# Patient Record
Sex: FEMALE | Race: ASIAN | NOT HISPANIC OR LATINO | URBAN - METROPOLITAN AREA
[De-identification: names, ages, dates, MRNs, and addresses within clinical notes are randomized per-mention and may not be internally consistent; named-entity substitution may affect disease eponyms.]

---

## 2017-07-01 ENCOUNTER — OUTPATIENT (OUTPATIENT)
Dept: OUTPATIENT SERVICES | Facility: HOSPITAL | Age: 77
LOS: 1 days | End: 2017-07-01
Payer: MEDICAID

## 2017-07-18 DIAGNOSIS — R69 ILLNESS, UNSPECIFIED: ICD-10-CM

## 2017-09-01 PROCEDURE — G9001: CPT

## 2019-04-01 ENCOUNTER — OUTPATIENT (OUTPATIENT)
Dept: OUTPATIENT SERVICES | Facility: HOSPITAL | Age: 79
LOS: 1 days | End: 2019-04-01
Payer: MEDICARE

## 2019-04-01 PROCEDURE — G9001: CPT

## 2019-04-17 DIAGNOSIS — Z71.89 OTHER SPECIFIED COUNSELING: ICD-10-CM

## 2021-09-27 ENCOUNTER — INPATIENT (INPATIENT)
Facility: HOSPITAL | Age: 81
LOS: 3 days | Discharge: HOME | End: 2021-10-01
Attending: HOSPITALIST | Admitting: HOSPITALIST
Payer: MEDICARE

## 2021-09-27 VITALS
HEART RATE: 44 BPM | DIASTOLIC BLOOD PRESSURE: 53 MMHG | RESPIRATION RATE: 114 BRPM | OXYGEN SATURATION: 100 % | SYSTOLIC BLOOD PRESSURE: 116 MMHG

## 2021-09-27 DIAGNOSIS — R55 SYNCOPE AND COLLAPSE: ICD-10-CM

## 2021-09-27 DIAGNOSIS — Z29.9 ENCOUNTER FOR PROPHYLACTIC MEASURES, UNSPECIFIED: ICD-10-CM

## 2021-09-27 DIAGNOSIS — R00.1 BRADYCARDIA, UNSPECIFIED: ICD-10-CM

## 2021-09-27 DIAGNOSIS — N39.0 URINARY TRACT INFECTION, SITE NOT SPECIFIED: ICD-10-CM

## 2021-09-27 DIAGNOSIS — Z87.81 PERSONAL HISTORY OF (HEALED) TRAUMATIC FRACTURE: Chronic | ICD-10-CM

## 2021-09-27 DIAGNOSIS — R29.6 REPEATED FALLS: ICD-10-CM

## 2021-09-27 DIAGNOSIS — Z98.890 OTHER SPECIFIED POSTPROCEDURAL STATES: Chronic | ICD-10-CM

## 2021-09-27 LAB
ALBUMIN SERPL ELPH-MCNC: 4.1 G/DL — SIGNIFICANT CHANGE UP (ref 3.3–5)
ALP SERPL-CCNC: 98 U/L — SIGNIFICANT CHANGE UP (ref 40–120)
ALT FLD-CCNC: 10 U/L — SIGNIFICANT CHANGE UP (ref 4–33)
ANION GAP SERPL CALC-SCNC: 14 MMOL/L — SIGNIFICANT CHANGE UP (ref 7–14)
APPEARANCE UR: CLEAR — SIGNIFICANT CHANGE UP
AST SERPL-CCNC: 17 U/L — SIGNIFICANT CHANGE UP (ref 4–32)
BACTERIA # UR AUTO: ABNORMAL
BASOPHILS # BLD AUTO: 0.03 K/UL — SIGNIFICANT CHANGE UP (ref 0–0.2)
BASOPHILS NFR BLD AUTO: 0.4 % — SIGNIFICANT CHANGE UP (ref 0–2)
BILIRUB SERPL-MCNC: 0.4 MG/DL — SIGNIFICANT CHANGE UP (ref 0.2–1.2)
BILIRUB UR-MCNC: NEGATIVE — SIGNIFICANT CHANGE UP
BLOOD GAS VENOUS COMPREHENSIVE RESULT: SIGNIFICANT CHANGE UP
BUN SERPL-MCNC: 8 MG/DL — SIGNIFICANT CHANGE UP (ref 7–23)
CALCIUM SERPL-MCNC: 9.2 MG/DL — SIGNIFICANT CHANGE UP (ref 8.4–10.5)
CHLORIDE SERPL-SCNC: 96 MMOL/L — LOW (ref 98–107)
CO2 SERPL-SCNC: 21 MMOL/L — LOW (ref 22–31)
COLOR SPEC: SIGNIFICANT CHANGE UP
CREAT SERPL-MCNC: 0.66 MG/DL — SIGNIFICANT CHANGE UP (ref 0.5–1.3)
DIFF PNL FLD: NEGATIVE — SIGNIFICANT CHANGE UP
EOSINOPHIL # BLD AUTO: 0.31 K/UL — SIGNIFICANT CHANGE UP (ref 0–0.5)
EOSINOPHIL NFR BLD AUTO: 4.3 % — SIGNIFICANT CHANGE UP (ref 0–6)
EPI CELLS # UR: 3 /HPF — SIGNIFICANT CHANGE UP (ref 0–5)
GLUCOSE SERPL-MCNC: 134 MG/DL — HIGH (ref 70–99)
GLUCOSE UR QL: NEGATIVE — SIGNIFICANT CHANGE UP
HCT VFR BLD CALC: 34.1 % — LOW (ref 34.5–45)
HGB BLD-MCNC: 11.6 G/DL — SIGNIFICANT CHANGE UP (ref 11.5–15.5)
HYALINE CASTS # UR AUTO: 7 /LPF — SIGNIFICANT CHANGE UP (ref 0–7)
IANC: 2.76 K/UL — SIGNIFICANT CHANGE UP (ref 1.5–8.5)
IMM GRANULOCYTES NFR BLD AUTO: 0.4 % — SIGNIFICANT CHANGE UP (ref 0–1.5)
KETONES UR-MCNC: NEGATIVE — SIGNIFICANT CHANGE UP
LEUKOCYTE ESTERASE UR-ACNC: ABNORMAL
LIDOCAIN IGE QN: 29 U/L — SIGNIFICANT CHANGE UP (ref 7–60)
LYMPHOCYTES # BLD AUTO: 3.56 K/UL — HIGH (ref 1–3.3)
LYMPHOCYTES # BLD AUTO: 49.3 % — HIGH (ref 13–44)
MAGNESIUM SERPL-MCNC: 2.2 MG/DL — SIGNIFICANT CHANGE UP (ref 1.6–2.6)
MCHC RBC-ENTMCNC: 28.8 PG — SIGNIFICANT CHANGE UP (ref 27–34)
MCHC RBC-ENTMCNC: 34 GM/DL — SIGNIFICANT CHANGE UP (ref 32–36)
MCV RBC AUTO: 84.6 FL — SIGNIFICANT CHANGE UP (ref 80–100)
MONOCYTES # BLD AUTO: 0.53 K/UL — SIGNIFICANT CHANGE UP (ref 0–0.9)
MONOCYTES NFR BLD AUTO: 7.3 % — SIGNIFICANT CHANGE UP (ref 2–14)
NEUTROPHILS # BLD AUTO: 2.76 K/UL — SIGNIFICANT CHANGE UP (ref 1.8–7.4)
NEUTROPHILS NFR BLD AUTO: 38.3 % — LOW (ref 43–77)
NITRITE UR-MCNC: NEGATIVE — SIGNIFICANT CHANGE UP
NRBC # BLD: 0 /100 WBCS — SIGNIFICANT CHANGE UP
NRBC # FLD: 0 K/UL — SIGNIFICANT CHANGE UP
NT-PROBNP SERPL-SCNC: 173 PG/ML — SIGNIFICANT CHANGE UP
PH UR: 7 — SIGNIFICANT CHANGE UP (ref 5–8)
PLATELET # BLD AUTO: 337 K/UL — SIGNIFICANT CHANGE UP (ref 150–400)
POTASSIUM SERPL-MCNC: 3.6 MMOL/L — SIGNIFICANT CHANGE UP (ref 3.5–5.3)
POTASSIUM SERPL-SCNC: 3.6 MMOL/L — SIGNIFICANT CHANGE UP (ref 3.5–5.3)
PROT SERPL-MCNC: 7.3 G/DL — SIGNIFICANT CHANGE UP (ref 6–8.3)
PROT UR-MCNC: ABNORMAL
RBC # BLD: 4.03 M/UL — SIGNIFICANT CHANGE UP (ref 3.8–5.2)
RBC # FLD: 12.2 % — SIGNIFICANT CHANGE UP (ref 10.3–14.5)
RBC CASTS # UR COMP ASSIST: 1 /HPF — SIGNIFICANT CHANGE UP (ref 0–4)
SARS-COV-2 RNA SPEC QL NAA+PROBE: SIGNIFICANT CHANGE UP
SODIUM SERPL-SCNC: 131 MMOL/L — LOW (ref 135–145)
SP GR SPEC: 1.01 — SIGNIFICANT CHANGE UP (ref 1–1.05)
T3FREE SERPL-MCNC: 2.75 PG/ML — SIGNIFICANT CHANGE UP (ref 1.8–4.6)
T4 AB SER-ACNC: 6.7 UG/DL — SIGNIFICANT CHANGE UP (ref 5.1–13)
TROPONIN T, HIGH SENSITIVITY RESULT: 7 NG/L — SIGNIFICANT CHANGE UP
TSH SERPL-MCNC: 5.01 UIU/ML — HIGH (ref 0.27–4.2)
UROBILINOGEN FLD QL: SIGNIFICANT CHANGE UP
WBC # BLD: 7.22 K/UL — SIGNIFICANT CHANGE UP (ref 3.8–10.5)
WBC # FLD AUTO: 7.22 K/UL — SIGNIFICANT CHANGE UP (ref 3.8–10.5)
WBC UR QL: 22 /HPF — HIGH (ref 0–5)

## 2021-09-27 PROCEDURE — 73100 X-RAY EXAM OF WRIST: CPT | Mod: 26,LT

## 2021-09-27 PROCEDURE — 70450 CT HEAD/BRAIN W/O DYE: CPT | Mod: 26

## 2021-09-27 PROCEDURE — 71045 X-RAY EXAM CHEST 1 VIEW: CPT | Mod: 26

## 2021-09-27 PROCEDURE — 73030 X-RAY EXAM OF SHOULDER: CPT | Mod: 26,LT

## 2021-09-27 PROCEDURE — 99233 SBSQ HOSP IP/OBS HIGH 50: CPT

## 2021-09-27 PROCEDURE — 99285 EMERGENCY DEPT VISIT HI MDM: CPT | Mod: 25

## 2021-09-27 PROCEDURE — 93010 ELECTROCARDIOGRAM REPORT: CPT

## 2021-09-27 PROCEDURE — 99223 1ST HOSP IP/OBS HIGH 75: CPT | Mod: GC

## 2021-09-27 RX ORDER — CEFTRIAXONE 500 MG/1
1000 INJECTION, POWDER, FOR SOLUTION INTRAMUSCULAR; INTRAVENOUS ONCE
Refills: 0 | Status: COMPLETED | OUTPATIENT
Start: 2021-09-27 | End: 2021-09-27

## 2021-09-27 RX ORDER — SODIUM CHLORIDE 9 MG/ML
1000 INJECTION INTRAMUSCULAR; INTRAVENOUS; SUBCUTANEOUS ONCE
Refills: 0 | Status: COMPLETED | OUTPATIENT
Start: 2021-09-27 | End: 2021-09-27

## 2021-09-27 RX ORDER — ONDANSETRON 8 MG/1
4 TABLET, FILM COATED ORAL ONCE
Refills: 0 | Status: COMPLETED | OUTPATIENT
Start: 2021-09-27 | End: 2021-09-27

## 2021-09-27 RX ORDER — ENOXAPARIN SODIUM 100 MG/ML
40 INJECTION SUBCUTANEOUS DAILY
Refills: 0 | Status: DISCONTINUED | OUTPATIENT
Start: 2021-09-27 | End: 2021-10-01

## 2021-09-27 RX ORDER — CEFTRIAXONE 500 MG/1
1000 INJECTION, POWDER, FOR SOLUTION INTRAMUSCULAR; INTRAVENOUS EVERY 24 HOURS
Refills: 0 | Status: DISCONTINUED | OUTPATIENT
Start: 2021-09-28 | End: 2021-09-29

## 2021-09-27 RX ADMIN — ENOXAPARIN SODIUM 40 MILLIGRAM(S): 100 INJECTION SUBCUTANEOUS at 23:28

## 2021-09-27 RX ADMIN — CEFTRIAXONE 100 MILLIGRAM(S): 500 INJECTION, POWDER, FOR SOLUTION INTRAMUSCULAR; INTRAVENOUS at 11:07

## 2021-09-27 RX ADMIN — SODIUM CHLORIDE 1000 MILLILITER(S): 9 INJECTION INTRAMUSCULAR; INTRAVENOUS; SUBCUTANEOUS at 08:00

## 2021-09-27 RX ADMIN — ONDANSETRON 4 MILLIGRAM(S): 8 TABLET, FILM COATED ORAL at 08:10

## 2021-09-27 NOTE — ED ADULT NURSE NOTE - OBJECTIVE STATEMENT
Pt received in room 17. Pt A&Ox2 (name and ), BIBEMS for evaluation of a fall and AMS. Per ffamily, pt has no pmhx and does not take any medications. Grandon at bedside states pt may have a history of low BP. States he heard her fall in kitchen and found her on floor, pt unable to provide any history or details. Pt c/o nausea. Pt denies cp, sob, abd pain, ha, dizziness v/d/, fevers/chills. Respirations even and unlabored, no accessory muscle use. Pt in sinus vitaly to 40's, MD aware at bedside. 20G placed to L AC, labs sent. Arrived with 20G to R hand from EMS. Stretcher in lowest position, side rails up, call bell within reach.

## 2021-09-27 NOTE — CONSULT NOTE ADULT - SUBJECTIVE AND OBJECTIVE BOX
Date of Admission: 9/27/2021    CHIEF COMPLAINT: unwitnessed fall    HISTORY OF PRESENT ILLNESS:  This is a 80yoF w/ no known past medical history presents after an unwitnessed fall.  As per daughter, patient was making tea when she syncopized     Allergies    No Known Allergies    Intolerances    	    MEDICATIONS:  enoxaparin Injectable 40 milliGRAM(s) SubCutaneous daily                  PAST MEDICAL & SURGICAL HISTORY:  H/O migraine    Bilateral cataracts    Syncope    H/O elbow surgery    H/O fracture of leg        FAMILY HISTORY:      SOCIAL HISTORY:    [ ] Non-smoker  [ ] Smoker  [ ] Alcohol      REVIEW OF SYSTEMS:  See HPI. Otherwise, 10 point ROS done and otherwise negative.    PHYSICAL EXAM:  T(C): 36.7 (09-27-21 @ 16:20), Max: 36.7 (09-27-21 @ 15:32)  HR: 82 (09-27-21 @ 16:20) (44 - 82)  BP: 173/90 (09-27-21 @ 16:20) (109/67 - 173/90)  RR: 18 (09-27-21 @ 16:20) (17 - 114)  SpO2: 100% (09-27-21 @ 16:20) (99% - 100%)  Wt(kg): --  I&O's Summary      Appearance: Normal	  HEENT:   Normal oral mucosa, PERRL, EOMI	  Lymphatic: No lymphadenopathy  Cardiovascular: Normal S1 S2, No JVD, No murmurs, No edema  Respiratory: Lungs clear to auscultation	  Psychiatry: A & O x 3, Mood & affect appropriate  Gastrointestinal:  Soft, Non-tender, + BS	  Skin: No rashes, No ecchymoses, No cyanosis	  Neurologic: Non-focal  Extremities: Normal range of motion, No clubbing, cyanosis or edema  Vascular: Peripheral pulses palpable 2+ bilaterally        LABS:	 	    CBC Full  -  ( 27 Sep 2021 07:57 )  WBC Count : 7.22 K/uL  Hemoglobin : 11.6 g/dL  Hematocrit : 34.1 %  Platelet Count - Automated : 337 K/uL  Mean Cell Volume : 84.6 fL  Mean Cell Hemoglobin : 28.8 pg  Mean Cell Hemoglobin Concentration : 34.0 gm/dL  Auto Neutrophil # : 2.76 K/uL  Auto Lymphocyte # : 3.56 K/uL  Auto Monocyte # : 0.53 K/uL  Auto Eosinophil # : 0.31 K/uL  Auto Basophil # : 0.03 K/uL  Auto Neutrophil % : 38.3 %  Auto Lymphocyte % : 49.3 %  Auto Monocyte % : 7.3 %  Auto Eosinophil % : 4.3 %  Auto Basophil % : 0.4 %    09-27    131<L>  |  96<L>  |  8   ----------------------------<  134<H>  3.6   |  21<L>  |  0.66    Ca    9.2      27 Sep 2021 07:57  Mg     2.20     09-27    TPro  7.3  /  Alb  4.1  /  TBili  0.4  /  DBili  x   /  AST  17  /  ALT  10  /  AlkPhos  98  09-27      proBNP: Serum Pro-Brain Natriuretic Peptide: 173 pg/mL (09-27 @ 07:57)    Lipid Profile:   HgA1c:   TSH: Thyroid Stimulating Hormone, Serum: 5.01 uIU/mL (09-27 @ 08:37)        CARDIAC MARKERS:            TELEMETRY: 	    ECG:  	  RADIOLOGY:  OTHER: 	    PREVIOUS DIAGNOSTIC TESTING:    [ ] Echocardiogram:  [ ]  Catheterization:  [ ] Stress Test:  	  	  ASSESSMENT/PLAN: 	     Date of Admission: 9/27/2021    CHIEF COMPLAINT: unwitnessed fall    HISTORY OF PRESENT ILLNESS:  This is a 80yoF w/ no known past medical history presents after an unwitnessed fall.  As per daughter, patient was making tea when she lost consciousness and collapsed, was found by her grandson.  She awoke immediately but remain altered and confused until arrival to ED.  Daughter states patient has not been feeling like herself since the night before.  Denies any changes in her medications, diet or lifestyle.  Does endorse a lifetime of intermittent dizziness that was never worked up.  Upon arrival of EMS, HR noted be in the 40's with improvement after a bolus of IVF.  In ED, was bradycardic 40-50's in AM but steadily improved to 70-90's when patient woke up during the day.  States she is feeling better however as per daughter still remains lethargic.  EKG noted to be sinus bradycardia.  Labs notable for TSH 5.01, Na 131, UA with few bacteria and large leukocytes.  CT head negative for acute bleed.  XR wrist and shoulder negative for acute fracture.      Allergies  No Known Allergies    MEDICATIONS:  enoxaparin Injectable 40 milliGRAM(s) SubCutaneous daily    PAST MEDICAL & SURGICAL HISTORY:  H/O migraine  Bilateral cataracts  Syncope  H/O elbow surgery  H/O fracture of leg    REVIEW OF SYSTEMS:  See HPI. Otherwise, 10 point ROS done and otherwise negative.    PHYSICAL EXAM:  T(C): 36.7 (09-27-21 @ 16:20), Max: 36.7 (09-27-21 @ 15:32)  HR: 82 (09-27-21 @ 16:20) (44 - 82)  BP: 173/90 (09-27-21 @ 16:20) (109/67 - 173/90)  RR: 18 (09-27-21 @ 16:20) (17 - 114)  SpO2: 100% (09-27-21 @ 16:20) (99% - 100%)  Wt(kg): --  I&O's Summary    Appearance: Normal	  HEENT:   Normal oral mucosa, PERRL, EOMI	  Lymphatic: No lymphadenopathy  Cardiovascular: Normal S1 S2, No JVD, No murmurs, No edema  Respiratory: Lungs clear to auscultation	  Psychiatry: A & O x 3, Mood & affect appropriate  Gastrointestinal:  Soft, Non-tender, + BS	  Skin: No rashes, No ecchymoses, No cyanosis	  Neurologic: Non-focal  Extremities: Normal range of motion, No clubbing, cyanosis or edema  Vascular: Peripheral pulses palpable 2+ bilaterally    LABS:	 	  CBC Full  -  ( 27 Sep 2021 07:57 )  WBC Count : 7.22 K/uL  Hemoglobin : 11.6 g/dL  Hematocrit : 34.1 %  Platelet Count - Automated : 337 K/uL  Mean Cell Volume : 84.6 fL  Mean Cell Hemoglobin : 28.8 pg  Mean Cell Hemoglobin Concentration : 34.0 gm/dL  Auto Neutrophil # : 2.76 K/uL  Auto Lymphocyte # : 3.56 K/uL  Auto Monocyte # : 0.53 K/uL  Auto Eosinophil # : 0.31 K/uL  Auto Basophil # : 0.03 K/uL  Auto Neutrophil % : 38.3 %  Auto Lymphocyte % : 49.3 %  Auto Monocyte % : 7.3 %  Auto Eosinophil % : 4.3 %  Auto Basophil % : 0.4 %    09-27    131<L>  |  96<L>  |  8   ----------------------------<  134<H>  3.6   |  21<L>  |  0.66    Ca    9.2      27 Sep 2021 07:57  Mg     2.20     09-27    TPro  7.3  /  Alb  4.1  /  TBili  0.4  /  DBili  x   /  AST  17  /  ALT  10  /  AlkPhos  98  09-27      proBNP: Serum Pro-Brain Natriuretic Peptide: 173 pg/mL (09-27 @ 07:57)    TSH: Thyroid Stimulating Hormone, Serum: 5.01 uIU/mL (09-27 @ 08:37)

## 2021-09-27 NOTE — H&P ADULT - ASSESSMENT
81 y/o female w/ h/o dizziness and syncope, migraine HA, and cataracts being admitted d/t unwitnessed fall and possible syncope. On arrival, pt was bradycardic to 44 bpm and found to have UTI. 81 y/o female w/ h/o dizziness and syncope, migraine HA, and cataracts being admitted d/t unwitnessed fall and possible syncope found to be bradycardic on initial presentation and with possible UTI.

## 2021-09-27 NOTE — ED PROVIDER NOTE - CLINICAL SUMMARY MEDICAL DECISION MAKING FREE TEXT BOX
80F presents with AMS, bradycardia. EKG appears to be sinus rhythm at this time. Pt intermittently dropping HR to 40s when vomting. Unclear if intermittent block or vagaling. Will check labs for electrolytes, trops, thyroid studies. Pt on zole and cardiac monitor. CT head for fall. Discuss with cards, TBA.

## 2021-09-27 NOTE — H&P ADULT - NEGATIVE GENERAL SYMPTOMS
Pt reports "feeling bad" day before fall./no fever/no chills Pt reports "feeling bad" day before fall. - wanted to rest more than normal/no fever/no chills

## 2021-09-27 NOTE — H&P ADULT - GASTROINTESTINAL DETAILS
soft/no distention/no masses palpable/bowel sounds normal/no rebound tenderness/no guarding/no rigidity

## 2021-09-27 NOTE — H&P ADULT - PROBLEM SELECTOR PLAN 2
- HR was 44 bpm and now up to 78 bpm  - Atropine if pt becomes symptomatic  - Cardiology consult. - Sinus vitaly to 44 on initial presentation without evidence of high grade AV block.  - heart rate has now improved and is stable in 70's while resting in bed  - Suspect patient had increased vagal tone but will continue to monitor  - Hold AVNB for now  - If becomes symptomatically bradycardic may place pacing pads on patient and keep zoll at bedside with Atropine  - EP consult called

## 2021-09-27 NOTE — ED PROVIDER NOTE - OBJECTIVE STATEMENT
80F unknown PMH presents with AMS. As per family, pt has been in [facility] recently. This AM they heard her fall in the kitchen and thought she appeared altered so called 911. States pt takes no medications, has no known medical history except that her BP is sometimes low. States she is mostly independent with ADLs. AAOx2 at baseline. Pt altered but only c/o epigastric pain, nausea.

## 2021-09-27 NOTE — H&P ADULT - HISTORY OF PRESENT ILLNESS
81 y/o female w/ h/o dizziness and syncope, migraine HA, and cataracts presents s/p unwitnessed fall this morning. Pt states that she was "feeling bad" day before and that she had a HA, which improved w/ Tylenol. This morning pt was making tea with her grandson and went to go from the kitchen to the next room where she fell. Pt does not remember events of the fall but states that she started feeling dizzy and then her legs gave out. States that she may have hit the back of her head while falling. Per daughter who was present in her room, pt's grandson went to her immediately after he heard her fall and called EMS. Pt's daughter also came very quickly and laid the pt on the floor and placed a pillow under her head. Her daughter states that when she found her, her eyes were closed and when they opened they were unable to focus. Pt was also unable to communicate. States that mental status improved rapidly and was close to baseline by the time EMS arrived and was able to answer their questions. Per daughter, pt suffers from dizziness and syncope since she was younger but denies any recent events and states that they have never seen her like that before. Daughter denies any cardiac history but states that her BP fluctuates and sometimes gets very low. After the fall pt was c/o left wrist pain, which is aggravated w/ movement. Also reports 1 episode of vomiting in the ambulance and RUQ/RLQ pain that started after the fall. Pt states that she did not eat anything this morning except a few sips of tea. For the last month, pt describes feeling shaky especially in her arms w/ extension. Also describes numbness in her feet which started yesterday. Denies any chest pain, SOB, cough, congestion, vision changes, hearing changes, nausea, diarrhea, constipation, hematochezia, melena, hematuria, and any new rashes.   In ED, pt A&O x 2 (alert to person and place but not time), which is pt's baseline mental status. HR was as low as 44 bpm. Fully COVID vaccinated w/ Pfizer vaccine.

## 2021-09-27 NOTE — ED ADULT NURSE NOTE - NSIMPLEMENTINTERV_GEN_ALL_ED
Implemented All Fall with Harm Risk Interventions:  [place] to call system. Call bell, personal items and telephone within reach. Instruct patient to call for assistance. Room bathroom lighting operational. Non-slip footwear when patient is off stretcher. Physically safe environment: no spills, clutter or unnecessary equipment. Stretcher in lowest position, wheels locked, appropriate side rails in place. Provide visual cue, wrist band, yellow gown, etc. Monitor gait and stability. Monitor for mental status changes and reorient to person, place, and time. Review medications for side effects contributing to fall risk. Reinforce activity limits and safety measures with patient and family. Provide visual clues: red socks.

## 2021-09-27 NOTE — ED PROVIDER NOTE - ATTENDING CONTRIBUTION TO CARE
Patient is a 81 yo F BIBEMS for evaluation of fall and AMS. Per family, patient has no medical problems and does not take any medications. Her grandson is at bedside, states she has a history of low blood pressure. He states he heard her fall in the kitchen. Patient can state she fell but cannnot provide any details. She is oriented to self and situation but not year. No known sick contacts. She is vaccinated for COVID19.    VS noted  Gen. no acute distress, elderly  HEENT: EOMI, mmm, atraumatic  Lungs: CTAB/L no C/ W /R   CVS: RRR   Abd; Soft epigastric tenderness, non distended   Pelvis: stable, hips non-tender bilaterally, full ROM  Ext: no edema  Skin: no rash  Neuro AAOx2 non focal clear speech  a/p: fall/ syncope - plan for labs, CT Head, CT A/P, u/a and reassess.   - Andrews NERI

## 2021-09-27 NOTE — H&P ADULT - PROBLEM SELECTOR PLAN 3
- Ceftriaxone given in ED  - Continue ceftriaxone - UA weakly positive and patient afebrile without leukocytosis HOWEVER given fall in elderly patient will treat as UTI.  - Ceftriaxone given in ED - will conitnue for now  - Follow up UCx

## 2021-09-27 NOTE — H&P ADULT - PROBLEM SELECTOR PLAN 1
- Admit to medicine on telemetry  - CT head unremarkable  - Xray left wrist: no acute fracture  - Xray left shoulder: no acute fracture  - EKG: Sinus vitaly @53 bpm w/ occasional PVC and RBBB  - +1 pitting edema on physical exam  - UA: 22 WBC, few bacteria, large leukocyte esterase  - TSH: 5.1  - Cardiology consult.   - TTE ordered  - Orthostatic vitals.  - Monitor vitals q4h.  - Ceftriaxone given in ED for UTI  - Repeat CBC for signs of infection  - Tylenol PRN for pain management and odansetron PRN   - Fall risk procedure - Admit to medicine on telemetry  - Unwitnessed fall at home while walking in hallway found to be bradycardic to 44 and with possible UTI; DDx includes symptomatic bradycardia vs vasovagal syncope vs mechanical fall  - CT head reviewed and is  unremarkable  - X-ray left wrist and left shoulder were without  acute fracture  - EKG: Sinus vitaly @53 bpm w/ occasional PVC and RBBB  - TSH: 5.1 and T4 6.7 - doubt thyroid induced bradycardia  - TTE ordered  - Orthostatic  BP ordered  - House EP called to eval   - Fall precautions and PT

## 2021-09-27 NOTE — H&P ADULT - NSHPLABSRESULTS_GEN_ALL_CORE
11.6   7.22  )-----------( 337      ( 27 Sep 2021 07:57 )             34.1       09-27    131<L>  |  96<L>  |  8   ----------------------------<  134<H>  3.6   |  21<L>  |  0.66    Ca    9.2      27 Sep 2021 07:57  Mg     2.20     09-27    TPro  7.3  /  Alb  4.1  /  TBili  0.4  /  DBili  x   /  AST  17  /  ALT  10  /  AlkPhos  98  09-27      EKG: Sinus vitaly @53 bpm w/ occasional PVC and RBBB    Thyroid Stimulating Hormone, Serum: 5.01 uIU/mL (09.27.21 @ 08:37)  T4, Serum (09.27.21 @ 08:37)    T4, Serum: 6.70 ug/dL    Urinalysis + Microscopic Examination (09.27.21 @ 09:34)    Urine Appearance: Clear    Urobilinogen: <2 mg/dL    Specific Gravity: 1.012    Protein, Urine: Trace    pH Urine: 7.0    Leukocyte Esterase Concentration: Large    Nitrite: Negative    Ketone - Urine: Negative    Bilirubin: Negative    Color: Light Yellow    Glucose Qualitative, Urine: Negative    Blood, Urine: Negative    Red Blood Cell - Urine: 1 /HPF    White Blood Cell - Urine: 22 /HPF    Epithelial Cells: 3 /HPF    Hyaline Casts: 7 /LPF    Bacteria: Few    CT head: No hydrocephalus, mass effect, midline shift, acute intracranial hemorrhage, or brain edema.  Mild white matter microvascular ischemic disease. Visualized paranasal sinuses and mastoid air cells are clear. No hydrocephalus, acute intracranial hemorrhage, mass effect, or brain edema.    X-ray Left Wrist: Generalized bone demineralization is noted. There is old fracture deformity of the distal radius. There is positive ulnar variance which is likely acquired. There is widening at the distal radioulnar joint on the AP view which is age indeterminate but also likely chronic. No acute fracture is identified.    X-ray Left Shoulder: There is a high riding humeral head consistent with chronic rotator cuff tearing. There is mild glenohumeral arthrosis. There are ossific bodies in the region of the supraspinatus, infraspinatus and teres minor tendons. There may be an os acromiale. There is moderate AC joint arthrosis. No acute fracture is identified.

## 2021-09-27 NOTE — H&P ADULT - NSHPSOCIALHISTORY_GEN_ALL_CORE
Pt currently lives at home with her daughter and grandson. States that she is able to ambulate w/ cane. Denies smoking/drugs/alcohol use.

## 2021-09-27 NOTE — H&P ADULT - ATTENDING COMMENTS
79 y/o female w/ h/o dizziness and syncope, migraine HA, and cataracts p/w syncope and fall. Found to have sinus bradycardia to 48bpm, RBBB (no prior EKG to compare) and UTI.    PE:  lethargic but AAO x 3, 5/5 b/l UE and LE strength, cn 2-12 intact  CTABL, S1,S2, abd soft NT, ND, +BS  Limited ROM of L. wrist and L. shoulder    Labs/Imaging:  No leukocytosis, hgb stable, hypona 131, HCO3 21, TSH 5.01 FT4 6.70, UA +  CTH was unremarkable  CXR showed calcified nodule on R. lower lung - made daughter aware, can f/u outpt  Xray wrist/Shoulder - chronic arthritis, no e/o fracture    Plan:  Sinus bradycardia - EP c/s, TTE, tele monitoring  check orthostatics  mild hypoNa - if worsening can consider further w/u  IV CTX x 3d for UTI  low suspicion for seizure/CNS etiology  PT c/s - although pt and daughter not interested in K[facility]

## 2021-09-27 NOTE — ED ADULT NURSE NOTE - CHIEF COMPLAINT QUOTE
awake responsive  lethargic  normally Alert oriented x 4 s/p unwitnessed syncopal episode fell on to wood floor  found on floor by son ( was in same room)  HR 44  no med hx

## 2021-09-27 NOTE — ED ADULT NURSE REASSESSMENT NOTE - NS ED NURSE REASSESS COMMENT FT1
Pt states she had fell this morning. R wrist mildly swollen with a hematoma noted. No signs of distress noted at this time. Pt placed on zoll r/t bradycardia occasionally <54bpm.  Labs collected and sent. Will CTM. Pt received to room 17. A&Ox3. Pt states she had fell this morning. R wrist mildly swollen with a hematoma noted. No signs of distress noted at this time. Pt placed on zoll r/t bradycardia occasionally <54bpm.  Labs collected and sent. Will CTM.

## 2021-09-27 NOTE — ED ADULT TRIAGE NOTE - CHIEF COMPLAINT QUOTE
awake responsive  lethargic  normally Alert oriented x 4 s/p unwitnessed syncopal episode fell on to wood floor  found on floor by son ( was in same room)  HR 44  no med hx
Attending Attestation (For Attendings USE Only)...

## 2021-09-27 NOTE — H&P ADULT - CARDIOVASCULAR
PA submitted via CM for Vraylar 1.5MG capsules.   (Petrona Fischer) Rx #: [id number]    STATUS: PENDING details… detailed exam

## 2021-09-28 PROBLEM — R55 SYNCOPE AND COLLAPSE: Chronic | Status: ACTIVE | Noted: 2021-09-27

## 2021-09-28 PROBLEM — H26.9 UNSPECIFIED CATARACT: Chronic | Status: ACTIVE | Noted: 2021-09-27

## 2021-09-28 LAB
ANION GAP SERPL CALC-SCNC: 14 MMOL/L — SIGNIFICANT CHANGE UP (ref 7–14)
BUN SERPL-MCNC: 10 MG/DL — SIGNIFICANT CHANGE UP (ref 7–23)
CALCIUM SERPL-MCNC: 9.6 MG/DL — SIGNIFICANT CHANGE UP (ref 8.4–10.5)
CHLORIDE SERPL-SCNC: 98 MMOL/L — SIGNIFICANT CHANGE UP (ref 98–107)
CO2 SERPL-SCNC: 21 MMOL/L — LOW (ref 22–31)
COVID-19 SPIKE DOMAIN AB INTERP: POSITIVE
COVID-19 SPIKE DOMAIN ANTIBODY RESULT: >250 U/ML — HIGH
CREAT SERPL-MCNC: 0.59 MG/DL — SIGNIFICANT CHANGE UP (ref 0.5–1.3)
CULTURE RESULTS: NO GROWTH — SIGNIFICANT CHANGE UP
GLUCOSE SERPL-MCNC: 114 MG/DL — HIGH (ref 70–99)
HCT VFR BLD CALC: 37.5 % — SIGNIFICANT CHANGE UP (ref 34.5–45)
HGB BLD-MCNC: 13.5 G/DL — SIGNIFICANT CHANGE UP (ref 11.5–15.5)
MAGNESIUM SERPL-MCNC: 2.1 MG/DL — SIGNIFICANT CHANGE UP (ref 1.6–2.6)
MCHC RBC-ENTMCNC: 29.8 PG — SIGNIFICANT CHANGE UP (ref 27–34)
MCHC RBC-ENTMCNC: 36 GM/DL — SIGNIFICANT CHANGE UP (ref 32–36)
MCV RBC AUTO: 82.8 FL — SIGNIFICANT CHANGE UP (ref 80–100)
NRBC # BLD: 0 /100 WBCS — SIGNIFICANT CHANGE UP
NRBC # FLD: 0 K/UL — SIGNIFICANT CHANGE UP
PHOSPHATE SERPL-MCNC: 3.4 MG/DL — SIGNIFICANT CHANGE UP (ref 2.5–4.5)
PLATELET # BLD AUTO: 333 K/UL — SIGNIFICANT CHANGE UP (ref 150–400)
POTASSIUM SERPL-MCNC: 4 MMOL/L — SIGNIFICANT CHANGE UP (ref 3.5–5.3)
POTASSIUM SERPL-SCNC: 4 MMOL/L — SIGNIFICANT CHANGE UP (ref 3.5–5.3)
RBC # BLD: 4.53 M/UL — SIGNIFICANT CHANGE UP (ref 3.8–5.2)
RBC # FLD: 12.3 % — SIGNIFICANT CHANGE UP (ref 10.3–14.5)
SARS-COV-2 IGG+IGM SERPL QL IA: >250 U/ML — HIGH
SARS-COV-2 IGG+IGM SERPL QL IA: POSITIVE
SODIUM SERPL-SCNC: 133 MMOL/L — LOW (ref 135–145)
SPECIMEN SOURCE: SIGNIFICANT CHANGE UP
WBC # BLD: 8.14 K/UL — SIGNIFICANT CHANGE UP (ref 3.8–10.5)
WBC # FLD AUTO: 8.14 K/UL — SIGNIFICANT CHANGE UP (ref 3.8–10.5)

## 2021-09-28 PROCEDURE — 99233 SBSQ HOSP IP/OBS HIGH 50: CPT

## 2021-09-28 PROCEDURE — 93620 COMP EP EVL R AT VEN PAC&REC: CPT | Mod: 26

## 2021-09-28 PROCEDURE — 33285 INSJ SUBQ CAR RHYTHM MNTR: CPT

## 2021-09-28 PROCEDURE — 99232 SBSQ HOSP IP/OBS MODERATE 35: CPT | Mod: 25

## 2021-09-28 PROCEDURE — 93010 ELECTROCARDIOGRAM REPORT: CPT

## 2021-09-28 RX ORDER — INFLUENZA VIRUS VACCINE 15; 15; 15; 15 UG/.5ML; UG/.5ML; UG/.5ML; UG/.5ML
0.7 SUSPENSION INTRAMUSCULAR ONCE
Refills: 0 | Status: DISCONTINUED | OUTPATIENT
Start: 2021-09-28 | End: 2021-10-01

## 2021-09-28 RX ORDER — INFLUENZA VIRUS VACCINE 15; 15; 15; 15 UG/.5ML; UG/.5ML; UG/.5ML; UG/.5ML
0.5 SUSPENSION INTRAMUSCULAR ONCE
Refills: 0 | Status: DISCONTINUED | OUTPATIENT
Start: 2021-09-28 | End: 2021-09-28

## 2021-09-28 RX ORDER — ACETAMINOPHEN 500 MG
650 TABLET ORAL ONCE
Refills: 0 | Status: COMPLETED | OUTPATIENT
Start: 2021-09-28 | End: 2021-09-28

## 2021-09-28 RX ADMIN — CEFTRIAXONE 100 MILLIGRAM(S): 500 INJECTION, POWDER, FOR SOLUTION INTRAMUSCULAR; INTRAVENOUS at 10:59

## 2021-09-28 RX ADMIN — ENOXAPARIN SODIUM 40 MILLIGRAM(S): 100 INJECTION SUBCUTANEOUS at 11:00

## 2021-09-28 RX ADMIN — Medication 650 MILLIGRAM(S): at 23:09

## 2021-09-28 NOTE — PROGRESS NOTE ADULT - ASSESSMENT
79 y/o female w/ h/o dizziness and syncope, migraine HA, and cataracts being admitted d/t unwitnessed fall and possible syncope found to be bradycardic on initial presentation and UTI.

## 2021-09-28 NOTE — PHYSICAL THERAPY INITIAL EVALUATION ADULT - DISCHARGE DISPOSITION, PT EVAL
to ensure safety with ambulation in the home environment with new device being recommended/home w/ home PT

## 2021-09-28 NOTE — PROGRESS NOTE ADULT - ASSESSMENT
80yoF w/ no known past medical history presents after an unwitnessed fall.  As per daughter, patient was making tea when she lost consciousness and collapsed, was found by her grandson.  She awoke immediately but remain altered and confused until arrival to ED.  Daughter states patient has not been feeling like herself since the night before.  Denies any changes in her medications, diet or lifestyle.  Does endorse a lifetime of intermittent dizziness that was never worked up.  Upon arrival of EMS, HR noted be in the 40's with improvement after a bolus of IVF.  In ED, was bradycardic 40-50's in AM but steadily improved to 70-90's when patient woke up during the day.  States she is feeling better however as per daughter still remains lethargic.  EKG noted to be sinus bradycardia.     Syncope and collapse   Monitor on telemetry   F/u TTE   Orthostatic vitals   NPO after midnight for EP study +/- PPM placement - risks and benefits discussed with patient and her daughter consent in chart   Keep K>4 Mg>2   80yoF w/ no known past medical history presents after an unwitnessed fall.  As per daughter, patient was making tea when she lost consciousness and collapsed, was found by her grandson.  She awoke immediately but remain altered and confused until arrival to ED.  Daughter states patient has not been feeling like herself since the night before.  Denies any changes in her medications, diet or lifestyle.  Does endorse a lifetime of intermittent dizziness that was never worked up.  Upon arrival of EMS, HR noted be in the 40's with improvement after a bolus of IVF.  In ED, was bradycardic 40-50's in AM but steadily improved to 70-90's when patient woke up during the day.  States she is feeling better however as per daughter still remains lethargic.  EKG noted to be sinus bradycardia.     Syncope and collapse   Monitor on telemetry   F/u TTE   Orthostatic vitals   NPO after midnight for EP study +/- PPM placement - risks and benefits discussed with patient and her daughter (patient's HCP) consent in chart   Keep K>4 Mg>2   80yoF w/ no known past medical history presents after an unwitnessed fall.  As per daughter, patient was making tea when she lost consciousness and collapsed, was found by her grandson.  She awoke immediately but remain altered and confused until arrival to ED.  Daughter states patient has not been feeling like herself since the night before.  Denies any changes in her medications, diet or lifestyle.  Does endorse a lifetime of intermittent dizziness that was never worked up.  Upon arrival of EMS, HR noted be in the 40's with improvement after a bolus of IVF.  In ED, was bradycardic 40-50's in AM but steadily improved to 70-90's when patient woke up during the day.  States she is feeling better however as per daughter still remains lethargic.  EKG noted to be sinus bradycardia.     Syncope and collapse   Monitor on telemetry   F/u TTE   Orthostatic vitals   NPO after midnight for EP study +/- PPM placement - risks and benefits discussed with patient and her daughter (patient's  [phone]) consent in chart   Keep K>4 Mg>2

## 2021-09-29 LAB
ANION GAP SERPL CALC-SCNC: 16 MMOL/L — HIGH (ref 7–14)
BUN SERPL-MCNC: 15 MG/DL — SIGNIFICANT CHANGE UP (ref 7–23)
CALCIUM SERPL-MCNC: 9.9 MG/DL — SIGNIFICANT CHANGE UP (ref 8.4–10.5)
CHLORIDE SERPL-SCNC: 97 MMOL/L — LOW (ref 98–107)
CO2 SERPL-SCNC: 18 MMOL/L — LOW (ref 22–31)
CREAT SERPL-MCNC: 0.75 MG/DL — SIGNIFICANT CHANGE UP (ref 0.5–1.3)
GLUCOSE SERPL-MCNC: 160 MG/DL — HIGH (ref 70–99)
HCT VFR BLD CALC: 39.5 % — SIGNIFICANT CHANGE UP (ref 34.5–45)
HGB BLD-MCNC: 13.8 G/DL — SIGNIFICANT CHANGE UP (ref 11.5–15.5)
MAGNESIUM SERPL-MCNC: 2.3 MG/DL — SIGNIFICANT CHANGE UP (ref 1.6–2.6)
MCHC RBC-ENTMCNC: 29 PG — SIGNIFICANT CHANGE UP (ref 27–34)
MCHC RBC-ENTMCNC: 34.9 GM/DL — SIGNIFICANT CHANGE UP (ref 32–36)
MCV RBC AUTO: 83 FL — SIGNIFICANT CHANGE UP (ref 80–100)
NRBC # BLD: 0 /100 WBCS — SIGNIFICANT CHANGE UP
NRBC # FLD: 0 K/UL — SIGNIFICANT CHANGE UP
PHOSPHATE SERPL-MCNC: 3.7 MG/DL — SIGNIFICANT CHANGE UP (ref 2.5–4.5)
PLATELET # BLD AUTO: 358 K/UL — SIGNIFICANT CHANGE UP (ref 150–400)
POTASSIUM SERPL-MCNC: 4.2 MMOL/L — SIGNIFICANT CHANGE UP (ref 3.5–5.3)
POTASSIUM SERPL-SCNC: 4.2 MMOL/L — SIGNIFICANT CHANGE UP (ref 3.5–5.3)
RBC # BLD: 4.76 M/UL — SIGNIFICANT CHANGE UP (ref 3.8–5.2)
RBC # FLD: 12.5 % — SIGNIFICANT CHANGE UP (ref 10.3–14.5)
SODIUM SERPL-SCNC: 131 MMOL/L — LOW (ref 135–145)
WBC # BLD: 5.95 K/UL — SIGNIFICANT CHANGE UP (ref 3.8–10.5)
WBC # FLD AUTO: 5.95 K/UL — SIGNIFICANT CHANGE UP (ref 3.8–10.5)

## 2021-09-29 PROCEDURE — 99232 SBSQ HOSP IP/OBS MODERATE 35: CPT

## 2021-09-29 PROCEDURE — 99221 1ST HOSP IP/OBS SF/LOW 40: CPT

## 2021-09-29 PROCEDURE — 73080 X-RAY EXAM OF ELBOW: CPT | Mod: 26,LT

## 2021-09-29 PROCEDURE — 71045 X-RAY EXAM CHEST 1 VIEW: CPT | Mod: 26

## 2021-09-29 RX ORDER — ACETAMINOPHEN 500 MG
650 TABLET ORAL EVERY 6 HOURS
Refills: 0 | Status: DISCONTINUED | OUTPATIENT
Start: 2021-09-29 | End: 2021-10-01

## 2021-09-29 RX ADMIN — ENOXAPARIN SODIUM 40 MILLIGRAM(S): 100 INJECTION SUBCUTANEOUS at 11:19

## 2021-09-29 RX ADMIN — CEFTRIAXONE 100 MILLIGRAM(S): 500 INJECTION, POWDER, FOR SOLUTION INTRAMUSCULAR; INTRAVENOUS at 11:57

## 2021-09-29 RX ADMIN — Medication 650 MILLIGRAM(S): at 00:09

## 2021-09-29 RX ADMIN — Medication 650 MILLIGRAM(S): at 11:15

## 2021-09-29 RX ADMIN — Medication 650 MILLIGRAM(S): at 12:06

## 2021-09-29 NOTE — PROVIDER CONTACT NOTE (OTHER) - SITUATION
Patient came in for a fall, was c/o pain to her left arm but x-rays were completed in ED. RN alerted ACP that patients arm appears more swollen and ecchymotic then on admission yesterday.

## 2021-09-29 NOTE — CONSULT NOTE ADULT - ATTENDING COMMENTS
agree with above.    80F s/p fall, now with nondisplaced L radial neck fracture.     NWB LUE.  Sling for comfort for up to 1 week, then remove.  Work on ROM multiple times throughout the day, limit supination/pronation for now.   FU in 2 weeks for rpt xrays.
80yoF w/ no known past medical history presents after an unwitnessed fall.  EP consulted for bradycardia, which was in the setting of vomiting and likely vagal. However, patient also with unwitnessed syncopal episode proceeded with dizziness. Could be vagal, however difficult to ascertain as she is not a great historian. EKG shows RBBB. Will plan for EPS +/- PPM placement depending on HV interval.

## 2021-09-29 NOTE — CONSULT NOTE ADULT - SUBJECTIVE AND OBJECTIVE BOX
Orthopedic Surgery Consult Note    80y Female RHD who presents s/p mechanical fall onto left arm. Patient was making tea when she went to walk to another room and experienced a syncopal episode. Reports pain and difficulty moving affected extremity afterward. Patient does not fully remember the incident, but denies headstrike. Denies numbness/tingling of the affected extremity. No other bone or joint complaints. She was admitted for a syncopal workup and x-rays of her wrist and shoulder were performed, which were negative. When she continued to have pain, elbow x-rays were ordered. Patient is able to move her left arm but it is painful.    PAST MEDICAL & SURGICAL HISTORY:  H/O migraine    Bilateral cataracts    Syncope    H/O elbow surgery    H/O fracture of leg      MEDICATIONS  (STANDING):  enoxaparin Injectable 40 milliGRAM(s) SubCutaneous daily  influenza  Vaccine (HIGH DOSE) 0.7 milliLiter(s) IntraMuscular once    MEDICATIONS  (PRN):  acetaminophen   Tablet .. 650 milliGRAM(s) Oral every 6 hours PRN Mild Pain (1 - 3), Moderate Pain (4 - 6)    No Known Allergies      Physical Exam  T(C): 36.3 (09-29-21 @ 22:03), Max: 36.6 (09-29-21 @ 11:51)  HR: 88 (09-29-21 @ 22:03) (80 - 88)  BP: 153/85 (09-29-21 @ 22:03) (150/79 - 156/83)  RR: 19 (09-29-21 @ 22:03) (16 - 19)  SpO2: 100% (09-29-21 @ 22:03) (99% - 100%)  Wt(kg): --    Gen: NAD  LUE: skin intact, no ecchymosis  Swelling, TTP about left elbow, no visible deformity  Full painless ROM of wrist/shoulder  Elbow motion full, but limited by pain to 10-90 degrees flexion/extension  Full pronation/supination with no bony block to motion  AIN/PIN/U intact  SILT M/U/R  2+ radial pulses, cap refill < 2s    Imaging  X-ray L elbow demonstrates an impacted mildly displaced radial neck fracture

## 2021-09-29 NOTE — PROGRESS NOTE ADULT - ASSESSMENT
80yoF w/ no known past medical history presents after an unwitnessed fall.  As per daughter, patient was making tea when she lost consciousness and collapsed, was found by her grandson.  She awoke immediately but remain altered and confused until arrival to ED.  Daughter states patient has not been feeling like herself since the night before.  Denies any changes in her medications, diet or lifestyle.  Does endorse a lifetime of intermittent dizziness that was never worked up.  Upon arrival of EMS, HR noted be in the 40's with improvement after a bolus of IVF.  In ED, was bradycardic 40-50's in AM but steadily improved to 70-90's when patient woke up during the day.  States she is feeling better however as per daughter still remains lethargic.  EKG noted to be sinus bradycardia.     Syncope and collapse   Monitor on telemetry   s/p EP study and ILR on 9/28; follow up instructions discussed with daughter (HCP)  Keep K>4 Mg>2

## 2021-09-29 NOTE — PROGRESS NOTE ADULT - ASSESSMENT
79 y/o female w/ h/o dizziness and syncope, migraine HA, and cataracts being admitted d/t unwitnessed fall and possible syncope found to be bradycardic on initial presentation and UTI. Now s/p ILR and nml EPS.

## 2021-09-29 NOTE — OCCUPATIONAL THERAPY INITIAL EVALUATION ADULT - RANGE OF MOTION EXAMINATION, UPPER EXTREMITY
except left shoulder 0-50 flex actively/bilateral UE Active Assistive ROM was WFL  (within functional limits)

## 2021-09-29 NOTE — OCCUPATIONAL THERAPY INITIAL EVALUATION ADULT - PERTINENT HX OF CURRENT PROBLEM, REHAB EVAL
Pt is a 81 y/o female w/ h/o dizziness and syncope, migraine HA, and cataracts being admitted d/t unwitnessed fall and possible syncope found to be bradycardic on initial presentation and with possible UTI. Left shoulder and wrist x-ray negative for fx.

## 2021-09-29 NOTE — PROGRESS NOTE ADULT - ATTENDING COMMENTS
79 y/o female with traumatic syncope. EPS unremarkable. Patient now s/p ILR. Will need outpatient follow up.
80yoF w/ no known past medical history presents after an unwitnessed fall.  EP consulted for bradycardia, which was in the setting of vomiting and likely vagal. However, patient also with unwitnessed syncopal episode proceeded with dizziness. Could be vagal, however difficult to ascertain as she is not a great historian. EKG shows RBBB. EPS study revealed no conduction disease. Decision was made to proceed with ILR placement for monitoring for recurrent syncope given that she had trauma with this episode.

## 2021-09-29 NOTE — CONSULT NOTE ADULT - ASSESSMENT
A/P: 80y Female s/p fall found to have minimally displaced radial neck fracture    - Pain control  - NWB in sling  - OT  - Come out of sling multiple times per day for ROM to prevent stiffness  - Follow up with Dr. Lubna Henry in 2 weeks for repeat x-rays. Please call [phone] to schedule an appointment
80yoF w/ no known past medical history presents after an unwitnessed fall.  As per daughter, patient was making tea when she lost consciousness and collapsed, was found by her grandson.  She awoke immediately but remain altered and confused until arrival to ED.  Daughter states patient has not been feeling like herself since the night before.  Denies any changes in her medications, diet or lifestyle.  Does endorse a lifetime of intermittent dizziness that was never worked up.  Upon arrival of EMS, HR noted be in the 40's with improvement after a bolus of IVF.  In ED, was bradycardic 40-50's in AM but steadily improved to 70-90's when patient woke up during the day.  States she is feeling better however as per daughter still remains lethargic.  EKG noted to be sinus bradycardia.     Syncope and collapse   Monitor on telemetry   F/u TTE   Orthostatic vitals   NPO after midnight for EP study   Keep K>4 Mg>2

## 2021-09-30 ENCOUNTER — TRANSCRIPTION ENCOUNTER (OUTPATIENT)
Age: 81
End: 2021-09-30

## 2021-09-30 DIAGNOSIS — R91.1 SOLITARY PULMONARY NODULE: ICD-10-CM

## 2021-09-30 DIAGNOSIS — S52.133A: ICD-10-CM

## 2021-09-30 LAB
ANION GAP SERPL CALC-SCNC: 12 MMOL/L — SIGNIFICANT CHANGE UP (ref 7–14)
BUN SERPL-MCNC: 17 MG/DL — SIGNIFICANT CHANGE UP (ref 7–23)
CALCIUM SERPL-MCNC: 9.3 MG/DL — SIGNIFICANT CHANGE UP (ref 8.4–10.5)
CHLORIDE SERPL-SCNC: 97 MMOL/L — LOW (ref 98–107)
CO2 SERPL-SCNC: 22 MMOL/L — SIGNIFICANT CHANGE UP (ref 22–31)
CREAT SERPL-MCNC: 0.63 MG/DL — SIGNIFICANT CHANGE UP (ref 0.5–1.3)
GLUCOSE SERPL-MCNC: 101 MG/DL — HIGH (ref 70–99)
HCT VFR BLD CALC: 32.7 % — LOW (ref 34.5–45)
HGB BLD-MCNC: 11.7 G/DL — SIGNIFICANT CHANGE UP (ref 11.5–15.5)
MAGNESIUM SERPL-MCNC: 2.3 MG/DL — SIGNIFICANT CHANGE UP (ref 1.6–2.6)
MCHC RBC-ENTMCNC: 29.6 PG — SIGNIFICANT CHANGE UP (ref 27–34)
MCHC RBC-ENTMCNC: 35.8 GM/DL — SIGNIFICANT CHANGE UP (ref 32–36)
MCV RBC AUTO: 82.8 FL — SIGNIFICANT CHANGE UP (ref 80–100)
NRBC # BLD: 0 /100 WBCS — SIGNIFICANT CHANGE UP
NRBC # FLD: 0 K/UL — SIGNIFICANT CHANGE UP
PHOSPHATE SERPL-MCNC: 2.5 MG/DL — SIGNIFICANT CHANGE UP (ref 2.5–4.5)
PLATELET # BLD AUTO: 345 K/UL — SIGNIFICANT CHANGE UP (ref 150–400)
POTASSIUM SERPL-MCNC: 3.6 MMOL/L — SIGNIFICANT CHANGE UP (ref 3.5–5.3)
POTASSIUM SERPL-SCNC: 3.6 MMOL/L — SIGNIFICANT CHANGE UP (ref 3.5–5.3)
RBC # BLD: 3.95 M/UL — SIGNIFICANT CHANGE UP (ref 3.8–5.2)
RBC # FLD: 12.5 % — SIGNIFICANT CHANGE UP (ref 10.3–14.5)
SODIUM SERPL-SCNC: 131 MMOL/L — LOW (ref 135–145)
WBC # BLD: 9.4 K/UL — SIGNIFICANT CHANGE UP (ref 3.8–10.5)
WBC # FLD AUTO: 9.4 K/UL — SIGNIFICANT CHANGE UP (ref 3.8–10.5)

## 2021-09-30 PROCEDURE — 99239 HOSP IP/OBS DSCHRG MGMT >30: CPT

## 2021-09-30 RX ORDER — ACETAMINOPHEN 500 MG
2 TABLET ORAL
Qty: 0 | Refills: 0 | DISCHARGE
Start: 2021-09-30

## 2021-09-30 RX ADMIN — ENOXAPARIN SODIUM 40 MILLIGRAM(S): 100 INJECTION SUBCUTANEOUS at 11:53

## 2021-09-30 NOTE — DISCHARGE NOTE PROVIDER - NSDCMRMEDTOKEN_GEN_ALL_CORE_FT
acetaminophen 325 mg oral tablet: 2 tab(s) orally every 6 hours, As needed, Mild Pain (1 - 3), Moderate Pain (4 - 6)   acetaminophen 325 mg oral tablet: 2 tab(s) orally every 6 hours, As needed, Mild Pain (1 - 3), Moderate Pain (4 - 6)  Occupational Therapy : 3 times a week, Occupational Therapy   physical therapy: 3 times per week, outpatient physical therapy

## 2021-09-30 NOTE — PROGRESS NOTE ADULT - ASSESSMENT
81 y/o female w/ h/o dizziness and syncope, migraine HA, and cataracts being admitted d/t unwitnessed fall and possible syncope. Found to be bradycardic on initial presentation and UTI. Now s/p ILR and nml EPS.

## 2021-09-30 NOTE — DISCHARGE NOTE PROVIDER - NSDCCPTREATMENT_GEN_ALL_CORE_FT
PRINCIPAL PROCEDURE  Procedure: 2D echo  Findings and Treatment: CONCLUSIONS: EF 63 %  1. Mitral annular calcification, otherwise normal mitral  valve. Mild mitral regurgitation.  2. Normal left ventricular internal dimensions and wall  thicknesses.  3. Endocardium not well visualized; grossly normal left  ventricular systolic function.  Endocardial visualization  enhanced with intravenous injection of echo contrast  (Definity).  4. Mild diastolic dysfunction (Stage I).  5. Normal right ventricular size and function.

## 2021-09-30 NOTE — DISCHARGE NOTE PROVIDER - NSFOLLOWUPCLINICS_GEN_ALL_ED_FT
[facility]  Cardiology  [address] [place] [id number]  Phone: [phone]  Fax:

## 2021-09-30 NOTE — DISCHARGE NOTE PROVIDER - CARE PROVIDER_API CALL
Lubna Henry)  [address], [facility] [address]  [place] [address]  Phone: [phone]  Fax: ()-  Follow Up Time: 2 weeks    PCP,   Phone: (   )    -  Fax: (   )    -  Follow Up Time:

## 2021-09-30 NOTE — DISCHARGE NOTE PROVIDER - NSDCFUSCHEDAPPT_GEN_ALL_CORE_FT
CARRILLO FIGUEROA ; 10/13/2021 ; NPP Cardio [facility] [id number] CAROLINA [facility] ; 10/13/2021 ; BIANKA Cardio [facility] [address] [facility]  [facility]LA[facility]AY[facility] ; 10/15/2021 ; BIANKA Orthosurg [address] [place] [address]

## 2021-09-30 NOTE — DISCHARGE NOTE PROVIDER - HOSPITAL COURSE
80F h/o dizziness and syncope, migraine HA, and cataracts being admitted d/t unwitnessed fall and possible syncope found to be bradycardic and possible UTI      Syncope.   - Admit to medicine on telemetry after unwitnessed fall at home while walking in hallway found to be bradycardic to 44 and with possible UTI  - CT head reviewed and is unremarkable  - X-ray left wrist and left shoulder were without acute fracture, left elbow X-ray with impacted radial head fracture  - EKG: Sinus vitaly @53 bpm w/ occasional PVC and RBBB  - TSH: 5.1 and T4 6.7 doubt thyroid induced bradycardia  - TTE ----Pending   - Orthostatics neg (9/29) but c/o dizziness  - EP consulted s/p ILR implantation 9/28; Outpatient clinic f/u   - Fall precautions and PT.  - 9/28 negative EPS and ILR implantation, RFV accessed    Elbow fracture  - Note w/ Left arm swelling/ecchymosis, elbow xray Impacted radial head fracture  - Ortho c/s recommended NWB in sling  - OT consulted    Sinus bradycardia.   - Sinus vitaly to 44 on initial presentation without evidence of high grade AV block now improved and is stable in 70's while resting in bed suspect patient had increased vagal tone but will continue to monitor  - Hold AVNB for now  - EP consult called    UTI  - UA weakly positive and patient afebrile without leukocytosis HOWEVER given fall in elderly patient will treat as UTI; Ceftriaxone given in ED - will continue for now  - UCx no growth    80F h/o dizziness and syncope, migraine HA, and cataracts being admitted d/t unwitnessed fall and possible syncope found to be bradycardic and possible UTI      Syncope.   - Admit to medicine on telemetry after unwitnessed fall at home while walking in hallway found to be bradycardic to 44 and with possible UTI  - CT head reviewed and is unremarkable  - X-ray left wrist and left shoulder were without acute fracture, left elbow X-ray with impacted radial head fracture  - EKG: Sinus vitaly @53 bpm w/ occasional PVC and RBBB  - TSH: 5.1 and T4 6.7 doubt thyroid induced bradycardia  - TTE ----Pending   - Orthostatics neg (9/29) but c/o dizziness  - EP consulted s/p ILR implantation 9/28; Outpatient clinic f/u   - Fall precautions and PT.  - 9/28 negative EPS and ILR implantation, RFV accessed    Elbow fracture  - Note w/ Left arm swelling/ecchymosis, elbow xray Impacted radial head fracture  - Ortho c/s recommended NWB in sling  - OT consulted    Sinus bradycardia.   - Sinus vitaly to 44 on initial presentation without evidence of high grade AV block now improved and is stable in 70's while resting in bed suspect patient had increased vagal tone but will continue to monitor  - Hold AVNB for now  - EP consult called    UTI  - UA weakly positive and patient afebrile without leukocytosis HOWEVER given fall in elderly patient will treat as UTI; Ceftriaxone given in ED - will continue for now  - UCx no growth       Patient seen and evaluated. Reviewed discharge medications with patient and attending. All new medications requiring new prescriptions were sent to the pharmacy of patient's choice. Reviewed need for prescription for previous home medications and new prescriptions sent if requested. Medically cleared/stable for discharge as per  with appropriate follow up. Patient understands and agrees with plan of care. 80F h/o dizziness and syncope, migraine HA, and cataracts being admitted d/t unwitnessed fall and possible syncope found to be bradycardic and possible UTI      Syncope.   - Admitted to medicine on telemetry after unwitnessed fall at home while walking in hallway found to be bradycardic to 44 and with possible UTI  - CT head reviewed and is unremarkable  - X-ray left wrist and left shoulder were without acute fracture, left elbow X-ray with impacted radial head fracture  - EKG: Sinus vitaly @53 bpm w/ occasional PVC and RBBB  - TSH: 5.1 and T4 6.7 doubt thyroid induced bradycardia  - TTE  Stable, EF 63%, Mild DSF  - Orthostatics neg (9/29) but c/o dizziness  - EP consulted s/p ILR implantation 9/28; Outpatient clinic f/u   - Fall precautions and Pt/OT rec home services , family requesting outpt services so Rx provided   - 9/28 negative EPS and ILR implantation, RFV accessed    Elbow fracture  - Note w/ Left arm swelling/ecchymosis, elbow xray Impacted radial head fracture  - Ortho c/s recommended NWB in sling  - OT consulted    Sinus bradycardia.   - Sinus vitaly to 44 on initial presentation without evidence of high grade AV block now improved and is stable in 70's while resting in bed suspect patient had increased vagal tone but will continue to monitor  - Held AVNB for now  - EP placed ILR , out pt follow up recommended     UTI  - S/p CTX UCx negative        Patient seen and evaluated. Reviewed discharge medications with patient and attending. All new medications requiring new prescriptions were sent to the pharmacy of patient's choice. Reviewed need for prescription for previous home medications and new prescriptions sent if requested. Medically cleared/stable for discharge as per  with appropriate follow up. Patient understands and agrees with plan of care. 80F h/o dizziness and syncope, migraine HA, and cataracts being admitted d/t unwitnessed fall and possible syncope found to be bradycardic to 44 and possible UTI    - CT head reviewed and is unremarkable  - X-ray left wrist and left shoulder were without acute fracture, left elbow X-ray with impacted radial head fracture  - EKG: Sinus vitaly @53 bpm w/ occasional PVC and RBBB  - TSH: 5.1 and T4 6.7 doubt thyroid induced bradycardia  - TTE  Stable, EF 63%, Mild DSF  - Orthostatics neg (9/29) but c/o dizziness  - EP consulted s/p ILR implantation 9/28 with nml EP study; Outpatient clinic f/u   - Fall precautions and Pt/OT rec home services , family requesting outpt services so Rx provided   - 9/28 negative EPS and ILR implantation, RFV accessed    Elbow fracture  - Note w/ Left arm swelling/ecchymosis, elbow xray Impacted radial head fracture  - Ortho c/s recommended NWB in sling  - OT consulted    Sinus bradycardia.   - Sinus vitaly to 44 on initial presentation without evidence of high grade AV block now improved and is stable in 70's while resting in bed suspect patient had increased vagal tone but will continue to monitor  - Held AVNB for now  - EP placed ILR , out pt follow up recommended     UTI  - S/p CTX, UCx negative        Patient seen and evaluated. Reviewed discharge medications with patient and attending. All new medications requiring new prescriptions were sent to the pharmacy of patient's choice. Reviewed need for prescription for previous home medications and new prescriptions sent if requested. Medically cleared/stable for discharge as per  with appropriate follow up. Patient understands and agrees with plan of care.

## 2021-09-30 NOTE — CHART NOTE - NSCHARTNOTEFT_GEN_A_CORE
Patient s/p ILR implant and s/p EP study via femoral access.   Patient denies any complaints, no pain at the implant site   Left subclavicular incision site stable. Dressing clean, dry and intact. No hematoma or active bleeding noted. R femoral site with old scant bleeding which was demarcated, no hematoma, or active bleeding. DP+ PT+. Denies any numbness/tingling.   CXR and ECG pending   Patient hemodynamically stable will continue to monitor      [facility]   P. [id number]
s/p negative EP study and loop recorder implantation.  Post EP study and Loop recorder teaching with instructions provided.  R groin site and loop recorder wound site dry intact no bleeding noted.  Follow up on 10/13 at 10:40 am.  Stable for discharge home from EP standpoint.
Call ortho consult for impacted radial head fracture. pt with c/o elbow pain and swelling.

## 2021-09-30 NOTE — DISCHARGE NOTE PROVIDER - PROVIDER TOKENS
PROVIDER:[TOKEN:[[id number]],FOLLOWUP:[2 weeks]],FREE:[LAST:[PCP],PHONE:[(   )    -],FAX:[(   )    -]]

## 2021-09-30 NOTE — DISCHARGE NOTE PROVIDER - NSDCCPCAREPLAN_GEN_ALL_CORE_FT
PRINCIPAL DISCHARGE DIAGNOSIS  Diagnosis: Syncope  Assessment and Plan of Treatment: Upon discharge from the hospital please be sure to eat and drink adequately to maintain a stable blood pressure. Follow-up with your primary care provider as outpatient within 1-2 weeks to further monitor your blood-work and blood pressure. Maintain a safe environment and avoid standing up too quickly in order to prevent falls. Be aware of presistent dizziness, nausea/vomiting, fainting, changes in heart rate/blood pressure, and similar events, and return to emergency department if such signs/symptoms persist.      SECONDARY DISCHARGE DIAGNOSES  Diagnosis: Radial neck fracture  Assessment and Plan of Treatment: You injuried your elbow when you fell. You will need to continue to used an arm in sling, please allow for periods of time out of sling to help prevent arm stiffness.   You will need to follow up with Dr. Lubna Henry in 2 weeks for repeat x-rays for your left elbow. Please call [phone] to schedule an appointment      Diagnosis: Lung nodule  Assessment and Plan of Treatment: You will need to follow up CXR showed calcified nodule on R. lower lung. You daughter is also aware and you can follow up with your PCP after discharge home .    Diagnosis: Urinary tract infection  Assessment and Plan of Treatment: You were started on antibiotics for a urinary infection. To help prevent future infections maintain healthy hygiene and avoid taking long baths. Wipe from front to back and empty your bladder frequently by keeping yourself properly hydrated.   Monitor for signs/symptoms of infection, such as, fever/chills, burning/pain with urination, urinary frequency/hesitancy, cloudy urine, or blood in urine and follow-up with your primary care provider as outpatient for further care.     PRINCIPAL DISCHARGE DIAGNOSIS  Diagnosis: Syncope  Assessment and Plan of Treatment: Upon discharge from the hospital please be sure to eat and drink adequately to maintain a stable blood pressure.   Maintain a safe environment and avoid standing up too quickly in order to prevent falls. Be aware of presistent dizziness, nausea/vomiting, fainting, changes in heart rate/blood pressure, and similar events, and return to emergency department if such signs/symptoms persist.  A loop recorder was implanted during admission and you have an appointment scheduled with electrophysiology 13-Oct-2021 at 10:40am.      SECONDARY DISCHARGE DIAGNOSES  Diagnosis: Urinary tract infection  Assessment and Plan of Treatment: You were started on antibiotics for a urinary infection. To help prevent future infections maintain healthy hygiene and avoid taking long baths. Wipe from front to back and empty your bladder frequently by keeping yourself properly hydrated.   Monitor for signs/symptoms of infection, such as, fever/chills, burning/pain with urination, urinary frequency/hesitancy, cloudy urine, or blood in urine and follow-up with your primary care provider as outpatient for further care.    Diagnosis: Radial neck fracture  Assessment and Plan of Treatment: You injuried your elbow when you fell. You will need to continue to used an arm in sling, please allow for periods of time out of sling to help prevent arm stiffness.   You will need to follow up with Dr. Lubna Henry in 2 weeks for repeat x-rays for your left elbow. Please call [phone] to schedule an appointment      Diagnosis: Lung nodule  Assessment and Plan of Treatment: You will need to follow up CXR showed calcified nodule on R. lower lung. You daughter is also aware and you can follow up with your PCP after discharge home .     PRINCIPAL DISCHARGE DIAGNOSIS  Diagnosis: Syncope  Assessment and Plan of Treatment: Upon discharge from the hospital please be sure to eat and drink adequately to maintain a stable blood pressure.   Maintain a safe environment and avoid standing up too quickly in order to prevent falls. Be aware of presistent dizziness, nausea/vomiting, fainting, changes in heart rate/blood pressure, and similar events, and return to emergency department if such signs/symptoms persist.  A loop recorder was implanted during admission and you have an appointment scheduled with electrophysiology 13-Oct-2021 at 10:40am.      SECONDARY DISCHARGE DIAGNOSES  Diagnosis: Radial neck fracture  Assessment and Plan of Treatment: You injuried your elbow when you fell. You will need to continue to used an arm in sling, please allow for periods of time out of sling to help prevent arm stiffness.   You will need to follow up with Dr. Lubna Henry in 2 weeks for repeat x-rays for your left elbow. Please call [phone] to schedule an appointment      Diagnosis: Lung nodule  Assessment and Plan of Treatment: You will need to follow up CXR showed calcified nodule on R. lower lung. You daughter is also aware and you can follow up with your PCP after discharge home .    Diagnosis: Urinary tract infection  Assessment and Plan of Treatment: You completed antibiotics for a urinary infection. To help prevent future infections maintain healthy hygiene and avoid taking long baths. Wipe from front to back and empty your bladder frequently by keeping yourself properly hydrated.   Monitor for signs/symptoms of infection, such as, fever/chills, burning/pain with urination, urinary frequency/hesitancy, cloudy urine, or blood in urine and follow-up with your primary care provider as outpatient for further care.     PRINCIPAL DISCHARGE DIAGNOSIS  Diagnosis: Syncope  Assessment and Plan of Treatment: Upon discharge from the hospital please be sure to eat and drink adequately to maintain a stable blood pressure.   Maintain a safe environment and avoid standing up too quickly in order to prevent falls. Be aware of presistent dizziness, nausea/vomiting, fainting, changes in heart rate/blood pressure, and similar events, and return to emergency department if such signs/symptoms persist.  A loop recorder was implanted during admission and you have an appointment scheduled with electrophysiology 13-Oct-2021 at 10:40am.      SECONDARY DISCHARGE DIAGNOSES  Diagnosis: Urinary tract infection  Assessment and Plan of Treatment: You completed antibiotics for a urinary infection. To help prevent future infections maintain healthy hygiene and avoid taking long baths. Wipe from front to back and empty your bladder frequently by keeping yourself properly hydrated.   Monitor for signs/symptoms of infection, such as, fever/chills, burning/pain with urination, urinary frequency/hesitancy, cloudy urine, or blood in urine and follow-up with your primary care provider as outpatient for further care.    Diagnosis: Radial neck fracture  Assessment and Plan of Treatment: You injuried your elbow when you fell. You will need to continue to used an arm in sling, please allow for periods of time out of sling to help prevent arm stiffness.   You will need to follow up with Dr. Lubna Henry in 2 weeks for repeat x-rays for your left elbow. Please call [phone] to schedule an appointment      Diagnosis: Hyponatremia  Assessment and Plan of Treatment: You were found to have low salt levels in the blood. Your salt level were found to be 130 , please follow up with your primary care provider in 1-2 weeks.    Diagnosis: Lung nodule  Assessment and Plan of Treatment: You will need to follow up CXR showed calcified nodule on R. lower lung. You daughter is also aware and you can follow up with your PCP after discharge home .

## 2021-09-30 NOTE — DISCHARGE NOTE PROVIDER - NSDCFUADDAPPT_GEN_ALL_CORE_FT
Chest Xray showed calcified nodule on R. lower lung- daughter aware, can f/u out patient with PCP     Please follow up with elbow injury with Dr. Lubna Henry in 2 weeks for repeat x-rays. Please call [phone] to schedule an appointment

## 2021-09-30 NOTE — DISCHARGE NOTE PROVIDER - CARE PROVIDERS DIRECT ADDRESSES
[email or web address]

## 2021-10-01 ENCOUNTER — TRANSCRIPTION ENCOUNTER (OUTPATIENT)
Age: 81
End: 2021-10-01

## 2021-10-01 VITALS
OXYGEN SATURATION: 100 % | RESPIRATION RATE: 18 BRPM | SYSTOLIC BLOOD PRESSURE: 152 MMHG | HEART RATE: 74 BPM | TEMPERATURE: 98 F | DIASTOLIC BLOOD PRESSURE: 81 MMHG

## 2021-10-01 DIAGNOSIS — E87.1 HYPO-OSMOLALITY AND HYPONATREMIA: ICD-10-CM

## 2021-10-01 LAB
ANION GAP SERPL CALC-SCNC: 14 MMOL/L — SIGNIFICANT CHANGE UP (ref 7–14)
BUN SERPL-MCNC: 13 MG/DL — SIGNIFICANT CHANGE UP (ref 7–23)
CALCIUM SERPL-MCNC: 9.2 MG/DL — SIGNIFICANT CHANGE UP (ref 8.4–10.5)
CHLORIDE SERPL-SCNC: 95 MMOL/L — LOW (ref 98–107)
CO2 SERPL-SCNC: 21 MMOL/L — LOW (ref 22–31)
CREAT SERPL-MCNC: 0.61 MG/DL — SIGNIFICANT CHANGE UP (ref 0.5–1.3)
GLUCOSE SERPL-MCNC: 96 MG/DL — SIGNIFICANT CHANGE UP (ref 70–99)
MAGNESIUM SERPL-MCNC: 2.2 MG/DL — SIGNIFICANT CHANGE UP (ref 1.6–2.6)
PHOSPHATE SERPL-MCNC: 2.7 MG/DL — SIGNIFICANT CHANGE UP (ref 2.5–4.5)
POTASSIUM SERPL-MCNC: 3.7 MMOL/L — SIGNIFICANT CHANGE UP (ref 3.5–5.3)
POTASSIUM SERPL-SCNC: 3.7 MMOL/L — SIGNIFICANT CHANGE UP (ref 3.5–5.3)
SODIUM SERPL-SCNC: 130 MMOL/L — LOW (ref 135–145)

## 2021-10-01 PROCEDURE — 99232 SBSQ HOSP IP/OBS MODERATE 35: CPT

## 2021-10-01 PROCEDURE — 93306 TTE W/DOPPLER COMPLETE: CPT | Mod: 26

## 2021-10-01 RX ADMIN — ENOXAPARIN SODIUM 40 MILLIGRAM(S): 100 INJECTION SUBCUTANEOUS at 12:08

## 2021-10-01 NOTE — DISCHARGE NOTE NURSING/CASE MANAGEMENT/SOCIAL WORK - NSDCFUADDAPPT_GEN_ALL_CORE_FT
Chest Xray showed calcified nodule on R. lower lung- daughter aware, can f/u out patient with PCP     Please follow up with elbow injury with Dr. Lubna Henry in 2 weeks for repeat x-rays. Please call [phone] to schedule an appointment

## 2021-10-01 NOTE — PROGRESS NOTE ADULT - REASON FOR ADMISSION
Unwitnessed Fall

## 2021-10-01 NOTE — PROGRESS NOTE ADULT - PROBLEM SELECTOR PLAN 1
- Unwitnessed fall at home while walking in hallway found to be bradycardic to 44 and UTI; DDx includes symptomatic bradycardia vs vasovagal syncope vs mechanical fall  - CT head unremarkable, X-ray left wrist and left shoulder were without acute fracture but shows old fx in left distal radius. CXR showed calcified nodule on R. lower lung- daughter aware, can f/u outpt  TTE pending  EPS unremarkable but ILR placed on 9/28. Has EP appt on Oct 13 10:40  - Fall precautions and PT recs: home PT  c/w tele monitoring
- Unwitnessed fall at home while walking in hallway found to be bradycardic to 44 and UTI; DDx: symptomatic bradycardia vs vasovagal syncope vs mechanical fall  - CT head unremarkable, X-ray left wrist and left shoulder were without acute fracture but shows old fx in left distal radius. CXR showed calcified nodule on R. lower lung- daughter aware, can f/u outpt  TTE grossly unremarkable, St 1 DD  EPS unremarkable but ILR placed on 9/28. Has EP appt on Oct 13 10:40  - Fall precautions and PT recs: home PT  c/w tele monitoring
- Unwitnessed fall at home while walking in hallway found to be bradycardic to 44 and UTI; DDx includes symptomatic bradycardia vs vasovagal syncope vs mechanical fall  - CT head unremarkable, X-ray left wrist and left shoulder were without acute fracture but shows old fx in left distal radius. CXR showed calcified nodule on R. lower lung- daughter aware, can f/u outpt  - EKG: Sinus vitaly @53 bpm w/ occasional PVC and RBBB  - TSH: 5.1 and T4 6.7 - doubt thyroid induced bradycardia  - TTE ordered  Pending EPS and possible PPM placement  - Fall precautions and PT recs: home PT  c/w tele monitoring
- Unwitnessed fall at home while walking in hallway found to be bradycardic to 44 and UTI; DDx: symptomatic bradycardia vs vasovagal syncope vs mechanical fall  - CT head unremarkable, X-ray left wrist and left shoulder were without acute fracture but shows old fx in left distal radius. CXR showed calcified nodule on R. lower lung- daughter aware, can f/u outpt  TTE pending  EPS unremarkable but ILR placed on 9/28. Has EP appt on Oct 13 10:40  - Fall precautions and PT recs: home PT  c/w tele monitoring

## 2021-10-01 NOTE — DISCHARGE NOTE NURSING/CASE MANAGEMENT/SOCIAL WORK - PATIENT PORTAL LINK FT
You can access the FollowMyHealth Patient Portal offered by [facility] by registering at the following website: [email or web address]. By joining [employer]’s FollowMyHealth portal, you will also be able to view your health information using other applications (apps) compatible with our system.

## 2021-10-01 NOTE — PROGRESS NOTE ADULT - PROBLEM SELECTOR PLAN 2
- Sinus vitaly to 44 on initial presentation without evidence of high grade AV block.  - heart rate has now improved and is stable in 70's while resting in bed  - Suspect patient had increased vagal tone but will continue to monitor  - Hold AVNB for now  - If becomes symptomatically bradycardic may place pacing pads on patient and keep zoll at bedside with Atropine  - EP following
- Sinus vitaly to 44 on initial presentation without evidence of high grade AV block, now with improved rates  S/p ILR on 9/28 and unremarkable EPS  - Hold AVNB for now  Appreciate EP recs
- Sinus vitaly to 44 on initial presentation without evidence of high grade AV block.  improved  S/p ILR on 9/28 and unremarkable EPS  - Hold AVNB for now  - If becomes symptomatically bradycardic may place pacing pads on patient and keep zoll at bedside with Atropine  - EP following
- Sinus vitaly to 44 on initial presentation without evidence of high grade AV block, now with improved rates  S/p ILR on 9/28 and unremarkable EPS  - Hold AVNB for now  Appreciate EP recs

## 2021-10-01 NOTE — PROGRESS NOTE ADULT - PROBLEM SELECTOR PROBLEM 3
UTI (urinary tract infection)
Radial neck fracture
Radial neck fracture
UTI (urinary tract infection)

## 2021-10-01 NOTE — PROGRESS NOTE ADULT - PROBLEM SELECTOR PLAN 4
s/p CTX x 3 days total, Ucx NGTD  No further report of burning with urination
s/p CTX x 3 days total, Ucx NGTD  No further report of burning with urination
- Lovenox 40 mg for DVT prophylaxis  PT: home PT  Dispo: pending TTE and set up of home services
- Lovenox 40 mg for DVT prophylaxis  PT: home PT  Dispo: pending EPS

## 2021-10-01 NOTE — PROGRESS NOTE ADULT - PROBLEM SELECTOR PLAN 5
- Lovenox 40 mg for DVT prophylaxis  PT: home PT but family wants outpatient PT  Dispo: pending TTE and DC plan for home. DC time: 32 min
- Lovenox 40 mg for DVT prophylaxis  PT: home PT but family wants outpatient PT  Dispo: stable for DC home. DC time: 32 min

## 2021-10-01 NOTE — PROGRESS NOTE ADULT - SUBJECTIVE AND OBJECTIVE BOX
Interval History:  No acute events overnight     MEDICATIONS  (STANDING):  cefTRIAXone   IVPB 1000 milliGRAM(s) IV Intermittent every 24 hours  enoxaparin Injectable 40 milliGRAM(s) SubCutaneous daily  influenza   Vaccine 0.5 milliLiter(s) IntraMuscular once    MEDICATIONS  (PRN):    Vital Signs Last 24 Hrs  T(C): 36.8 (28 Sep 2021 10:17), Max: 37.2 (27 Sep 2021 23:53)  T(F): 98.3 (28 Sep 2021 10:17), Max: 98.9 (27 Sep 2021 23:53)  HR: 85 (28 Sep 2021 10:17) (70 - 89)  BP: 139/81 (28 Sep 2021 10:17) (139/81 - 173/90)  BP(mean): --  RR: 18 (28 Sep 2021 10:17) (16 - 19)  SpO2: 100% (28 Sep 2021 10:17) (99% - 100%)    Appearance: Normal	  HEENT:   Normal oral mucosa, PERRL, EOMI	  Lymphatic: No lymphadenopathy  Cardiovascular: Normal S1 S2, No JVD, No murmurs, No edema  Respiratory: Lungs clear to auscultation	  Psychiatry: A & O x 3, Mood & affect appropriate  Gastrointestinal:  Soft, Non-tender, + BS	  Skin: No rashes, No ecchymoses, No cyanosis	  Neurologic: Non-focal  Extremities: Normal range of motion, No clubbing, cyanosis or edema  Vascular: Peripheral pulses palpable 2+ bilaterally    LABS:	 	    CBC Full  -  ( 28 Sep 2021 05:51 )  WBC Count : 8.14 K/uL  Hemoglobin : 13.5 g/dL  Hematocrit : 37.5 %  Platelet Count - Automated : 333 K/uL  Mean Cell Volume : 82.8 fL  Mean Cell Hemoglobin : 29.8 pg  Mean Cell Hemoglobin Concentration : 36.0 gm/dL  Auto Neutrophil # : x  Auto Lymphocyte # : x  Auto Monocyte # : x  Auto Eosinophil # : x  Auto Basophil # : x  Auto Neutrophil % : x  Auto Lymphocyte % : x  Auto Monocyte % : x  Auto Eosinophil % : x  Auto Basophil % : x    09-28    133<L>  |  98  |  10  ----------------------------<  114<H>  4.0   |  21<L>  |  0.59  09-27    131<L>  |  96<L>  |  8   ----------------------------<  134<H>  3.6   |  21<L>  |  0.66    Ca    9.6      28 Sep 2021 05:51  Ca    9.2      27 Sep 2021 07:57  Phos  3.4     09-28  Mg     2.10     09-28  Mg     2.20     09-27    TPro  7.3  /  Alb  4.1  /  TBili  0.4  /  DBili  x   /  AST  17  /  ALT  10  /  AlkPhos  98  09-27    LIVER FUNCTIONS - ( 27 Sep 2021 07:57 )  Alb: 4.1 g/dL / Pro: 7.3 g/dL / ALK PHOS: 98 U/L / ALT: 10 U/L / AST: 17 U/L / GGT: x                     
Interval History:  No acute events overnight   s/p EP study and ILR on 9/28  Telemetry: NSR; no events overnight     MEDICATIONS  (STANDING):  cefTRIAXone   IVPB 1000 milliGRAM(s) IV Intermittent every 24 hours  enoxaparin Injectable 40 milliGRAM(s) SubCutaneous daily  influenza  Vaccine (HIGH DOSE) 0.7 milliLiter(s) IntraMuscular once    MEDICATIONS  (PRN):  acetaminophen   Tablet .. 650 milliGRAM(s) Oral every 6 hours PRN Mild Pain (1 - 3), Moderate Pain (4 - 6)    Vital Signs Last 24 Hrs  T(C): 36.3 (29 Sep 2021 06:35), Max: 36.7 (28 Sep 2021 21:46)  T(F): 97.4 (29 Sep 2021 06:35), Max: 98 (28 Sep 2021 21:46)  HR: 86 (29 Sep 2021 06:35) (86 - 98)  BP: 156/83 (29 Sep 2021 06:35) (156/83 - 159/86)  BP(mean): --  RR: 16 (29 Sep 2021 06:35) (16 - 19)  SpO2: 100% (29 Sep 2021 06:35) (100% - 100%)    Appearance: Normal	  HEENT:   Normal oral mucosa, PERRL, EOMI	  Lymphatic: No lymphadenopathy  Cardiovascular: Normal S1 S2, No JVD, No murmurs, No edema  Respiratory: Lungs clear to auscultation	  Psychiatry: A & O x 3, Mood & affect appropriate  Gastrointestinal:  Soft, Non-tender, + BS	  Skin: No rashes, No ecchymoses, No cyanosis	  Neurologic: Non-focal  Extremities: Normal range of motion, No clubbing, cyanosis or edema  Vascular: Peripheral pulses palpable 2+ bilaterally    LABS:	 	    CBC Full  -  ( 29 Sep 2021 06:14 )  WBC Count : 5.95 K/uL  Hemoglobin : 13.8 g/dL  Hematocrit : 39.5 %  Platelet Count - Automated : 358 K/uL  Mean Cell Volume : 83.0 fL  Mean Cell Hemoglobin : 29.0 pg  Mean Cell Hemoglobin Concentration : 34.9 gm/dL  Auto Neutrophil # : x  Auto Lymphocyte # : x  Auto Monocyte # : x  Auto Eosinophil # : x  Auto Basophil # : x  Auto Neutrophil % : x  Auto Lymphocyte % : x  Auto Monocyte % : x  Auto Eosinophil % : x  Auto Basophil % : x    09-29    131<L>  |  97<L>  |  15  ----------------------------<  160<H>  4.2   |  18<L>  |  0.75  09-28    133<L>  |  98  |  10  ----------------------------<  114<H>  4.0   |  21<L>  |  0.59    Ca    9.9      29 Sep 2021 06:14  Ca    9.6      28 Sep 2021 05:51  Phos  3.7     09-29  Phos  3.4     09-28  Mg     2.30     09-29  Mg     2.10     09-28            
[facility] Division of Hospital Medicine  Marlene Frankel DO  Pager (KOSTAS-F, 8A-5P): [phone]      Patient is a 80y old  Female who presents with a chief complaint of Unwitnessed Fall (29 Sep 2021 23:08)      SUBJECTIVE / OVERNIGHT EVENTS: No events overnight. Denies chest pain but reports some pain at left shoulder and elbow. No N/V/D    MEDICATIONS  (STANDING):  enoxaparin Injectable 40 milliGRAM(s) SubCutaneous daily  influenza  Vaccine (HIGH DOSE) 0.7 milliLiter(s) IntraMuscular once    MEDICATIONS  (PRN):  acetaminophen   Tablet .. 650 milliGRAM(s) Oral every 6 hours PRN Mild Pain (1 - 3), Moderate Pain (4 - 6)      CAPILLARY BLOOD GLUCOSE        I&O's Summary      PHYSICAL EXAM:  Vital Signs Last 24 Hrs  T(C): 36.3 (30 Sep 2021 05:56), Max: 36.6 (29 Sep 2021 11:51)  T(F): 97.3 (30 Sep 2021 05:56), Max: 97.8 (29 Sep 2021 11:51)  HR: 69 (30 Sep 2021 05:56) (69 - 88)  BP: 146/70 (30 Sep 2021 05:56) (146/70 - 153/85)  BP(mean): --  RR: 18 (30 Sep 2021 05:56) (18 - 19)  SpO2: 100% (30 Sep 2021 05:56) (99% - 100%)    CONSTITUTIONAL: NAD, on room air  HEENT: sclera clear, MMM, eyes tracking  RESPIRATORY: Normal respiratory effort; chest ILR with overlying dressing C/D/I  CARDIOVASCULAR: S1/S2, RRR, no murmurs appreciated; No lower extremity edema  ABDOMEN: soft, Nontender, nondistended, normoactive bowel sounds, no rebound/guarding  PSYCH: calm, cooperative  MSK: left arm in sling, left elbow with swelling and mild erythema but no warmth and no significant TTP, has pain with ROM at left shoulder  NEUROLOGY: awake, alert, no gross deficits  SKIN: No rashes; no palpable lesions    LABS:                        11.7   9.40  )-----------( 345      ( 30 Sep 2021 07:15 )             32.7     09-30    131<L>  |  97<L>  |  17  ----------------------------<  101<H>  3.6   |  22  |  0.63    Ca    9.3      30 Sep 2021 07:15  Phos  2.5     09-30  Mg     2.30     09-30                  RADIOLOGY & ADDITIONAL TESTS:  Results Reviewed:   Imaging Personally Reviewed:  Electrocardiogram Personally Reviewed:    COORDINATION OF CARE:  Care Discussed with Consultants/Other Providers [Y/N]:  Prior or Outpatient Records Reviewed [Y/N]:  
[facility] Division of Hospital Medicine  Marlene Frankel DO  Pager (M-F, 8A-5P): [phone]      Patient is a 80y old  Female who presents with a chief complaint of Unwitnessed Fall (30 Sep 2021 11:43)      SUBJECTIVE / OVERNIGHT EVENTS: No events overnight. No complaints of pain, SOB, N/V/D    MEDICATIONS  (STANDING):  enoxaparin Injectable 40 milliGRAM(s) SubCutaneous daily  influenza  Vaccine (HIGH DOSE) 0.7 milliLiter(s) IntraMuscular once    MEDICATIONS  (PRN):  acetaminophen   Tablet .. 650 milliGRAM(s) Oral every 6 hours PRN Mild Pain (1 - 3), Moderate Pain (4 - 6)      CAPILLARY BLOOD GLUCOSE        I&O's Summary      PHYSICAL EXAM:  Vital Signs Last 24 Hrs  T(C): 36.7 (30 Sep 2021 22:39), Max: 36.7 (30 Sep 2021 17:11)  T(F): 98.1 (30 Sep 2021 22:39), Max: 98.1 (30 Sep 2021 17:11)  HR: 81 (01 Oct 2021 05:12) (73 - 91)  BP: 125/74 (01 Oct 2021 05:12) (125/74 - 150/76)  BP(mean): --  RR: 19 (01 Oct 2021 05:12) (16 - 19)  SpO2: 100% (01 Oct 2021 05:12) (98% - 100%)    CONSTITUTIONAL: NAD, on room air  HEENT: sclera clear, MMM, eyes tracking  RESPIRATORY: Normal respiratory effort; chest ILR with overlying dressing C/D/I  CARDIOVASCULAR: S1/S2, RRR, no murmurs appreciated; No lower extremity edema  ABDOMEN: soft, Nontender, nondistended, normoactive bowel sounds, no rebound/guarding  PSYCH: calm, cooperative  MSK: left arm in sling, left elbow with swelling and mild erythema but no warmth and no significant TTP  NEUROLOGY: awake, alert, no gross deficits  SKIN: No rashes; no palpable lesions    LABS:                        11.7   9.40  )-----------( 345      ( 30 Sep 2021 07:15 )             32.7     10-01    130<L>  |  95<L>  |  13  ----------------------------<  96  3.7   |  21<L>  |  0.61    Ca    9.2      01 Oct 2021 06:12  Phos  2.7     10-01  Mg     2.20     10-01                  RADIOLOGY & ADDITIONAL TESTS:  Results Reviewed:   Imaging Personally Reviewed:  Electrocardiogram Personally Reviewed:    COORDINATION OF CARE:  Care Discussed with Consultants/Other Providers [Y/N]:  Prior or Outpatient Records Reviewed [Y/N]:  
[facility] Division of Hospital Medicine  Marlene Frankel DO  Pager (KOSTAS-F, 8A-5P): [phone]      Patient is a 80y old  Female who presents with a chief complaint of Unwitnessed Fall (28 Sep 2021 11:07)      SUBJECTIVE / OVERNIGHT EVENTS: No events overnight. NO complaints of chest pain, SOB, dizziness    MEDICATIONS  (STANDING):  cefTRIAXone   IVPB 1000 milliGRAM(s) IV Intermittent every 24 hours  enoxaparin Injectable 40 milliGRAM(s) SubCutaneous daily  influenza  Vaccine (HIGH DOSE) 0.7 milliLiter(s) IntraMuscular once    MEDICATIONS  (PRN):      CAPILLARY BLOOD GLUCOSE        I&O's Summary      PHYSICAL EXAM:  Vital Signs Last 24 Hrs  T(C): 36.8 (28 Sep 2021 10:17), Max: 37.2 (27 Sep 2021 23:53)  T(F): 98.3 (28 Sep 2021 10:17), Max: 98.9 (27 Sep 2021 23:53)  HR: 85 (28 Sep 2021 10:17) (70 - 89)  BP: 139/81 (28 Sep 2021 10:17) (139/81 - 173/90)  BP(mean): --  RR: 18 (28 Sep 2021 10:17) (16 - 19)  SpO2: 100% (28 Sep 2021 10:17) (100% - 100%)    CONSTITUTIONAL: NAD, on room air  HEENT: sclera clear, MMM, eyes tracking  RESPIRATORY: Normal respiratory effort; lungs are clear to auscultation bilaterally  CARDIOVASCULAR: S1/S2, RRR, no murmurs appreciated; No lower extremity edema  ABDOMEN: soft, Nontender, nondistended, normoactive bowel sounds, no rebound/guarding  PSYCH: calm, cooperative  NEUROLOGY: awake, alert, no gross deficits, moving all extremities  SKIN: No rashes; no palpable lesions    LABS:                        13.5   8.14  )-----------( 333      ( 28 Sep 2021 05:51 )             37.5     09-28    133<L>  |  98  |  10  ----------------------------<  114<H>  4.0   |  21<L>  |  0.59    Ca    9.6      28 Sep 2021 05:51  Phos  3.4     09-  Mg     2.10     09-28    TPro  7.3  /  Alb  4.1  /  TBili  0.4  /  DBili  x   /  AST  17  /  ALT  10  /  AlkPhos  98            Urinalysis Basic - ( 27 Sep 2021 09:34 )    Color: Light Yellow / Appearance: Clear / S.012 / pH: x  Gluc: x / Ketone: Negative  / Bili: Negative / Urobili: <2 mg/dL   Blood: x / Protein: Trace / Nitrite: Negative   Leuk Esterase: Large / RBC: 1 /HPF / WBC 22 /HPF   Sq Epi: x / Non Sq Epi: 3 /HPF / Bacteria: Few        Culture - Urine (collected 27 Sep 2021 09:00)  Source: Clean Catch Clean Catch (Midstream)  Final Report (28 Sep 2021 11:14):    No growth        RADIOLOGY & ADDITIONAL TESTS:  Results Reviewed:   Imaging Personally Reviewed:  Electrocardiogram Personally Reviewed:    COORDINATION OF CARE:  Care Discussed with Consultants/Other Providers [Y/N]:  Prior or Outpatient Records Reviewed [Y/N]:  
[facility] Division of Hospital Medicine  Marlene Frankel DO  Pager (KOSTAS-F, 8A-5P): [phone]      Patient is a 80y old  Female who presents with a chief complaint of Unwitnessed Fall (29 Sep 2021 11:06)      SUBJECTIVE / OVERNIGHT EVENTS: No events overnight. No chest pain, SOB, N/V    MEDICATIONS  (STANDING):  cefTRIAXone   IVPB 1000 milliGRAM(s) IV Intermittent every 24 hours  enoxaparin Injectable 40 milliGRAM(s) SubCutaneous daily  influenza  Vaccine (HIGH DOSE) 0.7 milliLiter(s) IntraMuscular once    MEDICATIONS  (PRN):  acetaminophen   Tablet .. 650 milliGRAM(s) Oral every 6 hours PRN Mild Pain (1 - 3), Moderate Pain (4 - 6)      CAPILLARY BLOOD GLUCOSE        I&O's Summary      PHYSICAL EXAM:  Vital Signs Last 24 Hrs  T(C): 36.6 (29 Sep 2021 11:51), Max: 36.7 (28 Sep 2021 21:46)  T(F): 97.8 (29 Sep 2021 11:51), Max: 98 (28 Sep 2021 21:46)  HR: 80 (29 Sep 2021 11:51) (80 - 98)  BP: 150/79 (29 Sep 2021 11:51) (150/79 - 159/86)  BP(mean): --  RR: 18 (29 Sep 2021 11:51) (16 - 19)  SpO2: 99% (29 Sep 2021 11:51) (99% - 100%)    CONSTITUTIONAL: NAD, on room air  HEENT: sclera clear, MMM, eyes tracking  RESPIRATORY: Normal respiratory effort; lungs are clear to auscultation bilaterally, chest ILR with overlying dressing C/D/I  CARDIOVASCULAR: S1/S2, RRR, no murmurs appreciated; No lower extremity edema  ABDOMEN: soft, Nontender, nondistended, normoactive bowel sounds, no rebound/guarding  PSYCH: calm, cooperative  NEUROLOGY: awake, alert, no gross deficits, moving all extremities  SKIN: No rashes; no palpable lesions    LABS:                        13.8   5.95  )-----------( 358      ( 29 Sep 2021 06:14 )             39.5     09-29    131<L>  |  97<L>  |  15  ----------------------------<  160<H>  4.2   |  18<L>  |  0.75    Ca    9.9      29 Sep 2021 06:14  Phos  3.7     09-29  Mg     2.30     09-29                Culture - Urine (collected 27 Sep 2021 09:00)  Source: Clean Catch Clean Catch (Midstream)  Final Report (28 Sep 2021 11:14):    No growth        RADIOLOGY & ADDITIONAL TESTS:  Results Reviewed:   Imaging Personally Reviewed:  Electrocardiogram Personally Reviewed:    COORDINATION OF CARE:  Care Discussed with Consultants/Other Providers [Y/N]:  Prior or Outpatient Records Reviewed [Y/N]:

## 2021-10-01 NOTE — PROGRESS NOTE ADULT - PROBLEM SELECTOR PLAN 3
c/w CTX x 3 days total, Ucx NGTD  No further report of burning with urination
c/w CTX, f/u Ucx
JORGE ALBERTO ROGERS.  Sling for comfort for up to 1 week, then remove.  C/w PT/OT, ROM multiple times throughout the day, limit supination/pronation for now per ortho recs  Follow up with Dr. Lubna Henry in 2 weeks for repeat x-rays
JORGE ALBERTO ROGERS.  Sling for comfort for up to 1 week, then remove.  C/w PT/OT, ROM multiple times throughout the day, limit supination/pronation for now per ortho recs  Follow up with Dr. Lubna Henry in 2 weeks for repeat x-rays

## 2021-10-04 PROBLEM — Z00.00 ENCOUNTER FOR PREVENTIVE HEALTH EXAMINATION: Status: ACTIVE | Noted: 2021-10-04

## 2021-10-13 ENCOUNTER — APPOINTMENT (OUTPATIENT)
Dept: ELECTROPHYSIOLOGY | Facility: CLINIC | Age: 81
End: 2021-10-13
Payer: MEDICARE

## 2021-10-13 VITALS — HEART RATE: 77 BPM | SYSTOLIC BLOOD PRESSURE: 170 MMHG | RESPIRATION RATE: 14 BRPM | DIASTOLIC BLOOD PRESSURE: 87 MMHG

## 2021-10-13 DIAGNOSIS — S42.409A UNSPECIFIED FRACTURE OF LOWER END OF UNSPECIFIED HUMERUS, INITIAL ENCOUNTER FOR CLOSED FRACTURE: ICD-10-CM

## 2021-10-13 DIAGNOSIS — R55 SYNCOPE AND COLLAPSE: ICD-10-CM

## 2021-10-13 PROCEDURE — 93285 PRGRMG DEV EVAL SCRMS IP: CPT

## 2021-10-15 ENCOUNTER — APPOINTMENT (OUTPATIENT)
Dept: ORTHOPEDIC SURGERY | Facility: CLINIC | Age: 81
End: 2021-10-15
Payer: MEDICARE

## 2021-10-15 VITALS
OXYGEN SATURATION: 96 % | HEIGHT: 60 IN | WEIGHT: 160 LBS | SYSTOLIC BLOOD PRESSURE: 177 MMHG | HEART RATE: 75 BPM | BODY MASS INDEX: 31.41 KG/M2 | DIASTOLIC BLOOD PRESSURE: 94 MMHG

## 2021-10-15 PROCEDURE — 99214 OFFICE O/P EST MOD 30 MIN: CPT

## 2021-10-15 PROCEDURE — 73080 X-RAY EXAM OF ELBOW: CPT | Mod: LT

## 2021-10-15 NOTE — DISCUSSION/SUMMARY
[de-identified] : This is a 80-year-old female with a complete radial neck fracture, minimally displaced.  I have gone over the management options for this including nonoperative and operative.  Operative options include radial head arthroplasty however there are risks of infection, bleeding, nerve damage and others.  Nonoperative risks include possible nonunion/malunion.  After discussing the risks and benefits of both options the family elected to proceed with nonoperative management.  We will follow up in 2 weeks for repeat x-rays.  I have also recommended a course of meloxicam for 10 days and as needed thereafter.  Side effects were reviewed.

## 2021-10-15 NOTE — DATA REVIEWED
[de-identified] : 10/15/2021–left elbow x-rays (AP, lateral, oblique, radial head view): There is a complete fracture through the radial neck with mild interval displacement from the injury film.  Overall the fracture appears in acceptable alignment.

## 2021-10-15 NOTE — PHYSICAL EXAM
[FreeTextEntry1] : General: well nourished, in no acute distress, alert and oriented to person, place and time.\par Psychiatric: normal mood and affect, no abnormal movements or speech patterns.\par Eyes: vision intact without deficits, sclera and conjunctiva were normal, pupils were equal in size. \par ENT: Ears and nose were normal in appearance. No thyromegaly.\par Lymph: no enlarged nodes, no lymphedema in extremity.\par Respiratory: Normal respiratory rhythm and effort. No wheezing detected without auscultation. No shortness of breath or respiratory distress.\par Cardiac: no cardiac related leg swelling.\par Neurology: normal gross sensation in extremities to light touch.\par Abdomen: soft, non-tender, tympanic, no masses.\par \par LUE:\par \par Skin CDI. No swelling or ecchymosis.\par M/U/R/AIN/PIN 5/5. M/U/R/Ax SILT. +2 Rad pulse. Compartments soft. \par No palpable masses or lymphedema.\par +TTP radial head.\par No TTP over lateral epicondyle. No pain with resisted wrist extension.\par No TTP over medial epicondyle. No pain with resisted wrist flexion.\par Elbow ROM: 0-90 w/ pain, no crepitus.

## 2021-10-15 NOTE — HISTORY OF PRESENT ILLNESS
[FreeTextEntry1] : This is a 80-year-old female, LHD, with no prior past medical history had an unwitnessed syncopal fall on 9/27/2021, landing on her left upper extremity.  X-rays performed in the ER revealed a nondisplaced left radial neck fracture.  She was given a sling and was recommended gentle range of motion exercises, nonweightbearing.  She has also had a work-up by electrophysiology for her syncopal fall, thus far negative for any pathology.  Today, she says she continues to have severe left elbow pain.  No pain at rest but severe pain with any attempted range of motion.  Has been taking Tylenol which has helped.

## 2021-10-29 ENCOUNTER — APPOINTMENT (OUTPATIENT)
Dept: ORTHOPEDIC SURGERY | Facility: CLINIC | Age: 81
End: 2021-10-29
Payer: MEDICARE

## 2021-10-29 VITALS
SYSTOLIC BLOOD PRESSURE: 146 MMHG | HEIGHT: 60 IN | HEART RATE: 76 BPM | WEIGHT: 160 LBS | OXYGEN SATURATION: 99 % | DIASTOLIC BLOOD PRESSURE: 69 MMHG | BODY MASS INDEX: 31.41 KG/M2

## 2021-10-29 PROCEDURE — 73080 X-RAY EXAM OF ELBOW: CPT | Mod: LT

## 2021-10-29 PROCEDURE — 99214 OFFICE O/P EST MOD 30 MIN: CPT

## 2021-11-19 ENCOUNTER — APPOINTMENT (OUTPATIENT)
Dept: ELECTROPHYSIOLOGY | Facility: CLINIC | Age: 81
End: 2021-11-19
Payer: MEDICARE

## 2021-11-19 ENCOUNTER — NON-APPOINTMENT (OUTPATIENT)
Age: 81
End: 2021-11-19

## 2021-11-19 PROCEDURE — G2066: CPT

## 2021-11-19 PROCEDURE — 93298 REM INTERROG DEV EVAL SCRMS: CPT | Mod: NC

## 2021-12-03 ENCOUNTER — APPOINTMENT (OUTPATIENT)
Dept: ORTHOPEDIC SURGERY | Facility: CLINIC | Age: 81
End: 2021-12-03
Payer: MEDICARE

## 2021-12-03 PROCEDURE — 99213 OFFICE O/P EST LOW 20 MIN: CPT

## 2021-12-03 PROCEDURE — 73080 X-RAY EXAM OF ELBOW: CPT | Mod: LT

## 2021-12-03 RX ORDER — MELOXICAM 15 MG/1
15 TABLET ORAL
Qty: 30 | Refills: 1 | Status: ACTIVE | COMMUNITY
Start: 2021-10-15 | End: 1900-01-01

## 2021-12-17 ENCOUNTER — NON-APPOINTMENT (OUTPATIENT)
Age: 81
End: 2021-12-17

## 2021-12-20 ENCOUNTER — APPOINTMENT (OUTPATIENT)
Dept: ELECTROPHYSIOLOGY | Facility: CLINIC | Age: 81
End: 2021-12-20
Payer: MEDICARE

## 2021-12-20 PROCEDURE — 93298 REM INTERROG DEV EVAL SCRMS: CPT

## 2021-12-20 PROCEDURE — G2066: CPT

## 2022-01-18 ENCOUNTER — APPOINTMENT (OUTPATIENT)
Dept: ELECTROPHYSIOLOGY | Facility: CLINIC | Age: 82
End: 2022-01-18
Payer: MEDICARE

## 2022-01-18 ENCOUNTER — NON-APPOINTMENT (OUTPATIENT)
Age: 82
End: 2022-01-18

## 2022-01-18 PROCEDURE — 93298 REM INTERROG DEV EVAL SCRMS: CPT

## 2022-01-18 PROCEDURE — G2066: CPT

## 2022-02-18 ENCOUNTER — NON-APPOINTMENT (OUTPATIENT)
Age: 82
End: 2022-02-18

## 2022-02-18 ENCOUNTER — APPOINTMENT (OUTPATIENT)
Dept: ELECTROPHYSIOLOGY | Facility: CLINIC | Age: 82
End: 2022-02-18
Payer: MEDICARE

## 2022-02-18 PROCEDURE — 93298 REM INTERROG DEV EVAL SCRMS: CPT

## 2022-02-18 PROCEDURE — G2066: CPT

## 2022-03-04 ENCOUNTER — APPOINTMENT (OUTPATIENT)
Dept: ORTHOPEDIC SURGERY | Facility: CLINIC | Age: 82
End: 2022-03-04
Payer: MEDICARE

## 2022-03-04 DIAGNOSIS — S52.135A NONDISPLACED FRACTURE OF NECK OF LEFT RADIUS, INITIAL ENCOUNTER FOR CLOSED FRACTURE: ICD-10-CM

## 2022-03-04 PROCEDURE — 99213 OFFICE O/P EST LOW 20 MIN: CPT

## 2022-03-04 PROCEDURE — 73080 X-RAY EXAM OF ELBOW: CPT | Mod: LT

## 2022-03-14 ENCOUNTER — INPATIENT (INPATIENT)
Facility: HOSPITAL | Age: 82
LOS: 8 days | Discharge: HOME | End: 2022-03-23
Attending: HOSPITALIST | Admitting: HOSPITALIST
Payer: MEDICARE

## 2022-03-14 VITALS
HEART RATE: 78 BPM | SYSTOLIC BLOOD PRESSURE: 187 MMHG | RESPIRATION RATE: 18 BRPM | TEMPERATURE: 98 F | DIASTOLIC BLOOD PRESSURE: 83 MMHG | OXYGEN SATURATION: 94 %

## 2022-03-14 DIAGNOSIS — Z98.890 OTHER SPECIFIED POSTPROCEDURAL STATES: Chronic | ICD-10-CM

## 2022-03-14 DIAGNOSIS — R29.898 OTHER SYMPTOMS AND SIGNS INVOLVING THE MUSCULOSKELETAL SYSTEM: ICD-10-CM

## 2022-03-14 DIAGNOSIS — Z87.81 PERSONAL HISTORY OF (HEALED) TRAUMATIC FRACTURE: Chronic | ICD-10-CM

## 2022-03-14 DIAGNOSIS — E87.1 HYPO-OSMOLALITY AND HYPONATREMIA: ICD-10-CM

## 2022-03-14 DIAGNOSIS — I63.9 CEREBRAL INFARCTION, UNSPECIFIED: ICD-10-CM

## 2022-03-14 DIAGNOSIS — R55 SYNCOPE AND COLLAPSE: ICD-10-CM

## 2022-03-14 DIAGNOSIS — S62.609A FRACTURE OF UNSPECIFIED PHALANX OF UNSPECIFIED FINGER, INITIAL ENCOUNTER FOR CLOSED FRACTURE: ICD-10-CM

## 2022-03-14 DIAGNOSIS — S53.106A UNSPECIFIED DISLOCATION OF UNSPECIFIED ULNOHUMERAL JOINT, INITIAL ENCOUNTER: ICD-10-CM

## 2022-03-14 DIAGNOSIS — Z29.9 ENCOUNTER FOR PROPHYLACTIC MEASURES, UNSPECIFIED: ICD-10-CM

## 2022-03-14 LAB
ALBUMIN SERPL ELPH-MCNC: 3.9 G/DL — SIGNIFICANT CHANGE UP (ref 3.3–5)
ALP SERPL-CCNC: 149 U/L — HIGH (ref 40–120)
ALT FLD-CCNC: 14 U/L — SIGNIFICANT CHANGE UP (ref 4–33)
ANION GAP SERPL CALC-SCNC: 10 MMOL/L — SIGNIFICANT CHANGE UP (ref 7–14)
ANION GAP SERPL CALC-SCNC: 14 MMOL/L — SIGNIFICANT CHANGE UP (ref 7–14)
APPEARANCE UR: CLEAR — SIGNIFICANT CHANGE UP
APTT BLD: 26.3 SEC — LOW (ref 27–36.3)
AST SERPL-CCNC: 18 U/L — SIGNIFICANT CHANGE UP (ref 4–32)
BASOPHILS # BLD AUTO: 0.03 K/UL — SIGNIFICANT CHANGE UP (ref 0–0.2)
BASOPHILS NFR BLD AUTO: 0.4 % — SIGNIFICANT CHANGE UP (ref 0–2)
BILIRUB SERPL-MCNC: 0.3 MG/DL — SIGNIFICANT CHANGE UP (ref 0.2–1.2)
BILIRUB UR-MCNC: NEGATIVE — SIGNIFICANT CHANGE UP
BLOOD GAS VENOUS COMPREHENSIVE RESULT: SIGNIFICANT CHANGE UP
BUN SERPL-MCNC: 13 MG/DL — SIGNIFICANT CHANGE UP (ref 7–23)
BUN SERPL-MCNC: 15 MG/DL — SIGNIFICANT CHANGE UP (ref 7–23)
CALCIUM SERPL-MCNC: 9.4 MG/DL — SIGNIFICANT CHANGE UP (ref 8.4–10.5)
CALCIUM SERPL-MCNC: 9.4 MG/DL — SIGNIFICANT CHANGE UP (ref 8.4–10.5)
CHLORIDE SERPL-SCNC: 93 MMOL/L — LOW (ref 98–107)
CHLORIDE SERPL-SCNC: 94 MMOL/L — LOW (ref 98–107)
CK SERPL-CCNC: 85 U/L — SIGNIFICANT CHANGE UP (ref 25–170)
CO2 SERPL-SCNC: 21 MMOL/L — LOW (ref 22–31)
CO2 SERPL-SCNC: 24 MMOL/L — SIGNIFICANT CHANGE UP (ref 22–31)
COLOR SPEC: COLORLESS — SIGNIFICANT CHANGE UP
CREAT SERPL-MCNC: 0.5 MG/DL — SIGNIFICANT CHANGE UP (ref 0.5–1.3)
CREAT SERPL-MCNC: 0.56 MG/DL — SIGNIFICANT CHANGE UP (ref 0.5–1.3)
DIFF PNL FLD: NEGATIVE — SIGNIFICANT CHANGE UP
EGFR: 92 ML/MIN/1.73M2 — SIGNIFICANT CHANGE UP
EGFR: 95 ML/MIN/1.73M2 — SIGNIFICANT CHANGE UP
EOSINOPHIL # BLD AUTO: 0.39 K/UL — SIGNIFICANT CHANGE UP (ref 0–0.5)
EOSINOPHIL NFR BLD AUTO: 4.9 % — SIGNIFICANT CHANGE UP (ref 0–6)
FLUAV AG NPH QL: SIGNIFICANT CHANGE UP
FLUBV AG NPH QL: SIGNIFICANT CHANGE UP
GLUCOSE SERPL-MCNC: 106 MG/DL — HIGH (ref 70–99)
GLUCOSE SERPL-MCNC: 112 MG/DL — HIGH (ref 70–99)
GLUCOSE UR QL: NEGATIVE — SIGNIFICANT CHANGE UP
HCT VFR BLD CALC: 34.9 % — SIGNIFICANT CHANGE UP (ref 34.5–45)
HGB BLD-MCNC: 11.9 G/DL — SIGNIFICANT CHANGE UP (ref 11.5–15.5)
IANC: 4.78 K/UL — SIGNIFICANT CHANGE UP (ref 1.5–8.5)
IMM GRANULOCYTES NFR BLD AUTO: 0.5 % — SIGNIFICANT CHANGE UP (ref 0–1.5)
INR BLD: 1 RATIO — SIGNIFICANT CHANGE UP (ref 0.88–1.16)
KETONES UR-MCNC: NEGATIVE — SIGNIFICANT CHANGE UP
LEUKOCYTE ESTERASE UR-ACNC: NEGATIVE — SIGNIFICANT CHANGE UP
LYMPHOCYTES # BLD AUTO: 1.97 K/UL — SIGNIFICANT CHANGE UP (ref 1–3.3)
LYMPHOCYTES # BLD AUTO: 24.8 % — SIGNIFICANT CHANGE UP (ref 13–44)
MCHC RBC-ENTMCNC: 27.8 PG — SIGNIFICANT CHANGE UP (ref 27–34)
MCHC RBC-ENTMCNC: 34.1 GM/DL — SIGNIFICANT CHANGE UP (ref 32–36)
MCV RBC AUTO: 81.5 FL — SIGNIFICANT CHANGE UP (ref 80–100)
MONOCYTES # BLD AUTO: 0.73 K/UL — SIGNIFICANT CHANGE UP (ref 0–0.9)
MONOCYTES NFR BLD AUTO: 9.2 % — SIGNIFICANT CHANGE UP (ref 2–14)
NEUTROPHILS # BLD AUTO: 4.78 K/UL — SIGNIFICANT CHANGE UP (ref 1.8–7.4)
NEUTROPHILS NFR BLD AUTO: 60.2 % — SIGNIFICANT CHANGE UP (ref 43–77)
NITRITE UR-MCNC: NEGATIVE — SIGNIFICANT CHANGE UP
NRBC # BLD: 0 /100 WBCS — SIGNIFICANT CHANGE UP
NRBC # FLD: 0 K/UL — SIGNIFICANT CHANGE UP
OSMOLALITY SERPL: 277 MOSM/KG — SIGNIFICANT CHANGE UP (ref 275–295)
OSMOLALITY UR: 424 MOSM/KG — SIGNIFICANT CHANGE UP (ref 50–1200)
PH UR: 8 — SIGNIFICANT CHANGE UP (ref 5–8)
PLATELET # BLD AUTO: 412 K/UL — HIGH (ref 150–400)
POTASSIUM SERPL-MCNC: 3.9 MMOL/L — SIGNIFICANT CHANGE UP (ref 3.5–5.3)
POTASSIUM SERPL-MCNC: 4.1 MMOL/L — SIGNIFICANT CHANGE UP (ref 3.5–5.3)
POTASSIUM SERPL-SCNC: 3.9 MMOL/L — SIGNIFICANT CHANGE UP (ref 3.5–5.3)
POTASSIUM SERPL-SCNC: 4.1 MMOL/L — SIGNIFICANT CHANGE UP (ref 3.5–5.3)
PROT SERPL-MCNC: 7.6 G/DL — SIGNIFICANT CHANGE UP (ref 6–8.3)
PROT UR-MCNC: NEGATIVE — SIGNIFICANT CHANGE UP
PROTHROM AB SERPL-ACNC: 11.6 SEC — SIGNIFICANT CHANGE UP (ref 10.5–13.4)
RBC # BLD: 4.28 M/UL — SIGNIFICANT CHANGE UP (ref 3.8–5.2)
RBC # FLD: 13.1 % — SIGNIFICANT CHANGE UP (ref 10.3–14.5)
RSV RNA NPH QL NAA+NON-PROBE: SIGNIFICANT CHANGE UP
SARS-COV-2 RNA SPEC QL NAA+PROBE: SIGNIFICANT CHANGE UP
SODIUM SERPL-SCNC: 127 MMOL/L — LOW (ref 135–145)
SODIUM SERPL-SCNC: 129 MMOL/L — LOW (ref 135–145)
SP GR SPEC: 1.03 — SIGNIFICANT CHANGE UP (ref 1–1.05)
TROPONIN T, HIGH SENSITIVITY RESULT: 14 NG/L — SIGNIFICANT CHANGE UP
TROPONIN T, HIGH SENSITIVITY RESULT: 15 NG/L — SIGNIFICANT CHANGE UP
TSH SERPL-MCNC: 1.67 UIU/ML — SIGNIFICANT CHANGE UP (ref 0.27–4.2)
UROBILINOGEN FLD QL: SIGNIFICANT CHANGE UP
WBC # BLD: 7.94 K/UL — SIGNIFICANT CHANGE UP (ref 3.8–10.5)
WBC # FLD AUTO: 7.94 K/UL — SIGNIFICANT CHANGE UP (ref 3.8–10.5)

## 2022-03-14 PROCEDURE — 99285 EMERGENCY DEPT VISIT HI MDM: CPT | Mod: 25,GC

## 2022-03-14 PROCEDURE — 93010 ELECTROCARDIOGRAM REPORT: CPT | Mod: GC

## 2022-03-14 PROCEDURE — 73502 X-RAY EXAM HIP UNI 2-3 VIEWS: CPT | Mod: 26,LT

## 2022-03-14 PROCEDURE — 99223 1ST HOSP IP/OBS HIGH 75: CPT

## 2022-03-14 PROCEDURE — 93306 TTE W/DOPPLER COMPLETE: CPT | Mod: 26

## 2022-03-14 PROCEDURE — 73560 X-RAY EXAM OF KNEE 1 OR 2: CPT | Mod: 26,LT

## 2022-03-14 PROCEDURE — 72126 CT NECK SPINE W/DYE: CPT | Mod: 26,MA

## 2022-03-14 PROCEDURE — 73030 X-RAY EXAM OF SHOULDER: CPT | Mod: 26,LT

## 2022-03-14 PROCEDURE — 71045 X-RAY EXAM CHEST 1 VIEW: CPT | Mod: 26

## 2022-03-14 PROCEDURE — 73552 X-RAY EXAM OF FEMUR 2/>: CPT | Mod: 26,LT

## 2022-03-14 PROCEDURE — 93291 INTERROG DEV EVAL SCRMS IP: CPT | Mod: 26

## 2022-03-14 PROCEDURE — 73070 X-RAY EXAM OF ELBOW: CPT | Mod: 26,LT

## 2022-03-14 PROCEDURE — 70498 CT ANGIOGRAPHY NECK: CPT | Mod: 26,MA

## 2022-03-14 PROCEDURE — 0042T: CPT

## 2022-03-14 PROCEDURE — 70496 CT ANGIOGRAPHY HEAD: CPT | Mod: 26,MA

## 2022-03-14 PROCEDURE — 73100 X-RAY EXAM OF WRIST: CPT | Mod: 26,LT

## 2022-03-14 PROCEDURE — 73120 X-RAY EXAM OF HAND: CPT | Mod: 26,LT

## 2022-03-14 RX ORDER — ATORVASTATIN CALCIUM 80 MG/1
40 TABLET, FILM COATED ORAL AT BEDTIME
Refills: 0 | Status: DISCONTINUED | OUTPATIENT
Start: 2022-03-14 | End: 2022-03-15

## 2022-03-14 RX ORDER — ASPIRIN/CALCIUM CARB/MAGNESIUM 324 MG
81 TABLET ORAL DAILY
Refills: 0 | Status: DISCONTINUED | OUTPATIENT
Start: 2022-03-14 | End: 2022-03-23

## 2022-03-14 RX ORDER — INFLUENZA VIRUS VACCINE 15; 15; 15; 15 UG/.5ML; UG/.5ML; UG/.5ML; UG/.5ML
0.7 SUSPENSION INTRAMUSCULAR ONCE
Refills: 0 | Status: DISCONTINUED | OUTPATIENT
Start: 2022-03-14 | End: 2022-03-23

## 2022-03-14 RX ORDER — ACETAMINOPHEN 500 MG
650 TABLET ORAL EVERY 6 HOURS
Refills: 0 | Status: DISCONTINUED | OUTPATIENT
Start: 2022-03-14 | End: 2022-03-23

## 2022-03-14 RX ORDER — HEPARIN SODIUM 5000 [USP'U]/ML
5000 INJECTION INTRAVENOUS; SUBCUTANEOUS EVERY 8 HOURS
Refills: 0 | Status: DISCONTINUED | OUTPATIENT
Start: 2022-03-14 | End: 2022-03-15

## 2022-03-14 RX ADMIN — HEPARIN SODIUM 5000 UNIT(S): 5000 INJECTION INTRAVENOUS; SUBCUTANEOUS at 21:45

## 2022-03-14 RX ADMIN — Medication 81 MILLIGRAM(S): at 13:26

## 2022-03-14 RX ADMIN — ATORVASTATIN CALCIUM 40 MILLIGRAM(S): 80 TABLET, FILM COATED ORAL at 21:45

## 2022-03-14 NOTE — ED PROVIDER NOTE - PROGRESS NOTE DETAILS
Song Cochran PGY2: Work up thus far remarkable for hyponatremia. No acute fracture identified. Neuro recs provided. Pt accepted for admission for further management. Song Cochran PGY2: Post void residual ~160 cc.

## 2022-03-14 NOTE — H&P ADULT - NSHPSOCIALHISTORY_GEN_ALL_CORE
Pt , lives with daughter. Denies smoking, drinking or drugs. Ambulates independently with walker at home.

## 2022-03-14 NOTE — H&P ADULT - MUSCULOSKELETAL COMMENTS
c/o pain in left 5th finger since fall 5/10 on ROM Left hand 5th phallanx ecchymosis, mild swelling and tender to palpation and ROM

## 2022-03-14 NOTE — CONSULT NOTE ADULT - SUBJECTIVE AND OBJECTIVE BOX
Orthopedics Consult Note:  80 y/o Female with PMH of vertigo, syncope s/p ILR (10/'21), chronic headache presents to ED s/p unwitnessed fall this morning. Pt was evaluated for stroke, being admitted for syncope. Pt had multiple xrays performed which showed age-indeterminate left elbow dislocation. Pt was seen by Dr. Lubna Henry on 3/4/2022 where xrays obtained showed elbow with same deformity. Pt not complaining of any elbow pain at the moment. Pt denies fevers/chills, radiation of pain, any numbness/tingling of extremities.       PMH:  Vertigo  Syncope s/p ILR  Headaches    Meds: see med reconciliation      Vital Signs:  T(C): 36.8 (03-14-22 @ 14:12)  HR: 86 (03-14-22 @ 14:12)  BP: 155/86 (03-14-22 @ 14:12)  RR: 18 (03-14-22 @ 14:12)  SpO2: 100% (03-14-22 @ 14:12)  Wt(kg): --      Physical Exam:  L UE:  Skin intact, no soft tissue swelling/effusion/ecchymosis/erythema of elbow  -TTP over elbow able to range elbow w/o pain  Good range of motion of shoulder and wrist w/o pain  Sensation intact to light touch  2+ Radial Pulse  Compartments soft    Contralateral upper extremity/bilateral lower extremities:   No bony TTP, Good ROM w/o pain. Able to SLR on R/L. Exam Unremarkable      Imaging:  < from: Xray Elbow AP + Lateral, Left (03.14.22 @ 11:07) >  ACC: [id number] EXAM:  XR ELBOW 2 VIEWS LT                        PROCEDURE DATE:  03/14/2022    INTERPRETATION:  CLINICAL INDICATION: fall; left elbow pain and deformity    EXAM:  Frontal, oblique, and lateral left elbow from 3/14/2022 at 1107. Compared   to prior study from 9/29/2021.    IMPRESSION:  Old radial head fracture deformity with exuberant irregular surrounding   periosteal callus. Chronic appearing surrounding periarticular ossific   debris    Age-indeterminate elbow dislocation however new from prior.  Chronic appearing remodeled appearing distal humeral articular surfaces.  Generalized osteopenia otherwise no discrete suspicious lytic or blastic   lesions.  IV cannula with attached tubing in lateral antecubital region.  --- End of Report ---      EVERETT SAUNDERS MD; Attending Radiologist  This document has been electronically signed. Mar 14 2022 12:11PM  < end of copied text >

## 2022-03-14 NOTE — ED PROVIDER NOTE - ATTENDING CONTRIBUTION TO CARE
80 y/o female w/ h/o dizziness and syncope, migraine HA, and cataracts presents as code stroke.  per daughter last seen normal at 10pm lst night, this morning at about7 am daughter found pt sitting on floor, seemed to be a little weak and confused. pt stated she tried to get out of bed and fell.   seemed to be confused at the time but per daughter acting herself now.   pt was admitted last year for unwitnessed fall/syncope, found to be in sinus bradycardia  on exam pt with no facial droop, slight left sided arm weakness, right wnl, bl le normal strength  alert and oriented  likely fall vs syncope, less likely stroke, will ro infectious/metabolic an central causes for ams and generalized weakness

## 2022-03-14 NOTE — H&P ADULT - NSHPPHYSICALEXAM_GEN_ALL_CORE
Vital Signs Last 24 Hrs  T(C): 36.6 (14 Mar 2022 16:30), Max: 36.8 (14 Mar 2022 14:12)  T(F): 97.9 (14 Mar 2022 16:30), Max: 98.2 (14 Mar 2022 14:12)  HR: 79 (14 Mar 2022 16:30) (78 - 86)  BP: 155/72 (14 Mar 2022 16:30) (155/72 - 187/83)  BP(mean): --  RR: 18 (14 Mar 2022 16:30) (17 - 20)  SpO2: 100% (14 Mar 2022 16:30) (94% - 100%)

## 2022-03-14 NOTE — ED ADULT NURSE NOTE - OBJECTIVE STATEMENT
patient alert ox3 came in as fell last night and was found on the floor by her daughter this morning. as per patient she fell last night unknown LOC. patient was last seen at 10pm. c/o right side  pain and left hip pain. bruising to body noted. ambulates with cane at base line. connected to CM shows NSR. eczema noted to body. daughter by bed side. awaiting lab results and re eval. left sided weakness noted. speech clear ,will continue to monitor.

## 2022-03-14 NOTE — ED PROVIDER NOTE - PHYSICAL EXAMINATION
Gen: A&O to person, place, date  HEENT: Atraumatic. Mucous membranes moist, no scleral icterus.  CV: RRR. 1+ bilat lower extremity edema.   Resp: Respirations unlabored. CTAB, no rales, no wheezes.  GI: Abdomen non tender to palpation, soft and non-distended.   Skin/MSK: Chronic deformity of left elbow, mild ttp. Limited LLE neuro exam 2/2 to pain per pt. Stregth intact w/ plantar and dorsi flexion of left foot. Strength otherwise 5/5 throughout extremities and sensation intact - although exam at left elbow limited 2/2 to prior injury.   Neuro: Following commands. CN2-12 grossly intact. Poor compliance with finger to nose testing, unclear if confusion vs deficit. + pronator drift on left, likely 2/2 to prior injury to left arm. gait exam deferred.   Psych: Appropriate mood, cooperative

## 2022-03-14 NOTE — SWALLOW BEDSIDE ASSESSMENT ADULT - COMMENTS
As per Neurology Consult Note 3/14/22 "HPI: A 81 year old female with no known PMH (not on meds) has presented to the ED as a code stroke at 8:17 AM due to left sided weakness- daughter  found pt on the floor next to bed at 7 AM, pt was not fully alert. Pt states she may have lost consciousness and that she also hit her head when trying to get up from her bed and then fell on the floor on to left side. Unclear if any seizure like symptoms over night including tongue bite/urinary incontinence. LKW 10 pm on 3/13/22 (LKW). Pt has a previous admission several month ago due to dizziness/lightheadedness with LOC, found to be bradycardic at that time with L elbow fracture. 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 Uses a walker at home. Pt currently has a posterior headache and L hip pain with some LLE numbness (unable to clarify onset of numbness) but denies any nausea, tingling, changes in vision/hearing. No hx of strokes or blood thinners or seizures. Pt is not a TPA candidate as she's outside window. Not a thrombectomy candidate as no LVO seen on imaging. /93 by EMS."    CXR 3/14 Bilateral faint airspace opacities with a right upper lung nodular opacity. Recommend CT chest for further evaluation.  CTH 3/14 Stable exam.    Patient seen in [facility] for bedside swallow assessment. Received upright in bed, denied discomfort. Patient alert and oriented to self only. Patient cooperative and able to follow 1-step directives.

## 2022-03-14 NOTE — SWALLOW BEDSIDE ASSESSMENT ADULT - ADDITIONAL RECOMMENDATIONS
1. Monitor for PO intake/tolerance   2. Monitor for change in neurological status that may impact PO

## 2022-03-14 NOTE — ED PROVIDER NOTE - NS ED ROS FT
Gen: Denies fever.   HEENT: Denies headache. Denies congestion.  CV: Denies chest pain. Denies lightheadedness.  Skin: Denies rash.   Resp: Denies SOB. Denies cough.  GI: Denies abd pain. Denies nausea. Denies vomiting. Denies diarrhea. Denies melena. Denies hematochezia.  Msk: +extremity swelling. +extremity pain.  : Denies dysuria. Denies hematuria.  Neuro: Denies dizziness. +new numbness/tingling. +new focal weakness.  Psych: Denies SI

## 2022-03-14 NOTE — ED ADULT TRIAGE NOTE - CHIEF COMPLAINT QUOTE
Pt brought in by EMS from home who was found on the floor this AM, as per Daughter pt is altered and not acting like herself. Pt is A&Ox4 at baseline. Pts last normal was 10pm last night.

## 2022-03-14 NOTE — H&P ADULT - CONSTITUTIONAL DETAILS
Patient Instructions by Chika New MD at 8/21/2019  2:40 PM     Author: Chika New MD Service: -- Author Type: Physician    Filed: 8/21/2019  2:59 PM Encounter Date: 8/21/2019 Status: Signed    : Chika New MD (Physician)         8/21/2019  Wt Readings from Last 1 Encounters:   08/21/19 111 lb (50.3 kg) (59 %, Z= 0.23)*     * Growth percentiles are based on CDC (Girls, 2-20 Years) data.       Acetaminophen Dosing Instructions  (May take every 4-6 hours)      WEIGHT   AGE Infant/Children's  160mg/5ml Children's   Chewable Tabs  80 mg each Vance Strength  Chewable Tabs  160 mg     Milliliter (ml) Soft Chew Tabs Chewable Tabs   6-11 lbs 0-3 months 1.25 ml     12-17 lbs 4-11 months 2.5 ml     18-23 lbs 12-23 months 3.75 ml     24-35 lbs 2-3 years 5 ml 2 tabs    36-47 lbs 4-5 years 7.5 ml 3 tabs    48-59 lbs 6-8 years 10 ml 4 tabs 2 tabs   60-71 lbs 9-10 years 12.5 ml 5 tabs 2.5 tabs   72-95 lbs 11 years 15 ml 6 tabs 3 tabs   96 lbs and over 12 years   4 tabs     Ibuprofen Dosing Instructions- Liquid  (May take every 6-8 hours)      WEIGHT   AGE Concentrated Drops   50 mg/1.25 ml Infant/Children's   100 mg/5ml     Dropperful Milliliter (ml)   12-17 lbs 6- 11 months 1 (1.25 ml)    18-23 lbs 12-23 months 1 1/2 (1.875 ml)    24-35 lbs 2-3 years  5 ml   36-47 lbs 4-5 years  7.5 ml   48-59 lbs 6-8 years  10 ml   60-71 lbs 9-10 years  12.5 ml   72-95 lbs 11 years  15 ml       Ibuprofen Dosing Instructions- Tablets/Caplets  (May take every 6-8 hours)    WEIGHT AGE Children's   Chewable Tabs   50 mg Vance Strength   Chewable Tabs   100 mg Vance Strength   Caplets    100 mg     Tablet Tablet Caplet   24-35 lbs 2-3 years 2 tabs     36-47 lbs 4-5 years 3 tabs     48-59 lbs 6-8 years 4 tabs 2 tabs 2 caps   60-71 lbs 9-10 years 5 tabs 2.5 tabs 2.5 caps   72-95 lbs 11 years 6 tabs 3 tabs 3 caps         Patient Education           Bright Futures Parent Handout   Early Adolescent Visits  Here are some  suggestions from [employer] experts that may be of value to your family.     Your Growing and Changing Child    Talk with your child about how her body is changing with puberty.    Encourage your child to brush his teeth twice a day and floss once a day.    Help your child get to the dentist twice a year.    Serve healthy food and eat together as a family often.    Encourage your child to get 1 hour of vigorous physical activity every day.    Help your child limit screen time (TV, video games, or computer) to 2 hours a day, not including homework time.    Praise your child when she does something well, not just when she looks good.  Healthy Behavior Choices    Help your child find fun, safe things to do.    Make sure your child knows how you feel about alcohol and drug use.    Consider a plan to make sure your child or his friends cannot get alcohol or prescription drugs in your home.    Talk about relationships, sex, and values.    Encourage your child not to have sex.    If you are uncomfortable talking about puberty or sexual pressures with your child, please ask me or others you trust for reliable information that can help you.    Use clear and consistent rules and discipline with your child.    Be a role model for healthy behavior choices. Feeling Happy    Encourage your child to think through problems herself with your support.    Help your child figure out healthy ways to deal with stress.    Spend time with your child.    Know your grzegorz friends and their parents, where your child is, and what he is doing at all times.    Show your child how to use talk to share feelings and handle disputes.    If you are concerned that your child is sad, depressed, nervous, irritable, hopeless, or angry, talk with me.  School and Friends    Check in with your grzegorz teacher about her grades on tests and attend back-to-school events and parent-teacher conferences if possible.    Talk with your child as she takes over  responsibility for schoolwork.    Help your child with organizing time, if he needs it.    Encourage reading.    Help your child find activities she is really interested in, besides schoolwork.    Help your child find and try activities that help others.    Give your child the chance to make more of his own decisions as he grows older. Violence and Injuries    Make sure everyone always wears a seat belt in the car.    Do not allow your child to ride ATVs.    Make sure your child knows how to get help if he is feeling unsafe.    Remove guns from your home. If you must keep a gun in your home, make sure it is unloaded and locked with ammunition locked in a separate place.    Help your child figure out nonviolent ways to handle anger or fear.                well-groomed/no distress

## 2022-03-14 NOTE — CONSULT NOTE ADULT - ASSESSMENT
A/P: 82 y/o Female w/ chronic Left elbow dislocation  -Pain control  -FU XR left forearm  -PT/OT/WBAT LUE as tolerated  -Care per primary team  -No further orthopedic intervention at this time  -Pt may follow up with Dr. Lubna Henry 2 weeks from discharge, call office for appointment, [phone]  -Plan discussed with Dr. Henry

## 2022-03-14 NOTE — H&P ADULT - HISTORY OF PRESENT ILLNESS
82 yo female PMHx Vertigo, Syncope, s/p ILR (10/19307) and chronic headache p/w unwitnessed fall and confusion this morning. Pt poor historian, daughter at bedside provided further collateral.  Pt last known normal was last night where at baseline pt is fully coherent, answering questions briskly and A&Ox4. This morning at 7am pt got up from the bed to go the bathroom and next thing she remembers she was on the floor next to the bed. Pt doesn't remember how she well and c/o recurrent bifrontal headache, 5/10. As per daughter: around 7am she was about to leave to work. She can to check on the pt and found her on the floor lying down alert and confused, not following verbal commands. Daughter called EMS and pt taken to [facility] ED. No reported weakness, chest pain, sob, palpitations, nausea, vomiting, diarrhea, facial palsy, fever, chills, or sick contacts. 80 yo female PMHx Vertigo, Syncope, s/p ILR (10/92905) and chronic headache p/w unwitnessed fall and confusion this morning. Pt poor historian, daughter at bedside provided further collateral.  Pt last known normal was last night where at baseline pt is fully coherent, answering questions briskly and A&Ox4. This morning at 7am pt got up from the bed to go the bathroom and next thing she remembers she was on the floor next to the bed. Pt doesn't remember how she well and c/o recurrent bifrontal headache, 5/10. As per daughter: around 7am she was about to leave to work. She can to check on the pt and found her on the floor lying down alert and confused, not following verbal commands. Daughter called EMS and pt taken to [facility] ED. No reported weakness, chest pain, sob, palpitations, nausea, vomiting, diarrhea, facial palsy, fever, chills, or sick contacts.

## 2022-03-14 NOTE — H&P ADULT - ASSESSMENT
82 yo female PMHx Chronic Headache, Vertigo, s/p ILR (10/2021) p/w unwitnessed fall this morning with altered mental status, admitted to tele for Syncope and Collapse, r/o ACS, Stroke and Hyponatremia.   82 yo female PMHx Chronic Headache, Vertigo, s/p ILR (10/2021) p/w unwitnessed fall day of admission with altered mental status, admitted to tele for Syncope and Collapse, r/o ACS, Stroke and Hyponatremia.

## 2022-03-14 NOTE — H&P ADULT - NSHPLABSRESULTS_GEN_ALL_CORE
EKG: Sinus Rhythm RBBB @ 84bpm QTC 486ms  Trop: 15  Xray L elbow: Old radial head fracture deformity with exuberant irregular surrounding periosteal callus. Chronic appearing surrounding periarticular ossific   debris. Age-indeterminate elbow dislocation however new from prior.  Xray L Hip + Femur: No dislocations, acute appearing gross displaced fractures, or definite suspicious fracture lines appreciated radiographically  CXR: Bilateral faint airspace opacities with a right upper lung nodular opacity.  CTA Head+Neck+Perfusion: There is evidence of a 71% stenosis involving the distal left common carotid artery. Mild narrowing of the proximal left internal carotid   artery is seen. The proximal left external carotid appears normal.  CT Head w/o con: There is no evidence acute hemorrhage mass or mass effect seen  CT C-spine: No CT evidence of acute osseous injury. Moderate to severe multilevel degenerative osteoarthritic changes and degenerative joint disease. Healing left T1 rib fracture.   Xray L hand+L wrist: fracture involving radial basilar   aspect of 5th proximal phalanx EKG: Sinus Rhythm RBBB @ 84bpm QTC 486ms  Trop: 15  Xray L elbow: Old radial head fracture deformity with exuberant irregular surrounding periosteal callus. Chronic appearing surrounding periarticular ossific   debris. Age-indeterminate elbow dislocation however new from prior.  Xray L Hip + Femur: No dislocations, acute appearing gross displaced fractures, or definite suspicious fracture lines appreciated radiographically  CXR: Bilateral faint airspace opacities with a right upper lung nodular opacity.  CTA Head+Neck+Perfusion: There is evidence of a 71% stenosis involving the distal left common carotid artery. Mild narrowing of the proximal left internal carotid   artery is seen. The proximal left external carotid appears normal.  CT Head w/o con: There is no evidence acute hemorrhage mass or mass effect seen  CT C-spine: No CT evidence of acute osseous injury. Moderate to severe multilevel degenerative osteoarthritic changes and degenerative joint disease. Healing left T1 rib fracture.   Xray L hand+L wrist: fracture involving radial basilar   aspect of 5th proximal phalanx                        11.9   7.94  )-----------( 412      ( 14 Mar 2022 08:40 )             34.9     03-14    127<L>  |  93<L>  |  15  ----------------------------<  112<H>  3.9   |  24  |  0.56    Ca    9.4      14 Mar 2022 08:40  Mg     2.10     03-14    TPro  7.6  /  Alb  3.9  /  TBili  0.3  /  DBili  x   /  AST  18  /  ALT  14  /  AlkPhos  149<H>  03-14      Urinalysis Basic - ( 14 Mar 2022 09:26 )    Color: Colorless / Appearance: Clear / S.030 / pH: x  Gluc: x / Ketone: Negative  / Bili: Negative / Urobili: <2 mg/dL   Blood: x / Protein: Negative / Nitrite: Negative   Leuk Esterase: Negative / RBC: x / WBC x   Sq Epi: x / Non Sq Epi: x / Bacteria: x      PT/INR - ( 14 Mar 2022 08:40 )   PT: 11.6 sec;   INR: 1.00 ratio         PTT - ( 14 Mar 2022 08:40 )  PTT:26.3 sec  LIVER FUNCTIONS - ( 14 Mar 2022 08:40 )  Alb: 3.9 g/dL / Pro: 7.6 g/dL / ALK PHOS: 149 U/L / ALT: 14 U/L / AST: 18 U/L / GGT: x           CAPILLARY BLOOD GLUCOSE      POCT Blood Glucose.: 100 mg/dL (14 Mar 2022 08:18) EKG personally reviewed: Sinus Rhythm RBBB @ 84bpm QTC 486ms  Trop: 15  Imaging personally reviewed:  Xray L elbow: Old radial head fracture deformity with exuberant irregular surrounding periosteal callus. Chronic appearing surrounding periarticular ossific   debris. Age-indeterminate elbow dislocation however new from prior.  Xray L Hip + Femur: No dislocations, acute appearing gross displaced fractures, or definite suspicious fracture lines appreciated radiographically  CXR: Bilateral faint airspace opacities with a right upper lung nodular opacity.  CTA Head+Neck+Perfusion: There is evidence of a 71% stenosis involving the distal left common carotid artery. Mild narrowing of the proximal left internal carotid   artery is seen. The proximal left external carotid appears normal.  CT Head w/o con: There is no evidence acute hemorrhage mass or mass effect seen  CT C-spine: No CT evidence of acute osseous injury. Moderate to severe multilevel degenerative osteoarthritic changes and degenerative joint disease. Healing left T1 rib fracture.   Xray L hand+L wrist: fracture involving radial basilar   aspect of 5th proximal phalanx                        11.9   7.94  )-----------( 412      ( 14 Mar 2022 08:40 )             34.9     03-14    127<L>  |  93<L>  |  15  ----------------------------<  112<H>  3.9   |  24  |  0.56    Ca    9.4      14 Mar 2022 08:40  Mg     2.10     03-14    TPro  7.6  /  Alb  3.9  /  TBili  0.3  /  DBili  x   /  AST  18  /  ALT  14  /  AlkPhos  149<H>  03-14      Urinalysis Basic - ( 14 Mar 2022 09:26 )    Color: Colorless / Appearance: Clear / S.030 / pH: x  Gluc: x / Ketone: Negative  / Bili: Negative / Urobili: <2 mg/dL   Blood: x / Protein: Negative / Nitrite: Negative   Leuk Esterase: Negative / RBC: x / WBC x   Sq Epi: x / Non Sq Epi: x / Bacteria: x      PT/INR - ( 14 Mar 2022 08:40 )   PT: 11.6 sec;   INR: 1.00 ratio         PTT - ( 14 Mar 2022 08:40 )  PTT:26.3 sec  LIVER FUNCTIONS - ( 14 Mar 2022 08:40 )  Alb: 3.9 g/dL / Pro: 7.6 g/dL / ALK PHOS: 149 U/L / ALT: 14 U/L / AST: 18 U/L / GGT: x           CAPILLARY BLOOD GLUCOSE      POCT Blood Glucose.: 100 mg/dL (14 Mar 2022 08:18)

## 2022-03-14 NOTE — H&P ADULT - ATTENDING COMMENTS
81 yr old woman PMHx Chronic Headache, Vertigo, s/p ILR (10/2021) presents after an unwitnessed fall and altered mental status. Found to have acute metabolic encephalopathy in setting of hyponatremia, r/o CVA and malignancy.    Hypotonic hyponatremia noted, Na improved to 129 without fluids. Check Shilpa, Uosm, TSH. Fluid restrict for now, Patient appears euvolemic on exam  ? SIADH  RUL nodular opacity noted, patient asymptomatic but saturating well on RA. R/o malignancy vs infection: check Vit D 25 OH, CT chest  Nontoxic appearing, afebrile, and without leukocytosis. Monitor off Abx for now  CTA H/N: 71% stenosis involving the distal left common carotid artery. Check MRI without contrast, orthostatics. Appreciate neuro recs  ILR interrogation with no events  HST flat, EKG nonischemic, Check TTE with bubble study  Left elbow dislocation: appreciate ortho recs  Left 5th phalanx fracture splinted by JULIA Armenta  PT/OT, S/S

## 2022-03-14 NOTE — CONSULT NOTE ADULT - ASSESSMENT
Impression: LOC with head trauma may be 2/2 likely cardiac etiology given previous admission with dizziness/lightheadedness. Lower concern for stroke at this time given no new acute focal findings. Neuro findings likely 2/2 chronic deconditioning 2/2 previous hx of L elbow fracture and L hip displacement.     Recommendations:  -CDU with MRI brain w/o contrast, if no contraindications  -Neuro checks Q4  -Permissive HTN up to 220/110 for 24 hours from symptom onset, gradual normotension over 2-3 days  -PT/OT/Dysphagia screen  -Speech and swallow eval if dysphagia screen fails  -NPO except meds until dysphagia screen passed  -Head of bed > 30 degrees for aspiration prevention and aspiration precautions ordered.  -STAT CT head non-contrast for change in neuro exam.   -Routine EEG  -orthostatics  -may consider cardio consult  -Left elbow xray given hx of L elbow fracture    Secondary prevention of stroke:  -May start baby 81 mg ASA daily (rectal 325 mg ASA if dysphagia fails) if no contraindications, may discontinue if MRI is negative.     -Atorvastatin 80 mg daily (long-term goal LDL < 70)  -Tight glucose control (long-term goal HgbA1c < 6%)    Stroke workup:  -TTE w/ bubble study  -Continuous  telemetry to monitor for arrhythmia  -Stroke labwork: HgbA1C, fasting lipid panel, CBC, CMP, coag pannel, troponin  - Baseline EKG    Prophylaxis: Lovenox 40 mg Sq daily unless contraindicated in which case SCDs   Fall precautions, aspiration precautions    Case discussed with Dr. Froilan Kimble under supervision of neurologist Dr. Cary.    A 81 year old female with no known PMH (not on meds) has presented to the ED due to daughter having have found pt on the floor next to bed at 7 AM, pt was not fully alert. Pt states she may have lost consciousness and that she also hit her head when trying to get up from her bed and then fell on the floor. Unclear if any seizure like symptoms over night. LKW 10 pm on 3/13/22 (LKW). Pt has a previous admission several month ago due to dizziness/lightheadedness with LOC, found to be bradycardic at that time with L elbow fracture. 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 Uses a walker at home. Pt currently has a posterior headache but denies any nausea, numbness, tingling, changes in vision/hearing. No hx of strokes or blood thinners or seizures. Pt is not a TPA candidate as she's outside window. Not a thrombectomy candidate as no LVO seen on imaging.     NIH 4 (LUE drift, LLE drift, RLE drift, no month)  preMRS 3 (walker at home)    Impression: LOC with head trauma may be 2/2 likely cardiac etiology given previous admission with dizziness/lightheadedness. Lower concern for stroke at this time given no new acute focal findings. Neuro findings likely 2/2 chronic deconditioning 2/2 previous hx of L elbow fracture and L hip displacement.     Recommendations:  -CDU with MRI brain w/o contrast, if no contraindications  -Neuro checks Q4  -Permissive HTN up to 220/110 for 24 hours from symptom onset, gradual normotension over 2-3 days  -PT/OT/Dysphagia screen  -Speech and swallow eval if dysphagia screen fails  -NPO except meds until dysphagia screen passed  -Head of bed > 30 degrees for aspiration prevention and aspiration precautions ordered.  -STAT CT head non-contrast for change in neuro exam.   -Routine EEG  -orthostatics  -may consider cardio consult  -Left elbow xray given hx of L elbow fracture    Secondary prevention of stroke:  -May start baby 81 mg ASA daily (rectal 325 mg ASA if dysphagia fails) if no contraindications, may discontinue if MRI is negative.     -Atorvastatin 80 mg daily (long-term goal LDL < 70)  -Tight glucose control (long-term goal HgbA1c < 6%)    Stroke workup:  -TTE w/ bubble study  -Continuous  telemetry to monitor for arrhythmia  -Stroke labwork: HgbA1C, fasting lipid panel, CBC, CMP, coag pannel, troponin  - Baseline EKG    Prophylaxis: Lovenox 40 mg Sq daily unless contraindicated in which case SCDs   Fall precautions, aspiration precautions    Case discussed with Dr. Froilan Kimble under supervision of neurologist Dr. Cary.    HPI: A 81 year old female with no known PMH (not on meds) has presented to the ED due to daughter having have found pt on the floor next to bed at 7 AM, pt was not fully alert. Pt states she may have lost consciousness and that she also hit her head when trying to get up from her bed and then fell on the floor on to left side. Unclear if any seizure like symptoms over night including tongue bite/urinary incontinence. LKW 10 pm on 3/13/22 (LKW). Pt has a previous admission several month ago due to dizziness/lightheadedness with LOC, found to be bradycardic at that time with L elbow fracture. 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 Uses a walker at home. Pt currently has a posterior headache and L hip pain with some LLE numbness (unable to clarify onset of numbness) but denies any nausea, tingling, changes in vision/hearing. No hx of strokes or blood thinners or seizures. Pt is not a TPA candidate as she's outside window. Not a thrombectomy candidate as no LVO seen on imaging.     NIH 4 (LUE drift, LLE drift, RLE drift, no month)  preMRS 3 (walker at home)    Neuro exam revealed AAO x2 to self and location. LUE, LLE (trace) and RLE drift. CT head showed no acute findings. CTA/CTP negative to my eye.     Impression: LOC with head trauma may be 2/2 likely cardiac etiology given previous admission with dizziness/lightheadedness. Lower concern for stroke at this time given no new acute focal findings. Neuro findings likely 2/2 chronic deconditioning 2/2 previous hx of L elbow fracture and L hip displacement.     Recommendations:  -MRI brain w/o contrast, if no contraindications  -Neuro checks Q4  -Permissive HTN up to 220/110 for 24 hours from symptom onset, gradual normotension over 2-3 days  -PT/OT/Dysphagia screen  -Speech and swallow eval if dysphagia screen fails  -NPO except meds until dysphagia screen passed  -Head of bed > 30 degrees for aspiration prevention and aspiration precautions ordered.  -STAT CT head non-contrast for change in neuro exam.   -Routine EEG  -orthostatics  -may consider cardio consult  -Left elbow xray given hx of L elbow fracture    Secondary prevention of stroke:  -May start baby 81 mg ASA daily (rectal 325 mg ASA if dysphagia fails) if no contraindications, may discontinue if MRI is negative.     -Atorvastatin 80 mg daily (long-term goal LDL < 70)  -Tight glucose control (long-term goal HgbA1c < 6%)    Stroke workup:  -TTE w/ bubble study  -Continuous  telemetry to monitor for arrhythmia  -Stroke labwork: HgbA1C, fasting lipid panel, CBC, CMP, coag pannel, troponin  - Baseline EKG    Prophylaxis: Lovenox 40 mg Sq daily unless contraindicated in which case SCDs   Fall precautions, aspiration precautions    Case discussed with Dr. Froilan Kimble under supervision of neurologist Dr. Cary.    HPI: A 81 year old female with no known PMH (not on meds) has presented to the ED as a code stroke at 8:17 AM due to left sided weakness- daughter  found pt on the floor next to bed at 7 AM, pt was not fully alert. Pt states she may have lost consciousness and that she also hit her head when trying to get up from her bed and then fell on the floor on to left side. Unclear if any seizure like symptoms over night including tongue bite/urinary incontinence. LKW 10 pm on 3/13/22 (LKW). Pt has a previous admission several month ago due to dizziness/lightheadedness with LOC, found to be bradycardic at that time with L elbow fracture. 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 Uses a walker at home. Pt currently has a posterior headache and L hip pain with some LLE numbness (unable to clarify onset of numbness) but denies any nausea, tingling, changes in vision/hearing. No hx of strokes or blood thinners or seizures. Pt is not a TPA candidate as she's outside window. Not a thrombectomy candidate as no LVO seen on imaging.     NIH 4 (LUE drift, LLE drift, RLE drift, no month)  preMRS 3 (walker at home)      NIH 4 (LUE drift, LLE drift, RLE drift, no month)  preMRS 3 (walker at home)    Neuro exam revealed AAO x2 to self and location. LUE, LLE (trace) and RLE drift. CT head showed no acute findings. CTA/CTP negative to my eye.     Impression: LOC with head trauma may be 2/2 likely cardiac etiology given previous admission with dizziness/lightheadedness. Lower concern for stroke at this time given no new acute focal findings. Neuro findings likely 2/2 chronic deconditioning 2/2 previous hx of L elbow fracture and L hip displacement.     Recommendations:  -MRI brain w/o contrast, if no contraindications  -Neuro checks Q4  -Permissive HTN up to 220/110 for 24 hours from symptom onset, gradual normotension over 2-3 days  -PT/OT/Dysphagia screen  -Speech and swallow eval if dysphagia screen fails  -NPO except meds until dysphagia screen passed  -Head of bed > 30 degrees for aspiration prevention and aspiration precautions ordered.  -STAT CT head non-contrast for change in neuro exam.   -Routine EEG  -orthostatics  -may consider cardio consult  -Left elbow xray given hx of L elbow fracture    Secondary prevention of stroke:  -May start baby 81 mg ASA daily (rectal 325 mg ASA if dysphagia fails) if no contraindications, may discontinue if MRI is negative.     -Atorvastatin 80 mg daily (long-term goal LDL < 70)  -Tight glucose control (long-term goal HgbA1c < 6%)    Stroke workup:  -TTE w/ bubble study  -Continuous  telemetry to monitor for arrhythmia  -Stroke labwork: HgbA1C, fasting lipid panel, CBC, CMP, coag pannel, troponin  - Baseline EKG    Prophylaxis: Lovenox 40 mg Sq daily unless contraindicated in which case SCDs   Fall precautions, aspiration precautions    Case discussed with Dr. Froilan Kimble under supervision of neurologist Dr. Cary.    HPI: A 81 year old female with no known PMH (not on meds) has presented to the ED as a code stroke at 8:17 AM due to left sided weakness- daughter  found pt on the floor next to bed at 7 AM, pt was not fully alert. Pt states she may have lost consciousness and that she also hit her head when trying to get up from her bed and then fell on the floor on to left side. Unclear if any seizure like symptoms over night including tongue bite/urinary incontinence. LKW 10 pm on 3/13/22 (LKW). Pt has a previous admission several month ago due to dizziness/lightheadedness with LOC, found to be bradycardic at that time with L elbow fracture. 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 Uses a walker at home. Pt currently has a posterior headache and L hip pain with some LLE numbness (unable to clarify onset of numbness) but denies any nausea, tingling, changes in vision/hearing. No hx of strokes or blood thinners or seizures. Pt is not a TPA candidate as she's outside window. Not a thrombectomy candidate as no LVO seen on imaging. /93 by EMS.      NIH 4 (LUE drift, LLE drift, RLE drift, no month)  preMRS 3 (walker at home)    Neuro exam revealed AAO x2 to self and location. LUE, LLE (trace) and RLE drift. Mild tremor in upper extremities. Bradykinesia noted. Cogwheel rigidity in RUE.  CT head showed no acute findings. CTA/CTP negative to my eye.     Impression: LOC with head trauma may be 2/2 likely cardiac etiology given previous admission with dizziness/lightheadedness in the setting of possible movement disorder. Lower concern for stroke at this time given no new acute focal findings. Neuro findings likely 2/2 chronic deconditioning 2/2 previous hx of L elbow fracture and L hip displacement.     Recommendations:  -MRI brain w/o contrast, if no contraindications  -Neuro checks Q4  -Permissive HTN up to 220/110 for 24 hours from symptom onset, gradual normotension over 2-3 days  -PT/OT/Dysphagia screen  -Speech and swallow eval if dysphagia screen fails  -NPO except meds until dysphagia screen passed  -Head of bed > 30 degrees for aspiration prevention and aspiration precautions ordered.  -STAT CT head non-contrast for change in neuro exam.   -Routine EEG  -orthostatics  -may consider cardio consult  -Left elbow xray given hx of L elbow fracture    Secondary prevention of stroke:  -May start baby 81 mg ASA daily (rectal 325 mg ASA if dysphagia fails) if no contraindications, may discontinue if MRI is negative.     -Atorvastatin 80 mg daily (long-term goal LDL < 70)  -Tight glucose control (long-term goal HgbA1c < 6%)    Stroke workup:  -TTE w/ bubble study  -Continuous  telemetry to monitor for arrhythmia  -Stroke labwork: HgbA1C, fasting lipid panel, CBC, CMP, coag pannel, troponin  - Baseline EKG    Prophylaxis: Lovenox 40 mg Sq daily unless contraindicated in which case SCDs   Fall precautions, aspiration precautions    Patient should follow up with movement disorder specialist at [address] [place] [address] after discharge for further work up of possible movement disorder. Please instruct the patient to call [phone] to schedule this appointment.     Case discussed with Dr. Froilan Kimble under supervision of neurologist Dr. Cary.    HPI: A 81 year old female with no known PMH (not on meds) has presented to the ED as a code stroke at 8:17 AM due to left sided weakness- daughter  found pt on the floor next to bed at 7 AM, pt was not fully alert. Pt states she may have lost consciousness and that she also hit her head when trying to get up from her bed and then fell on the floor on to left side. Unclear if any seizure like symptoms over night including tongue bite/urinary incontinence. LKW 10 pm on 3/13/22 (LKW). Pt has a previous admission several month ago due to dizziness/lightheadedness with LOC, found to be bradycardic at that time with L elbow fracture. 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 Uses a walker at home. Pt currently has a posterior headache and L hip pain with some LLE numbness (unable to clarify onset of numbness) but denies any nausea, tingling, changes in vision/hearing. No hx of strokes or blood thinners or seizures. Pt is not a TPA candidate as she's outside window. Not a thrombectomy candidate as no LVO seen on imaging. /93 by EMS.      NIH 4 (LUE drift, LLE drift, RLE drift, no month)  preMRS 3 (walker at home)    Neuro exam revealed AAO x2 to self and location. LUE, LLE (trace) and RLE drift. Mild tremor in upper extremities. Bradykinesia noted. Cogwheel rigidity in RUE.  CT head showed no acute findings. CTA/CTP negative for acute changes     Impression: LOC with head trauma may be 2/2 likely cardiac etiology given previous admission with dizziness/lightheadedness in the setting of possible movement disorder. Lower concern for stroke at this time given no new acute focal findings. Neuro findings likely 2/2 chronic deconditioning 2/2 previous hx of L elbow fracture and L hip displacement.     Recommendations:  -MRI brain w/o contrast, if no contraindications  -Neuro checks Q4  -Permissive HTN up to 220/110 for 24 hours from symptom onset, gradual normotension over 2-3 days  -PT/OT/Dysphagia screen  -Speech and swallow eval if dysphagia screen fails  -NPO except meds until dysphagia screen passed  -Head of bed > 30 degrees for aspiration prevention and aspiration precautions ordered.  -STAT CT head non-contrast for change in neuro exam.   -Routine EEG  -orthostatics  -may consider cardio consult  -Left elbow xray given hx of L elbow fracture    Secondary prevention of stroke:  -May start baby 81 mg ASA daily (rectal 325 mg ASA if dysphagia fails) if no contraindications, may discontinue if MRI is negative.     -Atorvastatin 80 mg daily (long-term goal LDL < 70)  -Tight glucose control (long-term goal HgbA1c < 6%)    Stroke workup:  -TTE w/ bubble study  -Continuous  telemetry to monitor for arrhythmia  -Stroke labwork: HgbA1C, fasting lipid panel, CBC, CMP, coag pannel, troponin  - Baseline EKG    Prophylaxis: Lovenox 40 mg Sq daily unless contraindicated in which case SCDs   Fall precautions, aspiration precautions    Patient should follow up with movement disorder specialist at [address] [place] [address] after discharge for further work up of possible movement disorder. Please instruct the patient to call [phone] to schedule this appointment.     Case discussed with Dr. Froilan Kimble under supervision of neurologist Dr. Cary.    HPI: A 81 year old female with no known PMH (not on meds) has presented to the ED as a code stroke at 8:17 AM due to left sided weakness- daughter  found pt on the floor next to bed at 7 AM, pt was not fully alert. Pt states she may have lost consciousness and that she also hit her head when trying to get up from her bed and then fell on the floor on to left side. Unclear if any seizure like symptoms over night including tongue bite/urinary incontinence. LKW 10 pm on 3/13/22 (LKW). Pt has a previous admission several month ago due to dizziness/lightheadedness with LOC, found to be bradycardic at that time with L elbow fracture. 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 Uses a walker at home. Pt currently has a posterior headache and L hip pain with some LLE numbness (unable to clarify onset of numbness) but denies any nausea, tingling, changes in vision/hearing. No hx of strokes or blood thinners or seizures. Pt is not a TPA candidate as she's outside window. Not a thrombectomy candidate as no LVO seen on imaging. /93 by EMS.      NIH 4 (LUE drift, LLE drift, RLE drift, no month)  preMRS 3 (walker at home)    Neuro exam revealed AAO x2 to self and location. LUE, LLE (trace) and RLE drift. Mild tremor in upper extremities. Bradykinesia noted. Cogwheel rigidity in RUE.  CT head showed no acute findings. CTA/CTP negative for acute changes     Impression: LOC with head trauma may be 2/2 likely cardiac etiology given previous admission with dizziness/lightheadedness/bradycardia in the setting of possible movement disorder. Lower concern for stroke at this time given no new acute focal findings. Neuro findings likely 2/2 chronic deconditioning 2/2 previous hx of L elbow fracture and L hip displacement.     Recommendations:  -MRI brain w/o contrast, if no contraindications  -Neuro checks Q4  -Permissive HTN up to 220/110 for 24 hours from symptom onset, gradual normotension over 2-3 days  -PT/OT/Dysphagia screen  -Speech and swallow eval if dysphagia screen fails  -NPO except meds until dysphagia screen passed  -Head of bed > 30 degrees for aspiration prevention and aspiration precautions ordered.  -STAT CT head non-contrast for change in neuro exam.   -Routine EEG  -orthostatics  -may consider cardio consult  -Left elbow xray given hx of L elbow fracture    Secondary prevention of stroke:  -May start baby 81 mg ASA daily (rectal 325 mg ASA if dysphagia fails) if no contraindications, may discontinue if MRI is negative.     -Atorvastatin 80 mg daily (long-term goal LDL < 70)  -Tight glucose control (long-term goal HgbA1c < 6%)    Stroke workup:  -TTE w/ bubble study  -Continuous  telemetry to monitor for arrhythmia  -Stroke labwork: HgbA1C, fasting lipid panel, CBC, CMP, coag pannel, troponin  - Baseline EKG    Prophylaxis: Lovenox 40 mg Sq daily unless contraindicated in which case SCDs   Fall precautions, aspiration precautions    Patient should follow up with movement disorder specialist at [address] [place] [address] after discharge for further work up of possible movement disorder. Please instruct the patient to call [phone] to schedule this appointment.     Case discussed with Dr. Froilan Kimble under supervision of neurologist Dr. Cary.

## 2022-03-14 NOTE — H&P ADULT - NSICDXPASTSURGICALHX_GEN_ALL_CORE_FT
PAST SURGICAL HISTORY:  H/O elbow surgery     H/O fracture of leg     H/O surgical procedure ILR 10/2021

## 2022-03-14 NOTE — ED PROVIDER NOTE - OBJECTIVE STATEMENT
81 yo F w/ PMHx of dizziness, syncope, migraine HA, cataracts, previously admitted to [facility] in Oct 2021 for unwitnessed fall in setting of bradycardia and UTI, now presenting to ED via EMS as CODE STROKE w/ concern for acute onset of L sided weakness. Last known normal at 10 PM. Pt living at home w/ daughter - this morning pt was found lying next to her bed. Unclear if fall mechanical vs LOC/syncope. Pt states that she hit the back of her head and now has a HA. Daughter reports pt was chronically bed bound x 2 years following a L hip injury, pt now reports chronic LLE decreased sensation. Pt denies new focal extremity weakness. Exam limited to 2/2 mild confusion. Family denies infectious symptoms in pt. Pt denies CP/SOB. 81 yo F w/ PMHx of dizziness, syncope, migraine HA, cataracts, previously admitted to [facility] in Oct 2021 for unwitnessed fall in setting of bradycardia and UTI, now presenting to ED via EMS as CODE STROKE w/ concern for acute onset of L sided weakness. Last known normal at 10 PM. Pt living at home w/ daughter - this morning pt was found lying next to her bed. Unclear if fall mechanical vs LOC/syncope. Pt states that she hit the back of her head and now has a HA. Daughter reports pt was chronically bed bound x 2 years following a L hip injury, pt now reports chronic LLE decreased sensation, but also new left leg weakness and pain in her left thigh. 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 Pt denies new vision changes, urinary symptoms, cough/congestion, fevers, N/V, or abd pain. History slightly limited to 2/2 mild confusion. Pt at baseline per daughter. Family denies infectious symptoms in pt. Pt denies CP/SOB.

## 2022-03-14 NOTE — H&P ADULT - PROBLEM SELECTOR PLAN 2
tele monitor  house neuro in charge  Neuro checks q4h  MRI Head w/o con  PT/OT  check FLP, HgA1c, TSH  start on ASA 81mg and Statin (lipitor 80mg)  Speech and Swallow  TTE w/ bubble study NIH 4   tele monitor  house neuro in charge  Neuro checks q4h  MRI Head w/o con  PT/OT  check FLP, HgA1c, TSH  start on ASA 81mg and Statin (lipitor 80mg)  Speech and Swallow  TTE w/ bubble study

## 2022-03-14 NOTE — ED PROVIDER NOTE - CLINICAL SUMMARY MEDICAL DECISION MAKING FREE TEXT BOX
81 yo F w/ PMHx of dizziness, syncope, migraine HA, cataracts, previously admitted to [facility] in Oct 2021 for unwitnessed fall in setting of bradycardia and UTI, now presenting to ED via EMS as CODE STROKE w/ concern for acute onset of L sided weakness. Found down by daughter. Pt w/ HA and left thigh pain, and LLE weakness/decreased sensation. Exam as above. Neuro at bedside. Consider CVA, ICH, C spine injury, metabolic encephalopathy, syncope - consider electrolyte abnormality, arrhythmia, GENNY, dehydration, vasovagal episode. Previous similar presentation in Fall 2021 - negative cardiac evaluation then. Labs, ecg, stroke code eval, xrays. Neuro at bedside. Will reassess.

## 2022-03-14 NOTE — H&P ADULT - NSHPPOACENTRALVENOUSCATHETER_GEN_ALL_CORE
no

## 2022-03-14 NOTE — ED PROVIDER NOTE - NSFOLLOWUPINSTRUCTIONS_ED_ALL_ED_FT
81 yo F w/ PMHx of dizziness, syncope, migraine HA, cataracts, previously admitted to [facility] in Oct 2021 for unwitnessed fall in setting of bradycardia and UTI, now presenting to ED via EMS as CODE STROKE w/ concern for acute onset of L sided weakness. Found down by daughter. Pt w/ HA and left thigh pain, and LLE weakness/decreased sensation. Exam as above. Neuro at bedside. Consider CVA, ICH, C spine injury, metabolic encephalopathy, syncope - consider electrolyte abnormality, arrhythmia, GENNY, dehydration, vasovagal episode. Previous similar presentation in Fall 2021 - negative cardiac evaluation then. Labs, ecg, stroke code eval, xrays. Neuro at bedside. Will reassess.

## 2022-03-14 NOTE — PATIENT PROFILE ADULT - FALL HARM RISK - HARM RISK INTERVENTIONS
Assistance with ambulation/Assistance OOB with selected safe patient handling equipment/Communicate Risk of Fall with Harm to all staff/Discuss with provider need for PT consult/Monitor gait and stability/Reinforce activity limits and safety measures with patient and family/Tailored Fall Risk Interventions/Visual Cue: Yellow wristband and red socks/Bed in lowest position, wheels locked, appropriate side rails in place/Call bell, personal items and telephone in reach/Instruct patient to call for assistance before getting out of bed or chair/Non-slip footwear when patient is out of bed/[place] to call system/Physically safe environment - no spills, clutter or unnecessary equipment/Purposeful Proactive Rounding/Room/bathroom lighting operational, light cord in reach

## 2022-03-14 NOTE — CONSULT NOTE ADULT - SUBJECTIVE AND OBJECTIVE BOX
HPI:           PAST MEDICAL & SURGICAL HISTORY:  H/O migraine    Bilateral cataracts    Syncope    H/O elbow surgery    H/O fracture of leg      FAMILY HISTORY:      SOCIAL HISTORY: SOCIAL HISTORY:     Marital Status: (  )   (  ) Single  (  )   (  )      Occupation:      Lives: (  ) alone  (  ) with children   (  ) with spouse  (  ) with parents  (  ) other     Illicit Drug Use: (  ) never used  (  ) other _____     Tobacco Use:  (  ) never smoked  (  ) former smoker  (  ) current smoker  (  ) pack year  (  ) last cigarette date     Alcohol Use:      Sexual History:        MEDICATIONS  Home Medications:  acetaminophen 325 mg oral tablet: 2 tab(s) orally every 6 hours, As needed, Mild Pain (1 - 3), Moderate Pain (4 - 6) (30 Sep 2021 11:48)      MEDICATIONS  (STANDING):    MEDICATIONS  (PRN):      ALLERGIES/INTOLERANCES:  Allergies  No Known Allergies    Intolerances      OBJECTIVE:  VITALS   Vital Signs Last 24 Hrs  T(C): 36.6 (14 Mar 2022 08:15), Max: 36.6 (14 Mar 2022 08:15)  T(F): 97.8 (14 Mar 2022 08:15), Max: 97.8 (14 Mar 2022 08:15)  HR: 78 (14 Mar 2022 08:15) (78 - 78)  BP: 187/83 (14 Mar 2022 08:15) (187/83 - 187/83)  BP(mean): --  RR: 18 (14 Mar 2022 08:15) (18 - 18)  SpO2: 94% (14 Mar 2022 08:15) (94% - 94%)    PHYSICAL EXAM:  Neurological Exam:  MS: Awake, alert, oriented to person, place, situation, time. Normal affect. Follows all commands.    Language: Speech is clear, fluent with good repetition & comprehension (able to name objects___)    CNs: PERRLA (R = 3mm, L = 3mm). VFF. EOMI no nystagmus, no diplopia. V1-3 intact to LT/pinprick, well developed masseter muscles b/l. No facial asymmetry b/l, Hearing grossly normal (rubbing fingers) b/l. Symmetric palate elevation in midline. Gag reflex deferred. Head turning & shoulder shrug intact b/l. Tongue midline, normal movements, no atrophy.    Fundoscopic: pale w/ sharp discs margins No vascular changes.      Motor: Normal muscle bulk & tone. No noticeable tremor or seizure. No pronator drift.              Deltoid	Biceps	Triceps 	   R	5	5	5	5	 	  L	5	5	5	5			    	H-Flex	H-Ext	K-Flex	K-Ext	D-Flex	P-Flex  R	5	5	5	5	5	5		   L	5	5	5	5	5	5		     Sensation: Intact to LT/PP/Temp/Vibration/Position b/l throughout.     Cortical: Extinction on DSS (neglect): none    Reflexes:              Biceps(C5)       BR(C6)     Triceps(C7)               Patellar(L4)    Achilles(S1)    Plantar Resp  R	2	          2	             2		        2		    2		Down   L	2	          2	             2		        2		    2		Down     Coordination: intact rapid-alt movements. No dysmetria to FTN/HTS    Gait:  Normal Romberg. No postural instability. Normal stance and tandem gait.     LABORATORY:  CBC                       11.9   7.94  )-----------( 412      ( 14 Mar 2022 08:40 )             34.9     Chem       LFTs   Coagulopathy   Lipid Panel   A1c   Cardiac enzymes     U/A   CSF  Immunological  Other    STUDIES & IMAGING:  Studies (EKG, EEG, EMG, etc):     Radiology (XR, CT, MR, U/S, TTE/ELLEN): HPI: A 81 year old female with no known PMH (not on meds) has presented to the ED due to daughter having have found pt on the floor next to bed at 7 AM, pt was not fully alert. Pt states she may have lost consciousness and that she also hit her head when trying to get up from her bed and then fell on the floor. Unclear if any seizure like symptoms over night. LKW 10 pm on 3/13/22 (LKW). Pt has a previous admission several month ago due to dizziness/lightheadedness with LOC, found to be bradycardic at that time with L elbow fracture. 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 Uses a walker at home. Pt currently has a posterior headache but denies any nausea, numbness, tingling, changes in vision/hearing. No hx of strokes or blood thinners or seizures. Pt is not a TPA candidate as she's outside window. Not a thrombectomy candidate as no LVO seen on imaging.           PAST MEDICAL & SURGICAL HISTORY:  H/O migraine    Bilateral cataracts    Syncope    H/O elbow surgery    H/O fracture of leg      FAMILY HISTORY:      SOCIAL HISTORY: SOCIAL HISTORY:     Marital Status: (  )   (x  ) Single  (  )   (  )      Occupation:      Lives: (  ) alone  (x  ) with children   (  ) with spouse  (  ) with parents  (  ) other        MEDICATIONS  Home Medications:  acetaminophen 325 mg oral tablet: 2 tab(s) orally every 6 hours, As needed, Mild Pain (1 - 3), Moderate Pain (4 - 6) (30 Sep 2021 11:48)      MEDICATIONS  (STANDING):    MEDICATIONS  (PRN):      ALLERGIES/INTOLERANCES:  Allergies  No Known Allergies    Intolerances      OBJECTIVE:  VITALS   Vital Signs Last 24 Hrs  T(C): 36.6 (14 Mar 2022 08:15), Max: 36.6 (14 Mar 2022 08:15)  T(F): 97.8 (14 Mar 2022 08:15), Max: 97.8 (14 Mar 2022 08:15)  HR: 78 (14 Mar 2022 08:15) (78 - 78)  BP: 187/83 (14 Mar 2022 08:15) (187/83 - 187/83)  BP(mean): --  RR: 18 (14 Mar 2022 08:15) (18 - 18)  SpO2: 94% (14 Mar 2022 08:15) (94% - 94%)    PHYSICAL EXAM:  Neurological Exam:  MS: Awake, alert, oriented to person, place, situation, time. Normal affect. Follows all commands.    Language: Speech is clear, fluent with good repetition & comprehension (able to name objects___)    CNs: PERRLA (R = 3mm, L = 3mm). VFF. EOMI no nystagmus, no diplopia. V1-3 intact to LT/pinprick, well developed masseter muscles b/l. No facial asymmetry b/l, Hearing grossly normal (rubbing fingers) b/l. Symmetric palate elevation in midline. Gag reflex deferred. Head turning & shoulder shrug intact b/l. Tongue midline, normal movements, no atrophy.    Fundoscopic: pale w/ sharp discs margins No vascular changes.      Motor: Normal muscle bulk & tone. No noticeable tremor or seizure. No pronator drift.              Deltoid	Biceps	Triceps 	   R	5	5	5	5	 	  L	5	5	5	5			    	H-Flex	H-Ext	K-Flex	K-Ext	D-Flex	P-Flex  R	5	5	5	5	5	5		   L	5	5	5	5	5	5		     Sensation: Intact to LT/PP/Temp/Vibration/Position b/l throughout.     Cortical: Extinction on DSS (neglect): none    Reflexes:              Biceps(C5)       BR(C6)     Triceps(C7)               Patellar(L4)    Achilles(S1)    Plantar Resp  R	2	          2	             2		        2		    2		Down   L	2	          2	             2		        2		    2		Down     Coordination: intact rapid-alt movements. No dysmetria to FTN/HTS    Gait:  Normal Romberg. No postural instability. Normal stance and tandem gait.     LABORATORY:  CBC                       11.9   7.94  )-----------( 412      ( 14 Mar 2022 08:40 )             34.9     Chem       LFTs   Coagulopathy   Lipid Panel   A1c   Cardiac enzymes     U/A   CSF  Immunological  Other    STUDIES & IMAGING:  Studies (EKG, EEG, EMG, etc):     Radiology (XR, CT, MR, U/S, TTE/ELLEN): HPI: A 81 year old female with no known PMH (not on meds) has presented to the ED due to daughter having have found pt on the floor next to bed at 7 AM, pt was not fully alert. Pt states she may have lost consciousness and that she also hit her head when trying to get up from her bed and then fell on the floor on to left side. Unclear if any seizure like symptoms over night including tongue bite/urinary incontinence. LKW 10 pm on 3/13/22 (LKW). Pt has a previous admission several month ago due to dizziness/lightheadedness with LOC, found to be bradycardic at that time with L elbow fracture. 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 Uses a walker at home. Pt currently has a posterior headache and L hip pain with some LLE numbness (unable to clarify onset of numbness) but denies any nausea, tingling, changes in vision/hearing. No hx of strokes or blood thinners or seizures. Pt is not a TPA candidate as she's outside window. Not a thrombectomy candidate as no LVO seen on imaging.     NIH 4 (LUE drift, LLE drift, RLE drift, no month)  preMRS 3 (walker at home)      PAST MEDICAL & SURGICAL HISTORY:  H/O migraine    Bilateral cataracts    Syncope    H/O elbow surgery    H/O fracture of leg      FAMILY HISTORY:      SOCIAL HISTORY: SOCIAL HISTORY:     Marital Status: (  )   (x  ) Single  (  )   (  )      Occupation:      Lives: (  ) alone  (x  ) with children   (  ) with spouse  (  ) with parents  (  ) other        MEDICATIONS  Home Medications:  acetaminophen 325 mg oral tablet: 2 tab(s) orally every 6 hours, As needed, Mild Pain (1 - 3), Moderate Pain (4 - 6) (30 Sep 2021 11:48)      MEDICATIONS  (STANDING):    MEDICATIONS  (PRN):      ALLERGIES/INTOLERANCES:  Allergies  No Known Allergies    Intolerances      OBJECTIVE:  VITALS   Vital Signs Last 24 Hrs  T(C): 36.6 (14 Mar 2022 08:15), Max: 36.6 (14 Mar 2022 08:15)  T(F): 97.8 (14 Mar 2022 08:15), Max: 97.8 (14 Mar 2022 08:15)  HR: 78 (14 Mar 2022 08:15) (78 - 78)  BP: 187/83 (14 Mar 2022 08:15) (187/83 - 187/83)  BP(mean): --  RR: 18 (14 Mar 2022 08:15) (18 - 18)  SpO2: 94% (14 Mar 2022 08:15) (94% - 94%)    PHYSICAL EXAM:  Neurological Exam:  MS: Awake, alert, oriented to person, place, but not month or year. Normal affect. Follows all commands.    Language: Speech is clear, fluent with good repetition & comprehension (able to name objects)    CNs: PERRLA (R = 3mm, L = 3mm). VFF. EOMI no nystagmus, no diplopia. V1-3 intact to LT/pinprick, well developed masseter muscles b/l. No facial asymmetry b/l, Hearing grossly normal (rubbing fingers) b/l. Symmetric palate elevation in midline. Gag reflex deferred. Head turning & shoulder shrug intact b/l. Tongue midline, normal movements, no atrophy.    Motor: Normal muscle bulk & tone. No noticeable tremor or seizure. No pronator drift.  LUE drift, LLE drift (trace), RLE drift present	     Sensation: Intact to LT/PP in all extremities.     Reflexes:              Biceps(C5)       BR(C6)     Triceps(C7)               Patellar(L4)    Achilles(S1)    Plantar Resp  R	2	          2	             2		        2		    2		withdraws   L	2	          2	             2		        1		    1		withdraws    Coordination: intact rapid-alt movements. No dysmetria to FTN/HTS    Gait: deferred    LABORATORY:  CBC                       11.9   7.94  )-----------( 412      ( 14 Mar 2022 08:40 )             34.9     Chem       LFTs   Coagulopathy   Lipid Panel   A1c   Cardiac enzymes     U/A   CSF  Immunological  Other    STUDIES & IMAGING:  Studies (EKG, EEG, EMG, etc):     Radiology (XR, CT, MR, U/S, TTE/ELLEN):  Ct head:   Stable exam.    CTA/CTP negative to my eye.  HPI: A 81 year old female with no known PMH (not on meds) has presented to the ED as a code stroke at 8:17 AM due to left sided weakness- daughter  found pt on the floor next to bed at 7 AM, pt was not fully alert. Pt states she may have lost consciousness and that she also hit her head when trying to get up from her bed and then fell on the floor on to left side. Unclear if any seizure like symptoms over night including tongue bite/urinary incontinence. LKW 10 pm on 3/13/22 (LKW). Pt has a previous admission several month ago due to dizziness/lightheadedness with LOC, found to be bradycardic at that time with L elbow fracture. 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 Uses a walker at home. Pt currently has a posterior headache and L hip pain with some LLE numbness (unable to clarify onset of numbness) but denies any nausea, tingling, changes in vision/hearing. No hx of strokes or blood thinners or seizures. Pt is not a TPA candidate as she's outside window. Not a thrombectomy candidate as no LVO seen on imaging.     NIH 4 (LUE drift, LLE drift, RLE drift, no month)  preMRS 3 (walker at home)      PAST MEDICAL & SURGICAL HISTORY:  H/O migraine    Bilateral cataracts    Syncope    H/O elbow surgery    H/O fracture of leg      FAMILY HISTORY:      SOCIAL HISTORY: SOCIAL HISTORY:     Marital Status: (  )   (x  ) Single  (  )   (  )      Occupation:      Lives: (  ) alone  (x  ) with children   (  ) with spouse  (  ) with parents  (  ) other        MEDICATIONS  Home Medications:  acetaminophen 325 mg oral tablet: 2 tab(s) orally every 6 hours, As needed, Mild Pain (1 - 3), Moderate Pain (4 - 6) (30 Sep 2021 11:48)      MEDICATIONS  (STANDING):    MEDICATIONS  (PRN):      ALLERGIES/INTOLERANCES:  Allergies  No Known Allergies    Intolerances      OBJECTIVE:  VITALS   Vital Signs Last 24 Hrs  T(C): 36.6 (14 Mar 2022 08:15), Max: 36.6 (14 Mar 2022 08:15)  T(F): 97.8 (14 Mar 2022 08:15), Max: 97.8 (14 Mar 2022 08:15)  HR: 78 (14 Mar 2022 08:15) (78 - 78)  BP: 187/83 (14 Mar 2022 08:15) (187/83 - 187/83)  BP(mean): --  RR: 18 (14 Mar 2022 08:15) (18 - 18)  SpO2: 94% (14 Mar 2022 08:15) (94% - 94%)    PHYSICAL EXAM:  Neurological Exam:  MS: Awake, alert, oriented to person, place, but not month or year. Normal affect. Follows all commands.    Language: Speech is clear, fluent with good repetition & comprehension (able to name objects)    CNs: PERRLA (R = 3mm, L = 3mm). VFF. EOMI no nystagmus, no diplopia. V1-3 intact to LT/pinprick, well developed masseter muscles b/l. No facial asymmetry b/l, Hearing grossly normal (rubbing fingers) b/l. Symmetric palate elevation in midline. Gag reflex deferred. Head turning & shoulder shrug intact b/l. Tongue midline, normal movements, no atrophy.    Motor: Normal muscle bulk & tone. No noticeable tremor or seizure. No pronator drift.  LUE drift, LLE drift (trace), RLE drift present	     Sensation: Intact to LT/PP in all extremities.     Reflexes:              Biceps(C5)       BR(C6)     Triceps(C7)               Patellar(L4)    Achilles(S1)    Plantar Resp  R	2	          2	             2		        2		    2		withdraws   L	2	          2	             2		        1		    1		withdraws    Coordination: intact rapid-alt movements. No dysmetria to FTN/HTS    Gait: deferred    LABORATORY:  CBC                       11.9   7.94  )-----------( 412      ( 14 Mar 2022 08:40 )             34.9     Chem       LFTs   Coagulopathy   Lipid Panel   A1c   Cardiac enzymes     U/A   CSF  Immunological  Other    STUDIES & IMAGING:  Studies (EKG, EEG, EMG, etc):     Radiology (XR, CT, MR, U/S, TTE/ELLEN):  Ct head:   Stable exam.    CTA/CTP negative to my eye.  HPI: A 81 year old female with no known PMH (not on meds) has presented to the ED as a code stroke at 8:17 AM due to left sided weakness- daughter  found pt on the floor next to bed at 7 AM, pt was not fully alert. Pt states she may have lost consciousness and that she also hit her head when trying to get up from her bed and then fell on the floor on to left side. Unclear if any seizure like symptoms over night including tongue bite/urinary incontinence. LKW 10 pm on 3/13/22 (LKW). Pt has a previous admission several month ago due to dizziness/lightheadedness with LOC, found to be bradycardic at that time with L elbow fracture. 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 Uses a walker at home. Pt currently has a posterior headache and L hip pain with some LLE numbness (unable to clarify onset of numbness) but denies any nausea, tingling, changes in vision/hearing. No hx of strokes or blood thinners or seizures. Pt is not a TPA candidate as she's outside window. Not a thrombectomy candidate as no LVO seen on imaging. /93 by EMS.    NIH 4 (LUE drift, LLE drift, RLE drift, no month)  preMRS 3 (walker at home)      PAST MEDICAL & SURGICAL HISTORY:  H/O migraine    Bilateral cataracts    Syncope    H/O elbow surgery    H/O fracture of leg      FAMILY HISTORY:      SOCIAL HISTORY: SOCIAL HISTORY:     Marital Status: (  )   (x  ) Single  (  )   (  )      Occupation:      Lives: (  ) alone  (x  ) with children   (  ) with spouse  (  ) with parents  (  ) other        MEDICATIONS  Home Medications:  acetaminophen 325 mg oral tablet: 2 tab(s) orally every 6 hours, As needed, Mild Pain (1 - 3), Moderate Pain (4 - 6) (30 Sep 2021 11:48)      MEDICATIONS  (STANDING):    MEDICATIONS  (PRN):      ALLERGIES/INTOLERANCES:  Allergies  No Known Allergies    Intolerances      OBJECTIVE:  VITALS   Vital Signs Last 24 Hrs  T(C): 36.6 (14 Mar 2022 08:15), Max: 36.6 (14 Mar 2022 08:15)  T(F): 97.8 (14 Mar 2022 08:15), Max: 97.8 (14 Mar 2022 08:15)  HR: 78 (14 Mar 2022 08:15) (78 - 78)  BP: 187/83 (14 Mar 2022 08:15) (187/83 - 187/83)  BP(mean): --  RR: 18 (14 Mar 2022 08:15) (18 - 18)  SpO2: 94% (14 Mar 2022 08:15) (94% - 94%)    PHYSICAL EXAM:  Neurological Exam:  MS: Awake, alert, oriented to person, place, but not month or year. Normal affect. Follows all commands.    Language: Speech is clear, fluent with good repetition & comprehension (able to name objects)    CNs: PERRLA (R = 3mm, L = 3mm). VFF. EOMI no nystagmus, no diplopia. V1-3 intact to LT/pinprick, well developed masseter muscles b/l. No facial asymmetry b/l, Hearing grossly normal (rubbing fingers) b/l. Symmetric palate elevation in midline. Gag reflex deferred. Head turning & shoulder shrug intact b/l. Tongue midline, normal movements, no atrophy.    Motor: Normal muscle bulk. Mild tremor in upper extremities. Bradykinesia noted. Cogwheel rigidity in RUE. No pronator drift.  LUE drift, LLE drift (trace), RLE drift present	     Sensation: Intact to LT/PP in all extremities.     Reflexes:              Biceps(C5)       BR(C6)     Triceps(C7)               Patellar(L4)    Achilles(S1)    Plantar Resp  R	2	          2	             2		        2		    2		down   L	2	          2	             2		        2		    2		down    Coordination: intact rapid-alt movements. No dysmetria to FTN/HTS    Gait: deferred    LABORATORY:  CBC                       11.9   7.94  )-----------( 412      ( 14 Mar 2022 08:40 )             34.9     Chem       LFTs   Coagulopathy   Lipid Panel   A1c   Cardiac enzymes     U/A   CSF  Immunological  Other    STUDIES & IMAGING:  Studies (EKG, EEG, EMG, etc):     Radiology (XR, CT, MR, U/S, TTE/ELLEN):  Ct head:   Stable exam.    CTA/CTP negative to my eye.  HPI: A 81 year old female with no known PMH (not on meds) has presented to the ED as a code stroke at 8:17 AM due to left sided weakness- daughter  found pt on the floor next to bed at 7 AM, pt was not fully alert. Pt states she may have lost consciousness and that she also hit her head when trying to get up from her bed and then fell on the floor on to left side. Unclear if any seizure like symptoms over night including tongue bite/urinary incontinence. LKW 10 pm on 3/13/22 (LKW). Pt has a previous admission several month ago due to dizziness/lightheadedness with LOC, found to be bradycardic at that time with L elbow fracture. 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 Uses a walker at home. Pt currently has a posterior headache and L hip pain with some LLE numbness (unable to clarify onset of numbness) but denies any nausea, tingling, changes in vision/hearing. No hx of strokes or blood thinners or seizures. Pt is not a TPA candidate as she's outside window. Not a thrombectomy candidate as no LVO seen on imaging. /93 by EMS.    NIH 4 (LUE drift, LLE drift, RLE drift, no month)  preMRS 3 (walker at home)      PAST MEDICAL & SURGICAL HISTORY:  H/O migraine    Bilateral cataracts    Syncope    H/O elbow surgery    H/O fracture of leg      FAMILY HISTORY:      SOCIAL HISTORY: SOCIAL HISTORY:     Marital Status: (  )   (x  ) Single  (  )   (  )      Occupation:      Lives: (  ) alone  (x  ) with children   (  ) with spouse  (  ) with parents  (  ) other        MEDICATIONS  Home Medications:  acetaminophen 325 mg oral tablet: 2 tab(s) orally every 6 hours, As needed, Mild Pain (1 - 3), Moderate Pain (4 - 6) (30 Sep 2021 11:48)      MEDICATIONS  (STANDING):    MEDICATIONS  (PRN):      ALLERGIES/INTOLERANCES:  Allergies  No Known Allergies    Intolerances      OBJECTIVE:  VITALS   Vital Signs Last 24 Hrs  T(C): 36.6 (14 Mar 2022 08:15), Max: 36.6 (14 Mar 2022 08:15)  T(F): 97.8 (14 Mar 2022 08:15), Max: 97.8 (14 Mar 2022 08:15)  HR: 78 (14 Mar 2022 08:15) (78 - 78)  BP: 187/83 (14 Mar 2022 08:15) (187/83 - 187/83)  BP(mean): --  RR: 18 (14 Mar 2022 08:15) (18 - 18)  SpO2: 94% (14 Mar 2022 08:15) (94% - 94%)    PHYSICAL EXAM:  Neurological Exam:  MS: Awake, alert, oriented to person, place, but not month or year. Normal affect. Follows all commands.    Language: Speech is clear, fluent with good repetition & comprehension (able to name objects)    CNs: PERRLA (R = 3mm, L = 3mm). VFF. EOMI no nystagmus, no diplopia. V1-3 intact to LT/pinprick, well developed masseter muscles b/l. No facial asymmetry b/l, Hearing grossly normal (rubbing fingers) b/l. Symmetric palate elevation in midline. Gag reflex deferred. Head turning & shoulder shrug intact b/l. Tongue midline, normal movements, no atrophy.    Motor: Normal muscle bulk. Mild tremor in upper extremities. Bradykinesia noted. Cogwheel rigidity in RUE. No pronator drift.  LUE drift, LLE drift (trace), RLE drift present	     Sensation: Intact to LT/PP in all extremities.     Reflexes:              Biceps(C5)       BR(C6)     Triceps(C7)               Patellar(L4)    Achilles(S1)    Plantar Resp  R	2	          2	             2		        2		    2		down   L	2	          2	             2		        2		    2		down    Coordination: intact rapid-alt movements. No dysmetria to FTN/HTS    Gait: deferred    LABORATORY:  CBC                       11.9   7.94  )-----------( 412      ( 14 Mar 2022 08:40 )             34.9     Chem       LFTs   Coagulopathy   Lipid Panel   A1c   Cardiac enzymes     U/A   CSF  Immunological  Other    STUDIES & IMAGING:  Studies (EKG, EEG, EMG, etc):     Radiology (XR, CT, MR, U/S, TTE/ELLEN):  Ct head:   Stable exam.    CT perfusion was performed    CBF<30%: 0.0 mL  T-Max> 6.0s: 0.0 mL  Mismatch volume: 0.0 mL  Mismatch ratio: None    Calcification is seen involving the carotid bifurcation region   bilaterally.    Calcification is seen involving the aortic arch.    Mild narrowing of the distal right common carotid artery. Mild narrowing   of the proximal right internal carotid artery seen. Mild stenosis   involving the proximal right internal carotid artery is seen as well.    There is evidence of a 71% stenosis involving the distal left common   carotid artery. Mild narrowing of the proximal left internal carotid   artery is seen. The proximal left external carotid appears normal.    Both vertebral arteries demonstrate normal enhancement. Calcification is   seeninvolving both distal internal carotid stenosis.    Evaluation of the anterior cerebral middle cerebral basilar and posterior   cerebral arteries appear normal.    The dural venous sinuses demonstrate normal enhancement    Dilation of the soft tissueneck region appears normal    The visualized portions of both lung apices demonstrates hazy densities   which could be compatible with underlying infectious etiology. Please   correlate clinically.    Degenerative changes involving the cervical spineis seen.    IMPRESSION: Carotid stenosis as described above.    Unremarkable CT angiogram of the [place] [facility] [place].    No perfusion mismatches to suggest core infarct or critically   hypoperfused tissue at risk.    --- End of Report ---

## 2022-03-14 NOTE — SWALLOW BEDSIDE ASSESSMENT ADULT - SWALLOW EVAL: DIAGNOSIS
1. Mild oral dysphagia for regular solids marked by adequate oral aperture with decreased mastication and delayed A-P transport. There was mild lingual residue after the primary swallow which cleared with cued secondary swallows. 2. Functional oral phase for thin, mildly and moderately thick liquids, pureed, and soft and bite sized marked by adequate oral aperture with adequate bolus collection and A-P transport. 3. Functional pharyngeal phase for moderately thick liquids, pureed, soft and bite sized and regular marked by initiation of the pharyngeal swallow with hyolaryngeal elevation and excursion upon digital palpation without evidence of laryngeal penetration/aspiration. 4. Suspected pharyngeal dysphagia for mildly thick and thin liquids marked by suspected delay in swallow onset with hyolaryngeal elevation and excursion upon digital palpation. There was an inconsistent cough response  after the swallow which may be indicative of laryngeal penetration/aspiration.

## 2022-03-14 NOTE — ED ADULT NURSE NOTE - NSIMPLEMENTINTERV_GEN_ALL_ED
Implemented All Fall Risk Interventions:  [place] to call system. Call bell, personal items and telephone within reach. Instruct patient to call for assistance. Room bathroom lighting operational. Non-slip footwear when patient is off stretcher. Physically safe environment: no spills, clutter or unnecessary equipment. Stretcher in lowest position, wheels locked, appropriate side rails in place. Provide visual cue, wrist band, yellow gown, etc. Monitor gait and stability. Monitor for mental status changes and reorient to person, place, and time. Review medications for side effects contributing to fall risk. Reinforce activity limits and safety measures with patient and family.

## 2022-03-14 NOTE — H&P ADULT - PROBLEM SELECTOR PLAN 1
tele monitor   cardiac enzymes x 2  Orthostatic blood pressures  -check TTE  -work up for Stroke and Hyponatremia

## 2022-03-14 NOTE — SWALLOW BEDSIDE ASSESSMENT ADULT - ASR SWALLOW RECOMMEND DIAG
Cinesophagram in order to objectively assess swallow mechanism given inconsistent overt signs and symptoms of laryngeal penetration/aspiration at bedside

## 2022-03-15 DIAGNOSIS — R91.1 SOLITARY PULMONARY NODULE: ICD-10-CM

## 2022-03-15 LAB
24R-OH-CALCIDIOL SERPL-MCNC: 30 NG/ML — SIGNIFICANT CHANGE UP (ref 30–80)
A1C WITH ESTIMATED AVERAGE GLUCOSE RESULT: 5.6 % — SIGNIFICANT CHANGE UP (ref 4–5.6)
ANION GAP SERPL CALC-SCNC: 12 MMOL/L — SIGNIFICANT CHANGE UP (ref 7–14)
BUN SERPL-MCNC: 13 MG/DL — SIGNIFICANT CHANGE UP (ref 7–23)
CALCIUM SERPL-MCNC: 9.1 MG/DL — SIGNIFICANT CHANGE UP (ref 8.4–10.5)
CHLORIDE SERPL-SCNC: 93 MMOL/L — LOW (ref 98–107)
CHOLEST SERPL-MCNC: 225 MG/DL — HIGH
CO2 SERPL-SCNC: 19 MMOL/L — LOW (ref 22–31)
CREAT SERPL-MCNC: 0.59 MG/DL — SIGNIFICANT CHANGE UP (ref 0.5–1.3)
CULTURE RESULTS: SIGNIFICANT CHANGE UP
EGFR: 91 ML/MIN/1.73M2 — SIGNIFICANT CHANGE UP
ESTIMATED AVERAGE GLUCOSE: 114 — SIGNIFICANT CHANGE UP
GLUCOSE SERPL-MCNC: 87 MG/DL — SIGNIFICANT CHANGE UP (ref 70–99)
HCT VFR BLD CALC: 34.2 % — LOW (ref 34.5–45)
HDLC SERPL-MCNC: 49 MG/DL — LOW
HGB BLD-MCNC: 11.5 G/DL — SIGNIFICANT CHANGE UP (ref 11.5–15.5)
LIPID PNL WITH DIRECT LDL SERPL: 151 MG/DL — HIGH
MCHC RBC-ENTMCNC: 27.6 PG — SIGNIFICANT CHANGE UP (ref 27–34)
MCHC RBC-ENTMCNC: 33.6 GM/DL — SIGNIFICANT CHANGE UP (ref 32–36)
MCV RBC AUTO: 82.2 FL — SIGNIFICANT CHANGE UP (ref 80–100)
NON HDL CHOLESTEROL: 176 MG/DL — HIGH
NRBC # BLD: 0 /100 WBCS — SIGNIFICANT CHANGE UP
NRBC # FLD: 0 K/UL — SIGNIFICANT CHANGE UP
PLATELET # BLD AUTO: 388 K/UL — SIGNIFICANT CHANGE UP (ref 150–400)
POTASSIUM SERPL-MCNC: 4.1 MMOL/L — SIGNIFICANT CHANGE UP (ref 3.5–5.3)
POTASSIUM SERPL-SCNC: 4.1 MMOL/L — SIGNIFICANT CHANGE UP (ref 3.5–5.3)
RBC # BLD: 4.16 M/UL — SIGNIFICANT CHANGE UP (ref 3.8–5.2)
RBC # FLD: 13.1 % — SIGNIFICANT CHANGE UP (ref 10.3–14.5)
SODIUM SERPL-SCNC: 124 MMOL/L — LOW (ref 135–145)
SPECIMEN SOURCE: SIGNIFICANT CHANGE UP
TRIGL SERPL-MCNC: 125 MG/DL — SIGNIFICANT CHANGE UP
WBC # BLD: 7.04 K/UL — SIGNIFICANT CHANGE UP (ref 3.8–10.5)
WBC # FLD AUTO: 7.04 K/UL — SIGNIFICANT CHANGE UP (ref 3.8–10.5)

## 2022-03-15 PROCEDURE — 74230 X-RAY XM SWLNG FUNCJ C+: CPT | Mod: 26

## 2022-03-15 PROCEDURE — 73090 X-RAY EXAM OF FOREARM: CPT | Mod: 26,LT

## 2022-03-15 PROCEDURE — 99233 SBSQ HOSP IP/OBS HIGH 50: CPT

## 2022-03-15 PROCEDURE — 71250 CT THORAX DX C-: CPT | Mod: 26

## 2022-03-15 RX ORDER — ENOXAPARIN SODIUM 100 MG/ML
40 INJECTION SUBCUTANEOUS EVERY 24 HOURS
Refills: 0 | Status: DISCONTINUED | OUTPATIENT
Start: 2022-03-15 | End: 2022-03-23

## 2022-03-15 RX ORDER — SODIUM CHLORIDE 9 MG/ML
1000 INJECTION INTRAMUSCULAR; INTRAVENOUS; SUBCUTANEOUS
Refills: 0 | Status: DISCONTINUED | OUTPATIENT
Start: 2022-03-15 | End: 2022-03-16

## 2022-03-15 RX ORDER — ATORVASTATIN CALCIUM 80 MG/1
80 TABLET, FILM COATED ORAL AT BEDTIME
Refills: 0 | Status: DISCONTINUED | OUTPATIENT
Start: 2022-03-15 | End: 2022-03-23

## 2022-03-15 RX ADMIN — SODIUM CHLORIDE 100 MILLILITER(S): 9 INJECTION INTRAMUSCULAR; INTRAVENOUS; SUBCUTANEOUS at 16:22

## 2022-03-15 RX ADMIN — HEPARIN SODIUM 5000 UNIT(S): 5000 INJECTION INTRAVENOUS; SUBCUTANEOUS at 05:46

## 2022-03-15 RX ADMIN — Medication 81 MILLIGRAM(S): at 12:28

## 2022-03-15 RX ADMIN — ATORVASTATIN CALCIUM 80 MILLIGRAM(S): 80 TABLET, FILM COATED ORAL at 21:49

## 2022-03-15 RX ADMIN — HEPARIN SODIUM 5000 UNIT(S): 5000 INJECTION INTRAVENOUS; SUBCUTANEOUS at 13:51

## 2022-03-15 RX ADMIN — ENOXAPARIN SODIUM 40 MILLIGRAM(S): 100 INJECTION SUBCUTANEOUS at 18:07

## 2022-03-15 NOTE — PROGRESS NOTE ADULT - ASSESSMENT
80 yo female PMHx Chronic Headache, Vertigo, s/p ILR (10/2021) p/w unwitnessed fall day of admission with altered mental status, admitted to tele for Syncope and Collapse, r/o ACS, Stroke and Hyponatremia.

## 2022-03-15 NOTE — SWALLOW VFSS/MBS ASSESSMENT ADULT - COMMENTS
Per Neurology Progress note 3/14/22, patient is a "81 year old female with no known PMH (not on meds) has presented to the ED as a code stroke at 8:17 AM due to left sided weakness- daughter  found pt on the floor next to bed at 7 AM, pt was not fully alert. Pt states she may have lost consciousness and that she also hit her head when trying to get up from her bed and then fell on the floor on to left side. Unclear if any seizure like symptoms over night including tongue bite/urinary incontinence. LKW 10 pm on 3/13/22 (LKW). Pt has a previous admission several month ago due to dizziness/lightheadedness with LOC, found to be bradycardic at that time with L elbow fracture. 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 Uses a walker at home. Pt currently has a posterior headache and L hip pain with some LLE numbness (unable to clarify onset of numbness) but denies any nausea, tingling, changes in vision/hearing. No hx of strokes or blood thinners or seizures. Pt is not a TPA candidate as she's outside window. Not a thrombectomy candidate as no LVO seen on imaging. /93 by EMS."    WBC is WFL. Most recent CXR revealed "Bilateral faint airspace opacities with a right upper lung nodular opacity."    Patient received awake and alert in Radiology Suite this AM for a cinesophagram. Patient is known to this department as patient was seen for an initial bedside swallow assessment on 3/14/22 (please see for detail), at which time soft and bite sized solids with mildly thick liquids was recommended. Patient able to follow simple directives and was verbally responsive to make wants/needs known.

## 2022-03-15 NOTE — PHYSICAL THERAPY INITIAL EVALUATION ADULT - ACTIVE RANGE OF MOTION EXAMINATION, REHAB EVAL
left shoulder flexion 0-90, elbow WFL, wrist and hand not assess due to left 5th finger fracture/Right UE Active ROM was WFL (within functional limits)/bilateral  lower extremity Active ROM was WFL (within functional limits)

## 2022-03-15 NOTE — PHYSICAL THERAPY INITIAL EVALUATION ADULT - ADDITIONAL COMMENTS
As per patient's daughter in law, patient lives with daughter in house, 1 flight of stairs, ambulated with a walker.

## 2022-03-15 NOTE — SWALLOW VFSS/MBS ASSESSMENT ADULT - DELAYED INITIATION OF THE PHARYNGEAL SWALLOW (IN SECONDS)
Bolus head varies at the level of the vallecular and pyriforms. Bolus head at the level of the vallecular Bolus head at the level of the pyriforms

## 2022-03-15 NOTE — PROGRESS NOTE ADULT - SUBJECTIVE AND OBJECTIVE BOX
CARRILLO FIGUEROA  81y  Female      Patient is a 81y old  Female who presents with a chief complaint of syncope, stroke, hyponatremia (14 Mar 2022 14:59)      INTERVAL HPI/OVERNIGHT EVENTS: Pt awake and alert. She reports some pain from left 5th digit and left arm. Otherwise she denies chest pain, SOB, abdominal pain. No events on tele.         REVIEW OF SYSTEMS:  As per hpi    MEDICATIONS (STANDING):   acetaminophen     Tablet .. 650 milliGRAM(s) Oral every 6 hours PRN  aspirin enteric coated 81 milliGRAM(s) Oral daily  atorvastatin 40 milliGRAM(s) Oral at bedtime  heparin   Injectable 5000 Unit(s) SubCutaneous every 8 hours  influenza  Vaccine (HIGH DOSE) 0.7 milliLiter(s) IntraMuscular once  sodium chloride 0.9%. 1000 milliLiter(s) IV Continuous <Continuous>      T(C): 36.2 (03-15-22 @ 05:40), Max: 36.6 (22 @ 16:30)  HR: 87 (03-15-22 @ 05:40) (79 - 92)  BP: 170/91 (03-15-22 @ 05:40) (144/126 - 170/91)  RR: 18 (03-15-22 @ 05:40) (18 - 18)  SpO2: 100% (03-15-22 @ 05:40) (100% - 100%)  Wt(kg): --Vital Signs Last 24 Hrs  T(C): 36.2 (15 Mar 2022 05:40), Max: 36.6 (14 Mar 2022 16:30)  T(F): 97.2 (15 Mar 2022 05:40), Max: 97.9 (14 Mar 2022 16:30)  HR: 87 (15 Mar 2022 05:40) (79 - 92)  BP: 170/91 (15 Mar 2022 05:40) (144/126 - 170/91)  BP(mean): --  RR: 18 (15 Mar 2022 05:40) (18 - 18)  SpO2: 100% (15 Mar 2022 05:40) (100% - 100%)    PHYSICAL EXAM:  GENERAL: NAD, well-groomed, well-developed  HEAD:  Atraumatic, Normocephalic  EYES: EOMI, PERRLA, conjunctiva and sclera clear  ENMT: Moist mucous membranes, Good dentition, No lesions  NECK: Supple, No JVD, Normal thyroid  NERVOUS SYSTEM:  Alert & Oriented X2, not oriented to time, Good concentration; Motor Strength 4+/5 in all extremities; DTRs 2+ intact and symmetric. Pill rolling tremor noted  CHEST/LUNG: Clear to percussion bilaterally; No rales, rhonchi, wheezing, or rubs  HEART: Regular rate and rhythm; No murmurs, rubs, or gallops  ABDOMEN: Soft, Nontender, Nondistended; Bowel sounds present  EXTREMITIES:  2+ Peripheral Pulses, No clubbing, cyanosis. Ecchymosis on left forearm with associated edema. Left hand 5th phallanx ecchymosis, mild swelling and tender to palpation and ROM  SKIN: ecchymosis    Consultant(s) Notes Reviewed:  [x ] YES  [ ] NO  Care Discussed with Consultants/Other Providers [ x] YES  [ ] NO    LABS:                        11.5   7.04  )-----------( 388      ( 15 Mar 2022 08:17 )             34.2     03-15    124<L>  |  93<L>  |  13  ----------------------------<  87  4.1   |  19<L>  |  0.59    Ca    9.1      15 Mar 2022 08:18  Mg     2.10     03-14    TPro  7.6  /  Alb  3.9  /  TBili  0.3  /  DBili  x   /  AST  18  /  ALT  14  /  AlkPhos  149<H>  03-14    PT/INR - ( 14 Mar 2022 08:40 )   PT: 11.6 sec;   INR: 1.00 ratio         PTT - ( 14 Mar 2022 08:40 )  PTT:26.3 sec  Urinalysis Basic - ( 14 Mar 2022 09:26 )    Color: Colorless / Appearance: Clear / S.030 / pH: x  Gluc: x / Ketone: Negative  / Bili: Negative / Urobili: <2 mg/dL   Blood: x / Protein: Negative / Nitrite: Negative   Leuk Esterase: Negative / RBC: x / WBC x   Sq Epi: x / Non Sq Epi: x / Bacteria: x      CAPILLARY BLOOD GLUCOSE            Urinalysis Basic - ( 14 Mar 2022 09:26 )    Color: Colorless / Appearance: Clear / S.030 / pH: x  Gluc: x / Ketone: Negative  / Bili: Negative / Urobili: <2 mg/dL   Blood: x / Protein: Negative / Nitrite: Negative   Leuk Esterase: Negative / RBC: x / WBC x   Sq Epi: x / Non Sq Epi: x / Bacteria: x        RADIOLOGY & ADDITIONAL TESTS:    Imaging Personally Reviewed:  [ ] YES  [ ] NO

## 2022-03-15 NOTE — PHYSICAL THERAPY INITIAL EVALUATION ADULT - PERTINENT HX OF CURRENT PROBLEM, REHAB EVAL
Patient is 81 year old female admitted with history of Vertigo, Syncope, s/p ILR (10/59930) and chronic headache present with unwitnessed fall and confusion

## 2022-03-15 NOTE — PROGRESS NOTE ADULT - PROBLEM SELECTOR PLAN 1
tele monitor. ILR interrogated, no events. Unclear if LOC  cardiac enzymes flat. TTE with normal LV and RV function, no evidence of patent formen ovale  Broad ddx given unclear history  Concern that fall related to gait instability in the setting of age, white matter ichemic changes vs suspected parkinsons.  MRI brain pending to r/o acute CVA  Routine EEG to r/o seizures   May have also been related to hyponatremia, management as below   Check Orthostatic blood pressures

## 2022-03-15 NOTE — PROGRESS NOTE ADULT - PROBLEM SELECTOR PLAN 3
-high urine Osmolality. Check urine sodium  -SIADH suspected however pt fluid restricted and sodium worsened today  -will trial IVF  -Trend BMP q8h for now

## 2022-03-15 NOTE — PROGRESS NOTE ADULT - PROBLEM SELECTOR PLAN 2
NIH 4   tele monitor  house neuro following   Neuro checks q4h  F/u MRI Head w/o con  PT/OT- rec rehab  c/w ASA 81mg and Statin (lipitor 80mg)  Speech and Swallow eval appreciated  TTE w/ bubble study reviewed

## 2022-03-15 NOTE — SWALLOW VFSS/MBS ASSESSMENT ADULT - DIAGNOSTIC IMPRESSIONS
1. Functional oral stage for puree, mildly thick liquids and thin liquids marked by adequate bolus collection, oral containment and patient exhibiting piecemeal AP transport during large cup sips. Trace/mild lingual residue noted after the swallow that cleared with spontaneous subsequent swallow after each bolus.   2. Mild oral stage given soft and bite sized solids marked by adequate bolus collection, oral containment and mildly prolonged mastication with piecemeal AP transport. Mild lingual residue noted after the swallow that cleared with spontaneous subsequent swallow.  3. Functional pharyngeal stage for puree, soft and bite sized solids, mildly thick liquids and thin liquids marked by a delayed initiation of the pharyngeal swallow trigger (bolus head varies from the vallecular to the pyriforms for mildly thick liquids and thin liquids) with adequate hyolaryngeal elevation, adequate tongue base retraction, adequate epiglottic deflection, and adequate pharyngeal contractility. No aspiration observed before, during or after the swallow. 1. Functional oral stage for puree, mildly thick liquids and thin liquids marked by adequate bolus collection, oral containment and patient exhibiting piecemeal AP transport during large cup sips. Trace/mild lingual residue noted after the swallow that cleared with spontaneous subsequent swallow after each bolus.   2. Mild oral stage given soft and bite sized solids marked by adequate bolus collection, oral containment and mildly prolonged mastication with piecemeal AP transport. Mild lingual residue noted after the swallow that cleared with spontaneous subsequent swallow.  3. Functional pharyngeal stage for puree, soft and bite sized solids, and mildly thick liquids marked by a delayed initiation of the pharyngeal swallow trigger (bolus head varies from the vallecular to the pyriforms for mildly thick liquids and thin liquids) with adequate hyolaryngeal elevation, adequate tongue base retraction, adequate epiglottic deflection, and adequate pharyngeal contractility. No aspiration observed before, during or after the swallow.  4. Mild pharyngeal dysphagia for large cup sips of thin liquids marked by a delayed initiation of the pharyngeal swallow trigger (bolus head at the level of the pyriforms) with adequate hyolaryngeal elevation, reduced base of tongue retraction, reduced epiglottic deflection, and adequate pharyngeal contractility. There was one episode of trace laryngeal penetration during the swallow with full retrieval. 1. Functional oral stage for puree, mildly thick liquids and thin liquids marked by adequate bolus collection, oral containment and patient exhibiting piecemeal AP transport during large cup sips. Trace/mild lingual residue noted after the swallow that cleared with spontaneous subsequent swallow after each bolus.   2. Mild oral stage given soft and bite sized solids marked by adequate bolus collection, oral containment and mildly prolonged mastication with piecemeal AP transport. Mild lingual residue noted after the swallow that cleared with spontaneous subsequent swallow.  3. Functional pharyngeal stage for puree, soft and bite sized solids, and mildly thick liquids marked by a delayed initiation of the pharyngeal swallow trigger (bolus head varies from the vallecular to the pyriforms for mildly thick liquids and thin liquids) with adequate hyolaryngeal elevation, adequate tongue base retraction, adequate epiglottic deflection, and adequate pharyngeal contractility. No aspiration observed before, during or after the swallow.  4. Mild pharyngeal dysphagia for large cup sips of thin liquids marked by a delayed initiation of the pharyngeal swallow trigger (bolus head at the level of the pyriforms) with adequate hyolaryngeal elevation, reduced base of tongue retraction, reduced epiglottic deflection, and adequate pharyngeal contractility. There was one episode of trace laryngeal penetration during the swallow with full retrieval and airway protection maintained. 1. Functional oral stage for puree, mildly thick liquids and thin liquids marked by adequate bolus collection, oral containment and patient exhibiting piecemeal AP transport during large cup sips. Trace/mild lingual residue noted after the swallow that cleared with spontaneous subsequent swallow after each bolus.   2. Mild oral stage given soft and bite sized solids marked by adequate bolus collection, oral containment and mildly prolonged mastication with piecemeal AP transport. Mild lingual residue noted after the swallow that cleared with spontaneous subsequent swallow.  3. Functional pharyngeal stage for puree, soft and bite sized solids, and mildly thick liquids marked by a delayed initiation of the pharyngeal swallow trigger (bolus head varies from the vallecular to the pyriforms for mildly thick liquids) with adequate hyolaryngeal elevation, adequate tongue base retraction, adequate epiglottic deflection, and adequate pharyngeal contractility. No aspiration observed before, during or after the swallow.  4. Mild pharyngeal dysphagia for large cup sips of thin liquids marked by a delayed initiation of the pharyngeal swallow trigger (bolus head at the level of the pyriforms) with adequate hyolaryngeal elevation, adequate base of tongue retraction, adequate epiglottic deflection, and adequate pharyngeal contractility. There was one episode of trace laryngeal penetration during the swallow with full retrieval and airway protection maintained. 1. Functional oral stage for puree, mildly thick liquids and thin liquids marked by adequate bolus collection, oral containment and patient exhibiting piecemeal AP transport during large cup sips. Trace/mild lingual residue noted after the swallow that cleared with spontaneous subsequent swallow after each bolus.   2. Mild oral stage given soft and bite sized solids marked by adequate bolus collection, oral containment and mildly prolonged mastication with piecemeal AP transport. Mild lingual residue noted after the swallow that cleared with spontaneous subsequent swallow.  3. Functional pharyngeal stage for puree, soft and bite sized solids, and mildly thick liquids marked by a delayed initiation of the pharyngeal swallow trigger (bolus head varies from the vallecular to the pyriforms for mildly thick liquids) with adequate hyolaryngeal elevation, adequate tongue base retraction, adequate epiglottic deflection, and adequate pharyngeal contractility. No aspiration observed before, during or after the swallow.  4. Mild pharyngeal dysphagia for large cup sips of thin liquids marked by a delayed initiation of the pharyngeal swallow trigger (bolus head at the level of the pyriforms) with adequate hyolaryngeal elevation, adequate base of tongue retraction, adequate epiglottic deflection, and adequate pharyngeal contractility. There was one episode of trace laryngeal penetration during the swallow with full retrieval and airway protection maintained.    Of note, incidental finding of cricopharyngeal bar, per Radiologist report, however, non-obstructing overall swallow function.

## 2022-03-15 NOTE — OCCUPATIONAL THERAPY INITIAL EVALUATION ADULT - PERTINENT HX OF CURRENT PROBLEM, REHAB EVAL
81 yr old woman PMHx Chronic Headache, Vertigo, s/p ILR (10/2021) presents after an unwitnessed fall and altered mental status. Found to have acute metabolic encephalopathy in setting of hyponatremia, r/o CVA and malignancy.. Pt also found to have left hand 5th digit phalynx fx. Pt is an 81 yr old woman PMH Chronic Headache, Vertigo, s/p ILR (10/2021) presents after an unwitnessed fall and altered mental status. Found to have acute metabolic encephalopathy in setting of hyponatremia, r/o CVA and malignancy.. Pt also found to have left hand 5th digit phalynx fx.

## 2022-03-15 NOTE — PROGRESS NOTE ADULT - PROBLEM SELECTOR PLAN 6
Bilateral faint airspace opacities with a right upper lung nodular opacity. CT chest pending to further eval  no fevers or leukocytosis, monitor off abx

## 2022-03-15 NOTE — SWALLOW VFSS/MBS ASSESSMENT ADULT - RECOMMENDED CONSISTENCY
1.) Soft and bite sized solids with thin liquids  2.) Aspiration precautions  3.) Reflux precautions  4.) Maintain good Oral hygiene care

## 2022-03-15 NOTE — SWALLOW VFSS/MBS ASSESSMENT ADULT - ADDITIONAL RECOMMENDATIONS
1. Monitor for any changes in neurological status that may impact oral intake. 2. Reconsult this department for PO tolerance as needed.

## 2022-03-16 LAB
ALBUMIN SERPL ELPH-MCNC: 3.6 G/DL — SIGNIFICANT CHANGE UP (ref 3.3–5)
ALP SERPL-CCNC: 128 U/L — HIGH (ref 40–120)
ALT FLD-CCNC: 15 U/L — SIGNIFICANT CHANGE UP (ref 4–33)
ANION GAP SERPL CALC-SCNC: 12 MMOL/L — SIGNIFICANT CHANGE UP (ref 7–14)
ANION GAP SERPL CALC-SCNC: 13 MMOL/L — SIGNIFICANT CHANGE UP (ref 7–14)
AST SERPL-CCNC: 31 U/L — SIGNIFICANT CHANGE UP (ref 4–32)
BILIRUB SERPL-MCNC: 0.6 MG/DL — SIGNIFICANT CHANGE UP (ref 0.2–1.2)
BUN SERPL-MCNC: 11 MG/DL — SIGNIFICANT CHANGE UP (ref 7–23)
BUN SERPL-MCNC: 19 MG/DL — SIGNIFICANT CHANGE UP (ref 7–23)
CALCIUM SERPL-MCNC: 8.4 MG/DL — SIGNIFICANT CHANGE UP (ref 8.4–10.5)
CALCIUM SERPL-MCNC: 8.8 MG/DL — SIGNIFICANT CHANGE UP (ref 8.4–10.5)
CHLORIDE SERPL-SCNC: 97 MMOL/L — LOW (ref 98–107)
CHLORIDE SERPL-SCNC: 97 MMOL/L — LOW (ref 98–107)
CO2 SERPL-SCNC: 19 MMOL/L — LOW (ref 22–31)
CO2 SERPL-SCNC: 19 MMOL/L — LOW (ref 22–31)
CREAT SERPL-MCNC: 0.54 MG/DL — SIGNIFICANT CHANGE UP (ref 0.5–1.3)
CREAT SERPL-MCNC: 0.67 MG/DL — SIGNIFICANT CHANGE UP (ref 0.5–1.3)
EGFR: 88 ML/MIN/1.73M2 — SIGNIFICANT CHANGE UP
EGFR: 93 ML/MIN/1.73M2 — SIGNIFICANT CHANGE UP
GLUCOSE SERPL-MCNC: 113 MG/DL — HIGH (ref 70–99)
GLUCOSE SERPL-MCNC: 82 MG/DL — SIGNIFICANT CHANGE UP (ref 70–99)
HCT VFR BLD CALC: 33.6 % — LOW (ref 34.5–45)
HGB BLD-MCNC: 11.4 G/DL — LOW (ref 11.5–15.5)
MAGNESIUM SERPL-MCNC: 1.9 MG/DL — SIGNIFICANT CHANGE UP (ref 1.6–2.6)
MAGNESIUM SERPL-MCNC: 2.2 MG/DL — SIGNIFICANT CHANGE UP (ref 1.6–2.6)
MCHC RBC-ENTMCNC: 28.1 PG — SIGNIFICANT CHANGE UP (ref 27–34)
MCHC RBC-ENTMCNC: 33.9 GM/DL — SIGNIFICANT CHANGE UP (ref 32–36)
MCV RBC AUTO: 82.8 FL — SIGNIFICANT CHANGE UP (ref 80–100)
NRBC # BLD: 0 /100 WBCS — SIGNIFICANT CHANGE UP
NRBC # FLD: 0 K/UL — SIGNIFICANT CHANGE UP
PHOSPHATE SERPL-MCNC: 3.2 MG/DL — SIGNIFICANT CHANGE UP (ref 2.5–4.5)
PHOSPHATE SERPL-MCNC: 3.3 MG/DL — SIGNIFICANT CHANGE UP (ref 2.5–4.5)
PLATELET # BLD AUTO: 358 K/UL — SIGNIFICANT CHANGE UP (ref 150–400)
POTASSIUM SERPL-MCNC: 4 MMOL/L — SIGNIFICANT CHANGE UP (ref 3.5–5.3)
POTASSIUM SERPL-MCNC: 4.2 MMOL/L — SIGNIFICANT CHANGE UP (ref 3.5–5.3)
POTASSIUM SERPL-SCNC: 4 MMOL/L — SIGNIFICANT CHANGE UP (ref 3.5–5.3)
POTASSIUM SERPL-SCNC: 4.2 MMOL/L — SIGNIFICANT CHANGE UP (ref 3.5–5.3)
PROT SERPL-MCNC: 7 G/DL — SIGNIFICANT CHANGE UP (ref 6–8.3)
RBC # BLD: 4.06 M/UL — SIGNIFICANT CHANGE UP (ref 3.8–5.2)
RBC # FLD: 13 % — SIGNIFICANT CHANGE UP (ref 10.3–14.5)
SODIUM SERPL-SCNC: 128 MMOL/L — LOW (ref 135–145)
SODIUM SERPL-SCNC: 129 MMOL/L — LOW (ref 135–145)
WBC # BLD: 7.58 K/UL — SIGNIFICANT CHANGE UP (ref 3.8–10.5)
WBC # FLD AUTO: 7.58 K/UL — SIGNIFICANT CHANGE UP (ref 3.8–10.5)

## 2022-03-16 PROCEDURE — 99233 SBSQ HOSP IP/OBS HIGH 50: CPT

## 2022-03-16 PROCEDURE — 70551 MRI BRAIN STEM W/O DYE: CPT | Mod: 26

## 2022-03-16 RX ORDER — SODIUM CHLORIDE 9 MG/ML
1000 INJECTION INTRAMUSCULAR; INTRAVENOUS; SUBCUTANEOUS
Refills: 0 | Status: DISCONTINUED | OUTPATIENT
Start: 2022-03-16 | End: 2022-03-21

## 2022-03-16 RX ADMIN — Medication 81 MILLIGRAM(S): at 11:38

## 2022-03-16 RX ADMIN — SODIUM CHLORIDE 75 MILLILITER(S): 9 INJECTION INTRAMUSCULAR; INTRAVENOUS; SUBCUTANEOUS at 11:32

## 2022-03-16 RX ADMIN — ENOXAPARIN SODIUM 40 MILLIGRAM(S): 100 INJECTION SUBCUTANEOUS at 18:10

## 2022-03-16 RX ADMIN — ATORVASTATIN CALCIUM 80 MILLIGRAM(S): 80 TABLET, FILM COATED ORAL at 21:47

## 2022-03-16 NOTE — PROGRESS NOTE ADULT - PROBLEM SELECTOR PLAN 6
Bilateral faint airspace opacities with a right upper lung nodular opacity. CT chest: Given peripheral reticular opacities and architectural distortion, likely related to pulmonary fibrosis. Will need to compare to prior CT chest. Pt satting well, denies SOB. Will need repeat low-dose noncontrast CT chest in 3 months and pulm outpt f/u    no fevers or leukocytosis, monitor off abx

## 2022-03-16 NOTE — PROGRESS NOTE ADULT - PROBLEM SELECTOR PLAN 1
tele monitor. ILR interrogated, no events. Unclear if LOC  cardiac enzymes flat. TTE with normal LV and RV function, no evidence of patent foramen ovale  Broad ddx given unclear history  Concern that fall related to gait instability in the setting of age, white matter ischemic changes vs suspected parkinsons.  May have also been related to hyponatremia, management as below   MRI brain neg for acute CVA  Routine EEG to r/o seizures pending  Orthostatic blood pressures pending

## 2022-03-16 NOTE — PROGRESS NOTE ADULT - PROBLEM SELECTOR PLAN 3
-high urine Osmolality. Check urine sodium  -SIADH suspected however pt fluid restricted and sodium worsened. Sodium improved s/p IVF, will continue for now  -Trend BMP daily

## 2022-03-16 NOTE — PROGRESS NOTE ADULT - PROBLEM SELECTOR PLAN 2
NIH 4   tele monitor  house neuro following   Neuro checks q4h  MRI Head w/o con neg for CVA  PT/OT- rec rehab. Family refusing  c/w ASA 81mg and Statin (Lipitor 80mg)  Speech and Swallow eval appreciated  TTE w/ bubble study reviewed [facility] 4   tele monitor  house neuro following   Neuro checks q4h  MRI Head w/o con neg for CVA  PT/OT- rec rehab. Family refusing  c/w ASA 81mg and Statin (Lipitor 80mg)  Speech and Swallow eval appreciated  TTE w/ bubble study reviewed, cannot r/o intracardiac shunt. Possible ELLEN pending improvement in sodium

## 2022-03-16 NOTE — PROGRESS NOTE ADULT - SUBJECTIVE AND OBJECTIVE BOX
CARRILLO FIGUEROA  81y  Female      Patient is a 81y old  Female who presents with a chief complaint of syncope, stroke, hyponatremia (15 Mar 2022 14:12)      INTERVAL HPI/OVERNIGHT EVENTS: Pt awake and alert, appears close to baseline mental status. Reports some pain from left arm. Denies chest pain or SOB. PVCs on tele.        REVIEW OF SYSTEMS:  As per hpi    MEDICATIONS (STANDING):   acetaminophen     Tablet .. 650 milliGRAM(s) Oral every 6 hours PRN  aspirin enteric coated 81 milliGRAM(s) Oral daily  atorvastatin 80 milliGRAM(s) Oral at bedtime  enoxaparin Injectable 40 milliGRAM(s) SubCutaneous every 24 hours  influenza  Vaccine (HIGH DOSE) 0.7 milliLiter(s) IntraMuscular once  sodium chloride 0.9%. 1000 milliLiter(s) IV Continuous <Continuous>      T(C): 36.9 (03-16-22 @ 05:00), Max: 36.9 (03-15-22 @ 14:30)  HR: 87 (03-16-22 @ 05:00) (76 - 87)  BP: 161/90 (03-16-22 @ 05:00) (120/65 - 161/90)  RR: 18 (03-16-22 @ 05:00) (18 - 18)  SpO2: 95% (03-16-22 @ 05:00) (95% - 100%)  Wt(kg): --Vital Signs Last 24 Hrs  T(C): 36.9 (16 Mar 2022 05:00), Max: 36.9 (15 Mar 2022 14:30)  T(F): 98.5 (16 Mar 2022 05:00), Max: 98.5 (16 Mar 2022 05:00)  HR: 87 (16 Mar 2022 05:00) (76 - 87)  BP: 161/90 (16 Mar 2022 05:00) (120/65 - 161/90)  BP(mean): --  RR: 18 (16 Mar 2022 05:00) (18 - 18)  SpO2: 95% (16 Mar 2022 05:00) (95% - 100%)    PHYSICAL EXAM:  GENERAL: NAD, well-groomed, well-developed  HEAD:  Atraumatic, Normocephalic  EYES: EOMI, PERRLA, conjunctiva and sclera clear  ENMT: Moist mucous membranes, Good dentition, No lesions  NECK: Supple, No JVD, Normal thyroid  NERVOUS SYSTEM:  Alert & Oriented X2, not oriented to time, Good concentration; Motor Strength 4+/5 in all extremities; DTRs 2+ intact and symmetric. Pill rolling tremor noted  CHEST/LUNG: Clear to percussion bilaterally; No rales, rhonchi, wheezing, or rubs  HEART: Regular rate and rhythm; No murmurs, rubs, or gallops  ABDOMEN: Soft, Nontender, Nondistended; Bowel sounds present  EXTREMITIES:  2+ Peripheral Pulses, No clubbing, cyanosis. Ecchymosis on left forearm with associated edema. Left hand 5th phalanx ecchymosis, mild swelling and tender to palpation and ROM  SKIN: ecchymosis    Consultant(s) Notes Reviewed:  [x ] YES  [ ] NO  Care Discussed with Consultants/Other Providers [ x] YES  [ ] NO    LABS:                        11.4   7.58  )-----------( 358      ( 16 Mar 2022 07:56 )             33.6     03-16    129<L>  |  97<L>  |  11  ----------------------------<  82  4.2   |  19<L>  |  0.54    Ca    8.8      16 Mar 2022 07:56  Phos  3.2     03-16  Mg     2.20     03-16    TPro  7.0  /  Alb  3.6  /  TBili  0.6  /  DBili  x   /  AST  31  /  ALT  15  /  AlkPhos  128<H>  03-16        CAPILLARY BLOOD GLUCOSE                RADIOLOGY & ADDITIONAL TESTS:    Imaging Personally Reviewed:  [ ] YES  [ ] NO

## 2022-03-17 LAB
ANION GAP SERPL CALC-SCNC: 11 MMOL/L — SIGNIFICANT CHANGE UP (ref 7–14)
BUN SERPL-MCNC: 17 MG/DL — SIGNIFICANT CHANGE UP (ref 7–23)
CALCIUM SERPL-MCNC: 8.9 MG/DL — SIGNIFICANT CHANGE UP (ref 8.4–10.5)
CHLORIDE SERPL-SCNC: 98 MMOL/L — SIGNIFICANT CHANGE UP (ref 98–107)
CO2 SERPL-SCNC: 19 MMOL/L — LOW (ref 22–31)
CREAT SERPL-MCNC: 0.58 MG/DL — SIGNIFICANT CHANGE UP (ref 0.5–1.3)
EGFR: 91 ML/MIN/1.73M2 — SIGNIFICANT CHANGE UP
GLUCOSE SERPL-MCNC: 99 MG/DL — SIGNIFICANT CHANGE UP (ref 70–99)
HCT VFR BLD CALC: 32.1 % — LOW (ref 34.5–45)
HGB BLD-MCNC: 11.2 G/DL — LOW (ref 11.5–15.5)
MAGNESIUM SERPL-MCNC: 1.9 MG/DL — SIGNIFICANT CHANGE UP (ref 1.6–2.6)
MCHC RBC-ENTMCNC: 28.8 PG — SIGNIFICANT CHANGE UP (ref 27–34)
MCHC RBC-ENTMCNC: 34.9 GM/DL — SIGNIFICANT CHANGE UP (ref 32–36)
MCV RBC AUTO: 82.5 FL — SIGNIFICANT CHANGE UP (ref 80–100)
NRBC # BLD: 0 /100 WBCS — SIGNIFICANT CHANGE UP
NRBC # FLD: 0 K/UL — SIGNIFICANT CHANGE UP
PHOSPHATE SERPL-MCNC: 3 MG/DL — SIGNIFICANT CHANGE UP (ref 2.5–4.5)
PLATELET # BLD AUTO: 354 K/UL — SIGNIFICANT CHANGE UP (ref 150–400)
POTASSIUM SERPL-MCNC: 3.8 MMOL/L — SIGNIFICANT CHANGE UP (ref 3.5–5.3)
POTASSIUM SERPL-SCNC: 3.8 MMOL/L — SIGNIFICANT CHANGE UP (ref 3.5–5.3)
RBC # BLD: 3.89 M/UL — SIGNIFICANT CHANGE UP (ref 3.8–5.2)
RBC # FLD: 13.2 % — SIGNIFICANT CHANGE UP (ref 10.3–14.5)
SODIUM SERPL-SCNC: 128 MMOL/L — LOW (ref 135–145)
WBC # BLD: 7.85 K/UL — SIGNIFICANT CHANGE UP (ref 3.8–10.5)
WBC # FLD AUTO: 7.85 K/UL — SIGNIFICANT CHANGE UP (ref 3.8–10.5)

## 2022-03-17 PROCEDURE — 76376 3D RENDER W/INTRP POSTPROCES: CPT | Mod: 26

## 2022-03-17 PROCEDURE — 99233 SBSQ HOSP IP/OBS HIGH 50: CPT

## 2022-03-17 PROCEDURE — 93320 DOPPLER ECHO COMPLETE: CPT | Mod: 26,GC

## 2022-03-17 PROCEDURE — 93325 DOPPLER ECHO COLOR FLOW MAPG: CPT | Mod: 26,GC

## 2022-03-17 PROCEDURE — 93312 ECHO TRANSESOPHAGEAL: CPT | Mod: 26

## 2022-03-17 RX ADMIN — ENOXAPARIN SODIUM 40 MILLIGRAM(S): 100 INJECTION SUBCUTANEOUS at 21:56

## 2022-03-17 RX ADMIN — Medication 81 MILLIGRAM(S): at 21:55

## 2022-03-17 RX ADMIN — ATORVASTATIN CALCIUM 80 MILLIGRAM(S): 80 TABLET, FILM COATED ORAL at 21:55

## 2022-03-17 NOTE — EEG REPORT - NS EEG TEXT BOX
REPORT OF ROUTINE EEG WITH VIDEO  [facility]: [address], [address] [place] [address], Phone: [phone] [facility]: [address] [place] [address], Phone: [phone] Office: [address] [place] [address], [place] [address], [place] [address], Phone: [phone]  Patient Name: CARRILLO FIGUEROA   Age: 81 year : 1940 MRN #: -, Location: [id number] Referring Physician: ERIN SMITH  EEG #: [id number] Study Date: 3/16/2022   Start Time: 11:52:28 AM    Study Duration: 20.3 		  Technical Information:					 On Instrument: - Placement and Labeling of Electrodes: The EEG was performed utilizing 20 channels referential EEG connections (coronal over temporal over parasagittal montage) using all standard 10-20 electrode placements with EKG.  Recording was at a sampling rate of 256 samples per second per channel.  Time synchronized digital video recording was done simultaneously with EEG recording.  A low light infrared camera was used for low light recording.  Matthew and seizure detection algorithms were utilized. CSA Technical Component: Quantitative EEG analysis using a separate Compressed Spectral Array (CSA) software package was conducted in real-time and run at bedside after set up by the technician, digitally displaying the power of electrographic frequencies included in the 1-30Hz band using a graded color map.  This data was reviewed and interpreted independently, and is reported in a separate section below.  History:  ROUTINE EEG AT [id number] 81 YEAR OLD FEMALE COR: AWAKE / DROWSY P/W: SYSPTOMS INVOLUING MUSCULOSKELETAL SYSTEM PMH: SYNCOPE, BILATERAL CATARACTS, VERTIGO, HEADACHE HV: NOT COMPLETED DUE TO PANDEMIC PHOTIC: NOT COMPLETED DUE TO BILATERAL CATARACTS A&OX 3 CONCERN FOR SEIZURES    Medication Tylenol Lipitor Aspirin Lovenox  Study Interpretation:  FINDINGS:  The background was continuous, spontaneously variable and reactive.  During wakefulness, the posteriorly dominant rhythm consisted of symmetric, well modulated 10 Hz activity, with an amplitude to 70 uV, that attenuated to eye opening.  Low amplitude central beta was noted in wakefulness.  Background Slowing: Generalized slowing: theta/delta slowing Focal slowing: none was present.  Sleep Background: Drowsiness was characterized by fragmentation, attenuation, and slowing of the background activity.   Stage II sleep transients were not recorded.  Non-epileptiform activity: None.  Epileptiform Activity:  No epileptiform discharges were present.  Events: No clinical events were recorded. No seizures were recorded.  Activation Procedures:  -Hyperventilation was not performed.   -Photic stimulation was not performed.  Artifacts: Intermittent myogenic and movement artifacts were noted.  ECG: The heart rate on single channel ECG was predominantly between 80-90 BPM.  EEG Classification / Summary:  Abnormal EEG in the awake, drowsy states. -Mild generalized slowing  Clinical Impression:  Mild nonspecific diffuse or multifocal cerebral dysfunction.  No epileptiform pattern or seizure seen.  Preliminary Fellow Report, final report pending attending review  Judd Allison MD Epilepsy Fellow  Reading Room: [phone] On Call Service After Hours: [phone]    REPORT OF ROUTINE EEG WITH VIDEO  [facility]: [address], [address] [place] [address], Phone: [phone] [facility]: [address] [place] [address], Phone: [phone] Office: [address] [place] [address], [place] [address], [place] [address], Phone: [phone]  Patient Name: CARRILLO FIGUEROA   Age: 81 year : 1940 MRN #: -, Location: [id number] Referring Physician: ERIN SMITH  EEG #: [id number] Study Date: 3/16/2022   Start Time: 11:52:28 AM    Study Duration: 20.3 		  Technical Information:					 On Instrument: - Placement and Labeling of Electrodes: The EEG was performed utilizing 20 channels referential EEG connections (coronal over temporal over parasagittal montage) using all standard 10-20 electrode placements with EKG.  Recording was at a sampling rate of 256 samples per second per channel.  Time synchronized digital video recording was done simultaneously with EEG recording.  A low light infrared camera was used for low light recording.  Matthew and seizure detection algorithms were utilized. CSA Technical Component: Quantitative EEG analysis using a separate Compressed Spectral Array (CSA) software package was conducted in real-time and run at bedside after set up by the technician, digitally displaying the power of electrographic frequencies included in the 1-30Hz band using a graded color map.  This data was reviewed and interpreted independently, and is reported in a separate section below.  History:  ROUTINE EEG AT [id number] 81 YEAR OLD FEMALE COR: AWAKE / DROWSY PMH: SYNCOPE, BILATERAL CATARACTS, VERTIGO, HEADACHE HV: NOT COMPLETED DUE TO PANDEMIC PHOTIC: NOT COMPLETED DUE TO BILATERAL CATARACTS A&OX 3 CONCERN FOR SEIZURES   Medication Tylenol Lipitor Aspirin Lovenox  Study Interpretation:  FINDINGS:  The background was continuous, spontaneously variable and reactive.  During wakefulness, the posteriorly dominant rhythm consisted of symmetric, well modulated 10 Hz activity, with an amplitude to 70 uV, that attenuated to eye opening.  Low amplitude central beta was noted in wakefulness.  Background Slowing: Generalized slowing: theta/delta slowing Focal slowing: none was present.  Sleep Background: Drowsiness was characterized by fragmentation, attenuation, and slowing of the background activity with 2hz delta briefly.   Stage II sleep transients were not recorded.  Non-epileptiform activity: None.  Epileptiform Activity:  No epileptiform discharges were present.  Events: No clinical events were recorded. No seizures were recorded.  Activation Procedures:  -Hyperventilation was not performed.   -Photic stimulation was not performed.  Artifacts: Intermittent myogenic and movement artifacts were noted.  ECG: The heart rate on single channel ECG was predominantly between 80-90 BPM.  EEG Classification / Summary:  EEG in the awake and drowsy states, normal for age.  Clinical Impression:  No epileptiform pattern or seizure seen.  Addendum:  Note revised after review - [facility]  Judd Allison MD Epilepsy Fellow  MD YARITZA French Director, Continuous EEG Monitoring Program, [facility]  and Epilepsy Fellowship ,  Department of Neurology, [employer]  [facility] EEG Reading Room Ph#: [phone] Epilepsy Answering Service after 5PM and before 8:30AM: Ph#: [phone]

## 2022-03-17 NOTE — CHART NOTE - NSCHARTNOTEFT_GEN_A_CORE
MR brain w/o contrast:  No acute intracranial hemorrhage or evidence of acute   ischemia.  Similar-appearing moderate severity chronic white matter microvascular   type changes.    EEG:  EEG Classification / Summary:  EEG in the awake and drowsy states, normal for age.  Clinical Impression:  No epileptiform pattern or seizure seen.    Given no acute findings on MRI brain and EEG, low concern of neurological cause for pt's symptoms/presentation.    Signing off. Please reconsult neurology, if needed.     Donald Payne MD  Neurology PGY-2 MR brain w/o contrast:  No acute intracranial hemorrhage or evidence of acute   ischemia.  Similar-appearing moderate severity chronic white matter microvascular   type changes.    EEG:  EEG Classification / Summary:  EEG in the awake and drowsy states, normal for age.  Clinical Impression:  No epileptiform pattern or seizure seen.    Given no acute findings on MRI brain and EEG, low concern of neurological cause for pt's symptoms/presentation.    Signing off. Please reconsult neurology, if needed.     Donald Payne MD  Neurology PGY-2      Thank you

## 2022-03-17 NOTE — PROGRESS NOTE ADULT - PROBLEM SELECTOR PLAN 1
tele monitor. ILR interrogated, no events. Unclear if LOC  cardiac enzymes flat. TTE with normal LV and RV function, no evidence of patent foramen ovale  Broad ddx given unclear history  Concern that fall related to gait instability in the setting of age, white matter ischemic changes vs suspected parkinsons.  May have also been related to hyponatremia, management as below   MRI brain neg for acute CVA  Routine EEG to r/o seizures pending  Orthostatic blood pressures pending  ELLEN pending to r/o intracardiac shunt   Dopplers of the carotids ordered given 71% stenosis noted on CT

## 2022-03-17 NOTE — PROGRESS NOTE ADULT - ASSESSMENT
82 yo female PMHx Chronic Headache, Vertigo, s/p ILR (10/2021) p/w unwitnessed fall day of admission with altered mental status, admitted to tele for Syncope and Collapse, r/o ACS, Stroke and Hyponatremia.

## 2022-03-17 NOTE — PROGRESS NOTE ADULT - SUBJECTIVE AND OBJECTIVE BOX
CARRILLO FIGUEROA  81y  Female      Patient is a 81y old  Female who presents with a chief complaint of syncope, stroke, hyponatremia (16 Mar 2022 14:17)      INTERVAL HPI/OVERNIGHT EVENTS: Pt denies chest pain, shortness of breath, abdominal pain. Tearful on exam, wants to go home.         REVIEW OF SYSTEMS:  As per hpi    MEDICATIONS (STANDING):   acetaminophen     Tablet .. 650 milliGRAM(s) Oral every 6 hours PRN  aspirin enteric coated 81 milliGRAM(s) Oral daily  atorvastatin 80 milliGRAM(s) Oral at bedtime  enoxaparin Injectable 40 milliGRAM(s) SubCutaneous every 24 hours  influenza  Vaccine (HIGH DOSE) 0.7 milliLiter(s) IntraMuscular once  sodium chloride 0.9%. 1000 milliLiter(s) IV Continuous <Continuous>      T(C): 36.4 (03-17-22 @ 05:32), Max: 36.6 (03-16-22 @ 18:00)  HR: 93 (03-17-22 @ 05:32) (85 - 96)  BP: 158/85 (03-17-22 @ 05:32) (148/86 - 158/85)  RR: 18 (03-17-22 @ 05:32) (18 - 20)  SpO2: 100% (03-17-22 @ 05:32) (98% - 100%)  Wt(kg): --Vital Signs Last 24 Hrs  T(C): 36.4 (17 Mar 2022 05:32), Max: 36.6 (16 Mar 2022 18:00)  T(F): 97.6 (17 Mar 2022 05:32), Max: 97.8 (16 Mar 2022 18:00)  HR: 93 (17 Mar 2022 05:32) (85 - 96)  BP: 158/85 (17 Mar 2022 05:32) (148/86 - 158/85)  BP(mean): --  RR: 18 (17 Mar 2022 05:32) (18 - 20)  SpO2: 100% (17 Mar 2022 05:32) (98% - 100%)    PHYSICAL EXAM:  GENERAL: NAD, well-groomed, well-developed  HEAD:  Atraumatic, Normocephalic  EYES: EOMI, PERRLA, conjunctiva and sclera clear  ENMT: Moist mucous membranes, Good dentition, No lesions  NECK: Supple, No JVD, Normal thyroid  NERVOUS SYSTEM:  Alert & Oriented X3 Good concentration; Motor Strength 4+/5 in all extremities; DTRs 2+ intact and symmetric. Pill rolling tremor noted  CHEST/LUNG: Clear to percussion bilaterally; No rales, rhonchi, wheezing, or rubs  HEART: Regular rate and rhythm; No murmurs, rubs, or gallops  ABDOMEN: Soft, Nontender, Nondistended; Bowel sounds present  EXTREMITIES:  2+ Peripheral Pulses, No clubbing, cyanosis. Ecchymosis on left forearm with associated edema. Left hand 5th phalanx ecchymosis, mild swelling and tender to palpation and ROM  SKIN: ecchymosis    Consultant(s) Notes Reviewed:  [x ] YES  [ ] NO  Care Discussed with Consultants/Other Providers [ x] YES  [ ] NO    LABS:                        11.2   7.85  )-----------( 354      ( 17 Mar 2022 07:11 )             32.1     03-17    128<L>  |  98  |  17  ----------------------------<  99  3.8   |  19<L>  |  0.58    Ca    8.9      17 Mar 2022 07:11  Phos  3.0     03-17  Mg     1.90     03-17    TPro  7.0  /  Alb  3.6  /  TBili  0.6  /  DBili  x   /  AST  31  /  ALT  15  /  AlkPhos  128<H>  03-16        CAPILLARY BLOOD GLUCOSE                RADIOLOGY & ADDITIONAL TESTS:    Imaging Personally Reviewed:  [ ] YES  [ ] NO

## 2022-03-17 NOTE — PROGRESS NOTE ADULT - PROBLEM SELECTOR PLAN 3
-high urine Osmolality. Check urine sodium  -sodium initially improved and now slight downtrend. Recheck urine lytes   -Trend BMP daily

## 2022-03-17 NOTE — PROGRESS NOTE ADULT - PROBLEM SELECTOR PLAN 2
NIH 4   tele monitor  house neuro following   Neuro checks q4h  MRI Head w/o con neg for CVA  PT/OT- rec rehab. Family refusing  c/w ASA 81mg and Statin (Lipitor 80mg)  Speech and Swallow eval appreciated  TTE w/ bubble study reviewed, cannot r/o intracardiac shunt. Possible ELLEN pending improvement in sodium  Dopplers of the carotids ordered given 71% stenosis noted on CT, may be a candidate for intervention as per neuro

## 2022-03-18 ENCOUNTER — TRANSCRIPTION ENCOUNTER (OUTPATIENT)
Age: 82
End: 2022-03-18

## 2022-03-18 LAB
ANION GAP SERPL CALC-SCNC: 14 MMOL/L — SIGNIFICANT CHANGE UP (ref 7–14)
BUN SERPL-MCNC: 12 MG/DL — SIGNIFICANT CHANGE UP (ref 7–23)
CALCIUM SERPL-MCNC: 8.8 MG/DL — SIGNIFICANT CHANGE UP (ref 8.4–10.5)
CHLORIDE SERPL-SCNC: 97 MMOL/L — LOW (ref 98–107)
CO2 SERPL-SCNC: 19 MMOL/L — LOW (ref 22–31)
CREAT SERPL-MCNC: 0.53 MG/DL — SIGNIFICANT CHANGE UP (ref 0.5–1.3)
EGFR: 93 ML/MIN/1.73M2 — SIGNIFICANT CHANGE UP
GLUCOSE SERPL-MCNC: 78 MG/DL — SIGNIFICANT CHANGE UP (ref 70–99)
HCT VFR BLD CALC: 32.9 % — LOW (ref 34.5–45)
HGB BLD-MCNC: 11.4 G/DL — LOW (ref 11.5–15.5)
MAGNESIUM SERPL-MCNC: 2 MG/DL — SIGNIFICANT CHANGE UP (ref 1.6–2.6)
MCHC RBC-ENTMCNC: 28.7 PG — SIGNIFICANT CHANGE UP (ref 27–34)
MCHC RBC-ENTMCNC: 34.7 GM/DL — SIGNIFICANT CHANGE UP (ref 32–36)
MCV RBC AUTO: 82.9 FL — SIGNIFICANT CHANGE UP (ref 80–100)
NRBC # BLD: 0 /100 WBCS — SIGNIFICANT CHANGE UP
NRBC # FLD: 0 K/UL — SIGNIFICANT CHANGE UP
OSMOLALITY UR: 555 MOSM/KG — SIGNIFICANT CHANGE UP (ref 50–1200)
PHOSPHATE SERPL-MCNC: 3.5 MG/DL — SIGNIFICANT CHANGE UP (ref 2.5–4.5)
PLATELET # BLD AUTO: 361 K/UL — SIGNIFICANT CHANGE UP (ref 150–400)
POTASSIUM SERPL-MCNC: 3.6 MMOL/L — SIGNIFICANT CHANGE UP (ref 3.5–5.3)
POTASSIUM SERPL-SCNC: 3.6 MMOL/L — SIGNIFICANT CHANGE UP (ref 3.5–5.3)
RBC # BLD: 3.97 M/UL — SIGNIFICANT CHANGE UP (ref 3.8–5.2)
RBC # FLD: 13.2 % — SIGNIFICANT CHANGE UP (ref 10.3–14.5)
SODIUM SERPL-SCNC: 130 MMOL/L — LOW (ref 135–145)
SODIUM UR-SCNC: 103 MMOL/L — SIGNIFICANT CHANGE UP
WBC # BLD: 8.26 K/UL — SIGNIFICANT CHANGE UP (ref 3.8–10.5)
WBC # FLD AUTO: 8.26 K/UL — SIGNIFICANT CHANGE UP (ref 3.8–10.5)

## 2022-03-18 PROCEDURE — 99233 SBSQ HOSP IP/OBS HIGH 50: CPT

## 2022-03-18 PROCEDURE — 93880 EXTRACRANIAL BILAT STUDY: CPT | Mod: 26

## 2022-03-18 RX ADMIN — Medication 81 MILLIGRAM(S): at 11:58

## 2022-03-18 RX ADMIN — ENOXAPARIN SODIUM 40 MILLIGRAM(S): 100 INJECTION SUBCUTANEOUS at 17:33

## 2022-03-18 RX ADMIN — ATORVASTATIN CALCIUM 80 MILLIGRAM(S): 80 TABLET, FILM COATED ORAL at 21:21

## 2022-03-18 NOTE — DISCHARGE NOTE PROVIDER - CARE PROVIDERS DIRECT ADDRESSES
,DirectAddress_Unknown,[email or web address] ,[email or web address] ,[email or web address],DirectAddress_Unknown

## 2022-03-18 NOTE — DISCHARGE NOTE PROVIDER - PROVIDER TOKENS
PROVIDER:[TOKEN:[[id number]]],PROVIDER:[TOKEN:[[id number]]] PROVIDER:[TOKEN:[[id number]]] PROVIDER:[TOKEN:[[id number]]],PROVIDER:[TOKEN:[[id number]]]

## 2022-03-18 NOTE — DISCHARGE NOTE PROVIDER - NSDCCPCAREPLAN_GEN_ALL_CORE_FT
PRINCIPAL DISCHARGE DIAGNOSIS  Diagnosis: Syncope and collapse  Assessment and Plan of Treatment: you had MRI negative for stroke, EEG negative. You syncope likely due to electrolyte abnormality and weakness. You CT neck noted 71% left common carotid stensis. ELLEN: minimal MR, Mild non-mobile atherosclerotic plaque in the aortic arch and descending thoracic aorta. no PFO    Followup with neurology clinic in 2 weeks for further workup      SECONDARY DISCHARGE DIAGNOSES  Diagnosis: Hyponatremia  Assessment and Plan of Treatment: you were give IV fluid with improvement    Diagnosis: Dislocation of elbow  Assessment and Plan of Treatment: Followup with Dr. Lubna Henry 2 weeks from discharge, call office for appointment, [phone]    Diagnosis: Fracture of phalanx of finger  Assessment and Plan of Treatment: Followup with Dr. Lubna Henry 2 weeks from discharge, call office for appointment, [phone]    Diagnosis: Lung nodule  Assessment and Plan of Treatment: Your CT neck noted right upper lobe lung nodule. Ill-defined posterior opacity in the right upper lobe measuring up to 1.4 cm. Given peripheral reticular opacities and architectural distortion, likely   related to pulmonary fibrosis.  Repeat CT in 3 months. Followup with your PMD for pulmonary clinic referral     PRINCIPAL DISCHARGE DIAGNOSIS  Diagnosis: Syncope and collapse  Assessment and Plan of Treatment: you had MRI negative for stroke, EEG negative. You syncope likely due to electrolyte abnormality and weakness. You CT neck noted 71% left common carotid stensis. ELLEN: minimal MR, Mild non-mobile atherosclerotic plaque in the aortic arch and descending thoracic aorta. no PFO    Carotid duplex: Moderate heterogenous plaque noted within the proximal right and left internal carotid arteries, consistent with 50-79% in both proximal vessels.  Followup with General neurology clinic in 1 month for further workup      SECONDARY DISCHARGE DIAGNOSES  Diagnosis: Hyponatremia  Assessment and Plan of Treatment: you were give IV fluid with improvement    Diagnosis: Dislocation of elbow  Assessment and Plan of Treatment: Followup with Dr. Lubna Henry 2 weeks from discharge, call office for appointment, [phone]    Diagnosis: Fracture of phalanx of finger  Assessment and Plan of Treatment: Followup with Dr. Lubna Henry 2 weeks from discharge, call office for appointment, [phone]    Diagnosis: Lung nodule  Assessment and Plan of Treatment: Your CT neck noted right upper lobe lung nodule. Ill-defined posterior opacity in the right upper lobe measuring up to 1.4 cm. Given peripheral reticular opacities and architectural distortion, likely   related to pulmonary fibrosis.  Repeat CT in 3 months. Followup with your PMD for pulmonary clinic referral     PRINCIPAL DISCHARGE DIAGNOSIS  Diagnosis: Syncope and collapse  Assessment and Plan of Treatment: you had MRI negative for stroke, EEG (brain monitor) negative for seizure. Your fall and collapse was likely due to electrolyte abnormality and weakness. Your CT neck noted 71% left common carotid stenosis which is buildup of plaque. Therefore you were started on aspirin and Lipitor. Please follow up with Neurology clinic in 1 month.      SECONDARY DISCHARGE DIAGNOSES  Diagnosis: Hyponatremia  Assessment and Plan of Treatment: Your sodium level was low so you were seen by the kidney team while in the hospital and you were started on IV fluids and salt tabs. Your sodium level upon discharge is 130 (normal level is 135-145) therefore please continue to take salt tabs 1g three times a day at home. Please get your labs rechecked in 1 week to make sure your sodium level improves. Follow up with your PCP.    Diagnosis: Dislocation of elbow  Assessment and Plan of Treatment: Weight bearing as tolerated. No surgical intervention was needed.  Followup with Dr. Lubna Henry 2 weeks from discharge, call office for appointment, [phone]    Diagnosis: Fracture of phalanx of finger  Assessment and Plan of Treatment: Phalanx splint applied. No surgical intervention was needed.  Followup with Dr. Lubna Henry 2 weeks from discharge, call office for appointment, [phone]    Diagnosis: Lung nodule  Assessment and Plan of Treatment: Your CT chest noted right upper lobe lung nodule. Ill-defined posterior opacity in the right upper lobe measuring up to 1.4 cm. Given peripheral reticular opacities and architectural distortion, likely   related to pulmonary fibrosis.  Please get repeat CT in 3 months. Followup with your PMD for pulmonary clinic referral     PRINCIPAL DISCHARGE DIAGNOSIS  Diagnosis: Syncope and collapse  Assessment and Plan of Treatment: you had MRI negative for stroke, EEG (brain monitor) negative for seizure. Your fall and collapse was likely due to electrolyte abnormality and weakness. Your CT neck noted 71% left common carotid stenosis which is buildup of plaque. Therefore you were started on aspirin and Lipitor. Please follow up with Neurology clinic in 1 month.      SECONDARY DISCHARGE DIAGNOSES  Diagnosis: Hyponatremia  Assessment and Plan of Treatment: Your sodium level was low so you were seen by the kidney team while in the hospital and you were started on IV fluids and salt tabs. Your sodium level upon discharge is 130 (normal level is 135-145) therefore please continue to take salt tabs 1g three times a day at home. Please get your labs rechecked in 1 week to make sure your sodium level improves. Follow up with your PCP.  Follow up with your PCP on April 7th at 230pm.    Diagnosis: Dislocation of elbow  Assessment and Plan of Treatment: Weight bearing as tolerated. No surgical intervention was needed.  Followup with Dr. Lubna Henry 2 weeks from discharge, call office for appointment, [phone]    Diagnosis: Fracture of phalanx of finger  Assessment and Plan of Treatment: Phalanx splint applied. No surgical intervention was needed.  Followup with Dr. Lubna Henry 2 weeks from discharge, call office for appointment, [phone]    Diagnosis: Lung nodule  Assessment and Plan of Treatment: Your CT chest noted right upper lobe lung nodule. Ill-defined posterior opacity in the right upper lobe measuring up to 1.4 cm. Given peripheral reticular opacities and architectural distortion, likely   related to pulmonary fibrosis.  Please get repeat CT in 3 months. Followup with your PMD for pulmonary clinic referral

## 2022-03-18 NOTE — PROGRESS NOTE ADULT - SUBJECTIVE AND OBJECTIVE BOX
CARRILLO FIGUEROA  81y  Female      Patient is a 81y old  Female who presents with a chief complaint of syncope, stroke, hyponatremia (18 Mar 2022 08:30)      INTERVAL HPI/OVERNIGHT EVENTS: Pt reports feeling well. No chest pain, shortness of breath, abdominal pain.         REVIEW OF SYSTEMS:  As per hpi    MEDICATIONS (STANDING):   acetaminophen     Tablet .. 650 milliGRAM(s) Oral every 6 hours PRN  aspirin enteric coated 81 milliGRAM(s) Oral daily  atorvastatin 80 milliGRAM(s) Oral at bedtime  enoxaparin Injectable 40 milliGRAM(s) SubCutaneous every 24 hours  influenza  Vaccine (HIGH DOSE) 0.7 milliLiter(s) IntraMuscular once  sodium chloride 0.9%. 1000 milliLiter(s) IV Continuous <Continuous>      T(C): 36.8 (03-18-22 @ 05:01), Max: 36.8 (03-18-22 @ 05:01)  HR: 68 (03-18-22 @ 05:01) (68 - 95)  BP: 138/88 (03-18-22 @ 05:01) (138/88 - 147/77)  RR: 18 (03-18-22 @ 05:01) (18 - 18)  SpO2: 98% (03-18-22 @ 05:01) (98% - 100%)  Wt(kg): --Vital Signs Last 24 Hrs  T(C): 36.8 (18 Mar 2022 05:01), Max: 36.8 (18 Mar 2022 05:01)  T(F): 98.2 (18 Mar 2022 05:01), Max: 98.2 (18 Mar 2022 05:01)  HR: 68 (18 Mar 2022 05:01) (68 - 95)  BP: 138/88 (18 Mar 2022 05:01) (138/88 - 147/77)  BP(mean): --  RR: 18 (18 Mar 2022 05:01) (18 - 18)  SpO2: 98% (18 Mar 2022 05:01) (98% - 100%)    PHYSICAL EXAM:  GENERAL: NAD, well-groomed, well-developed  HEAD:  Atraumatic, Normocephalic  EYES: EOMI, PERRLA, conjunctiva and sclera clear  ENMT: Moist mucous membranes, Good dentition, No lesions  NECK: Supple, No JVD, Normal thyroid  NERVOUS SYSTEM:  Alert & Oriented X3 Good concentration; Motor Strength 4+/5 in all extremities; DTRs 2+ intact and symmetric. Pill rolling tremor noted  CHEST/LUNG: Clear to percussion bilaterally; No rales, rhonchi, wheezing, or rubs  HEART: Regular rate and rhythm; No murmurs, rubs, or gallops  ABDOMEN: Soft, Nontender, Nondistended; Bowel sounds present  EXTREMITIES:  2+ Peripheral Pulses, No clubbing, cyanosis. Ecchymosis on left forearm with associated edema. Left hand 5th phalanx ecchymosis, mild swelling and tender to palpation and ROM  SKIN: ecchymosis    Consultant(s) Notes Reviewed:  [x ] YES  [ ] NO  Care Discussed with Consultants/Other Providers [ x] YES  [ ] NO    LABS:                        11.4   8.26  )-----------( 361      ( 18 Mar 2022 06:45 )             32.9     03-18    130<L>  |  97<L>  |  12  ----------------------------<  78  3.6   |  19<L>  |  0.53    Ca    8.8      18 Mar 2022 06:45  Phos  3.5     03-18  Mg     2.00     03-18          CAPILLARY BLOOD GLUCOSE                RADIOLOGY & ADDITIONAL TESTS:    Imaging Personally Reviewed:  [ ] YES  [ ] NO

## 2022-03-18 NOTE — PROGRESS NOTE ADULT - PROBLEM SELECTOR PLAN 2
NIH 4   tele monitor  house neuro following   Neuro checks q4h  MRI Head w/o con neg for CVA  PT/OT- rec rehab. Family refusing  c/w ASA 81mg and Statin (Lipitor 80mg)  Speech and Swallow eval appreciated  TTE w/ bubble study reviewed, cannot r/o intracardiac shunt. ELLEN wnl  Dopplers of the carotids ordered given 71% stenosis noted on CT, may be a candidate for intervention as per neuro, f/u.

## 2022-03-18 NOTE — DISCHARGE NOTE PROVIDER - NSFOLLOWUPCLINICS_GEN_ALL_ED_FT
[facility]  Neurology  [address] [place] [address] [address]rd [place] [address]  Phone: [phone]  Fax:      [facility]  Neurology  [address] [place] [address] [place] [address]  Phone: [phone]  Fax:     [facility]  Pulmonology  [address] [place] [address]  Phone: [phone]  Fax:

## 2022-03-18 NOTE — PROGRESS NOTE ADULT - PROBLEM SELECTOR PLAN 1
tele monitor. ILR interrogated, no events. Unclear if LOC  cardiac enzymes flat. TTE with normal LV and RV function, no evidence of patent foramen ovale  Broad ddx given unclear history  Concern that fall related to gait instability in the setting of age, white matter ischemic changes vs suspected parkinsons.  May have also been related to hyponatremia, management as below   MRI brain neg for acute CVA  Routine EEG neg  Orthostatic blood pressures neg  ELLEN wnl  Dopplers of the carotids ordered given 71% stenosis noted on CT, reviewed. B/l stenosis noted, neuro f/u

## 2022-03-18 NOTE — PROGRESS NOTE ADULT - PROBLEM SELECTOR PLAN 3
-high urine Osmolality. Check urine sodium  -sodium improved this am. Unclear if also a component of SIADH, pending urine lytes   -Trend BMP daily

## 2022-03-18 NOTE — DISCHARGE NOTE PROVIDER - CARE PROVIDER_API CALL
Marizol Garcia (NP; RN)  NP in Family Health  [address] [facility], [address]  [place] [address]  Phone: [phone]  Fax: [phone]  Follow Up Time:     Lubna Henry)  [address], [address]  [place] [address]  Phone: [phone]  Fax: ()-  Follow Up Time:    Lubna Henry)  [address], [facility] [address]  [place] [address]  Phone: [phone]  Fax: ()-  Follow Up Time:    Lubna Henry)  [address], [address]  [place] [address]  Phone: [phone]  Fax: ()-  Follow Up Time:     Marizol Garcia (NP; RN)  NP in Family Health  [address] [place] [address], [address]  [place] [address]  Phone: [phone]  Fax: [phone]  Follow Up Time:

## 2022-03-18 NOTE — DISCHARGE NOTE PROVIDER - NSDCMRMEDTOKEN_GEN_ALL_CORE_FT
Tylenol Extra Strength 500 mg oral tablet: 2 tab(s) orally once a day, As Needed   aspirin 81 mg oral delayed release tablet: 1 tab(s) orally once a day  atorvastatin 80 mg oral tablet: 1 tab(s) orally once a day (at bedtime)  Occupational Therapy : 3 times a week, Occupational Therapy   polyethylene glycol 3350 oral powder for reconstitution: 17 gram(s) orally once a day  senna oral tablet: 2 tab(s) orally once a day (at bedtime)  sodium chloride 1 g oral tablet: 1 tab(s) orally 3 times a day   Tylenol Extra Strength 500 mg oral tablet: 2 tab(s) orally once a day, As Needed   aspirin 81 mg oral delayed release tablet: 1 tab(s) orally once a day  atorvastatin 80 mg oral tablet: 1 tab(s) orally once a day (at bedtime)  Occupational Therapy : 3 times a week, Occupational Therapy   Physical therapy: 3-5x/week  polyethylene glycol 3350 oral powder for reconstitution: 17 gram(s) orally once a day  senna oral tablet: 2 tab(s) orally once a day (at bedtime)  sodium chloride 1 g oral tablet: 1 tab(s) orally 3 times a day   Tylenol Extra Strength 500 mg oral tablet: 2 tab(s) orally once a day, As Needed

## 2022-03-18 NOTE — DISCHARGE NOTE PROVIDER - HOSPITAL COURSE
81y F PMHx Chronic Headache, Vertigo, s/p ILR (10/2021) p/w unwitnessed fall and AMS. CTH neg however CTA HN with 71% stenosis of distal left common carotid artery. Patient ultimately admitted to telemetry for Fall r/o Syncope vs Stroke and Hyponatremia. Also found with L 5th Phalanx Fx, L Wrist Fx and L Elbow Dilocation (acute vs chronic?)    + syncope - ILR interrogation no events, echo WNL however had positive bubble study.   + 71% stenosis of L common carotid artery. MRI brain no stroke, EEG neg  +Lung nodule RUL  + L elbow dislocation WBAT per ortho no acute intervention     Hospital course:    Syncope and Collapse. Stroke ruled out  orthostatic neg  EKG: Sinus Rhythm RBBB @ 84bpm QTC 486ms  s/p ILR interrogation NSR, no events noted.   Echo WNL however had positive bubble study  CTA HN- 71% stenosis involving the distal L common carotid artery. Mild narrowing proximal L ICA.  CTH neg  CT C-spine neg for acute findings. Mod-severe multilevel degenerative osteoarthritic changes and degenerative joint disease. Healing left T1 rib fracture.  MRI Brain- no stroke. Moderate severity chronic white matter microvascular   type changes.  EEG neg  ELLEN: minimal MR, Mild non-mobile atherosclerotic plaque in the aortic arch and descending thoracic aorta. no PFO   Neuro: Most likely diagnosis is that she has a multifactorial gait abnormality that caused a fall, not consistent with CVA     Hyponatremia  Na+ 127-->129  SIADH suspected however pt fluid restricted and sodium worsened. Sodium improved s/p IVF    L 5th Phalanx Fracture  Xray L hand+L wrist: fracture involving radial basilar aspect of 5th proximal phalanx  applied phalanx splint    L elbow dislocation  foearm XR- Redemonstrated elbow dislocation with displaced radial head fracture component  - WBAT LUE, no acute intervention per ortho  -  follow up with Dr. Lubna Henry 2 weeks from discharge, call office for appointment, [phone]    Lung nodule  Bilateral faint airspace opacities with a RUL nodular opacity. CT chest pending to further eval  CT chest: Ill-defined posterior opacity in the right upper lobe measuring up to 1.4   cm. Given peripheral reticular opacities and architectural distortion, likely   related to pulmonary fibrosis.  - repeat CT in 3 months    81y F PMHx Chronic Headache, Vertigo, s/p ILR (10/2021) p/w unwitnessed fall and AMS. CTH neg however CTA HN with 71% stenosis of distal left common carotid artery. Patient ultimately admitted to telemetry for Fall r/o Syncope vs Stroke and Hyponatremia. Also found with L 5th Phalanx Fx, L Wrist Fx and L Elbow Dilocation (acute vs chronic?)    + syncope - ILR interrogation no events, echo WNL however had positive bubble study.   + 71% stenosis of L common carotid artery. MRI brain no stroke, EEG neg  +Lung nodule RUL  + L elbow dislocation WBAT per ortho no acute intervention     Hospital course:    Syncope and Collapse. Stroke ruled out  orthostatic neg  EKG: Sinus Rhythm RBBB @ 84bpm QTC 486ms  s/p ILR interrogation NSR, no events noted.   Echo WNL however had positive bubble study  CTA HN- 71% stenosis involving the distal L common carotid artery. Mild narrowing proximal L ICA.  CTH neg  CT C-spine neg for acute findings. Mod-severe multilevel degenerative osteoarthritic changes and degenerative joint disease. Healing left T1 rib fracture.  MRI Brain- no stroke. Moderate severity chronic white matter microvascular type changes.  EEG neg  ELLEN: minimal MR, Mild non-mobile atherosclerotic plaque in the aortic arch and descending thoracic aorta. no PFO   Neuro: Most likely diagnosis is that she has a multifactorial gait abnormality that caused a fall, not consistent with CVA  Followup with neuro clinic for the carotid stenosis      Hyponatremia  Na+ 127-->129  SIADH suspected however pt fluid restricted and sodium worsened. Sodium improved s/p IVF    L 5th Phalanx Fracture  Xray L hand+L wrist: fracture involving radial basilar aspect of 5th proximal phalanx  applied phalanx splint  outpatient ortho followup     L elbow dislocation  foearm XR- Redemonstrated elbow dislocation with displaced radial head fracture component  WBAT LUE, no acute intervention per ortho  follow up with Dr. Lubna Henry 2 weeks from discharge, call office for appointment, [phone]    Lung nodule  Bilateral faint airspace opacities with a RUL nodular opacity. CT chest pending to further eval  CT chest: Ill-defined posterior opacity in the right upper lobe measuring up to 1.4   cm. Given peripheral reticular opacities and architectural distortion, likely   related to pulmonary fibrosis.  repeat CT in 3 months     PT rehab but family wants to take pt home with outpatient PT     Case discussed with ****. Reviewed discharge medications with patient; All new medications requiring new prescription sent to pharmacy of patients choice. Reviewed need for prescription for previous home medication and new prescriptions sent if requested. Patient in agreement and understands.     INCOMPELTE 81y F PMHx Chronic Headache, Vertigo, s/p ILR (10/2021) p/w unwitnessed fall and AMS. CTH neg however CTA HN with 71% stenosis of distal left common carotid artery. Patient ultimately admitted to telemetry for Fall r/o Syncope vs Stroke and Hyponatremia. Also found with L 5th Phalanx Fx, L Wrist Fx and L Elbow Dilocation (acute vs chronic?)    + syncope - ILR interrogation no events, echo WNL however had positive bubble study.   + 71% stenosis of L common carotid artery. MRI brain no stroke, EEG neg  +Lung nodule RUL  + L elbow dislocation WBAT per ortho no acute intervention     Hospital course:    Syncope and Collapse. Stroke ruled out  orthostatic neg  EKG: Sinus Rhythm RBBB @ 84bpm QTC 486ms  s/p ILR interrogation NSR, no events noted.   Echo WNL however had positive bubble study  CTA HN- 71% stenosis involving the distal L common carotid artery. Mild narrowing proximal L ICA.  CTH neg  CT C-spine neg for acute findings. Mod-severe multilevel degenerative osteoarthritic changes and degenerative joint disease. Healing left T1 rib fracture.  MRI Brain- no stroke. Moderate severity chronic white matter microvascular type changes.  EEG neg  ELLEN: minimal MR, Mild non-mobile atherosclerotic plaque in the aortic arch and descending thoracic aorta. no PFO   Neuro: Most likely diagnosis is that she has a multifactorial gait abnormality that caused a fall, not consistent with CVA  Followup with neuro clinic for the carotid stenosis      Hyponatremia  Na+ 127-->129>130  Likely SIADH    L 5th Phalanx Fracture  Xray L hand+L wrist: fracture involving radial basilar aspect of 5th proximal phalanx  applied phalanx splint  outpatient ortho followup     L elbow dislocation  foearm XR- Redemonstrated elbow dislocation with displaced radial head fracture component  WBAT LUE, no acute intervention per ortho  follow up with Dr. Lubna Henry 2 weeks from discharge, call office for appointment, [phone]    Lung nodule  Bilateral faint airspace opacities with a RUL nodular opacity. CT chest pending to further eval  CT chest: Ill-defined posterior opacity in the right upper lobe measuring up to 1.4   cm. Given peripheral reticular opacities and architectural distortion, likely   related to pulmonary fibrosis.  repeat CT in 3 months     PT rehab but family wants to take pt home with outpatient PT     Case discussed with ****. Reviewed discharge medications with patient; All new medications requiring new prescription sent to pharmacy of patients choice. Reviewed need for prescription for previous home medication and new prescriptions sent if requested. Patient in agreement and understands.     INCOMPELTE 81y F PMHx Chronic Headache, Vertigo, s/p ILR (10/2021) p/w unwitnessed fall and AMS. CTH neg however CTA HN with 71% stenosis of distal left common carotid artery. Patient ultimately admitted to telemetry for Fall r/o Syncope vs Stroke and Hyponatremia. Also found with L 5th Phalanx Fx, L Wrist Fx and L Elbow Dilocation (acute vs chronic?)    + syncope - ILR interrogation no events, echo WNL however had positive bubble study.   + 71% stenosis of L common carotid artery. MRI brain no stroke, EEG neg  +Lung nodule RUL  + L elbow dislocation WBAT per ortho no acute intervention     Hospital course:    Syncope and Collapse. Stroke ruled out  orthostatic neg  EKG: Sinus Rhythm RBBB @ 84bpm QTC 486ms  s/p ILR interrogation NSR, no events noted.   Echo WNL however had positive bubble study  CTA HN- 71% stenosis involving the distal L common carotid artery. Mild narrowing proximal L ICA.  CTH neg  CT C-spine neg for acute findings. Mod-severe multilevel degenerative osteoarthritic changes and degenerative joint disease. Healing left T1 rib fracture.  MRI Brain- no stroke. Moderate severity chronic white matter microvascular type changes.  EEG neg  ELLEN: minimal MR, Mild non-mobile atherosclerotic plaque in the aortic arch and descending thoracic aorta. no PFO   Carotid duplex: Moderate heterogenous plaque noted within the proximal right and left internal carotid arteries, consistent with 50-79% in both proximal vessels.  Neuro: Most likely diagnosis is that she has a multifactorial gait abnormality that caused a fall, not consistent with CVA  Followup with neuro clinic for the carotid stenosis      Hyponatremia  Na+ 127-->129>130  Likely SIADH    L 5th Phalanx Fracture  Xray L hand+L wrist: fracture involving radial basilar aspect of 5th proximal phalanx  applied phalanx splint  outpatient ortho followup     L elbow dislocation  foearm XR- Redemonstrated elbow dislocation with displaced radial head fracture component  WBAT LUE, no acute intervention per ortho  follow up with Dr. Lubna Henry 2 weeks from discharge, call office for appointment, [phone]    Lung nodule  Bilateral faint airspace opacities with a RUL nodular opacity. CT chest pending to further eval  CT chest: Ill-defined posterior opacity in the right upper lobe measuring up to 1.4   cm. Given peripheral reticular opacities and architectural distortion, likely   related to pulmonary fibrosis.  repeat CT in 3 months     PT rehab but family wants to take pt home with outpatient PT     Case discussed with ****. Reviewed discharge medications with patient; All new medications requiring new prescription sent to pharmacy of patients choice. Reviewed need for prescription for previous home medication and new prescriptions sent if requested. Patient in agreement and understands.     INCOMPELTE 81y F PMHx Chronic Headache, Vertigo, s/p ILR (10/2021) p/w unwitnessed fall and AMS. Stroke was ruled out. CTH neg and MRI brain neg for stroke however CTA HN with 71% stenosis of distal left common carotid artery and MRI w/ Moderate severity chronic white matter microvascular type changes. Carotid Duplex; 50-79% Mod heterogenous plaque within the proximal R and L ICA. R/O Syncope workup was done which was all negative (orthostatic neg, loop recorder interrogation WNL, EEG neg Echo possible positive bubble study but then ELLEN was done which ruled out PFO). Neurology was following and stated most likely diagnosis is that she has a multifactorial gait abnormality that caused a fall, not consistent with CVA. Recommended outpatient neurology clinic f/u for Carotid artery stenosis and started aspirin and Lipitor.   Pt was seen by Ortho for L 5th Phalanx Fx, L Wrist Fx and L Elbow Dislocation (acute vs chronic?) s/p fall who recommended no surgical intervention. Phalanx splint applied for phalanx fracture and for the elbow dislocation pt was recommended for WBAT. Patient will follow up with Dr. Lubna Henry 2 weeks from discharge  Pt was seen by Renal for hyponatremia 2/2 SIADH. Per renal, SIADH likely mediated by pain in addition to decreased solute intake. Pt was treated with  IVF, Salt tabs, IV Lasix, and Ensure was added to increase protein intake. Na improved upon discharge - renal recommended discharging patient home on 1g TID.  Pt incidentally found to have a 1.4cm RUL lung nodule on CT chest. Pt will need repeat CT in 3 months.    PT recommended rehab but family wants to take pt home with outpatient PT. Script given to patient    Case discussed with Dr. Gunter on 3/23, pt medically stable for discharge home. Reviewed discharge medications with patient; All new medications requiring new prescription sent to pharmacy of patients choice. Reviewed need for prescription for previous home medication and new prescriptions sent if requested. Patient in agreement and understands. 81y F PMHx Chronic Headache, Vertigo, s/p ILR (10/2021) p/w unwitnessed fall and AMS. Stroke was ruled out. CTH neg and MRI brain neg for stroke however CTA HN with 71% stenosis of distal left common carotid artery and MRI w/ Moderate severity chronic white matter microvascular type changes. Carotid Duplex; 50-79% Mod heterogenous plaque within the proximal R and L ICA. R/O Syncope workup was done which was all negative (orthostatic neg, loop recorder interrogation WNL, EEG neg Echo possible positive bubble study but then ELLEN was done which ruled out PFO). Neurology was following and stated most likely diagnosis is that she has a multifactorial gait abnormality that caused a fall, not consistent with CVA. Recommended outpatient neurology clinic f/u for Carotid artery stenosis and started aspirin and Lipitor.   Pt was seen by Ortho for L 5th Phalanx Fx, L Wrist Fx and L Elbow Dislocation (acute vs chronic?) s/p fall who recommended no surgical intervention. Phalanx splint applied for phalanx fracture and for the elbow dislocation pt was recommended for WBAT. Patient will follow up with Dr. Lubna Henry 2 weeks from discharge  Pt was seen by Renal for hyponatremia 2/2 SIADH. Per renal, SIADH likely mediated by pain in addition to decreased solute intake. Pt was treated with  IVF, Salt tabs, IV Lasix, and Ensure was added to increase protein intake. Na improved upon discharge - renal recommended discharging patient home on 1g TID.  Pt incidentally found to have a 1.4cm RUL lung nodule on CT chest. Pt will need repeat CT in 3 months.    PT recommended rehab but family wants to take pt home with outpatient PT. Script given to patient    Pt has appt with PCP on April 7th at 230pm.    Case discussed with Dr. Gunter on 3/23, pt medically stable for discharge home. Reviewed discharge medications with patient; All new medications requiring new prescription sent to pharmacy of patients choice. Reviewed need for prescription for previous home medication and new prescriptions sent if requested. Patient in agreement and understands.

## 2022-03-19 LAB
ANION GAP SERPL CALC-SCNC: 11 MMOL/L — SIGNIFICANT CHANGE UP (ref 7–14)
ANION GAP SERPL CALC-SCNC: 16 MMOL/L — HIGH (ref 7–14)
BUN SERPL-MCNC: 16 MG/DL — SIGNIFICANT CHANGE UP (ref 7–23)
BUN SERPL-MCNC: 19 MG/DL — SIGNIFICANT CHANGE UP (ref 7–23)
CALCIUM SERPL-MCNC: 8.6 MG/DL — SIGNIFICANT CHANGE UP (ref 8.4–10.5)
CALCIUM SERPL-MCNC: 8.8 MG/DL — SIGNIFICANT CHANGE UP (ref 8.4–10.5)
CHLORIDE SERPL-SCNC: 94 MMOL/L — LOW (ref 98–107)
CHLORIDE SERPL-SCNC: 95 MMOL/L — LOW (ref 98–107)
CO2 SERPL-SCNC: 17 MMOL/L — LOW (ref 22–31)
CO2 SERPL-SCNC: 19 MMOL/L — LOW (ref 22–31)
CREAT SERPL-MCNC: 0.55 MG/DL — SIGNIFICANT CHANGE UP (ref 0.5–1.3)
CREAT SERPL-MCNC: 0.58 MG/DL — SIGNIFICANT CHANGE UP (ref 0.5–1.3)
EGFR: 91 ML/MIN/1.73M2 — SIGNIFICANT CHANGE UP
EGFR: 93 ML/MIN/1.73M2 — SIGNIFICANT CHANGE UP
GLUCOSE SERPL-MCNC: 133 MG/DL — HIGH (ref 70–99)
GLUCOSE SERPL-MCNC: 149 MG/DL — HIGH (ref 70–99)
HCT VFR BLD CALC: 34.5 % — SIGNIFICANT CHANGE UP (ref 34.5–45)
HGB BLD-MCNC: 11.3 G/DL — LOW (ref 11.5–15.5)
MAGNESIUM SERPL-MCNC: 1.8 MG/DL — SIGNIFICANT CHANGE UP (ref 1.6–2.6)
MAGNESIUM SERPL-MCNC: 1.9 MG/DL — SIGNIFICANT CHANGE UP (ref 1.6–2.6)
MCHC RBC-ENTMCNC: 27.9 PG — SIGNIFICANT CHANGE UP (ref 27–34)
MCHC RBC-ENTMCNC: 32.8 GM/DL — SIGNIFICANT CHANGE UP (ref 32–36)
MCV RBC AUTO: 85.2 FL — SIGNIFICANT CHANGE UP (ref 80–100)
NRBC # BLD: 0 /100 WBCS — SIGNIFICANT CHANGE UP
NRBC # FLD: 0 K/UL — SIGNIFICANT CHANGE UP
PHOSPHATE SERPL-MCNC: 2.4 MG/DL — LOW (ref 2.5–4.5)
PLATELET # BLD AUTO: 325 K/UL — SIGNIFICANT CHANGE UP (ref 150–400)
POTASSIUM SERPL-MCNC: 3.9 MMOL/L — SIGNIFICANT CHANGE UP (ref 3.5–5.3)
POTASSIUM SERPL-MCNC: 4 MMOL/L — SIGNIFICANT CHANGE UP (ref 3.5–5.3)
POTASSIUM SERPL-SCNC: 3.9 MMOL/L — SIGNIFICANT CHANGE UP (ref 3.5–5.3)
POTASSIUM SERPL-SCNC: 4 MMOL/L — SIGNIFICANT CHANGE UP (ref 3.5–5.3)
RBC # BLD: 4.05 M/UL — SIGNIFICANT CHANGE UP (ref 3.8–5.2)
RBC # FLD: 13.2 % — SIGNIFICANT CHANGE UP (ref 10.3–14.5)
SODIUM SERPL-SCNC: 125 MMOL/L — LOW (ref 135–145)
SODIUM SERPL-SCNC: 127 MMOL/L — LOW (ref 135–145)
WBC # BLD: 11.35 K/UL — HIGH (ref 3.8–10.5)
WBC # FLD AUTO: 11.35 K/UL — HIGH (ref 3.8–10.5)

## 2022-03-19 PROCEDURE — 99233 SBSQ HOSP IP/OBS HIGH 50: CPT

## 2022-03-19 RX ORDER — SODIUM CHLORIDE 9 MG/ML
1 INJECTION INTRAMUSCULAR; INTRAVENOUS; SUBCUTANEOUS
Refills: 0 | Status: DISCONTINUED | OUTPATIENT
Start: 2022-03-19 | End: 2022-03-19

## 2022-03-19 RX ORDER — SODIUM CHLORIDE 9 MG/ML
1 INJECTION INTRAMUSCULAR; INTRAVENOUS; SUBCUTANEOUS THREE TIMES A DAY
Refills: 0 | Status: DISCONTINUED | OUTPATIENT
Start: 2022-03-19 | End: 2022-03-20

## 2022-03-19 RX ADMIN — Medication 650 MILLIGRAM(S): at 06:18

## 2022-03-19 RX ADMIN — ATORVASTATIN CALCIUM 80 MILLIGRAM(S): 80 TABLET, FILM COATED ORAL at 21:55

## 2022-03-19 RX ADMIN — Medication 650 MILLIGRAM(S): at 05:18

## 2022-03-19 RX ADMIN — Medication 81 MILLIGRAM(S): at 11:55

## 2022-03-19 RX ADMIN — SODIUM CHLORIDE 1 GRAM(S): 9 INJECTION INTRAMUSCULAR; INTRAVENOUS; SUBCUTANEOUS at 14:36

## 2022-03-19 RX ADMIN — SODIUM CHLORIDE 1 GRAM(S): 9 INJECTION INTRAMUSCULAR; INTRAVENOUS; SUBCUTANEOUS at 21:55

## 2022-03-19 RX ADMIN — ENOXAPARIN SODIUM 40 MILLIGRAM(S): 100 INJECTION SUBCUTANEOUS at 17:29

## 2022-03-19 NOTE — PROGRESS NOTE ADULT - PROBLEM SELECTOR PLAN 6
Bilateral faint airspace opacities with a right upper lung nodular opacity. CT chest: Given peripheral reticular opacities and architectural distortion, likely related to pulmonary fibrosis. Will need to compare to prior CT chest. Pt satting well, denies SOB. Will need repeat low-dose noncontrast CT chest in 3 months and pulm outpt f/u    no fevers or leukocytosis, monitor off abx. Slight uptrend in WBC noted today, will trend

## 2022-03-19 NOTE — PROGRESS NOTE ADULT - PROBLEM SELECTOR PLAN 3
-urine lytes consistent with SIADH. Slight downtrend  -c/w fluid restriction and will add on salt tabs  -Trend BMP BID

## 2022-03-19 NOTE — PROGRESS NOTE ADULT - PROBLEM SELECTOR PLAN 2
NIH 4   tele monitor  house neuro following   Neuro checks q4h  MRI Head w/o con neg for CVA  PT/OT- rec rehab. Family refusing  c/w ASA 81mg and Statin (Lipitor 80mg)  Speech and Swallow eval appreciated  TTE w/ bubble study reviewed, cannot r/o intracardiac shunt. ELLEN wnl  Dopplers of the carotids ordered given 71% stenosis noted on CT, may be a candidate for intervention as per neuro, outpt f/u.

## 2022-03-19 NOTE — PROGRESS NOTE ADULT - PROBLEM SELECTOR PLAN 1
tele monitor. ILR interrogated, no events. Unclear if LOC  cardiac enzymes flat. TTE with normal LV and RV function, no evidence of patent foramen ovale  Broad ddx given unclear history  Concern that fall related to gait instability in the setting of age, white matter ischemic changes vs suspected parkinsons.  May have also been related to hyponatremia, management as below   MRI brain neg for acute CVA  Routine EEG neg  Orthostatic blood pressures neg  ELLEN wnl  Dopplers of the carotids ordered given 71% stenosis noted on CT, reviewed. B/l stenosis noted, outpt neuro f/u

## 2022-03-19 NOTE — PROGRESS NOTE ADULT - SUBJECTIVE AND OBJECTIVE BOX
CARRILLO FIGUEROA  81y  Female      Patient is a 81y old  Female who presents with a chief complaint of syncope, stroke, hyponatremia (18 Mar 2022 14:13)      INTERVAL HPI/OVERNIGHT EVENTS: No events overnight. Pt denies chest pain, shortness of breath, abdominal pain.         REVIEW OF SYSTEMS:  As per hpi    MEDICATIONS (STANDING):   acetaminophen     Tablet .. 650 milliGRAM(s) Oral every 6 hours PRN  aspirin enteric coated 81 milliGRAM(s) Oral daily  atorvastatin 80 milliGRAM(s) Oral at bedtime  enoxaparin Injectable 40 milliGRAM(s) SubCutaneous every 24 hours  influenza  Vaccine (HIGH DOSE) 0.7 milliLiter(s) IntraMuscular once  sodium chloride 1 Gram(s) Oral two times a day  sodium chloride 0.9%. 1000 milliLiter(s) IV Continuous <Continuous>      T(C): 36.7 (03-19-22 @ 12:45), Max: 36.8 (03-18-22 @ 21:25)  HR: 86 (03-19-22 @ 12:45) (86 - 90)  BP: 122/69 (03-19-22 @ 12:45) (110/60 - 122/69)  RR: 18 (03-19-22 @ 12:45) (18 - 18)  SpO2: 99% (03-19-22 @ 12:45) (98% - 99%)  Wt(kg): --Vital Signs Last 24 Hrs  T(C): 36.7 (19 Mar 2022 12:45), Max: 36.8 (18 Mar 2022 21:25)  T(F): 98 (19 Mar 2022 12:45), Max: 98.3 (18 Mar 2022 21:25)  HR: 86 (19 Mar 2022 12:45) (86 - 90)  BP: 122/69 (19 Mar 2022 12:45) (110/60 - 122/69)  BP(mean): --  RR: 18 (19 Mar 2022 12:45) (18 - 18)  SpO2: 99% (19 Mar 2022 12:45) (98% - 99%)    PHYSICAL EXAM:  GENERAL: NAD, well-groomed, well-developed  HEAD:  Atraumatic, Normocephalic  EYES: EOMI, PERRLA, conjunctiva and sclera clear  ENMT: Moist mucous membranes, Good dentition, No lesions  NECK: Supple, No JVD, Normal thyroid  NERVOUS SYSTEM:  Alert & Oriented X3 Good concentration; Motor Strength 4+/5 in all extremities; DTRs 2+ intact and symmetric. Pill rolling tremor noted  CHEST/LUNG: Clear to percussion bilaterally; No rales, rhonchi, wheezing, or rubs  HEART: Regular rate and rhythm; No murmurs, rubs, or gallops  ABDOMEN: Soft, Nontender, Nondistended; Bowel sounds present  EXTREMITIES:  2+ Peripheral Pulses, No clubbing, cyanosis. Ecchymosis on left forearm with associated edema. Left hand 5th phalanx ecchymosis, mild swelling and tender to palpation and ROM  SKIN: ecchymosis      Consultant(s) Notes Reviewed:  [x ] YES  [ ] NO  Care Discussed with Consultants/Other Providers [ x] YES  [ ] NO    LABS:                        11.3   11.35 )-----------( 325      ( 19 Mar 2022 07:35 )             34.5     03-19    127<L>  |  94<L>  |  19  ----------------------------<  133<H>  4.0   |  17<L>  |  0.58    Ca    8.6      19 Mar 2022 07:35  Phos  3.5     03-18  Mg     1.90     03-19          CAPILLARY BLOOD GLUCOSE                RADIOLOGY & ADDITIONAL TESTS:    Imaging Personally Reviewed:  [ ] YES  [ ] NO

## 2022-03-20 LAB
ANION GAP SERPL CALC-SCNC: 10 MMOL/L — SIGNIFICANT CHANGE UP (ref 7–14)
ANION GAP SERPL CALC-SCNC: 8 MMOL/L — SIGNIFICANT CHANGE UP (ref 7–14)
BUN SERPL-MCNC: 10 MG/DL — SIGNIFICANT CHANGE UP (ref 7–23)
BUN SERPL-MCNC: 14 MG/DL — SIGNIFICANT CHANGE UP (ref 7–23)
CALCIUM SERPL-MCNC: 8.6 MG/DL — SIGNIFICANT CHANGE UP (ref 8.4–10.5)
CALCIUM SERPL-MCNC: 8.6 MG/DL — SIGNIFICANT CHANGE UP (ref 8.4–10.5)
CHLORIDE SERPL-SCNC: 96 MMOL/L — LOW (ref 98–107)
CHLORIDE SERPL-SCNC: 98 MMOL/L — SIGNIFICANT CHANGE UP (ref 98–107)
CO2 SERPL-SCNC: 18 MMOL/L — LOW (ref 22–31)
CO2 SERPL-SCNC: 22 MMOL/L — SIGNIFICANT CHANGE UP (ref 22–31)
CREAT SERPL-MCNC: 0.47 MG/DL — LOW (ref 0.5–1.3)
CREAT SERPL-MCNC: 0.52 MG/DL — SIGNIFICANT CHANGE UP (ref 0.5–1.3)
EGFR: 94 ML/MIN/1.73M2 — SIGNIFICANT CHANGE UP
EGFR: 96 ML/MIN/1.73M2 — SIGNIFICANT CHANGE UP
GLUCOSE SERPL-MCNC: 118 MG/DL — HIGH (ref 70–99)
GLUCOSE SERPL-MCNC: 121 MG/DL — HIGH (ref 70–99)
HCT VFR BLD CALC: 31.6 % — LOW (ref 34.5–45)
HGB BLD-MCNC: 10.7 G/DL — LOW (ref 11.5–15.5)
MAGNESIUM SERPL-MCNC: 1.9 MG/DL — SIGNIFICANT CHANGE UP (ref 1.6–2.6)
MAGNESIUM SERPL-MCNC: 1.9 MG/DL — SIGNIFICANT CHANGE UP (ref 1.6–2.6)
MCHC RBC-ENTMCNC: 28.4 PG — SIGNIFICANT CHANGE UP (ref 27–34)
MCHC RBC-ENTMCNC: 33.9 GM/DL — SIGNIFICANT CHANGE UP (ref 32–36)
MCV RBC AUTO: 83.8 FL — SIGNIFICANT CHANGE UP (ref 80–100)
NRBC # BLD: 0 /100 WBCS — SIGNIFICANT CHANGE UP
NRBC # FLD: 0 K/UL — SIGNIFICANT CHANGE UP
OSMOLALITY UR: 668 MOSM/KG — SIGNIFICANT CHANGE UP (ref 50–1200)
PHOSPHATE SERPL-MCNC: 2.5 MG/DL — SIGNIFICANT CHANGE UP (ref 2.5–4.5)
PLATELET # BLD AUTO: 308 K/UL — SIGNIFICANT CHANGE UP (ref 150–400)
POTASSIUM SERPL-MCNC: 3.6 MMOL/L — SIGNIFICANT CHANGE UP (ref 3.5–5.3)
POTASSIUM SERPL-MCNC: 3.8 MMOL/L — SIGNIFICANT CHANGE UP (ref 3.5–5.3)
POTASSIUM SERPL-SCNC: 3.6 MMOL/L — SIGNIFICANT CHANGE UP (ref 3.5–5.3)
POTASSIUM SERPL-SCNC: 3.8 MMOL/L — SIGNIFICANT CHANGE UP (ref 3.5–5.3)
RBC # BLD: 3.77 M/UL — LOW (ref 3.8–5.2)
RBC # FLD: 13.2 % — SIGNIFICANT CHANGE UP (ref 10.3–14.5)
SODIUM SERPL-SCNC: 126 MMOL/L — LOW (ref 135–145)
SODIUM SERPL-SCNC: 126 MMOL/L — LOW (ref 135–145)
SODIUM UR-SCNC: 98 MMOL/L — SIGNIFICANT CHANGE UP
WBC # BLD: 9.91 K/UL — SIGNIFICANT CHANGE UP (ref 3.8–10.5)
WBC # FLD AUTO: 9.91 K/UL — SIGNIFICANT CHANGE UP (ref 3.8–10.5)

## 2022-03-20 PROCEDURE — 99233 SBSQ HOSP IP/OBS HIGH 50: CPT

## 2022-03-20 RX ORDER — SODIUM CHLORIDE 9 MG/ML
2 INJECTION INTRAMUSCULAR; INTRAVENOUS; SUBCUTANEOUS
Refills: 0 | Status: DISCONTINUED | OUTPATIENT
Start: 2022-03-20 | End: 2022-03-21

## 2022-03-20 RX ADMIN — SODIUM CHLORIDE 1 GRAM(S): 9 INJECTION INTRAMUSCULAR; INTRAVENOUS; SUBCUTANEOUS at 05:41

## 2022-03-20 RX ADMIN — ATORVASTATIN CALCIUM 80 MILLIGRAM(S): 80 TABLET, FILM COATED ORAL at 22:22

## 2022-03-20 RX ADMIN — SODIUM CHLORIDE 1 GRAM(S): 9 INJECTION INTRAMUSCULAR; INTRAVENOUS; SUBCUTANEOUS at 13:08

## 2022-03-20 RX ADMIN — ENOXAPARIN SODIUM 40 MILLIGRAM(S): 100 INJECTION SUBCUTANEOUS at 16:44

## 2022-03-20 RX ADMIN — SODIUM CHLORIDE 2 GRAM(S): 9 INJECTION INTRAMUSCULAR; INTRAVENOUS; SUBCUTANEOUS at 18:17

## 2022-03-20 RX ADMIN — Medication 81 MILLIGRAM(S): at 11:28

## 2022-03-20 NOTE — PROGRESS NOTE ADULT - PROBLEM SELECTOR PLAN 3
-urine lytes consistent with SIADH. Slight downtrend today despite starting on salt tabs  -repeat lytes  if sodium continues to downtrend, will consult renal  -c/w fluid restriction and c/w salt tabs for now  -Trend BMP BID

## 2022-03-20 NOTE — PROGRESS NOTE ADULT - SUBJECTIVE AND OBJECTIVE BOX
CARRILLO FIGUEROA  81y  Female      Patient is a 81y old  Female who presents with a chief complaint of syncope, stroke, hyponatremia (19 Mar 2022 14:17)      INTERVAL HPI/OVERNIGHT EVENTS: Pt feeling well. Tearful, requesting to go home. Denies chest pain, shortness of breath.         REVIEW OF SYSTEMS:  As per hpi    MEDICATIONS (STANDING):   acetaminophen     Tablet .. 650 milliGRAM(s) Oral every 6 hours PRN  aspirin enteric coated 81 milliGRAM(s) Oral daily  atorvastatin 80 milliGRAM(s) Oral at bedtime  enoxaparin Injectable 40 milliGRAM(s) SubCutaneous every 24 hours  influenza  Vaccine (HIGH DOSE) 0.7 milliLiter(s) IntraMuscular once  sodium chloride 1 Gram(s) Oral three times a day  sodium chloride 0.9%. 1000 milliLiter(s) IV Continuous <Continuous>      T(C): 36.7 (03-20-22 @ 05:30), Max: 36.7 (03-19-22 @ 12:45)  HR: 86 (03-20-22 @ 05:30) (86 - 86)  BP: 132/71 (03-20-22 @ 05:30) (122/69 - 132/71)  RR: 18 (03-20-22 @ 05:30) (18 - 18)  SpO2: 98% (03-20-22 @ 05:30) (98% - 99%)  Wt(kg): --Vital Signs Last 24 Hrs  T(C): 36.7 (20 Mar 2022 05:30), Max: 36.7 (19 Mar 2022 12:45)  T(F): 98.1 (20 Mar 2022 05:30), Max: 98.1 (20 Mar 2022 05:30)  HR: 86 (20 Mar 2022 05:30) (86 - 86)  BP: 132/71 (20 Mar 2022 05:30) (122/69 - 132/71)  BP(mean): --  RR: 18 (20 Mar 2022 05:30) (18 - 18)  SpO2: 98% (20 Mar 2022 05:30) (98% - 99%)    PHYSICAL EXAM:  GENERAL: NAD, well-groomed, well-developed  HEAD:  Atraumatic, Normocephalic  EYES: EOMI, PERRLA, conjunctiva and sclera clear  ENMT: Moist mucous membranes, Good dentition, No lesions  NECK: Supple, No JVD, Normal thyroid  NERVOUS SYSTEM:  Alert & Oriented X3 Good concentration; Motor Strength 4+/5 in all extremities; DTRs 2+ intact and symmetric. Pill rolling tremor noted  CHEST/LUNG: Clear to percussion bilaterally; No rales, rhonchi, wheezing, or rubs  HEART: Regular rate and rhythm; No murmurs, rubs, or gallops  ABDOMEN: Soft, Nontender, Nondistended; Bowel sounds present  EXTREMITIES:  2+ Peripheral Pulses, No clubbing, cyanosis. Ecchymosis on left forearm with associated edema. Left hand 5th phalanx ecchymosis, mild swelling and tender to palpation and ROM  SKIN: ecchymosis      Consultant(s) Notes Reviewed:  [x ] YES  [ ] NO  Care Discussed with Consultants/Other Providers [ x] YES  [ ] NO    LABS:                        10.7   9.91  )-----------( 308      ( 20 Mar 2022 07:05 )             31.6     03-20    126<L>  |  98  |  14  ----------------------------<  121<H>  3.6   |  18<L>  |  0.47<L>    Ca    8.6      20 Mar 2022 07:05  Phos  2.4     03-19  Mg     1.90     03-20          CAPILLARY BLOOD GLUCOSE                RADIOLOGY & ADDITIONAL TESTS:    Imaging Personally Reviewed:  [ ] YES  [ ] NO

## 2022-03-21 LAB
ANION GAP SERPL CALC-SCNC: 13 MMOL/L — SIGNIFICANT CHANGE UP (ref 7–14)
BUN SERPL-MCNC: 9 MG/DL — SIGNIFICANT CHANGE UP (ref 7–23)
CALCIUM SERPL-MCNC: 8.8 MG/DL — SIGNIFICANT CHANGE UP (ref 8.4–10.5)
CHLORIDE SERPL-SCNC: 95 MMOL/L — LOW (ref 98–107)
CO2 SERPL-SCNC: 19 MMOL/L — LOW (ref 22–31)
CREAT SERPL-MCNC: 0.44 MG/DL — LOW (ref 0.5–1.3)
EGFR: 98 ML/MIN/1.73M2 — SIGNIFICANT CHANGE UP
GLUCOSE SERPL-MCNC: 106 MG/DL — HIGH (ref 70–99)
MAGNESIUM SERPL-MCNC: 1.9 MG/DL — SIGNIFICANT CHANGE UP (ref 1.6–2.6)
POTASSIUM SERPL-MCNC: 3.5 MMOL/L — SIGNIFICANT CHANGE UP (ref 3.5–5.3)
POTASSIUM SERPL-SCNC: 3.5 MMOL/L — SIGNIFICANT CHANGE UP (ref 3.5–5.3)
SARS-COV-2 RNA SPEC QL NAA+PROBE: SIGNIFICANT CHANGE UP
SODIUM SERPL-SCNC: 127 MMOL/L — LOW (ref 135–145)

## 2022-03-21 PROCEDURE — 99223 1ST HOSP IP/OBS HIGH 75: CPT | Mod: GC

## 2022-03-21 PROCEDURE — 99233 SBSQ HOSP IP/OBS HIGH 50: CPT

## 2022-03-21 RX ORDER — SENNA PLUS 8.6 MG/1
2 TABLET ORAL AT BEDTIME
Refills: 0 | Status: DISCONTINUED | OUTPATIENT
Start: 2022-03-21 | End: 2022-03-23

## 2022-03-21 RX ORDER — SODIUM CHLORIDE 5 G/100ML
500 INJECTION, SOLUTION INTRAVENOUS
Refills: 0 | Status: DISCONTINUED | OUTPATIENT
Start: 2022-03-21 | End: 2022-03-21

## 2022-03-21 RX ORDER — POLYETHYLENE GLYCOL 3350 17 G/17G
17 POWDER, FOR SOLUTION ORAL DAILY
Refills: 0 | Status: DISCONTINUED | OUTPATIENT
Start: 2022-03-21 | End: 2022-03-23

## 2022-03-21 RX ORDER — SODIUM CHLORIDE 9 MG/ML
2 INJECTION INTRAMUSCULAR; INTRAVENOUS; SUBCUTANEOUS THREE TIMES A DAY
Refills: 0 | Status: DISCONTINUED | OUTPATIENT
Start: 2022-03-21 | End: 2022-03-23

## 2022-03-21 RX ADMIN — SODIUM CHLORIDE 2 GRAM(S): 9 INJECTION INTRAMUSCULAR; INTRAVENOUS; SUBCUTANEOUS at 22:45

## 2022-03-21 RX ADMIN — SODIUM CHLORIDE 2 GRAM(S): 9 INJECTION INTRAMUSCULAR; INTRAVENOUS; SUBCUTANEOUS at 17:27

## 2022-03-21 RX ADMIN — POLYETHYLENE GLYCOL 3350 17 GRAM(S): 17 POWDER, FOR SOLUTION ORAL at 12:09

## 2022-03-21 RX ADMIN — Medication 81 MILLIGRAM(S): at 12:10

## 2022-03-21 RX ADMIN — SENNA PLUS 2 TABLET(S): 8.6 TABLET ORAL at 22:45

## 2022-03-21 RX ADMIN — Medication 650 MILLIGRAM(S): at 12:19

## 2022-03-21 RX ADMIN — ENOXAPARIN SODIUM 40 MILLIGRAM(S): 100 INJECTION SUBCUTANEOUS at 17:27

## 2022-03-21 RX ADMIN — SODIUM CHLORIDE 2 GRAM(S): 9 INJECTION INTRAMUSCULAR; INTRAVENOUS; SUBCUTANEOUS at 05:42

## 2022-03-21 RX ADMIN — ATORVASTATIN CALCIUM 80 MILLIGRAM(S): 80 TABLET, FILM COATED ORAL at 22:45

## 2022-03-21 NOTE — PROGRESS NOTE ADULT - SUBJECTIVE AND OBJECTIVE BOX
Preston Gunter  Hospitalist  Pager- [phone]    Patient is a 81y old  Female who presents with a chief complaint of syncope, stroke, hyponatremia (19 Mar 2022 14:17)      INTERVAL HPI/OVERNIGHT EVENTS: Pt feeling well. Tearful, requesting to go home. Denies chest pain, shortness of breath.         REVIEW OF SYSTEMS:  As per hpi    MEDICATIONS  (STANDING):  aspirin enteric coated 81 milliGRAM(s) Oral daily  atorvastatin 80 milliGRAM(s) Oral at bedtime  enoxaparin Injectable 40 milliGRAM(s) SubCutaneous every 24 hours  influenza  Vaccine (HIGH DOSE) 0.7 milliLiter(s) IntraMuscular once  sodium chloride 2 Gram(s) Oral two times a day  sodium chloride 0.9%. 1000 milliLiter(s) (75 mL/Hr) IV Continuous <Continuous>    MEDICATIONS  (PRN):  acetaminophen     Tablet .. 650 milliGRAM(s) Oral every 6 hours PRN Mild Pain (1 - 3), Moderate Pain (4 - 6)    Vital Signs Last 24 Hrs  T(C): 36.8 (21 Mar 2022 05:35), Max: 36.8 (21 Mar 2022 05:35)  T(F): 98.3 (21 Mar 2022 05:35), Max: 98.3 (21 Mar 2022 05:35)  HR: 86 (21 Mar 2022 05:35) (74 - 93)  BP: 159/95 (21 Mar 2022 05:35) (144/79 - 162/86)  BP(mean): --  RR: 18 (21 Mar 2022 05:35) (18 - 19)  SpO2: 100% (21 Mar 2022 05:35) (100% - 100%)      PHYSICAL EXAM:  GENERAL: NAD, well-groomed, well-developed  HEAD:  Atraumatic, Normocephalic  EYES: EOMI, PERRLA, conjunctiva and sclera clear  ENMT: Moist mucous membranes, Good dentition, No lesions  NECK: Supple, No JVD, Normal thyroid  NERVOUS SYSTEM:  Alert & Oriented X3 Good concentration; Motor Strength 4+/5 in all extremities; DTRs 2+ intact and symmetric. Pill rolling tremor noted  CHEST/LUNG: Clear to percussion bilaterally; No rales, rhonchi, wheezing, or rubs  HEART: Regular rate and rhythm; No murmurs, rubs, or gallops  ABDOMEN: Soft, Nontender, Nondistended; Bowel sounds present  EXTREMITIES:  2+ Peripheral Pulses, No clubbing, cyanosis. Ecchymosis on left forearm with associated edema. Left hand 5th phalanx ecchymosis, mild swelling and tender to palpation and ROM  SKIN: ecchymosis      Consultant(s) Notes Reviewed:  [x ] YES  [ ] NO  Care Discussed with Consultants/Other Providers [ x] YES  [ ] NO    LABS:                                                10.7   9.91  )-----------( 308      ( 20 Mar 2022 07:05 )             31.6     03-21    127<L>  |  95<L>  |  9   ----------------------------<  106<H>  3.5   |  19<L>  |  0.44<L>    Ca    8.8      21 Mar 2022 06:50  Phos  2.5     03-20  Mg     1.90     03-21            CAPILLARY BLOOD GLUCOSE                RADIOLOGY & ADDITIONAL TESTS:    Imaging Personally Reviewed:  [ ] YES  [ ] NO Preston Gunter  Hospitalist  Pager- [phone]    Patient is a 81y old  Female who presents with a chief complaint of syncope, stroke, hyponatremia (19 Mar 2022 14:17)      INTERVAL HPI/OVERNIGHT EVENTS: Pt seen and examined by me  No new events  Denies chest pain, shortness of breath.   Constipation , no BM for 5 days       REVIEW OF SYSTEMS:  As per hpi    MEDICATIONS  (STANDING):  aspirin enteric coated 81 milliGRAM(s) Oral daily  atorvastatin 80 milliGRAM(s) Oral at bedtime  enoxaparin Injectable 40 milliGRAM(s) SubCutaneous every 24 hours  influenza  Vaccine (HIGH DOSE) 0.7 milliLiter(s) IntraMuscular once  sodium chloride 2 Gram(s) Oral two times a day  sodium chloride 0.9%. 1000 milliLiter(s) (75 mL/Hr) IV Continuous <Continuous>    MEDICATIONS  (PRN):  acetaminophen     Tablet .. 650 milliGRAM(s) Oral every 6 hours PRN Mild Pain (1 - 3), Moderate Pain (4 - 6)    Vital Signs Last 24 Hrs  T(C): 36.8 (21 Mar 2022 05:35), Max: 36.8 (21 Mar 2022 05:35)  T(F): 98.3 (21 Mar 2022 05:35), Max: 98.3 (21 Mar 2022 05:35)  HR: 86 (21 Mar 2022 05:35) (74 - 93)  BP: 159/95 (21 Mar 2022 05:35) (144/79 - 162/86)  BP(mean): --  RR: 18 (21 Mar 2022 05:35) (18 - 19)  SpO2: 100% (21 Mar 2022 05:35) (100% - 100%)      PHYSICAL EXAM:  GENERAL: NAD, well-groomed, well-developed  HEAD:  Atraumatic, Normocephalic  EYES: EOMI, PERRLA, conjunctiva and sclera clear  ENMT: Moist mucous membranes, Good dentition, No lesions  NECK: Supple, No JVD, Normal thyroid  NERVOUS SYSTEM:  Alert & Oriented X3 Good concentration; Motor Strength 4+/5 in all extremities, tremor noted  CHEST/LUNG: Clear to percussion bilaterally; No rales, rhonchi, wheezing, or rubs  HEART: Regular rate and rhythm; No murmurs, rubs, or gallops  ABDOMEN: Soft, Nontender, Nondistended; Bowel sounds present  EXTREMITIES:  2+ Peripheral Pulses, No clubbing, cyanosis. Ecchymosis on left forearm with associated edema. Left hand 5th phalanx ecchymosis, mild swelling and tender to palpation and ROM  SKIN: ecchymosis      Consultant(s) Notes Reviewed:  [x ] YES  [ ] NO  Care Discussed with Consultants/Other Providers [ x] YES  [ ] NO    LABS:                                                10.7   9.91  )-----------( 308      ( 20 Mar 2022 07:05 )             31.6     03-21    127<L>  |  95<L>  |  9   ----------------------------<  106<H>  3.5   |  19<L>  |  0.44<L>    Ca    8.8      21 Mar 2022 06:50  Phos  2.5     03-20  Mg     1.90     03-21            CAPILLARY BLOOD GLUCOSE                RADIOLOGY & ADDITIONAL TESTS:    Imaging Personally Reviewed:  [ ] YES  [ ] NO

## 2022-03-21 NOTE — PROGRESS NOTE ADULT - PROBLEM SELECTOR PLAN 3
Urine lytes consistent with SIADH.   On fluid restriction and salt tabs   Will consult renal  Trend BMP

## 2022-03-21 NOTE — CONSULT NOTE ADULT - SUBJECTIVE AND OBJECTIVE BOX
[facility] DIVISION OF KIDNEY DISEASES AND HYPERTENSION -- FOLLOW UP NOTE  --------------------------------------------------------------------------------  Pt. is an 81 y.o F w/ PMHx. of HLD. presents after syncopal episode at home which patient attributes to her weak right knee. Pt. states that she has some pain in her knee that is worse with ambulation and has been chronic. Denies any history of sodium problems. Pt. has been treated with Salt tabs and fluid restriction. Admitted with Na of 127, peaked at 130 and has now trended and stabilized at 127. UOsm: 668, Javi: 98. Pt. denies any nausea, vomiting, fogginess of mind or diarrhea.       PAST HISTORY  --------------------------------------------------------------------------------  No significant changes to PMH, PSH, FHx, SHx, unless otherwise noted    ALLERGIES & MEDICATIONS  --------------------------------------------------------------------------------  Allergies    No Known Allergies    Intolerances      Standing Inpatient Medications  aspirin enteric coated 81 milliGRAM(s) Oral daily  atorvastatin 80 milliGRAM(s) Oral at bedtime  enoxaparin Injectable 40 milliGRAM(s) SubCutaneous every 24 hours  influenza  Vaccine (HIGH DOSE) 0.7 milliLiter(s) IntraMuscular once  polyethylene glycol 3350 17 Gram(s) Oral daily  senna 2 Tablet(s) Oral at bedtime  sodium chloride 2 Gram(s) Oral three times a day  sodium chloride 0.9%. 1000 milliLiter(s) IV Continuous <Continuous>    PRN Inpatient Medications  acetaminophen     Tablet .. 650 milliGRAM(s) Oral every 6 hours PRN      REVIEW OF SYSTEMS  --------------------------------------------------------------------------------  Gen: No fevers/chills  Head/Eyes/Ears: Normal hearing,   Respiratory: No dyspnea, cough  CV: No chest pain  GI: No abdominal pain, diarrhea  : No dysuria, hematuria  MSK: No edema      All other systems were reviewed and are negative, except as noted.    VITALS/PHYSICAL EXAM  --------------------------------------------------------------------------------  T(C): 36.8 (03-21-22 @ 05:35), Max: 36.8 (03-21-22 @ 05:35)  HR: 86 (03-21-22 @ 05:35) (86 - 93)  BP: 159/95 (03-21-22 @ 05:35) (159/95 - 162/86)  RR: 18 (03-21-22 @ 05:35) (18 - 18)  SpO2: 100% (03-21-22 @ 05:35) (100% - 100%)  Wt(kg): --        03-20-22 @ 07:01  -  03-21-22 @ 07:00  --------------------------------------------------------  IN: 354 mL / OUT: 150 mL / NET: 204 mL    03-21-22 @ 07:01  -  03-21-22 @ 15:24  --------------------------------------------------------  IN: 100 mL / OUT: 0 mL / NET: 100 mL        Physical Exam:  	Gen: NAD  	HEENT: MMM  	Pulm: CTA B/L  	CV: S1S2  	Abd: Soft, +BS   	Ext: No LE edema B/L, R. knee bandaged, tender to motion, LUE bandaged.  	Neuro: Awake  	Skin: Warm and dry  	Vascular access: peripheral       LABS/STUDIES  --------------------------------------------------------------------------------              10.7   9.91  >-----------<  308      [03-20-22 @ 07:05]              31.6     127  |  95  |  9   ----------------------------<  106      [03-21-22 @ 06:50]  3.5   |  19  |  0.44        Ca     8.8     [03-21-22 @ 06:50]      Mg     1.90     [03-21-22 @ 06:50]      Phos  2.5     [03-20-22 @ 16:38]    Creatinine Trend:  SCr 0.44 [03-21 @ 06:50]  SCr 0.52 [03-20 @ 16:38]  SCr 0.47 [03-20 @ 07:05]  SCr 0.55 [03-19 @ 19:05]  SCr 0.58 [03-19 @ 07:35]    Urinalysis - [03-14-22 @ 09:26]      Color Colorless / Appearance Clear / SG 1.030 / pH 8.0      Gluc Negative / Ketone Negative  / Bili Negative / Urobili <2 mg/dL       Blood Negative / Protein Negative / Leuk Est Negative / Nitrite Negative      RBC  / WBC  / Hyaline  / Gran  / Sq Epi  / Non Sq Epi  / Bacteria     Urine Sodium 98      [03-20-22 @ 10:46]  Urine Osmolality 668      [03-20-22 @ 10:46]    Vitamin D (25OH) 30.0      [03-15-22 @ 11:54]  TSH 1.67      [03-14-22 @ 14:11]  Lipid: chol 225, , HDL 49, LDL --      [03-15-22 @ 08:18]

## 2022-03-21 NOTE — CONSULT NOTE ADULT - ATTENDING COMMENTS
Hyponatremia    Workup per fellows note above. Cont to monitor labs and volume status.
Found on the floor next to her bed.  She says she got up and then fell.  She denies any new focal neurological symptoms. She has chronic fluctuating numbness and pain in the left leg. She has been walking with a walker or cane for over 5 months.     Exam:  Significantly limited by pain: right shoulder, left elbow/forearm/wrist, left knee.   Mild bradykinesia.  Low frequency rest tremor in both hands.   Mild cogwheel rigidity in the right arm but difficult to test in left 2/2 pain.  Motor: 4+ --> 5/5 except where she is limited by pain - right deltoid, left arm.  Reflexes 2+ throughout.     A/P  Ms. Farmer is an 82 yo woman found on the floor.  Most likely diagnosis is that she has a multifactorial gait abnormality that caused a fall - age, white matter ischemic changes, no h/o Parkinson disease but has bradykinesia and low frequency rest tremor.   Episode not c/w stroke - no acute focal neurological signs or symptoms at this time - with her risk factors and an uncertain history, I agree with MRI brain.   Unclear if she had any LOC or not - this could have been an unwitnessed seizure but history is uncertain - I agree with EEG.     I agree with the work up and management as above.  I did not discuss the signs of possible Parkinson disease with her or the family at this time.  Would prefer further evaluation when more stable as outpatient.     Thank you

## 2022-03-21 NOTE — PROGRESS NOTE ADULT - PROBLEM SELECTOR PLAN 2
Acute CVA ruled out  NIH 4   MRI Head w/o con neg for CVA  TTE with normal LV and RV function, no evidence of patent foramen ovale.ELLEN wnl  c/w ASA 81mg and Statin (Lipitor 80mg)  TTE w/ bubble study reviewed, cannot r/o intracardiac shunt. ELLEN wnl  Dopplers of the carotids ordered given 71% stenosis noted on CT, may be a candidate for intervention as per neuro, outpt f/u.  PT/OT- rec rehab. Family refusing

## 2022-03-21 NOTE — CONSULT NOTE ADULT - PROBLEM SELECTOR RECOMMENDATION 9
Pt. admitted with Na of 127 which has been stable and trended to 127 today despite Salt tabs and fluid restriction. Javi 98, UOsm 668 consistent with SIADH likely mediated by pain in addition to decreased solute intake. Please provide with Ensure to increase protein intake. Please start Pt. on 2% @ 45 cc/hr for 4 hours. Please check BMP afterwards. Goal Na is 133. Avoid overcorrection  by more than 6-8meq per 24 hour period. Monitor UOP and call Nephrology is exceeds 200cc/ hour.     If you have any questions, please feel free to contact me  Nicholas Mar  Nephrology Fellow  [phone]; Prefer [employer] TEAMS  (After 5pm or on weekends please page the on-call fellow)

## 2022-03-21 NOTE — PROGRESS NOTE ADULT - PROBLEM SELECTOR PLAN 1
Unclear if LOC.  Concern that fall related to gait instability in the setting of age, white matter ischemic changes vs suspected parkinsons.  May have also been related to hyponatremia  MRI brain neg for acute CVA  ILR interrogated, no events.   Troponins 14--15  TTE with normal LV and RV function, no evidence of patent foramen ovale.ELLEN wnl  Routine EEG neg  Orthostatic blood pressures neg  Dopplers of the carotids ordered given 71% stenosis noted on CT, reviewed. B/l stenosis noted, outpt neuro f/u  Apprecitae Neuro recs- Given no acute findings on MRI brain and EEG, low concern of neurological cause for pt's symptoms/presentation.

## 2022-03-22 LAB
ANION GAP SERPL CALC-SCNC: 10 MMOL/L — SIGNIFICANT CHANGE UP (ref 7–14)
ANION GAP SERPL CALC-SCNC: 10 MMOL/L — SIGNIFICANT CHANGE UP (ref 7–14)
ANION GAP SERPL CALC-SCNC: 12 MMOL/L — SIGNIFICANT CHANGE UP (ref 7–14)
BUN SERPL-MCNC: 10 MG/DL — SIGNIFICANT CHANGE UP (ref 7–23)
BUN SERPL-MCNC: 11 MG/DL — SIGNIFICANT CHANGE UP (ref 7–23)
BUN SERPL-MCNC: 12 MG/DL — SIGNIFICANT CHANGE UP (ref 7–23)
CALCIUM SERPL-MCNC: 8.6 MG/DL — SIGNIFICANT CHANGE UP (ref 8.4–10.5)
CALCIUM SERPL-MCNC: 8.6 MG/DL — SIGNIFICANT CHANGE UP (ref 8.4–10.5)
CALCIUM SERPL-MCNC: 8.7 MG/DL — SIGNIFICANT CHANGE UP (ref 8.4–10.5)
CHLORIDE SERPL-SCNC: 102 MMOL/L — SIGNIFICANT CHANGE UP (ref 98–107)
CHLORIDE SERPL-SCNC: 97 MMOL/L — LOW (ref 98–107)
CHLORIDE SERPL-SCNC: 97 MMOL/L — LOW (ref 98–107)
CO2 SERPL-SCNC: 20 MMOL/L — LOW (ref 22–31)
CREAT SERPL-MCNC: 0.47 MG/DL — LOW (ref 0.5–1.3)
CREAT SERPL-MCNC: 0.48 MG/DL — LOW (ref 0.5–1.3)
CREAT SERPL-MCNC: 0.55 MG/DL — SIGNIFICANT CHANGE UP (ref 0.5–1.3)
EGFR: 93 ML/MIN/1.73M2 — SIGNIFICANT CHANGE UP
EGFR: 96 ML/MIN/1.73M2 — SIGNIFICANT CHANGE UP
EGFR: 96 ML/MIN/1.73M2 — SIGNIFICANT CHANGE UP
GLUCOSE SERPL-MCNC: 123 MG/DL — HIGH (ref 70–99)
GLUCOSE SERPL-MCNC: 124 MG/DL — HIGH (ref 70–99)
GLUCOSE SERPL-MCNC: 143 MG/DL — HIGH (ref 70–99)
HCT VFR BLD CALC: 34.8 % — SIGNIFICANT CHANGE UP (ref 34.5–45)
HGB BLD-MCNC: 11.8 G/DL — SIGNIFICANT CHANGE UP (ref 11.5–15.5)
MAGNESIUM SERPL-MCNC: 1.9 MG/DL — SIGNIFICANT CHANGE UP (ref 1.6–2.6)
MAGNESIUM SERPL-MCNC: 1.9 MG/DL — SIGNIFICANT CHANGE UP (ref 1.6–2.6)
MCHC RBC-ENTMCNC: 28.3 PG — SIGNIFICANT CHANGE UP (ref 27–34)
MCHC RBC-ENTMCNC: 33.9 GM/DL — SIGNIFICANT CHANGE UP (ref 32–36)
MCV RBC AUTO: 83.5 FL — SIGNIFICANT CHANGE UP (ref 80–100)
NRBC # BLD: 0 /100 WBCS — SIGNIFICANT CHANGE UP
NRBC # FLD: 0 K/UL — SIGNIFICANT CHANGE UP
PHOSPHATE SERPL-MCNC: 2.4 MG/DL — LOW (ref 2.5–4.5)
PHOSPHATE SERPL-MCNC: 2.4 MG/DL — LOW (ref 2.5–4.5)
PLATELET # BLD AUTO: 350 K/UL — SIGNIFICANT CHANGE UP (ref 150–400)
POTASSIUM SERPL-MCNC: 3.6 MMOL/L — SIGNIFICANT CHANGE UP (ref 3.5–5.3)
POTASSIUM SERPL-MCNC: 3.6 MMOL/L — SIGNIFICANT CHANGE UP (ref 3.5–5.3)
POTASSIUM SERPL-MCNC: 4.1 MMOL/L — SIGNIFICANT CHANGE UP (ref 3.5–5.3)
POTASSIUM SERPL-SCNC: 3.6 MMOL/L — SIGNIFICANT CHANGE UP (ref 3.5–5.3)
POTASSIUM SERPL-SCNC: 3.6 MMOL/L — SIGNIFICANT CHANGE UP (ref 3.5–5.3)
POTASSIUM SERPL-SCNC: 4.1 MMOL/L — SIGNIFICANT CHANGE UP (ref 3.5–5.3)
RBC # BLD: 4.17 M/UL — SIGNIFICANT CHANGE UP (ref 3.8–5.2)
RBC # FLD: 13 % — SIGNIFICANT CHANGE UP (ref 10.3–14.5)
SODIUM SERPL-SCNC: 127 MMOL/L — LOW (ref 135–145)
SODIUM SERPL-SCNC: 127 MMOL/L — LOW (ref 135–145)
SODIUM SERPL-SCNC: 134 MMOL/L — LOW (ref 135–145)
WBC # BLD: 10.24 K/UL — SIGNIFICANT CHANGE UP (ref 3.8–10.5)
WBC # FLD AUTO: 10.24 K/UL — SIGNIFICANT CHANGE UP (ref 3.8–10.5)

## 2022-03-22 PROCEDURE — 99232 SBSQ HOSP IP/OBS MODERATE 35: CPT

## 2022-03-22 PROCEDURE — 99233 SBSQ HOSP IP/OBS HIGH 50: CPT | Mod: GC

## 2022-03-22 RX ORDER — FUROSEMIDE 40 MG
20 TABLET ORAL ONCE
Refills: 0 | Status: COMPLETED | OUTPATIENT
Start: 2022-03-22 | End: 2022-03-22

## 2022-03-22 RX ADMIN — Medication 81 MILLIGRAM(S): at 11:26

## 2022-03-22 RX ADMIN — SODIUM CHLORIDE 2 GRAM(S): 9 INJECTION INTRAMUSCULAR; INTRAVENOUS; SUBCUTANEOUS at 12:48

## 2022-03-22 RX ADMIN — SODIUM CHLORIDE 2 GRAM(S): 9 INJECTION INTRAMUSCULAR; INTRAVENOUS; SUBCUTANEOUS at 06:27

## 2022-03-22 RX ADMIN — ATORVASTATIN CALCIUM 80 MILLIGRAM(S): 80 TABLET, FILM COATED ORAL at 21:19

## 2022-03-22 RX ADMIN — ENOXAPARIN SODIUM 40 MILLIGRAM(S): 100 INJECTION SUBCUTANEOUS at 17:01

## 2022-03-22 RX ADMIN — Medication 20 MILLIGRAM(S): at 12:48

## 2022-03-22 RX ADMIN — SENNA PLUS 2 TABLET(S): 8.6 TABLET ORAL at 21:17

## 2022-03-22 RX ADMIN — POLYETHYLENE GLYCOL 3350 17 GRAM(S): 17 POWDER, FOR SOLUTION ORAL at 11:23

## 2022-03-22 NOTE — PROGRESS NOTE ADULT - SUBJECTIVE AND OBJECTIVE BOX
Preston Gunter  Hospitalist  Pager- [phone]    Patient is a 81y old  Female who presents with a chief complaint of syncope, stroke, hyponatremia (19 Mar 2022 14:17)      INTERVAL HPI/OVERNIGHT EVENTS: Pt seen and examined by me        REVIEW OF SYSTEMS:  As per hpi    MEDICATIONS  (STANDING):  aspirin enteric coated 81 milliGRAM(s) Oral daily  atorvastatin 80 milliGRAM(s) Oral at bedtime  enoxaparin Injectable 40 milliGRAM(s) SubCutaneous every 24 hours  influenza  Vaccine (HIGH DOSE) 0.7 milliLiter(s) IntraMuscular once  polyethylene glycol 3350 17 Gram(s) Oral daily  senna 2 Tablet(s) Oral at bedtime  sodium chloride 2 Gram(s) Oral three times a day  Sodium CHloride 2% 500 milliLiter(s) 500 milliLiter(s) (45 mL/Hr) IV Continuous <Continuous>    MEDICATIONS  (PRN):  acetaminophen     Tablet .. 650 milliGRAM(s) Oral every 6 hours PRN Mild Pain (1 - 3), Moderate Pain (4 - 6)      Vital Signs Last 24 Hrs  T(C): 36.9 (21 Mar 2022 22:41), Max: 36.9 (21 Mar 2022 22:41)  T(F): 98.5 (21 Mar 2022 22:41), Max: 98.5 (21 Mar 2022 22:41)  HR: 79 (21 Mar 2022 22:41) (79 - 80)  BP: 147/74 (21 Mar 2022 22:41) (120/67 - 147/74)  BP(mean): --  RR: 18 (21 Mar 2022 22:41) (18 - 18)  SpO2: 99% (21 Mar 2022 22:41) (99% - 100%)    PHYSICAL EXAM:  GENERAL: NAD, well-groomed, well-developed  HEAD:  Atraumatic, Normocephalic  EYES: EOMI, PERRLA, conjunctiva and sclera clear  ENMT: Moist mucous membranes, Good dentition, No lesions  NECK: Supple, No JVD, Normal thyroid  NERVOUS SYSTEM:  Alert & Oriented X3 Good concentration; Motor Strength 4+/5 in all extremities, tremor noted  CHEST/LUNG: Clear to percussion bilaterally; No rales, rhonchi, wheezing, or rubs  HEART: Regular rate and rhythm; No murmurs, rubs, or gallops  ABDOMEN: Soft, Nontender, Nondistended; Bowel sounds present  EXTREMITIES:  2+ Peripheral Pulses, No clubbing, cyanosis. Ecchymosis on left forearm with associated edema. Left hand 5th phalanx ecchymosis, mild swelling and tender to palpation and ROM  SKIN: ecchymosis      Consultant(s) Notes Reviewed:  [x ] YES  [ ] NO  Care Discussed with Consultants/Other Providers [ x] YES  [ ] NO    LABS:                                            11.8   10.24 )-----------( 350      ( 22 Mar 2022 05:02 )             34.8     03-22    127<L>  |  97<L>  |  11  ----------------------------<  123<H>  3.6   |  20<L>  |  0.47<L>    Ca    8.7      22 Mar 2022 05:02  Phos  2.4     03-22  Mg     1.90     03-22          CAPILLARY BLOOD GLUCOSE                RADIOLOGY & ADDITIONAL TESTS:    Imaging Personally Reviewed:  [ ] YES  [ ] NO Preston Gunter  Hospitalist  Pager- [phone]    Patient is a 81y old  Female who presents with a chief complaint of syncope, stroke, hyponatremia (19 Mar 2022 14:17)      INTERVAL HPI/OVERNIGHT EVENTS: Pt seen and examined by me  Pt frustrated that she is in the hospital   Asking to go home as she is not able to walk or do much in the hospital   Had a BM last night     MEDICATIONS  (STANDING):  aspirin enteric coated 81 milliGRAM(s) Oral daily  atorvastatin 80 milliGRAM(s) Oral at bedtime  enoxaparin Injectable 40 milliGRAM(s) SubCutaneous every 24 hours  influenza  Vaccine (HIGH DOSE) 0.7 milliLiter(s) IntraMuscular once  polyethylene glycol 3350 17 Gram(s) Oral daily  senna 2 Tablet(s) Oral at bedtime  sodium chloride 2 Gram(s) Oral three times a day  Sodium CHloride 2% 500 milliLiter(s) 500 milliLiter(s) (45 mL/Hr) IV Continuous <Continuous>    MEDICATIONS  (PRN):  acetaminophen     Tablet .. 650 milliGRAM(s) Oral every 6 hours PRN Mild Pain (1 - 3), Moderate Pain (4 - 6)      Vital Signs Last 24 Hrs  T(C): 36.9 (21 Mar 2022 22:41), Max: 36.9 (21 Mar 2022 22:41)  T(F): 98.5 (21 Mar 2022 22:41), Max: 98.5 (21 Mar 2022 22:41)  HR: 79 (21 Mar 2022 22:41) (79 - 80)  BP: 147/74 (21 Mar 2022 22:41) (120/67 - 147/74)  BP(mean): --  RR: 18 (21 Mar 2022 22:41) (18 - 18)  SpO2: 99% (21 Mar 2022 22:41) (99% - 100%)    PHYSICAL EXAM:  GENERAL: NAD, well-groomed, well-developed  EYES: EOMI, PERRLA, conjunctiva and sclera clear  ENMT: Moist mucous membranes, Good dentition, No lesions  NECK: Supple, No JVD, Normal thyroid  NERVOUS SYSTEM:  Alert & Oriented X3 Good concentration; Motor Strength 4+/5 in all extremities, tremor noted  CHEST/LUNG: Clear to percussion bilaterally; No rales, rhonchi, wheezing, or rubs  HEART: Regular rate and rhythm; No murmurs, rubs, or gallops  ABDOMEN: Soft, Nontender, Nondistended; Bowel sounds present  EXTREMITIES:  2+ Peripheral Pulses, No clubbing, cyanosis. Ecchymosis on left forearm with associated edema. Left hand 5th phalanx ecchymosis, mild swelling and tender to palpation and ROM  SKIN: ecchymosis. LUE- dressing present   LABS:                                            11.8   10.24 )-----------( 350      ( 22 Mar 2022 05:02 )             34.8     03-22    127<L>  |  97<L>  |  11  ----------------------------<  123<H>  3.6   |  20<L>  |  0.47<L>    Ca    8.7      22 Mar 2022 05:02  Phos  2.4     03-22  Mg     1.90     03-22          CAPILLARY BLOOD GLUCOSE      RADIOLOGY & ADDITIONAL TESTS:    Imaging Personally Reviewed:  [ ] YES  [ ] NO        Consultant(s) Notes Reviewed:  [x ] YES  [ ] NO  Care Discussed with Consultants/Other Providers [ x] YES  [ ] NO -Renal

## 2022-03-22 NOTE — PROGRESS NOTE ADULT - PROBLEM SELECTOR PLAN 1
Unclear if LOC.  Concern that fall related to gait instability in the setting of age, white matter ischemic changes vs metabolic ( hyponatremia) vs suspected parkinsons.  MRI brain neg for acute CVA  ILR interrogated, no events.   Troponins 14--15  TTE with normal LV and RV function, no evidence of patent foramen ovale.ELLEN wnl  Routine EEG neg  Orthostatic blood pressures neg  Dopplers of the carotids ordered given 71% stenosis noted on CT, reviewed. B/l stenosis noted, outpt neuro f/u  Apprecitae Neuro recs- Given no acute findings on MRI brain and EEG, low concern of neurological cause for pt's symptoms/presentation. Javi 98, UOsm 668  consistent with SIADH.   On fluid restriction and salt tabs   Appreciate Renal recs- SIADH likely mediated by pain in addition to decreased solute intake.  Received hypertonic saline 2% @ 45 cc/hr for 4 hours on 3/21  No significant improvement in Na this AM   DW Renal fellow- Plan for repeat hypertonic saline 2% @ 45 cc/hr for 4 hours, lasix 20 mg IV x1  Trend BMP

## 2022-03-22 NOTE — PROGRESS NOTE ADULT - SUBJECTIVE AND OBJECTIVE BOX
[facility] DIVISION OF KIDNEY DISEASES AND HYPERTENSION -- FOLLOW UP NOTE  --------------------------------------------------------------------------------  HPI: Pt. is 81 y.o F w/ PMHx. of HLD. presents after syncopal episode at home which patient attributes to her weak right knee. Nephrology consulted for hyponatremia. Pt. denies any acute complaints today. Does not eat hospital food. Asking to go home. Na unchanged.         PAST HISTORY  --------------------------------------------------------------------------------  No significant changes to PMH, PSH, FHx, SHx, unless otherwise noted    ALLERGIES & MEDICATIONS  --------------------------------------------------------------------------------  Allergies    No Known Allergies    Intolerances      Standing Inpatient Medications  aspirin enteric coated 81 milliGRAM(s) Oral daily  atorvastatin 80 milliGRAM(s) Oral at bedtime  enoxaparin Injectable 40 milliGRAM(s) SubCutaneous every 24 hours  influenza  Vaccine (HIGH DOSE) 0.7 milliLiter(s) IntraMuscular once  polyethylene glycol 3350 17 Gram(s) Oral daily  senna 2 Tablet(s) Oral at bedtime  sodium chloride 2 Gram(s) Oral three times a day  sodium chloride 2% 500 milliLiter(s) 500 milliLiter(s) IV Continuous <Continuous>    PRN Inpatient Medications  acetaminophen     Tablet .. 650 milliGRAM(s) Oral every 6 hours PRN      REVIEW OF SYSTEMS  --------------------------------------------------------------------------------  Gen: No fevers/chills  Head/Eyes/Ears: Normal hearing,   Respiratory: No dyspnea, cough  CV: No chest pain  GI: No abdominal pain, diarrhea  : No dysuria, hematuria  MSK: No edema    All other systems were reviewed and are negative, except as noted.      VITALS/PHYSICAL EXAM  --------------------------------------------------------------------------------  T(C): 36.9 (03-22-22 @ 12:42), Max: 36.9 (03-21-22 @ 22:41)  HR: 73 (03-22-22 @ 12:42) (73 - 80)  BP: 135/66 (03-22-22 @ 12:42) (120/67 - 147/74)  RR: 17 (03-22-22 @ 12:42) (17 - 18)  SpO2: 99% (03-22-22 @ 12:42) (98% - 100%)  Wt(kg): --        03-21-22 @ 07:01  -  03-22-22 @ 07:00  --------------------------------------------------------  IN: 454 mL / OUT: 0 mL / NET: 454 mL    Physical Exam:  	Gen: NAD  	HEENT: MMM  	Pulm: CTA B/L  	CV: S1S2  	Abd: Soft, +BS   	Ext: No LE edema B/L, R. knee bandaged, tender to motion, LUE bandaged.  	Neuro: Awake  	Skin: Warm and dry  	Vascular access: peripheral     LABS/STUDIES  --------------------------------------------------------------------------------              11.8   10.24 >-----------<  350      [03-22-22 @ 05:02]              34.8     127  |  97  |  11  ----------------------------<  123      [03-22-22 @ 05:02]  3.6   |  20  |  0.47        Ca     8.7     [03-22-22 @ 05:02]      Mg     1.90     [03-22-22 @ 05:02]      Phos  2.4     [03-22-22 @ 05:02]    Creatinine Trend:  SCr 0.47 [03-22 @ 05:02]  SCr 0.44 [03-21 @ 06:50]  SCr 0.52 [03-20 @ 16:38]  SCr 0.47 [03-20 @ 07:05]  SCr 0.55 [03-19 @ 19:05]    Urinalysis - [03-14-22 @ 09:26]      Color Colorless / Appearance Clear / SG 1.030 / pH 8.0      Gluc Negative / Ketone Negative  / Bili Negative / Urobili <2 mg/dL       Blood Negative / Protein Negative / Leuk Est Negative / Nitrite Negative      RBC  / WBC  / Hyaline  / Gran  / Sq Epi  / Non Sq Epi  / Bacteria     Urine Sodium 98      [03-20-22 @ 10:46]  Urine Osmolality 668      [03-20-22 @ 10:46]    Vitamin D (25OH) 30.0      [03-15-22 @ 11:54]  TSH 1.67      [03-14-22 @ 14:11]  Lipid: chol 225, , HDL 49, LDL --      [03-15-22 @ 08:18]

## 2022-03-22 NOTE — PROGRESS NOTE ADULT - PROBLEM SELECTOR PLAN 3
Javi 98, UOsm 668  consistent with SIADH.   On fluid restriction and salt tabs   Appreciate Renal recs- SIADH likely mediated by pain in addition to decreased solute intake.  Received hypertonic saline 2% @ 45 cc/hr for 4 hours on 3/21  No significant improvement in Na this AM   FU renal recs  Trend BMP Acute CVA ruled out  NIH 4   MRI Head w/o con neg for CVA  TTE with normal LV and RV function, no evidence of patent foramen ovale.ELLEN wnl  c/w ASA 81mg and Statin (Lipitor 80mg)  TTE w/ bubble study reviewed, cannot r/o intracardiac shunt. ELLEN wnl  Dopplers of the carotids ordered given 71% stenosis noted on CT, may be a candidate for intervention as per neuro, outpt f/u.

## 2022-03-22 NOTE — PROGRESS NOTE ADULT - PROBLEM SELECTOR PLAN 6
Bilateral faint airspace opacities with a right upper lung nodular opacity. CT chest: Given peripheral reticular opacities and architectural distortion, likely related to pulmonary fibrosis. Will need to compare to prior CT chest. Pt satting well, denies SOB. Will need repeat low-dose noncontrast CT chest in 3 months and pulm outpt f/u    no fevers or leukocytosis, monitor off abx. Slight uptrend in WBC noted today, will trend Bilateral faint airspace opacities with a right upper lung nodular opacity. CT chest: Given peripheral reticular opacities and architectural distortion, likely related to pulmonary fibrosis. Will need to compare to prior CT chest. Pt satting well, denies SOB. Will need repeat low-dose noncontrast CT chest in 3 months and pulm outpt f/u  No fevers or leukocytosis, monitor off abx.

## 2022-03-22 NOTE — PROGRESS NOTE ADULT - ASSESSMENT
80 yo female PMHx Chronic Headache, Vertigo, s/p ILR (10/2021) p/w unwitnessed fall day of admission with altered mental status, admitted to tele for Syncope and Collapse, r/o ACS, Stroke and Hyponatremia.  82 yo female PMHx Chronic Headache, Vertigo, s/p ILR (10/2021) p/w unwitnessed fall day of admission with altered mental status, admitted to tele for Syncope and Collapse, r/o ACS, Stroke and Hyponatremia.

## 2022-03-22 NOTE — PROGRESS NOTE ADULT - PROBLEM SELECTOR PLAN 1
Pt. admitted with Na of 127 which has been stable and trended to 127 today despite Salt tabs and fluid restriction. Javi 98, UOsm 668 consistent with SIADH likely mediated by pain in addition to decreased solute intake. Please provide with Ensure to increase protein intake. Na remains 127 despite HTS. Please repeat 2% @ 45 cc/hr for 4 hours and give Lasix 20mg IV. Please check BMP afterwards. Goal Na is 133. Avoid overcorrection by more than 6-8meq per 24 hour period. Monitor UOP and call Nephrology is exceeds 200cc/ hour.     If you have any questions, please feel free to contact me  Nicholas Mar  Nephrology Fellow  [phone]; Prefer [employer] TEAMS  (After 5pm or on weekends please page the on-call fellow).

## 2022-03-22 NOTE — PROGRESS NOTE ADULT - PROBLEM SELECTOR PLAN 7
lovenox  Dispo- FU renal recs  updated pts daughter Farzana lovenox  Dispo- Pending improvement in serum sodium   PT/OT- rec rehab. Family refusing  updated pts daughter Farzana

## 2022-03-22 NOTE — PROGRESS NOTE ADULT - ATTENDING COMMENTS
Hyponatremia    Na still low today, cont on salt tabs and repeat HTS today.  Add lasix as per fellows note above.

## 2022-03-22 NOTE — PROGRESS NOTE ADULT - PROBLEM SELECTOR PLAN 2
Acute CVA ruled out  NIH 4   MRI Head w/o con neg for CVA  TTE with normal LV and RV function, no evidence of patent foramen ovale.ELLEN wnl  c/w ASA 81mg and Statin (Lipitor 80mg)  TTE w/ bubble study reviewed, cannot r/o intracardiac shunt. ELLEN wnl  Dopplers of the carotids ordered given 71% stenosis noted on CT, may be a candidate for intervention as per neuro, outpt f/u.  PT/OT- rec rehab. Family refusing Unclear if LOC.  Concern that fall related to gait instability in the setting of age, white matter ischemic changes vs metabolic   ( hyponatremia) vs suspected parkinson's.  MRI brain neg for acute CVA  ILR interrogated, no events.   Troponins 14--15  TTE with normal LV and RV function, no evidence of patent foramen ovale.ELLEN wnl  Routine EEG neg  Orthostatic blood pressures neg  Dopplers of the carotids ordered given 71% stenosis noted on CT, reviewed. B/l stenosis noted, outpt neuro f/u  Apprecitae Neuro recs- Given no acute findings on MRI brain and EEG, low concern of neurological cause for pt's symptoms/presentation.

## 2022-03-23 ENCOUNTER — TRANSCRIPTION ENCOUNTER (OUTPATIENT)
Age: 82
End: 2022-03-23

## 2022-03-23 VITALS
DIASTOLIC BLOOD PRESSURE: 88 MMHG | OXYGEN SATURATION: 100 % | HEART RATE: 80 BPM | TEMPERATURE: 99 F | SYSTOLIC BLOOD PRESSURE: 148 MMHG | RESPIRATION RATE: 18 BRPM

## 2022-03-23 LAB
ANION GAP SERPL CALC-SCNC: 12 MMOL/L — SIGNIFICANT CHANGE UP (ref 7–14)
BUN SERPL-MCNC: 14 MG/DL — SIGNIFICANT CHANGE UP (ref 7–23)
CALCIUM SERPL-MCNC: 9.1 MG/DL — SIGNIFICANT CHANGE UP (ref 8.4–10.5)
CHLORIDE SERPL-SCNC: 98 MMOL/L — SIGNIFICANT CHANGE UP (ref 98–107)
CO2 SERPL-SCNC: 20 MMOL/L — LOW (ref 22–31)
CREAT SERPL-MCNC: 0.51 MG/DL — SIGNIFICANT CHANGE UP (ref 0.5–1.3)
EGFR: 94 ML/MIN/1.73M2 — SIGNIFICANT CHANGE UP
GLUCOSE SERPL-MCNC: 113 MG/DL — HIGH (ref 70–99)
HCT VFR BLD CALC: 35.9 % — SIGNIFICANT CHANGE UP (ref 34.5–45)
HGB BLD-MCNC: 12.3 G/DL — SIGNIFICANT CHANGE UP (ref 11.5–15.5)
MAGNESIUM SERPL-MCNC: 1.8 MG/DL — SIGNIFICANT CHANGE UP (ref 1.6–2.6)
MCHC RBC-ENTMCNC: 28.6 PG — SIGNIFICANT CHANGE UP (ref 27–34)
MCHC RBC-ENTMCNC: 34.3 GM/DL — SIGNIFICANT CHANGE UP (ref 32–36)
MCV RBC AUTO: 83.5 FL — SIGNIFICANT CHANGE UP (ref 80–100)
NRBC # BLD: 0 /100 WBCS — SIGNIFICANT CHANGE UP
NRBC # FLD: 0 K/UL — SIGNIFICANT CHANGE UP
PHOSPHATE SERPL-MCNC: 2.8 MG/DL — SIGNIFICANT CHANGE UP (ref 2.5–4.5)
PLATELET # BLD AUTO: 388 K/UL — SIGNIFICANT CHANGE UP (ref 150–400)
POTASSIUM SERPL-MCNC: 3.8 MMOL/L — SIGNIFICANT CHANGE UP (ref 3.5–5.3)
POTASSIUM SERPL-SCNC: 3.8 MMOL/L — SIGNIFICANT CHANGE UP (ref 3.5–5.3)
RBC # BLD: 4.3 M/UL — SIGNIFICANT CHANGE UP (ref 3.8–5.2)
RBC # FLD: 13.1 % — SIGNIFICANT CHANGE UP (ref 10.3–14.5)
SODIUM SERPL-SCNC: 130 MMOL/L — LOW (ref 135–145)
WBC # BLD: 11.07 K/UL — HIGH (ref 3.8–10.5)
WBC # FLD AUTO: 11.07 K/UL — HIGH (ref 3.8–10.5)

## 2022-03-23 PROCEDURE — 99232 SBSQ HOSP IP/OBS MODERATE 35: CPT

## 2022-03-23 PROCEDURE — 99239 HOSP IP/OBS DSCHRG MGMT >30: CPT

## 2022-03-23 RX ORDER — SENNA PLUS 8.6 MG/1
2 TABLET ORAL
Qty: 0 | Refills: 0 | DISCHARGE
Start: 2022-03-23

## 2022-03-23 RX ORDER — ATORVASTATIN CALCIUM 80 MG/1
1 TABLET, FILM COATED ORAL
Qty: 30 | Refills: 0
Start: 2022-03-23 | End: 2022-04-21

## 2022-03-23 RX ORDER — ASPIRIN/CALCIUM CARB/MAGNESIUM 324 MG
1 TABLET ORAL
Qty: 30 | Refills: 0
Start: 2022-03-23 | End: 2022-04-21

## 2022-03-23 RX ORDER — SODIUM CHLORIDE 9 MG/ML
1 INJECTION INTRAMUSCULAR; INTRAVENOUS; SUBCUTANEOUS
Qty: 90 | Refills: 0
Start: 2022-03-23 | End: 2022-04-21

## 2022-03-23 RX ORDER — POLYETHYLENE GLYCOL 3350 17 G/17G
17 POWDER, FOR SOLUTION ORAL
Qty: 0 | Refills: 0 | DISCHARGE
Start: 2022-03-23

## 2022-03-23 RX ADMIN — SODIUM CHLORIDE 2 GRAM(S): 9 INJECTION INTRAMUSCULAR; INTRAVENOUS; SUBCUTANEOUS at 12:23

## 2022-03-23 RX ADMIN — Medication 81 MILLIGRAM(S): at 12:24

## 2022-03-23 RX ADMIN — POLYETHYLENE GLYCOL 3350 17 GRAM(S): 17 POWDER, FOR SOLUTION ORAL at 12:24

## 2022-03-23 NOTE — PROGRESS NOTE ADULT - SUBJECTIVE AND OBJECTIVE BOX
[facility] DIVISION OF KIDNEY DISEASES AND HYPERTENSION -- FOLLOW UP NOTE  --------------------------------------------------------------------------------  Chief Complaint: Hyponatremia    24 hour events/subjective:  Patient with no complaints this AM. Wants to go home.       PAST HISTORY  --------------------------------------------------------------------------------  No significant changes to PMH, PSH, FHx, SHx, unless otherwise noted    ALLERGIES & MEDICATIONS  --------------------------------------------------------------------------------  Allergies    No Known Allergies    Intolerances      Standing Inpatient Medications  aspirin enteric coated 81 milliGRAM(s) Oral daily  atorvastatin 80 milliGRAM(s) Oral at bedtime  enoxaparin Injectable 40 milliGRAM(s) SubCutaneous every 24 hours  influenza  Vaccine (HIGH DOSE) 0.7 milliLiter(s) IntraMuscular once  polyethylene glycol 3350 17 Gram(s) Oral daily  senna 2 Tablet(s) Oral at bedtime  sodium chloride 2 Gram(s) Oral three times a day    PRN Inpatient Medications  acetaminophen     Tablet .. 650 milliGRAM(s) Oral every 6 hours PRN      REVIEW OF SYSTEMS  --------------------------------------------------------------------------------  Gen: No fevers/chills  Head/Eyes/Ears: Normal hearing,   Respiratory: No dyspnea, cough  CV: No chest pain  GI: No abdominal pain, diarrhea  : No dysuria, hematuria  MSK: No edema    All other systems were reviewed and are negative, except as noted.    VITALS/PHYSICAL EXAM  --------------------------------------------------------------------------------  T(C): 36.3 (03-23-22 @ 05:30), Max: 36.8 (03-22-22 @ 21:15)  HR: 98 (03-23-22 @ 05:30) (97 - 98)  BP: 155/90 (03-23-22 @ 05:30) (128/69 - 155/90)  RR: 18 (03-23-22 @ 05:30) (18 - 18)  SpO2: 100% (03-23-22 @ 05:30) (99% - 100%)  Wt(kg): --        Physical Exam:  	Gen: NAD  	HEENT: MMM  	Pulm: CTA B/L  	CV: S1S2  	Abd: Soft, +BS   	Ext: No LE edema B/L, R. knee bandaged, tender to motion, LUE bandaged.  	Neuro: Awake  	Skin: Warm and dry  	Vascular access: peripheral     LABS/STUDIES  --------------------------------------------------------------------------------              12.3   11.07 >-----------<  388      [03-23-22 @ 06:43]              35.9     130  |  98  |  14  ----------------------------<  113      [03-23-22 @ 06:43]  3.8   |  20  |  0.51        Ca     9.1     [03-23-22 @ 06:43]      Mg     1.80     [03-23-22 @ 06:43]      Phos  2.8     [03-23-22 @ 06:43]            Creatinine Trend:  SCr 0.51 [03-23 @ 06:43]  SCr 0.55 [03-22 @ 18:45]  SCr 0.47 [03-22 @ 05:02]  SCr 0.44 [03-21 @ 06:50]  SCr 0.52 [03-20 @ 16:38]    Urinalysis - [03-14-22 @ 09:26]      Color Colorless / Appearance Clear / SG 1.030 / pH 8.0      Gluc Negative / Ketone Negative  / Bili Negative / Urobili <2 mg/dL       Blood Negative / Protein Negative / Leuk Est Negative / Nitrite Negative      RBC  / WBC  / Hyaline  / Gran  / Sq Epi  / Non Sq Epi  / Bacteria     Urine Sodium 98      [03-20-22 @ 10:46]  Urine Osmolality 668      [03-20-22 @ 10:46]    Vitamin D (25OH) 30.0      [03-15-22 @ 11:54]  TSH 1.67      [03-14-22 @ 14:11]  Lipid: chol 225, , HDL 49, LDL --      [03-15-22 @ 08:18]

## 2022-03-23 NOTE — PROGRESS NOTE ADULT - PROBLEM SELECTOR PROBLEM 5
Dislocation of elbow

## 2022-03-23 NOTE — PROGRESS NOTE ADULT - PROVIDER SPECIALTY LIST ADULT
Hospitalist
Nephrology
Nephrology
Hospitalist

## 2022-03-23 NOTE — PROGRESS NOTE ADULT - PROBLEM SELECTOR PROBLEM 6
Lung nodule

## 2022-03-23 NOTE — PROGRESS NOTE ADULT - CONVERSATION DETAILS
Attempted to have GOC with pt- she deferred to her daughter Farzana  Spoke to Farzana- As per Farzana, she had a conversation regarding advance directives in the past  with the patient but doesn't remember the details  Encouraged Farzana to have this conversation again with pt, Farzana to speak with pt when she comes home after  dc

## 2022-03-23 NOTE — PROGRESS NOTE ADULT - SUBJECTIVE AND OBJECTIVE BOX
Preston Gunter  Hospitalist  Pager- [phone]    Patient is a 81y old  Female who presents with a chief complaint of syncope, stroke, hyponatremia (19 Mar 2022 14:17)      INTERVAL HPI/OVERNIGHT EVENTS: Pt seen and examined by me  Asking to go home.  " I feel stuck in this place "  Denies CP/SOB     MEDICATIONS  (STANDING):  aspirin enteric coated 81 milliGRAM(s) Oral daily  atorvastatin 80 milliGRAM(s) Oral at bedtime  enoxaparin Injectable 40 milliGRAM(s) SubCutaneous every 24 hours  influenza  Vaccine (HIGH DOSE) 0.7 milliLiter(s) IntraMuscular once  polyethylene glycol 3350 17 Gram(s) Oral daily  senna 2 Tablet(s) Oral at bedtime  sodium chloride 2 Gram(s) Oral three times a day    MEDICATIONS  (PRN):  acetaminophen     Tablet .. 650 milliGRAM(s) Oral every 6 hours PRN Mild Pain (1 - 3), Moderate Pain (4 - 6)        Vital Signs Last 24 Hrs  T(C): 36.3 (23 Mar 2022 05:30), Max: 36.9 (22 Mar 2022 12:42)  T(F): 97.4 (23 Mar 2022 05:30), Max: 98.5 (22 Mar 2022 12:42)  HR: 98 (23 Mar 2022 05:30) (73 - 98)  BP: 155/90 (23 Mar 2022 05:30) (128/69 - 155/90)  BP(mean): --  RR: 18 (23 Mar 2022 05:30) (17 - 18)  SpO2: 100% (23 Mar 2022 05:30) (99% - 100%)        PHYSICAL EXAM:  GENERAL: NAD, well-groomed, well-developed  EYES: EOMI, PERRLA, conjunctiva and sclera clear  ENMT: Moist mucous membranes, Good dentition, No lesions  NECK: Supple, No JVD, Normal thyroid  NERVOUS SYSTEM:  Alert & Oriented X3 Good concentration; Motor Strength 4+/5 in all extremities, tremor noted  CHEST/LUNG: Clear to percussion bilaterally; No rales, rhonchi, wheezing, or rubs  HEART: Regular rate and rhythm; No murmurs, rubs, or gallops  ABDOMEN: Soft, Nontender, Nondistended; Bowel sounds present  EXTREMITIES:  2+ Peripheral Pulses, No clubbing, cyanosis. Ecchymosis on left forearm with associated edema. Left hand 5th phalanx ecchymosis, mild swelling and tender to palpation and ROM  SKIN: ecchymosis. LUE- dressing present       LABS:                              12.3   11.07 )-----------( 388      ( 23 Mar 2022 06:43 )             35.9         03-23    130<L>  |  98  |  14  ----------------------------<  113<H>  3.8   |  20<L>  |  0.51    Ca    9.1      23 Mar 2022 06:43  Phos  2.8     03-23  Mg     1.80     03-23            CAPILLARY BLOOD GLUCOSE      RADIOLOGY & ADDITIONAL TESTS:    Imaging Personally Reviewed:  [ ] YES  [ ] NO        Consultant(s) Notes Reviewed:  [x ] YES  [ ] NO  Care Discussed with Consultants/Other Providers [ x] YES  [ ] NO -Renal

## 2022-03-23 NOTE — PROGRESS NOTE ADULT - PROBLEM SELECTOR PLAN 7
lovenox  Dispo- Pending improvement in serum sodium   PT/OT- rec rehab. Family refusing  updated pts daughter Farzana 3/22 and 3/21 lovenox  Dispo- Pending improvement in serum sodium   PT/OT- rec rehab. Family refusing  updated pts daughter Farzana 3/21,3/22 and 3/23  Updated plan, meds, needs for FU appointment  Farzana to make outpt appointment with her PMD at [facility]  to monitor labs   DC planning 33min

## 2022-03-23 NOTE — PROGRESS NOTE ADULT - PROBLEM SELECTOR PLAN 2
Unclear if LOC.- Clinically improving   Concern that fall related to gait instability in the setting of age, white matter ischemic changes vs metabolic   ( hyponatremia) vs suspected parkinson's.  MRI brain neg for acute CVA  ILR interrogated, no events.   Troponins 14--15  TTE with normal LV and RV function, no evidence of patent foramen ovale.ELLEN wnl  Routine EEG neg  Orthostatic blood pressures neg  Dopplers of the carotids ordered given 71% stenosis noted on CT, reviewed. B/l stenosis noted, outpt neuro f/u  Apprecitae Neuro recs- Given no acute findings on MRI brain and EEG, low concern of neurological cause for pt's symptoms/presentation.

## 2022-03-23 NOTE — PROGRESS NOTE ADULT - PROBLEM SELECTOR PLAN 4
Applied splint to Left 5th digit  -Tylenol PRN pain
Applied splint to Left 5th digit  Tylenol PRN pain
Applied splint to Left 5th digit  Tylenol PRN pain
Applied splint to Left 5th digit  -Tylenol PRN pain
Applied splint to Left 5th digit  -Tylenol PRN pain
Applied splint to Left 5th digit  Tylenol PRN pain
Applied splint to Left 5th digit  -Tylenol PRN pain

## 2022-03-23 NOTE — DISCHARGE NOTE NURSING/CASE MANAGEMENT/SOCIAL WORK - PATIENT PORTAL LINK FT
You can access the FollowMyHealth Patient Portal offered by [facility] by registering at the following website: [email or web address]. By joining [facility]’s FollowMyHealth portal, you will also be able to view your health information using other applications (apps) compatible with our system.

## 2022-03-23 NOTE — PROGRESS NOTE ADULT - REASON FOR ADMISSION
syncope, stroke, hyponatremia

## 2022-03-23 NOTE — PROGRESS NOTE ADULT - PROBLEM SELECTOR PLAN 6
Bilateral faint airspace opacities with a right upper lung nodular opacity. CT chest: Given peripheral reticular opacities and architectural distortion, likely related to pulmonary fibrosis. Will need to compare to prior CT chest. Pt satting well, denies SOB. Will need repeat low-dose noncontrast CT chest in 3 months and pulm outpt f/u  No fevers or leukocytosis, monitor off abx.

## 2022-03-23 NOTE — PROGRESS NOTE ADULT - PROBLEM SELECTOR PROBLEM 4
Fracture of phalanx of finger

## 2022-03-23 NOTE — DISCHARGE NOTE NURSING/CASE MANAGEMENT/SOCIAL WORK - NSDCPEFALRISK_GEN_ALL_CORE
For information on Fall & Injury Prevention, visit: [email or web address] OR  [email or web address] OR  https://www.cdc.gov/steadi/patient.html

## 2022-03-23 NOTE — PROGRESS NOTE ADULT - PROBLEM SELECTOR PLAN 1
Javi 98, UOsm 668  consistent with SIADH.   On fluid restriction and salt tabs   Appreciate Renal recs- SIADH likely mediated by pain in addition to decreased solute intake.  Received hypertonic saline 2% @ 45 cc/hr for 4 hours on 3/21 and 3/22 with Lasix 20 mg IV x1  Na improving   DW Renal fellow- Plan for repeat hypertonic saline 2% @ 45 cc/hr for 4 hours,   Trend BMP

## 2022-03-23 NOTE — PROGRESS NOTE ADULT - PROBLEM SELECTOR PLAN 3
Acute CVA ruled out  NIH 4   MRI Head w/o con neg for CVA  TTE with normal LV and RV function, no evidence of patent foramen ovale.ELLEN wnl  c/w ASA 81mg and Statin (Lipitor 80mg)  TTE w/ bubble study reviewed, cannot r/o intracardiac shunt. ELLEN wnl  Dopplers of the carotids ordered given 71% stenosis noted on CT, may be a candidate for intervention as per neuro, outpt f/u. Acute CVA ruled out  NIH 4   MRI Head w/o con neg for CVA  TTE with normal LV and RV function, no evidence of patent foramen ovale.ELLEN wnl  c/w ASA 81mg and Statin (Lipitor 80mg)  TTE w/ bubble study reviewed, cannot r/o intracardiac shunt. ELLEN wnl  Dopplers of the carotids ordered given 71% stenosis noted on CT, may be a candidate for intervention as per neuro, outpt f/u.  Discussed with Neuro team- Advise to continue ASA/Lipitor  on Dc given Carotid artery stenosis

## 2022-03-23 NOTE — PROGRESS NOTE ADULT - PROBLEM SELECTOR PLAN 1
Pt. admitted with Na of 130 which is better with salt tabs and fluid restriction. Please provide with Ensure to increase protein intake. Cont salt tabs and monitor as needed.     If you have any questions, please feel free to contact me  Nicholas Mar  Nephrology Fellow  [phone]; Prefer [employer] TEAMS  (After 5pm or on weekends please page the on-call fellow).

## 2022-05-25 ENCOUNTER — APPOINTMENT (OUTPATIENT)
Dept: ELECTROPHYSIOLOGY | Facility: CLINIC | Age: 82
End: 2022-05-25
Payer: MEDICARE

## 2022-05-25 ENCOUNTER — NON-APPOINTMENT (OUTPATIENT)
Age: 82
End: 2022-05-25

## 2022-05-25 PROCEDURE — 93298 REM INTERROG DEV EVAL SCRMS: CPT

## 2022-05-25 PROCEDURE — G2066: CPT

## 2022-06-25 ENCOUNTER — NON-APPOINTMENT (OUTPATIENT)
Age: 82
End: 2022-06-25

## 2022-06-27 ENCOUNTER — APPOINTMENT (OUTPATIENT)
Dept: ELECTROPHYSIOLOGY | Facility: CLINIC | Age: 82
End: 2022-06-27

## 2022-06-27 PROCEDURE — G2066: CPT

## 2022-06-27 PROCEDURE — 93298 REM INTERROG DEV EVAL SCRMS: CPT

## 2022-09-09 ENCOUNTER — APPOINTMENT (OUTPATIENT)
Dept: ORTHOPEDIC SURGERY | Facility: CLINIC | Age: 82
End: 2022-09-09

## 2022-09-25 ENCOUNTER — FORM ENCOUNTER (OUTPATIENT)
Age: 82
End: 2022-09-25

## 2022-09-27 ENCOUNTER — NON-APPOINTMENT (OUTPATIENT)
Age: 82
End: 2022-09-27

## 2022-09-27 ENCOUNTER — APPOINTMENT (OUTPATIENT)
Dept: ELECTROPHYSIOLOGY | Facility: CLINIC | Age: 82
End: 2022-09-27

## 2022-09-27 PROCEDURE — 93298 REM INTERROG DEV EVAL SCRMS: CPT

## 2022-09-27 PROCEDURE — G2066: CPT

## 2022-10-12 ENCOUNTER — APPOINTMENT (OUTPATIENT)
Dept: ELECTROPHYSIOLOGY | Facility: CLINIC | Age: 82
End: 2022-10-12

## 2022-10-27 ENCOUNTER — NON-APPOINTMENT (OUTPATIENT)
Age: 82
End: 2022-10-27

## 2022-10-27 ENCOUNTER — APPOINTMENT (OUTPATIENT)
Dept: ELECTROPHYSIOLOGY | Facility: CLINIC | Age: 82
End: 2022-10-27

## 2022-10-27 PROCEDURE — 93298 REM INTERROG DEV EVAL SCRMS: CPT

## 2022-10-27 PROCEDURE — G2066: CPT

## 2022-11-28 ENCOUNTER — APPOINTMENT (OUTPATIENT)
Dept: ELECTROPHYSIOLOGY | Facility: CLINIC | Age: 82
End: 2022-11-28

## 2022-11-28 ENCOUNTER — NON-APPOINTMENT (OUTPATIENT)
Age: 82
End: 2022-11-28

## 2022-11-28 PROCEDURE — 93298 REM INTERROG DEV EVAL SCRMS: CPT

## 2022-11-28 PROCEDURE — G2066: CPT

## 2022-12-28 ENCOUNTER — APPOINTMENT (OUTPATIENT)
Dept: ELECTROPHYSIOLOGY | Facility: CLINIC | Age: 82
End: 2022-12-28
Payer: MEDICARE

## 2022-12-28 ENCOUNTER — NON-APPOINTMENT (OUTPATIENT)
Age: 82
End: 2022-12-28

## 2022-12-28 PROCEDURE — G2066: CPT

## 2022-12-28 PROCEDURE — 93298 REM INTERROG DEV EVAL SCRMS: CPT

## 2023-01-01 NOTE — PROGRESS NOTE ADULT - PROBLEM SELECTOR PLAN 5
Re-demonstrated elbow dislocation with displaced radial head fracture component. Suspect chronic process but unable to entirely exclude acute process. Appreciate Ortho recs, no acute interventions warranted   c/w pain control
-Re-demonstrated elbow dislocation with displaced radial head fracture component. Suspect chronic process but unable to entirely exclude acute process. Appreciate Ortho recs, no acute interventions warranted   -c/w pain control
-Redemonstrated elbow dislocation with displaced radial head fracture component. Suspect chronic process but unable to entirely exclude acute process. F/u Ortho recs   -c/w pain control
-Re-demonstrated elbow dislocation with displaced radial head fracture component. Suspect chronic process but unable to entirely exclude acute process. F/u Ortho recs   -c/w pain control
Re-demonstrated elbow dislocation with displaced radial head fracture component. Suspect chronic process but unable to entirely exclude acute process. Appreciate Ortho recs, no acute interventions warranted   c/w pain control  Fu As outpt
-Re-demonstrated elbow dislocation with displaced radial head fracture component. Suspect chronic process but unable to entirely exclude acute process. Appreciate Ortho recs, no acute interventions warranted   -c/w pain control
Re-demonstrated elbow dislocation with displaced radial head fracture component. Suspect chronic process but unable to entirely exclude acute process. Appreciate Ortho recs, no acute interventions warranted   c/w pain control
History/Exam/Medical decision making

## 2023-01-30 ENCOUNTER — NON-APPOINTMENT (OUTPATIENT)
Age: 83
End: 2023-01-30

## 2023-01-30 ENCOUNTER — APPOINTMENT (OUTPATIENT)
Dept: ELECTROPHYSIOLOGY | Facility: CLINIC | Age: 83
End: 2023-01-30
Payer: MEDICARE

## 2023-01-30 PROCEDURE — 93298 REM INTERROG DEV EVAL SCRMS: CPT

## 2023-01-30 PROCEDURE — G2066: CPT

## 2023-02-22 NOTE — H&P ADULT - MLM HIDDEN
Carac Pregnancy And Lactation Text: This medication is Pregnancy Category X and contraindicated in pregnancy and in women who may become pregnant. It is unknown if this medication is excreted in breast milk. yes

## 2023-03-06 ENCOUNTER — FORM ENCOUNTER (OUTPATIENT)
Age: 83
End: 2023-03-06

## 2023-03-06 ENCOUNTER — APPOINTMENT (OUTPATIENT)
Dept: ELECTROPHYSIOLOGY | Facility: CLINIC | Age: 83
End: 2023-03-06

## 2023-04-26 ENCOUNTER — NON-APPOINTMENT (OUTPATIENT)
Age: 83
End: 2023-04-26

## 2023-04-26 ENCOUNTER — APPOINTMENT (OUTPATIENT)
Dept: ELECTROPHYSIOLOGY | Facility: CLINIC | Age: 83
End: 2023-04-26
Payer: MEDICARE

## 2023-04-26 PROCEDURE — 93298 REM INTERROG DEV EVAL SCRMS: CPT

## 2023-04-26 PROCEDURE — G2066: CPT

## 2023-06-01 ENCOUNTER — FORM ENCOUNTER (OUTPATIENT)
Age: 83
End: 2023-06-01

## 2023-06-01 ENCOUNTER — APPOINTMENT (OUTPATIENT)
Dept: ELECTROPHYSIOLOGY | Facility: CLINIC | Age: 83
End: 2023-06-01

## 2023-07-05 ENCOUNTER — FORM ENCOUNTER (OUTPATIENT)
Age: 83
End: 2023-07-05

## 2023-07-06 ENCOUNTER — APPOINTMENT (OUTPATIENT)
Dept: ELECTROPHYSIOLOGY | Facility: CLINIC | Age: 83
End: 2023-07-06

## 2023-07-21 ENCOUNTER — APPOINTMENT (OUTPATIENT)
Dept: ELECTROPHYSIOLOGY | Facility: CLINIC | Age: 83
End: 2023-07-21
Payer: MEDICARE

## 2023-07-21 ENCOUNTER — NON-APPOINTMENT (OUTPATIENT)
Age: 83
End: 2023-07-21

## 2023-07-21 PROCEDURE — 93298 REM INTERROG DEV EVAL SCRMS: CPT

## 2023-07-21 PROCEDURE — G2066: CPT

## 2023-08-30 ENCOUNTER — APPOINTMENT (OUTPATIENT)
Dept: ELECTROPHYSIOLOGY | Facility: CLINIC | Age: 83
End: 2023-08-30

## 2023-09-27 ENCOUNTER — APPOINTMENT (OUTPATIENT)
Dept: ELECTROPHYSIOLOGY | Facility: CLINIC | Age: 83
End: 2023-09-27

## 2023-10-03 ENCOUNTER — APPOINTMENT (OUTPATIENT)
Dept: ELECTROPHYSIOLOGY | Facility: CLINIC | Age: 83
End: 2023-10-03
Payer: MEDICARE

## 2023-10-03 ENCOUNTER — NON-APPOINTMENT (OUTPATIENT)
Age: 83
End: 2023-10-03

## 2023-10-03 PROCEDURE — G2066: CPT

## 2023-10-03 PROCEDURE — 93298 REM INTERROG DEV EVAL SCRMS: CPT

## 2023-11-17 ENCOUNTER — APPOINTMENT (OUTPATIENT)
Dept: ELECTROPHYSIOLOGY | Facility: CLINIC | Age: 83
End: 2023-11-17
Payer: MEDICARE

## 2023-11-17 ENCOUNTER — NON-APPOINTMENT (OUTPATIENT)
Age: 83
End: 2023-11-17

## 2023-11-17 PROCEDURE — G2066: CPT

## 2023-11-17 PROCEDURE — 93298 REM INTERROG DEV EVAL SCRMS: CPT | Mod: NC

## 2023-12-21 ENCOUNTER — APPOINTMENT (OUTPATIENT)
Dept: ELECTROPHYSIOLOGY | Facility: CLINIC | Age: 83
End: 2023-12-21

## 2023-12-26 ENCOUNTER — INPATIENT (INPATIENT)
Facility: HOSPITAL | Age: 83
LOS: 13 days | Discharge: HOME | End: 2024-01-09
Attending: STUDENT IN AN ORGANIZED HEALTH CARE EDUCATION/TRAINING PROGRAM | Admitting: STUDENT IN AN ORGANIZED HEALTH CARE EDUCATION/TRAINING PROGRAM
Payer: MEDICARE

## 2023-12-26 VITALS
OXYGEN SATURATION: 99 % | DIASTOLIC BLOOD PRESSURE: 89 MMHG | HEART RATE: 78 BPM | RESPIRATION RATE: 16 BRPM | TEMPERATURE: 99 F | SYSTOLIC BLOOD PRESSURE: 176 MMHG

## 2023-12-26 DIAGNOSIS — Z98.890 OTHER SPECIFIED POSTPROCEDURAL STATES: Chronic | ICD-10-CM

## 2023-12-26 DIAGNOSIS — Z87.81 PERSONAL HISTORY OF (HEALED) TRAUMATIC FRACTURE: Chronic | ICD-10-CM

## 2023-12-26 DIAGNOSIS — I10 ESSENTIAL (PRIMARY) HYPERTENSION: ICD-10-CM

## 2023-12-26 DIAGNOSIS — R53.1 WEAKNESS: ICD-10-CM

## 2023-12-26 DIAGNOSIS — E87.1 HYPO-OSMOLALITY AND HYPONATREMIA: ICD-10-CM

## 2023-12-26 DIAGNOSIS — R41.82 ALTERED MENTAL STATUS, UNSPECIFIED: ICD-10-CM

## 2023-12-26 DIAGNOSIS — Z29.9 ENCOUNTER FOR PROPHYLACTIC MEASURES, UNSPECIFIED: ICD-10-CM

## 2023-12-26 DIAGNOSIS — R21 RASH AND OTHER NONSPECIFIC SKIN ERUPTION: ICD-10-CM

## 2023-12-26 LAB
ALBUMIN SERPL ELPH-MCNC: 4 G/DL — SIGNIFICANT CHANGE UP (ref 3.3–5)
ALBUMIN SERPL ELPH-MCNC: 4 G/DL — SIGNIFICANT CHANGE UP (ref 3.3–5)
ALP SERPL-CCNC: 117 U/L — SIGNIFICANT CHANGE UP (ref 40–120)
ALP SERPL-CCNC: 117 U/L — SIGNIFICANT CHANGE UP (ref 40–120)
ALT FLD-CCNC: 15 U/L — SIGNIFICANT CHANGE UP (ref 4–33)
ALT FLD-CCNC: 15 U/L — SIGNIFICANT CHANGE UP (ref 4–33)
ANION GAP SERPL CALC-SCNC: 13 MMOL/L — SIGNIFICANT CHANGE UP (ref 7–14)
ANION GAP SERPL CALC-SCNC: 13 MMOL/L — SIGNIFICANT CHANGE UP (ref 7–14)
ANION GAP SERPL CALC-SCNC: 14 MMOL/L — SIGNIFICANT CHANGE UP (ref 7–14)
ANION GAP SERPL CALC-SCNC: 14 MMOL/L — SIGNIFICANT CHANGE UP (ref 7–14)
APPEARANCE UR: CLEAR — SIGNIFICANT CHANGE UP
APPEARANCE UR: CLEAR — SIGNIFICANT CHANGE UP
AST SERPL-CCNC: 44 U/L — HIGH (ref 4–32)
AST SERPL-CCNC: 44 U/L — HIGH (ref 4–32)
BACTERIA # UR AUTO: ABNORMAL /HPF
BACTERIA # UR AUTO: ABNORMAL /HPF
BASOPHILS # BLD AUTO: 0.04 K/UL — SIGNIFICANT CHANGE UP (ref 0–0.2)
BASOPHILS # BLD AUTO: 0.04 K/UL — SIGNIFICANT CHANGE UP (ref 0–0.2)
BASOPHILS NFR BLD AUTO: 0.5 % — SIGNIFICANT CHANGE UP (ref 0–2)
BASOPHILS NFR BLD AUTO: 0.5 % — SIGNIFICANT CHANGE UP (ref 0–2)
BILIRUB SERPL-MCNC: 0.5 MG/DL — SIGNIFICANT CHANGE UP (ref 0.2–1.2)
BILIRUB SERPL-MCNC: 0.5 MG/DL — SIGNIFICANT CHANGE UP (ref 0.2–1.2)
BILIRUB UR-MCNC: NEGATIVE — SIGNIFICANT CHANGE UP
BILIRUB UR-MCNC: NEGATIVE — SIGNIFICANT CHANGE UP
BLOOD GAS VENOUS COMPREHENSIVE RESULT: SIGNIFICANT CHANGE UP
BLOOD GAS VENOUS COMPREHENSIVE RESULT: SIGNIFICANT CHANGE UP
BUN SERPL-MCNC: 11 MG/DL — SIGNIFICANT CHANGE UP (ref 7–23)
BUN SERPL-MCNC: 11 MG/DL — SIGNIFICANT CHANGE UP (ref 7–23)
BUN SERPL-MCNC: 15 MG/DL — SIGNIFICANT CHANGE UP (ref 7–23)
BUN SERPL-MCNC: 15 MG/DL — SIGNIFICANT CHANGE UP (ref 7–23)
CALCIUM SERPL-MCNC: 7.9 MG/DL — LOW (ref 8.4–10.5)
CALCIUM SERPL-MCNC: 7.9 MG/DL — LOW (ref 8.4–10.5)
CALCIUM SERPL-MCNC: 9.2 MG/DL — SIGNIFICANT CHANGE UP (ref 8.4–10.5)
CALCIUM SERPL-MCNC: 9.2 MG/DL — SIGNIFICANT CHANGE UP (ref 8.4–10.5)
CHLORIDE SERPL-SCNC: 88 MMOL/L — LOW (ref 98–107)
CHLORIDE SERPL-SCNC: 88 MMOL/L — LOW (ref 98–107)
CHLORIDE SERPL-SCNC: 94 MMOL/L — LOW (ref 98–107)
CHLORIDE SERPL-SCNC: 94 MMOL/L — LOW (ref 98–107)
CO2 SERPL-SCNC: 21 MMOL/L — LOW (ref 22–31)
CO2 SERPL-SCNC: 21 MMOL/L — LOW (ref 22–31)
CO2 SERPL-SCNC: 22 MMOL/L — SIGNIFICANT CHANGE UP (ref 22–31)
CO2 SERPL-SCNC: 22 MMOL/L — SIGNIFICANT CHANGE UP (ref 22–31)
COLOR SPEC: YELLOW — SIGNIFICANT CHANGE UP
COLOR SPEC: YELLOW — SIGNIFICANT CHANGE UP
CREAT SERPL-MCNC: 0.48 MG/DL — LOW (ref 0.5–1.3)
CREAT SERPL-MCNC: 0.48 MG/DL — LOW (ref 0.5–1.3)
CREAT SERPL-MCNC: 0.6 MG/DL — SIGNIFICANT CHANGE UP (ref 0.5–1.3)
CREAT SERPL-MCNC: 0.6 MG/DL — SIGNIFICANT CHANGE UP (ref 0.5–1.3)
DIFF PNL FLD: NEGATIVE — SIGNIFICANT CHANGE UP
DIFF PNL FLD: NEGATIVE — SIGNIFICANT CHANGE UP
EGFR: 90 ML/MIN/1.73M2 — SIGNIFICANT CHANGE UP
EGFR: 90 ML/MIN/1.73M2 — SIGNIFICANT CHANGE UP
EGFR: 95 ML/MIN/1.73M2 — SIGNIFICANT CHANGE UP
EGFR: 95 ML/MIN/1.73M2 — SIGNIFICANT CHANGE UP
EOSINOPHIL # BLD AUTO: 0.01 K/UL — SIGNIFICANT CHANGE UP (ref 0–0.5)
EOSINOPHIL # BLD AUTO: 0.01 K/UL — SIGNIFICANT CHANGE UP (ref 0–0.5)
EOSINOPHIL NFR BLD AUTO: 0.1 % — SIGNIFICANT CHANGE UP (ref 0–6)
EOSINOPHIL NFR BLD AUTO: 0.1 % — SIGNIFICANT CHANGE UP (ref 0–6)
FLUAV AG NPH QL: SIGNIFICANT CHANGE UP
FLUAV AG NPH QL: SIGNIFICANT CHANGE UP
FLUBV AG NPH QL: SIGNIFICANT CHANGE UP
FLUBV AG NPH QL: SIGNIFICANT CHANGE UP
GLUCOSE SERPL-MCNC: 113 MG/DL — HIGH (ref 70–99)
GLUCOSE SERPL-MCNC: 113 MG/DL — HIGH (ref 70–99)
GLUCOSE SERPL-MCNC: 130 MG/DL — HIGH (ref 70–99)
GLUCOSE SERPL-MCNC: 130 MG/DL — HIGH (ref 70–99)
GLUCOSE UR QL: NEGATIVE MG/DL — SIGNIFICANT CHANGE UP
GLUCOSE UR QL: NEGATIVE MG/DL — SIGNIFICANT CHANGE UP
HCT VFR BLD CALC: 35.6 % — SIGNIFICANT CHANGE UP (ref 34.5–45)
HCT VFR BLD CALC: 35.6 % — SIGNIFICANT CHANGE UP (ref 34.5–45)
HGB BLD-MCNC: 12.5 G/DL — SIGNIFICANT CHANGE UP (ref 11.5–15.5)
HGB BLD-MCNC: 12.5 G/DL — SIGNIFICANT CHANGE UP (ref 11.5–15.5)
IANC: 5.68 K/UL — SIGNIFICANT CHANGE UP (ref 1.8–7.4)
IANC: 5.68 K/UL — SIGNIFICANT CHANGE UP (ref 1.8–7.4)
IMM GRANULOCYTES NFR BLD AUTO: 0.4 % — SIGNIFICANT CHANGE UP (ref 0–0.9)
IMM GRANULOCYTES NFR BLD AUTO: 0.4 % — SIGNIFICANT CHANGE UP (ref 0–0.9)
KETONES UR-MCNC: ABNORMAL MG/DL
KETONES UR-MCNC: ABNORMAL MG/DL
LEUKOCYTE ESTERASE UR-ACNC: ABNORMAL
LEUKOCYTE ESTERASE UR-ACNC: ABNORMAL
LYMPHOCYTES # BLD AUTO: 1.58 K/UL — SIGNIFICANT CHANGE UP (ref 1–3.3)
LYMPHOCYTES # BLD AUTO: 1.58 K/UL — SIGNIFICANT CHANGE UP (ref 1–3.3)
LYMPHOCYTES # BLD AUTO: 19.2 % — SIGNIFICANT CHANGE UP (ref 13–44)
LYMPHOCYTES # BLD AUTO: 19.2 % — SIGNIFICANT CHANGE UP (ref 13–44)
MCHC RBC-ENTMCNC: 29.2 PG — SIGNIFICANT CHANGE UP (ref 27–34)
MCHC RBC-ENTMCNC: 29.2 PG — SIGNIFICANT CHANGE UP (ref 27–34)
MCHC RBC-ENTMCNC: 35.1 GM/DL — SIGNIFICANT CHANGE UP (ref 32–36)
MCHC RBC-ENTMCNC: 35.1 GM/DL — SIGNIFICANT CHANGE UP (ref 32–36)
MCV RBC AUTO: 83.2 FL — SIGNIFICANT CHANGE UP (ref 80–100)
MCV RBC AUTO: 83.2 FL — SIGNIFICANT CHANGE UP (ref 80–100)
MONOCYTES # BLD AUTO: 0.91 K/UL — HIGH (ref 0–0.9)
MONOCYTES # BLD AUTO: 0.91 K/UL — HIGH (ref 0–0.9)
MONOCYTES NFR BLD AUTO: 11 % — SIGNIFICANT CHANGE UP (ref 2–14)
MONOCYTES NFR BLD AUTO: 11 % — SIGNIFICANT CHANGE UP (ref 2–14)
NEUTROPHILS # BLD AUTO: 5.68 K/UL — SIGNIFICANT CHANGE UP (ref 1.8–7.4)
NEUTROPHILS # BLD AUTO: 5.68 K/UL — SIGNIFICANT CHANGE UP (ref 1.8–7.4)
NEUTROPHILS NFR BLD AUTO: 68.8 % — SIGNIFICANT CHANGE UP (ref 43–77)
NEUTROPHILS NFR BLD AUTO: 68.8 % — SIGNIFICANT CHANGE UP (ref 43–77)
NITRITE UR-MCNC: NEGATIVE — SIGNIFICANT CHANGE UP
NITRITE UR-MCNC: NEGATIVE — SIGNIFICANT CHANGE UP
NRBC # BLD: 0 /100 WBCS — SIGNIFICANT CHANGE UP (ref 0–0)
NRBC # BLD: 0 /100 WBCS — SIGNIFICANT CHANGE UP (ref 0–0)
NRBC # FLD: 0 K/UL — SIGNIFICANT CHANGE UP (ref 0–0)
NRBC # FLD: 0 K/UL — SIGNIFICANT CHANGE UP (ref 0–0)
PH UR: 7 — SIGNIFICANT CHANGE UP (ref 5–8)
PH UR: 7 — SIGNIFICANT CHANGE UP (ref 5–8)
PLATELET # BLD AUTO: 313 K/UL — SIGNIFICANT CHANGE UP (ref 150–400)
PLATELET # BLD AUTO: 313 K/UL — SIGNIFICANT CHANGE UP (ref 150–400)
POTASSIUM SERPL-MCNC: 3.1 MMOL/L — LOW (ref 3.5–5.3)
POTASSIUM SERPL-MCNC: 3.1 MMOL/L — LOW (ref 3.5–5.3)
POTASSIUM SERPL-MCNC: 4.5 MMOL/L — SIGNIFICANT CHANGE UP (ref 3.5–5.3)
POTASSIUM SERPL-MCNC: 4.5 MMOL/L — SIGNIFICANT CHANGE UP (ref 3.5–5.3)
POTASSIUM SERPL-SCNC: 3.1 MMOL/L — LOW (ref 3.5–5.3)
POTASSIUM SERPL-SCNC: 3.1 MMOL/L — LOW (ref 3.5–5.3)
POTASSIUM SERPL-SCNC: 4.5 MMOL/L — SIGNIFICANT CHANGE UP (ref 3.5–5.3)
POTASSIUM SERPL-SCNC: 4.5 MMOL/L — SIGNIFICANT CHANGE UP (ref 3.5–5.3)
PROT SERPL-MCNC: 8.3 G/DL — SIGNIFICANT CHANGE UP (ref 6–8.3)
PROT SERPL-MCNC: 8.3 G/DL — SIGNIFICANT CHANGE UP (ref 6–8.3)
PROT UR-MCNC: 100 MG/DL
PROT UR-MCNC: 100 MG/DL
RBC # BLD: 4.28 M/UL — SIGNIFICANT CHANGE UP (ref 3.8–5.2)
RBC # BLD: 4.28 M/UL — SIGNIFICANT CHANGE UP (ref 3.8–5.2)
RBC # FLD: 12.1 % — SIGNIFICANT CHANGE UP (ref 10.3–14.5)
RBC # FLD: 12.1 % — SIGNIFICANT CHANGE UP (ref 10.3–14.5)
RBC CASTS # UR COMP ASSIST: 8 /HPF — HIGH (ref 0–4)
RBC CASTS # UR COMP ASSIST: 8 /HPF — HIGH (ref 0–4)
REVIEW: SIGNIFICANT CHANGE UP
REVIEW: SIGNIFICANT CHANGE UP
RSV RNA NPH QL NAA+NON-PROBE: SIGNIFICANT CHANGE UP
RSV RNA NPH QL NAA+NON-PROBE: SIGNIFICANT CHANGE UP
SARS-COV-2 RNA SPEC QL NAA+PROBE: SIGNIFICANT CHANGE UP
SARS-COV-2 RNA SPEC QL NAA+PROBE: SIGNIFICANT CHANGE UP
SODIUM SERPL-SCNC: 123 MMOL/L — LOW (ref 135–145)
SODIUM SERPL-SCNC: 123 MMOL/L — LOW (ref 135–145)
SODIUM SERPL-SCNC: 129 MMOL/L — LOW (ref 135–145)
SODIUM SERPL-SCNC: 129 MMOL/L — LOW (ref 135–145)
SP GR SPEC: 1.02 — SIGNIFICANT CHANGE UP (ref 1–1.03)
SP GR SPEC: 1.02 — SIGNIFICANT CHANGE UP (ref 1–1.03)
SQUAMOUS # UR AUTO: 6 /HPF — HIGH (ref 0–5)
SQUAMOUS # UR AUTO: 6 /HPF — HIGH (ref 0–5)
UROBILINOGEN FLD QL: 0.2 MG/DL — SIGNIFICANT CHANGE UP (ref 0.2–1)
UROBILINOGEN FLD QL: 0.2 MG/DL — SIGNIFICANT CHANGE UP (ref 0.2–1)
WBC # BLD: 8.25 K/UL — SIGNIFICANT CHANGE UP (ref 3.8–10.5)
WBC # BLD: 8.25 K/UL — SIGNIFICANT CHANGE UP (ref 3.8–10.5)
WBC # FLD AUTO: 8.25 K/UL — SIGNIFICANT CHANGE UP (ref 3.8–10.5)
WBC # FLD AUTO: 8.25 K/UL — SIGNIFICANT CHANGE UP (ref 3.8–10.5)
WBC UR QL: 15 /HPF — HIGH (ref 0–5)
WBC UR QL: 15 /HPF — HIGH (ref 0–5)

## 2023-12-26 PROCEDURE — 99223 1ST HOSP IP/OBS HIGH 75: CPT | Mod: GC

## 2023-12-26 PROCEDURE — 71045 X-RAY EXAM CHEST 1 VIEW: CPT | Mod: 26

## 2023-12-26 PROCEDURE — 99222 1ST HOSP IP/OBS MODERATE 55: CPT

## 2023-12-26 PROCEDURE — 99254 IP/OBS CNSLTJ NEW/EST MOD 60: CPT | Mod: GC

## 2023-12-26 PROCEDURE — 99285 EMERGENCY DEPT VISIT HI MDM: CPT | Mod: GC

## 2023-12-26 RX ORDER — POTASSIUM CHLORIDE 20 MEQ
10 PACKET (EA) ORAL
Refills: 0 | Status: COMPLETED | OUTPATIENT
Start: 2023-12-26 | End: 2023-12-26

## 2023-12-26 RX ORDER — SODIUM CHLORIDE 9 MG/ML
1000 INJECTION INTRAMUSCULAR; INTRAVENOUS; SUBCUTANEOUS ONCE
Refills: 0 | Status: COMPLETED | OUTPATIENT
Start: 2023-12-26 | End: 2023-12-26

## 2023-12-26 RX ORDER — HYDRALAZINE HCL 50 MG
10 TABLET ORAL ONCE
Refills: 0 | Status: COMPLETED | OUTPATIENT
Start: 2023-12-26 | End: 2023-12-26

## 2023-12-26 RX ORDER — ACYCLOVIR SODIUM 500 MG
500 VIAL (EA) INTRAVENOUS EVERY 12 HOURS
Refills: 0 | Status: DISCONTINUED | OUTPATIENT
Start: 2023-12-26 | End: 2024-01-03

## 2023-12-26 RX ORDER — POTASSIUM CHLORIDE 20 MEQ
20 PACKET (EA) ORAL
Refills: 0 | Status: DISCONTINUED | OUTPATIENT
Start: 2023-12-26 | End: 2023-12-26

## 2023-12-26 RX ADMIN — SODIUM CHLORIDE 1000 MILLILITER(S): 9 INJECTION INTRAMUSCULAR; INTRAVENOUS; SUBCUTANEOUS at 09:55

## 2023-12-26 RX ADMIN — Medication 10 MILLIGRAM(S): at 19:12

## 2023-12-26 RX ADMIN — SODIUM CHLORIDE 1000 MILLILITER(S): 9 INJECTION INTRAMUSCULAR; INTRAVENOUS; SUBCUTANEOUS at 12:10

## 2023-12-26 RX ADMIN — Medication 100 MILLIEQUIVALENT(S): at 22:10

## 2023-12-26 RX ADMIN — Medication 100 MILLIEQUIVALENT(S): at 19:12

## 2023-12-26 RX ADMIN — Medication 100 MILLIEQUIVALENT(S): at 23:01

## 2023-12-26 RX ADMIN — SODIUM CHLORIDE 1000 MILLILITER(S): 9 INJECTION INTRAMUSCULAR; INTRAVENOUS; SUBCUTANEOUS at 08:55

## 2023-12-26 RX ADMIN — SODIUM CHLORIDE 1000 MILLILITER(S): 9 INJECTION INTRAMUSCULAR; INTRAVENOUS; SUBCUTANEOUS at 10:57

## 2023-12-26 RX ADMIN — Medication 100 MILLIEQUIVALENT(S): at 20:50

## 2023-12-26 RX ADMIN — Medication 110 MILLIGRAM(S): at 20:49

## 2023-12-26 NOTE — ED PROVIDER NOTE - DIFFERENTIAL DIAGNOSIS
Differential Diagnosis cellulitis, zoster, ind\fection, uti, pneumonia, iral infection, hypoglycemia

## 2023-12-26 NOTE — ED ADULT NURSE REASSESSMENT NOTE - NS ED NURSE REASSESS COMMENT FT1
report received from previous ED RN. pt confused at this time, A&OX2, able to be redirected but returns to restlessness in stretcher. new 20G iv placed to left hand, iv meds infusing without complications, iv sites WNL. safety measures maintained, side rails up x2. call bell in place at bedside.

## 2023-12-26 NOTE — H&P ADULT - PROBLEM SELECTOR PLAN 3
Patient A&Ox2 at time of interview, reported to be A&Ox3 at baseline. May be in the setting of hyponatremia. See above. Patient A&Ox2 at time of interview, reported to be A&Ox3 at baseline. May be in the setting of hyponatremia.  -CT head noncon  -MRI head with IV contrast per ID

## 2023-12-26 NOTE — H&P ADULT - PROBLEM SELECTOR PLAN 4
Weakness may be in the setting of AMS. Patient states herself that she walks with a walker.  -PT consult Weakness may be in the setting of AMS. Patient states herself that she walks with a walker.  -Consider PT consult

## 2023-12-26 NOTE — ED PROVIDER NOTE - CARE PLAN
Principal Discharge DX:	Altered mental state   1 Principal Discharge DX:	Altered mental state  Secondary Diagnosis:	Hyponatremia

## 2023-12-26 NOTE — ED ADULT NURSE REASSESSMENT NOTE - NS ED NURSE REASSESS COMMENT FT1
Break RN: MD Butler at bedside and made aware of BP. Repeat vitals performed and documented. MD aware of vitals. Awaiting further plan at this time. Respirations equal and unlabored. Pt resting comfortably in stretcher. No acute distress noted. Safety maintained.

## 2023-12-26 NOTE — ED ADULT NURSE NOTE - OBJECTIVE STATEMENT
rcvd pt to rm 17, primarily Bulgarian speaking c/o lethargy, weakness and confusion. as per daughter, pt has been "acting differently" x 2 days. reports new onset upper body extremity swelling, red rash to right side of face and inability to open right eye. pt alert and responsive to verbal stimuli. MDs at bedside for eval rcvd pt to rm 17, primarily Portuguese speaking c/o lethargy, weakness and confusion. as per daughter, pt has been "acting differently" x 2 days. reports new onset upper body extremity swelling, red rash to right side of face and inability to open right eye. pt alert and responsive to verbal stimuli. MDs at bedside for eval

## 2023-12-26 NOTE — ED PROVIDER NOTE - PHYSICAL EXAMINATION
CONSTITUTIONAL: awake; in no acute distress. Tired appearing  SKIN: skin of R mandible with erythematous rash with some vesicles, yellow colored crusting to surface  HEAD: Normocephalic; atraumatic.  EYES: PERRL. No injection. R eye with scant purulent drainage  ENT: No nasal discharge; airway clear.  NECK: Supple; non tender.  CARD: S1, S2 normal; no murmurs, gallops, or rubs. Regular rate and rhythm.   RESP: No wheezes, rales or rhonchi. Good air movement bilaterally.   ABD: soft ntnd, no guarding, no distention, no rigidity.   EXT: No cyanosis or edema.   NEURO: Alert, oriented to person, place, not year. Moves all extremities equally. Follows simple commands.  PSYCH: Cooperative, appropriate.

## 2023-12-26 NOTE — ED ADULT NURSE NOTE - CHIEF COMPLAINT QUOTE
pt c/o weakness x few days, per daughter is lethargic and confused, usually A&O3 independent, has new upper body edema, rash to right sided of face eye won't open, and more recently pt has not eaten since yesterday. Phx low iodine   Md Dubin called for stroke eval, no code stroke called

## 2023-12-26 NOTE — H&P ADULT - PROBLEM SELECTOR PLAN 5
Diet:  DVT ppx:  Dispo: pending clinical improvement Diet: pending S&S eval  DVT ppx: lovenox  Dispo: pending clinical improvement Patient with elevated BP to 195/70. Med rec unable to be obtained at this time, on chart review no reported history of BP. Patient with elevated BP to 195/70. Med rec unable to be obtained at this time, on chart review no reported history of BP.  -Ordered PO hydralazine stat  -If no improvement overnight, night team can give IV labetalol

## 2023-12-26 NOTE — H&P ADULT - NSHPPHYSICALEXAM_GEN_ALL_CORE
VITALS:   T(C): 37.3 (12-26-23 @ 11:00), Max: 37.3 (12-26-23 @ 08:10)  HR: 93 (12-26-23 @ 11:00) (78 - 93)  BP: 199/81 (12-26-23 @ 11:00) (163/101 - 199/81)  RR: 18 (12-26-23 @ 11:00) (16 - 18)  SpO2: 100% (12-26-23 @ 11:00) (98% - 100%)    GENERAL: NAD, lying in bed comfortably  HEAD:  Atraumatic, normocephalic  EYES: Right eye closed, unable to open. Left eye conjunctiva and sclera clear  ENT: Dry mucous membranes. White tongue.  HEART: Regular rate and rhythm, no murmurs, rubs, or gallops  LUNGS: Unlabored respirations.  Clear to auscultation bilaterally, no crackles, wheezing, or rhonchi  ABDOMEN: Soft, nontender, nondistended, +BS  EXTREMITIES: No clubbing, cyanosis, or edema. 5/5  strength bilaterally. Patient otherwise unable to participate in strength testing of upper and lower extremities.  NERVOUS SYSTEM:  A&Ox2. Sensation on face intact bilaterally.  SKIN: Erythema of right jawline and chin with crusting noted at corner of mouth, below lip, and by the chin; rash not crossing midline      No rashes or lesions VITALS:   T(C): 37.3 (12-26-23 @ 11:00), Max: 37.3 (12-26-23 @ 08:10)  HR: 93 (12-26-23 @ 11:00) (78 - 93)  BP: 199/81 (12-26-23 @ 11:00) (163/101 - 199/81)  RR: 18 (12-26-23 @ 11:00) (16 - 18)  SpO2: 100% (12-26-23 @ 11:00) (98% - 100%)    GENERAL: NAD, lying in bed comfortably  HEAD:  Atraumatic, normocephalic  EYES: Right eye closed, unable to open. Left eye conjunctiva and sclera clear  ENT: Dry mucous membranes. White tongue. No teeth.  HEART: Regular rate and rhythm, no murmurs, rubs, or gallops  LUNGS: Unlabored respirations.  Clear to auscultation bilaterally, no crackles, wheezing, or rhonchi  ABDOMEN: Soft, nontender, nondistended, +BS  EXTREMITIES: No clubbing, cyanosis, or edema. 5/5  strength bilaterally. Patient otherwise unable to participate in strength testing of upper and lower extremities.  NERVOUS SYSTEM:  A&Ox2. Sensation on face intact bilaterally.  SKIN: Erythema of right jawline and chin with crusting noted at corner of mouth, below lip, and by the chin; rash not crossing midline      No rashes or lesions

## 2023-12-26 NOTE — ED PROVIDER NOTE - ATTENDING CONTRIBUTION TO CARE
I performed a history and physical exam of the patient and discussed their management with the resident and /or advanced care provider. I reviewed the resident and /or ACP's note and agree with the documented findings and plan of care. My medical decision making and observations are found above.  Lungs clear, abd soft, facial rash (?zoster vs infection)

## 2023-12-26 NOTE — CONSULT NOTE ADULT - ASSESSMENT
Patient to be seen. 82-year-old female past medical history of hyponatremia, Cataracts, AMS, presenting for weakness and rash.  Daughter at bedside states for the past 1 to 2 days patient has been more tired and more confused, where she is typically AO x 3 and ambulatory with a walker she has been able to walk around as easily and does not answer questions to where she usually does.  Originally had a pimple on her chin starting few days ago and since this morning has been worsening redness to the right side of her face and jaw, with some whitish-yellow colored drainage. Rash is more painful this morning. No fevers, chills.  No vomiting, diarrhea, other stool changes at home.  No urinary complaints, shortness of breath, chest pain.  No recent travel, sick contacts. No falls. Family not present at my exam, patient knows she is int [facility] and reporting pain in her chin. Says she has been having headaches for years. No further history available.    ER: afebrile, no leucocytosis  UA 15 WBC    # Facial rash  # AMS  - Facial rash is somewhat concerning for herpes zoster given the distribution; some secondary infection is possible  - Send HSV/VZV lesion PCR  - Obtain LP  - Can start on Acyclovir 10 mg/Kg Q8hrs; adjust renally based on cr clearance; no weight available  - ensure maintenance IV fluids while on acyclovir  - Can start on Augmentin 825 mg BID    Discussed with attending and primary service        Ralph Parmar MD, PGY-4  ID Fellow  [employer] Teams Preferred  After 5pm/weekends call [phone]   82-year-old female past medical history of hyponatremia, Cataracts, AMS, presenting for weakness and rash.  Daughter at bedside states for the past 1 to 2 days patient has been more tired and more confused, where she is typically AO x 3 and ambulatory with a walker she has been able to walk around as easily and does not answer questions to where she usually does.  Originally had a pimple on her chin starting few days ago and since this morning has been worsening redness to the right side of her face and jaw, with some whitish-yellow colored drainage. Rash is more painful this morning. No fevers, chills.  No vomiting, diarrhea, other stool changes at home.  No urinary complaints, shortness of breath, chest pain.  No recent travel, sick contacts. No falls. Family not present at my exam, patient knows she is int [facility] and reporting pain in her chin. Says she has been having headaches for years. No further history available.    ER: afebrile, no leucocytosis  UA 15 WBC    # Facial rash  # AMS  - Facial rash is somewhat concerning for herpes zoster given the distribution; some secondary infection is possible  - Send HSV/VZV lesion PCR  - Obtain LP  - Can start on Acyclovir 10 mg/Kg Q8hrs; adjust renally based on cr clearance; no weight available  - ensure maintenance IV fluids while on acyclovir  - Can start on Augmentin 825 mg BID    Discussed with attending and primary service        Ralph Parmar MD, PGY-4  ID Fellow  [employer] Teams Preferred  After 5pm/weekends call [phone]

## 2023-12-26 NOTE — ED ADULT NURSE REASSESSMENT NOTE - NS ED NURSE REASSESS COMMENT FT1
Break RN: Pt moved to room 15 for airborne precautions. Pt turned and repositioned for comfort. Weight obtained and documented. Pt denies any complaints. No acute distress noted. Safety maintained.

## 2023-12-26 NOTE — H&P ADULT - ATTENDING COMMENTS
82 year old female with history of chronic HA, vertigo, hyponatremia requiring hospitalization previously, now coming in with a couple days of generalized weakness, confusion, difficulty ambulating and facial rash. Here tmax 100.1, HR 70-90s, SBP 160s-190s, saturating well on RA. Labs notable for Na 123, given 2L NS, repeat BMP with Na 129. Suspect hypovolemic hyponatremia improving with IVF, continue to monitor BMP closely. Fall precautions, obtain PT eval. Regarding facial rash, concern for zoster, appreciate ID input, will place on airborne/contact precautions at this time given concern. Start acyclovir w/ IVF, plan for LP as soon as possible given concern for AMS as well. F/u CTH. F/u HSV/VZV swab of lesion. Also with elevated BP here, no reported prior hx, not on home antihypertensives. Will start hydralazine PO, monitor closely. D/w HS team.

## 2023-12-26 NOTE — ED PROVIDER NOTE - OBJECTIVE STATEMENT
Patient is a 82-year-old female past medical history of her iodine presenting for weakness and rash.  Daughter at bedside states for the past 1 to 2 days patient has been more tired and more confused, where she is typically AO x 3 and ambulatory with a walker she has been able to walk around as easily and does not answer questions to where she usually does.  Originally had a pimple on her chin starting 1 to 2 days ago and since this morning has been worsening redness to the right side of her face and jaw, with some whitish-yellow colored drainage. Rash is more painful this morning. No fevers, chills.  No vomiting, diarrhea, other stool changes at home.  No urinary complaints, shortness of breath, chest pain.  No recent travel, sick contacts. No falls.

## 2023-12-26 NOTE — CONSULT NOTE ADULT - ATTENDING COMMENTS
82-yo F w/ no pertinent contributing PMH, presenting for facial rash and ?AMS. Patient was noted to be fatigued and confused x1-2 days PTA. Baseline reportedly A&O x3 and ambulatory. Presenting with facial rash, which started from a "pimple" on the chin, which spread through the R-side of the chin over time. Painful rash this AM. On exam, patient is awake, alert. ?orientation difficult to assess. Does not follow simple command consistently. No nuchal rigidity. R CN V3 distribution of erythema, warmth, and patchy vesicular rash w/ scabbing. Tmax 100.1. No leukocytosis. Would assess for VZV rash w/ possible CSF involvement.    #Facial rash  #AMS  #Hyponatremia  #Low grade fever  - R CN V3 distribution of erythema, warmth, and patchy vesicular rash. Would assess for VZV rash. Please obtain HSV/VZV swab of the an open lesion.  - Also treat for possible superinfection. Would start Unasyn 3g IV Q6H (concern for swallowing)  - LP to assess for CSF involvement. Send meningoencephalitis PCR panel, cell counts, glucose, and protein. Send HSV/VZV study. Will need CTH prior to LP  - MRI brain  - Can also start acyclovir 10 mg/kg IV Q12H (renally dosed), preferably after LP. Provide ample IVF hydration  - Bed head elevation. Aspiration precautions.    Plan discussed with primary team house staff and ID fellow.  Thank you for this consult. Inpatient ID team will follow.    Guille Pérez MD, PhD  Attending Physician  Division of Infectious Diseases  Department of Medicine    Please contact through MS Teams message.  Office: [phone] (after 5 PM or weekend) 82-yo F w/ no pertinent contributing PMH, presenting for facial rash and ?AMS. Patient was noted to be fatigued and confused x1-2 days PTA. Baseline reportedly A&O x3 and ambulatory. Presenting with facial rash, which started from a "pimple" on the chin, which spread through the R-side of the chin over time. Painful rash this AM. On exam, patient is awake, alert. ?orientation difficult to assess. Does not follow simple command consistently. No nuchal rigidity. R CN V3 distribution of erythema, warmth, and patchy vesicular rash w/ scabbing. Tmax 100.1. No leukocytosis. Would assess for VZV rash w/ possible CSF involvement.    #Facial rash  #AMS  #Hyponatremia  #Low grade fever  - R CN V3 distribution of erythema, warmth, and patchy vesicular rash. Would assess for VZV rash. Please obtain HSV/VZV swab of the an open lesion.  - Also treat for possible superinfection. Would start Unasyn 3g IV Q6H (concern for swallowing)  - LP to assess for CSF involvement. Send meningoencephalitis PCR panel, cell counts, glucose, and protein. Send HSV/VZV study. Will need CTH prior to LP  - MRI brain  - Can also start acyclovir 10 mg/kg IV Q12H (renally dosed), preferably after LP. Provide ample IVF hydration  - Bed head elevation. Aspiration precautions.    Plan discussed with primary team house staff and ID fellow.  Thank you for this consult. Inpatient ID team will follow.    Guille Pérez MD, PhD  Attending Physician  Division of Infectious Diseases  Department of Medicine    Please contact through MS Teams message.  Office: [phone] (after 5 PM or weekend) 82-yo F w/ no pertinent contributing PMH, presenting for facial rash and ?AMS. Patient was noted to be fatigued and confused x1-2 days PTA. Baseline reportedly A&O x3 and ambulatory. Presenting with facial rash, which started from a "pimple" on the chin, which spread through the R-side of the chin over time. Painful rash this AM. On exam, patient is awake, alert. ?orientation difficult to assess. Does not follow simple command consistently. No nuchal rigidity. R CN V3 distribution of erythema, warmth, and patchy vesicular rash w/ scabbing. Tmax 100.1. No leukocytosis. Would assess for VZV rash w/ possible CSF involvement.    #Facial rash  #AMS  #Hyponatremia  #Low grade fever  - R CN V3 distribution of erythema, warmth, and patchy vesicular rash. Would assess for VZV rash. Please obtain HSV/VZV swab of the an open lesion.  - Does not consistently follow command. Fatigued. ?AMS. Hyponatremia appears at baseline.  - Also treat for possible superinfection. Would start Unasyn 3g IV Q6H (concern for swallowing)  - LP to assess for CSF involvement. Send meningoencephalitis PCR panel, cell counts, glucose, and protein. Send HSV/VZV study. Will need CTH prior to LP  - MRI brain  - Can also start acyclovir 10 mg/kg IV Q12H (renally dosed), preferably after LP. Provide ample IVF hydration  - Bed head elevation. Aspiration precautions.    Plan discussed with primary team house staff and ID fellow.  Thank you for this consult. Inpatient ID team will follow.    Guille Pérez MD, PhD  Attending Physician  Division of Infectious Diseases  Department of Medicine    Please contact through MS Teams message.  Office: [phone] (after 5 PM or weekend) 82-yo F w/ no pertinent contributing PMH, presenting for facial rash and ?AMS. Patient was noted to be fatigued and confused x1-2 days PTA. Baseline reportedly A&O x3 and ambulatory. Presenting with facial rash, which started from a "pimple" on the chin, which spread through the R-side of the chin over time. Painful rash this AM. On exam, patient is awake, alert. ?orientation difficult to assess. Does not follow simple command consistently. No nuchal rigidity. R CN V3 distribution of erythema, warmth, and patchy vesicular rash w/ scabbing. Tmax 100.1. No leukocytosis. Would assess for VZV rash w/ possible CSF involvement.    #Facial rash  #AMS  #Hyponatremia  #Low grade fever  - R CN V3 distribution of erythema, warmth, and patchy vesicular rash. Would assess for VZV rash. Please obtain HSV/VZV swab of the an open lesion.  - Does not consistently follow command. Fatigued. ?AMS. Hyponatremia appears at baseline.  - Also treat for possible superinfection. Would start Unasyn 3g IV Q6H (concern for swallowing)  - LP to assess for CSF involvement. Send meningoencephalitis PCR panel, cell counts, glucose, and protein. Send HSV/VZV study. Will need CTH prior to LP  - MRI brain  - Can also start acyclovir 10 mg/kg IV Q12H (renally dosed), preferably after LP. Provide ample IVF hydration  - Bed head elevation. Aspiration precautions.    Plan discussed with primary team house staff and ID fellow.  Thank you for this consult. Inpatient ID team will follow.    Guille Pérez MD, PhD  Attending Physician  Division of Infectious Diseases  Department of Medicine    Please contact through MS Teams message.  Office: [phone] (after 5 PM or weekend) 82-yo F w/ no pertinent contributing PMH, presenting for facial rash and ?AMS. Patient was noted to be fatigued and confused x1-2 days PTA. Baseline reportedly A&O x3 and ambulatory. Presenting with facial rash, which started from a "pimple" on the chin, which spread through the R-side of the chin over time. Painful rash this AM. On exam, patient is awake, alert. ?orientation difficult to assess. Does not follow simple command consistently. No nuchal rigidity. R CN V3 distribution of erythema, warmth, and patchy vesicular rash w/ scabbing. Tmax 100.1. No leukocytosis. Would assess for VZV rash w/ possible CSF involvement.    #Facial rash  #AMS  #Hyponatremia  #Low grade fever  - R CN V3 distribution of erythema, warmth, and patchy vesicular rash. Would assess for VZV rash. Please obtain HSV/VZV swab of the an open lesion.  - Does not consistently follow command. Fatigued. ?AMS. Noted to be hyponatremic at baseline. ?AMS contributed by worsening HypoNa?  - Also treat for possible superinfection. Would start Unasyn 3g IV Q6H (concern for swallowing)  - LP to assess for CSF involvement. Send meningoencephalitis PCR panel, cell counts, glucose, and protein. Send HSV/VZV study. Will need CTH prior to LP  - MRI brain  - Can also start acyclovir 10 mg/kg IV Q12H (renally dosed), preferably after LP. Provide ample IVF hydration  - Bed head elevation. Aspiration precautions.    Plan discussed with primary team house staff and ID fellow.  Thank you for this consult. Inpatient ID team will follow.    Guille Pérez MD, PhD  Attending Physician  Division of Infectious Diseases  Department of Medicine    Please contact through MS Teams message.  Office: [phone] (after 5 PM or weekend) 82-yo F w/ no pertinent contributing PMH, presenting for facial rash and ?AMS. Patient was noted to be fatigued and confused x1-2 days PTA. Baseline reportedly A&O x3 and ambulatory. Presenting with facial rash, which started from a "pimple" on the chin, which spread through the R-side of the chin over time. Painful rash this AM. On exam, patient is awake, alert. ?orientation difficult to assess. Does not follow simple command consistently. No nuchal rigidity. R CN V3 distribution of erythema, warmth, and patchy vesicular rash w/ scabbing. Tmax 100.1. No leukocytosis. Would assess for VZV rash w/ possible CSF involvement.    #Facial rash  #AMS  #Hyponatremia  #Low grade fever  - R CN V3 distribution of erythema, warmth, and patchy vesicular rash. Would assess for VZV rash. Please obtain HSV/VZV swab of the an open lesion.  - Does not consistently follow command. Fatigued. ?AMS. Noted to be hyponatremic at baseline. ?AMS contributed by worsening HypoNa?  - Also treat for possible superinfection. Would start Unasyn 3g IV Q6H (concern for swallowing)  - LP to assess for CSF involvement. Send meningoencephalitis PCR panel, cell counts, glucose, and protein. Send HSV/VZV study. Will need CTH prior to LP  - MRI brain  - Can also start acyclovir 10 mg/kg IV Q12H (renally dosed), preferably after LP. Provide ample IVF hydration  - Bed head elevation. Aspiration precautions.    Plan discussed with primary team house staff and ID fellow.  Thank you for this consult. Inpatient ID team will follow.    Guille Pérez MD, PhD  Attending Physician  Division of Infectious Diseases  Department of Medicine    Please contact through MS Teams message.  Office: [phone] (after 5 PM or weekend)

## 2023-12-26 NOTE — H&P ADULT - PROBLEM SELECTOR PLAN 2
Na 123 --> 129 on admission. Was previously admitted in Mar 2022 with Na of 130, improved with salt tabs and fluid restriction. Patient s/p 2L NS in ED.  -Salt tabs  - Na 123 --> 129 on admission. 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 Patient s/p 2L NS in ED.  -f/u urine studies  -Consider salt tabs Na 123 --> 129 on admission. 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 Patient s/p 2L NS in ED.  -f/u repeat BMP to recheck Na, ordered stat  -f/u urine studies

## 2023-12-26 NOTE — ED ADULT TRIAGE NOTE - PAIN: PRESENCE, MLM
denies pain/discomfort (Rating = 0) Glycopyrrolate Counseling:  I discussed with the patient the risks of glycopyrrolate including but not limited to skin rash, drowsiness, dry mouth, difficulty urinating, and blurred vision.

## 2023-12-26 NOTE — ED PROVIDER NOTE - CLINICAL SUMMARY MEDICAL DECISION MAKING FREE TEXT BOX
Patient is a 82-year-old female past medical history of her iodine presenting for weakness and rash. With vital signs notably afebrile, vital stable.  Presenting for confusion and lethargy  with new rash on the right jaw, that is painful, red, vesicular, with some overlying yellow crusting.  Differential includes but not limited to infectious metabolic encephalopathy including herpes versus sore including superimposed bacterial infection including staph given drainage.  Versus other infectious etiology including pneumonia versus UTI.  With electrolyte abnormality.  At this time cannot exclude encephalitis given herpetic appearing rash and altered mental status.  To evaluate infectious workup including labs, cultures, urine, chest x-ray.  If results not suggestive of other focal process to consider lumbar puncture. Patient is a 82-year-old female past medical history of her iodine presenting for weakness and rash. With vital signs notably afebrile, vital stable.  Presenting for confusion and lethargy  with new rash on the right jaw, that is painful, red, vesicular, with some overlying yellow crusting.  Differential includes but not limited to infectious metabolic encephalopathy including herpes versus sore including superimposed bacterial infection including staph given drainage.  Versus other infectious etiology including pneumonia versus UTI.  With electrolyte abnormality.  At this time cannot exclude encephalitis given herpetic appearing rash and altered mental status.  To evaluate infectious workup including labs, cultures, urine, chest x-ray.  If results not suggestive of other focal process to consider lumbar puncture.  Katharine: 82-year-old who presents to the emergency department with more confusion and facial infection.  Patient has redness under her jaw that radiates up into the right side of her face.  Differential includes zoster versus cellulitis.  Patient less communicative than usual but moving all 4.  Stroke less likely.  Infection more likely.  Will look for urine infection lung infection viral infection and if nothing else pans out may be forced to do a lumbar puncture thinking in terms of herpes encephalitis.

## 2023-12-26 NOTE — H&P ADULT - PROBLEM SELECTOR PLAN 1
-ID consulted, appreciate recs  -f/u HSV/VZV lesion PCR  -Airborne/contact precautions  -Obtain LP per ID  -Acyclovir 10 mg/Kg Q8hrs; adjust renally based on Cr clearance --> no weight available  -Maintenance IV fluids while on acyclovir  -Augmentin 825 mg BID -ID consulted, appreciate recs  -f/u HSV/VZV lesion PCR  -Airborne/contact precautions  -Obtain LP per ID --> spoke with ID attending, ideally would get LP before acyclovir but if logistically challenging can give acyclovir first  -Acyclovir 10 mg/Kg Q8hrs; adjust renally based on Cr clearance --> no weight available  -Maintenance IV fluids while on acyclovir  -Augmentin 825-125 mg BID -ID consulted, appreciate recs  -f/u HSV/VZV lesion PCR  -Airborne/contact precautions  -Obtain LP per ID --> spoke with ID attending, ideally would get LP before acyclovir but if logistically challenging can give acyclovir first  -Acyclovir 10 mg/Kg Q8hrs; adjust renally based on Cr clearance --> no weight available, getting stat weight  -Maintenance IV fluids while on acyclovir  -Augmentin 825-125 mg PO BID -ID consulted, appreciate recs  -f/u HSV/VZV lesion PCR  -Airborne/contact precautions  -Obtain LP per ID --> spoke with ID attending, ideally would get LP before acyclovir but if logistically challenging can give acyclovir first. Will obtain meningoencephalitis panel, cell count, glucose, protein etc., will f/u with finalized ID note.  -Acyclovir 10 mg/Kg Q8hrs; adjust renally based on Cr clearance --> no weight available, getting stat weight  -Maintenance IV fluids while on acyclovir  -Augmentin 825-125 mg PO BID

## 2023-12-26 NOTE — H&P ADULT - HISTORY OF PRESENT ILLNESS
82F with PMH chronic headache, vertigo, s/p ILR 10/21 presenting with weakness and rash. Patient is A&Ox2 and unable to provide detailed history. Daughter not presently at bedside and could not be reached on the phone, collateral to be obtained later. Per ED note, daughter states that patient has been more tired and confused for x1-2 days and does not answer questions that she usually answers. At baseline, patient is reported to be A&Ox3 and ambulatory with a walker. Patient has had a pimple on her chin that was first noticed x1-2 days ago, and on day of admission worsened with erythema on the right side of her face and jaw and associated white/yellow-colored drainage. Patient endorses pain at the face of the rash. No reported fever, chills, shortness of breath, chest pain, vomiting, diarrhea, urinary complaints, recent travel, sick contacts. 82F with PMH hyponatremia, chronic headache, vertigo, s/p ILR 10/21, cataracts presenting with weakness and rash. Patient is A&Ox2 and unable to provide detailed history. Daughter not presently at bedside and could not be reached on the phone, collateral to be obtained later. Per ED note, daughter states that patient has been more tired and confused for x1-2 days and does not answer questions that she usually answers. At baseline, patient is reported to be A&Ox3 and ambulatory with a walker. Patient has had a pimple on her chin that was first noticed x1-2 days ago, and on day of admission worsened with erythema on the right side of her face and jaw and associated white/yellow-colored drainage. Patient endorses pain at the face of the rash. No reported fever, chills, shortness of breath, chest pain, vomiting, diarrhea, urinary complaints, recent travel, sick contacts.

## 2023-12-26 NOTE — ED ADULT TRIAGE NOTE - CHIEF COMPLAINT QUOTE
pt c/o weakness x few days, per daughter is lethargic and confused, usually A&O3 independent, has new upper body edema, rash to right sided of face eye won't op, and more recently pt has not eaten since yesterday. Phx low iodine pt c/o weakness x few days, per daughter is lethargic and confused, usually A&O3 independent, has new upper body edema, rash to right sided of face eye won't open, and more recently pt has not eaten since yesterday. Phx low iodine   [facility] called for stroke eval, no code stroke called

## 2023-12-26 NOTE — H&P ADULT - ASSESSMENT
82F with PMH hyponatremia, chronic headache, vertigo, s/p ILR 10/21, cataracts presenting with weakness and rash.

## 2023-12-26 NOTE — H&P ADULT - PROBLEM SELECTOR PLAN 6
Diet: pending S&S eval  DVT ppx: lovenox  Dispo: pending clinical improvement Diet: pending dysphagia screen  DVT ppx: SCDs --> if CTH neg, can start lovenox  Dispo: pending clinical improvement Diet: pending dysphagia screen. per daughter patient eats a regular diet, but currently does not have her dentures.  DVT ppx: SCDs --> if CTH neg, can start lovenox  Dispo: pending clinical improvement

## 2023-12-26 NOTE — CONSULT NOTE ADULT - SUBJECTIVE AND OBJECTIVE BOX
Patient is a 82y old  Female who presents with a chief complaint of     HPI:     82-year-old female past medical history of her iodine presenting for weakness and rash.  Daughter at bedside states for the past 1 to 2 days patient has been more tired and more confused, where she is typically AO x 3 and ambulatory with a walker she has been able to walk around as easily and does not answer questions to where she usually does.  Originally had a pimple on her chin starting 1 to 2 days ago and since this morning has been worsening redness to the right side of her face and jaw, with some whitish-yellow colored drainage. Rash is more painful this morning. No fevers, chills.  No vomiting, diarrhea, other stool changes at home.  No urinary complaints, shortness of breath, chest pain.  No recent travel, sick contacts. No falls.    UA 15 WBC    prior hospital charts reviewed [  ]  primary team notes reviewed [  ]  other consultant notes reviewed [  ]    PAST MEDICAL & SURGICAL HISTORY:  Bilateral cataracts      Syncope      H/O elbow surgery      H/O fracture of leg      H/O surgical procedure  ILR 10/2021          Allergies  Allergy Status Unknown    ANTIMICROBIALS (past 90 days)  MEDICATIONS  (STANDING):          MEDICATIONS  (STANDING):    SOCIAL HISTORY:       FAMILY HISTORY:  No pertinent family history in first degree relatives      REVIEW OF SYSTEMS  [  ] ROS unobtainable because:    [  ] All other systems negative except as noted below:	    Constitutional:  [ ] fever [ ] chills  [ ] weight loss  [ ] weakness  Skin:  [ ] rash [ ] phlebitis	  Eyes: [ ] icterus [ ] pain  [ ] discharge	  ENMT: [ ] sore throat  [ ] thrush [ ] ulcers [ ] exudates  Respiratory: [ ] dyspnea [ ] hemoptysis [ ] cough [ ] sputum	  Cardiovascular:  [ ] chest pain [ ] palpitations [ ] edema	  Gastrointestinal:  [ ] nausea [ ] vomiting [ ] diarrhea [ ] constipation [ ] pain	  Genitourinary:  [ ] dysuria [ ] frequency [ ] hematuria [ ] discharge [ ] flank pain  [ ] incontinence  Musculoskeletal:  [ ] myalgias [ ] arthralgias [ ] arthritis  [ ] back pain  Neurological:  [ ] headache [ ] seizures  [ ] confusion/altered mental status  Psychiatric:  [ ] anxiety [ ] depression	  Hematology/Lymphatics:  [ ] lymphadenopathy  Endocrine:  [ ] adrenal [ ] thyroid  Allergic/Immunologic:	 [ ] transplant [ ] seasonal    Vital Signs Last 24 Hrs  T(F): 99.2 (12-26-23 @ 11:00), Max: 99.2 (12-26-23 @ 11:00)  Vital Signs Last 24 Hrs  HR: 93 (12-26-23 @ 11:00) (78 - 93)  BP: 199/81 (12-26-23 @ 11:00) (163/101 - 199/81)  RR: 18 (12-26-23 @ 11:00)  SpO2: 100% (12-26-23 @ 11:00) (98% - 100%)  Wt(kg): --    PHYSICAL EXAM:  Constitutional: non-toxic, no distress  HEAD/EYES: anicteric, no conjunctival injection  ENT:  supple, no thrush  Cardiovascular:   normal S1, S2, no murmur, no edema  Respiratory:  clear BS bilaterally, no wheezes, no rales  GI:  soft, non-tender, normal bowel sounds  :  no moses, no CVA tenderness  Musculoskeletal:  no synovitis, normal ROM  Neurologic: awake and alert, normal strength, no focal findings  Skin:  no rash, no erythema, no phlebitis  Heme/Onc: no lymphadenopathy   Psychiatric:  awake, alert, appropriate mood                            12.5   8.25  )-----------( 313      ( 26 Dec 2023 09:55 )             35.6   12-26    129<L>  |  94<L>  |  11  ----------------------------<  130<H>  3.1<L>   |  22  |  0.48<L>    Ca    7.9<L>      26 Dec 2023 14:06    TPro  8.3  /  Alb  4.0  /  TBili  0.5  /  DBili  x   /  AST  44<H>  /  ALT  15  /  AlkPhos  117  12-26    Urinalysis Basic - ( 26 Dec 2023 14:06 )    Color: x / Appearance: x / SG: x / pH: x  Gluc: 130 mg/dL / Ketone: x  / Bili: x / Urobili: x   Blood: x / Protein: x / Nitrite: x   Leuk Esterase: x / RBC: x / WBC x   Sq Epi: x / Non Sq Epi: x / Bacteria: x    MICROBIOLOGY:              RADIOLOGY:  imaging below personally reviewed and agree with findings    < from: Xray Chest 1 View- PORTABLE-Urgent (Xray Chest 1 View- PORTABLE-Urgent .) (12.26.23 @ 08:48) >    ACC: [id number] EXAM:  XR CHEST PORTABLE URGENT 1V   ORDERED BY: HENRY GONZALEZ     PROCEDURE DATE:  12/26/2023          INTERPRETATION:  EXAMINATION: XR CHEST URGENT    CLINICAL INDICATION: eval acute infectious process    TECHNIQUE: Single frontal, portable view of the chest was obtained.    COMPARISON: Chest x-ray 3/14/2022.    FINDINGS:  Loop recorder.    The heart is enlarged on this projection.  The lungs are clear.  No pneumothorax.  Trace bilateral pleural effusions.  No acute bony abnormality.    IMPRESSION:  No focal opacities.  Trace bilateral pleural effusions.    < end of copied text >   Patient is a 82y old  Female who presents with a chief complaint of     HPI:     82-year-old female past medical history of her iodine presenting for weakness and rash.  Daughter at bedside states for the past 1 to 2 days patient has been more tired and more confused, where she is typically AO x 3 and ambulatory with a walker she has been able to walk around as easily and does not answer questions to where she usually does.  Originally had a pimple on her chin starting 1 to 2 days ago and since this morning has been worsening redness to the right side of her face and jaw, with some whitish-yellow colored drainage. Rash is more painful this morning. No fevers, chills.  No vomiting, diarrhea, other stool changes at home.  No urinary complaints, shortness of breath, chest pain.  No recent travel, sick contacts. No falls.    UA 15 WBC    prior hospital charts reviewed [  ]  primary team notes reviewed [  ]  other consultant notes reviewed [  ]    PAST MEDICAL & SURGICAL HISTORY:  Bilateral cataracts      Syncope      H/O elbow surgery      H/O fracture of leg      H/O surgical procedure  ILR 10/2021          Allergies  Allergy Status Unknown    ANTIMICROBIALS (past 90 days)  MEDICATIONS  (STANDING):          MEDICATIONS  (STANDING):    SOCIAL HISTORY:       FAMILY HISTORY:  No pertinent family history in first degree relatives      REVIEW OF SYSTEMS  [  ] ROS unobtainable because:    [  ] All other systems negative except as noted below:	    Constitutional:  [ ] fever [ ] chills  [ ] weight loss  [ ] weakness  Skin:  [ ] rash [ ] phlebitis	  Eyes: [ ] icterus [ ] pain  [ ] discharge	  ENMT: [ ] sore throat  [ ] thrush [ ] ulcers [ ] exudates  Respiratory: [ ] dyspnea [ ] hemoptysis [ ] cough [ ] sputum	  Cardiovascular:  [ ] chest pain [ ] palpitations [ ] edema	  Gastrointestinal:  [ ] nausea [ ] vomiting [ ] diarrhea [ ] constipation [ ] pain	  Genitourinary:  [ ] dysuria [ ] frequency [ ] hematuria [ ] discharge [ ] flank pain  [ ] incontinence  Musculoskeletal:  [ ] myalgias [ ] arthralgias [ ] arthritis  [ ] back pain  Neurological:  [ ] headache [ ] seizures  [ ] confusion/altered mental status  Psychiatric:  [ ] anxiety [ ] depression	  Hematology/Lymphatics:  [ ] lymphadenopathy  Endocrine:  [ ] adrenal [ ] thyroid  Allergic/Immunologic:	 [ ] transplant [ ] seasonal    Vital Signs Last 24 Hrs  T(F): 99.2 (12-26-23 @ 11:00), Max: 99.2 (12-26-23 @ 11:00)  Vital Signs Last 24 Hrs  HR: 93 (12-26-23 @ 11:00) (78 - 93)  BP: 199/81 (12-26-23 @ 11:00) (163/101 - 199/81)  RR: 18 (12-26-23 @ 11:00)  SpO2: 100% (12-26-23 @ 11:00) (98% - 100%)  Wt(kg): --    PHYSICAL EXAM:  Constitutional: non-toxic, no distress  HEAD/EYES: anicteric, no conjunctival injection  ENT:  supple, no thrush  Cardiovascular:   normal S1, S2, no murmur, no edema  Respiratory:  clear BS bilaterally, no wheezes, no rales  GI:  soft, non-tender, normal bowel sounds  :  no moses, no CVA tenderness  Musculoskeletal:  no synovitis, normal ROM  Neurologic: awake and alert, normal strength, no focal findings  Skin:  no rash, no erythema, no phlebitis  Heme/Onc: no lymphadenopathy   Psychiatric:  awake, alert, appropriate mood                            12.5   8.25  )-----------( 313      ( 26 Dec 2023 09:55 )             35.6   12-26    129<L>  |  94<L>  |  11  ----------------------------<  130<H>  3.1<L>   |  22  |  0.48<L>    Ca    7.9<L>      26 Dec 2023 14:06    TPro  8.3  /  Alb  4.0  /  TBili  0.5  /  DBili  x   /  AST  44<H>  /  ALT  15  /  AlkPhos  117  12-26    Urinalysis Basic - ( 26 Dec 2023 14:06 )    Color: x / Appearance: x / SG: x / pH: x  Gluc: 130 mg/dL / Ketone: x  / Bili: x / Urobili: x   Blood: x / Protein: x / Nitrite: x   Leuk Esterase: x / RBC: x / WBC x   Sq Epi: x / Non Sq Epi: x / Bacteria: x    MICROBIOLOGY:              RADIOLOGY:  imaging below personally reviewed and agree with findings    < from: Xray Chest 1 View- PORTABLE-Urgent (Xray Chest 1 View- PORTABLE-Urgent .) (12.26.23 @ 08:48) >    ACC: [id number] EXAM:  XR CHEST PORTABLE URGENT 1V   ORDERED BY: HENRY GONZALEZ     PROCEDURE DATE:  12/26/2023          INTERPRETATION:  EXAMINATION: XR CHEST URGENT    CLINICAL INDICATION: eval acute infectious process    TECHNIQUE: Single frontal, portable view of the chest was obtained.    COMPARISON: Chest x-ray 3/14/2022.    FINDINGS:  Loop recorder.    The heart is enlarged on this projection.  The lungs are clear.  No pneumothorax.  Trace bilateral pleural effusions.  No acute bony abnormality.    IMPRESSION:  No focal opacities.  Trace bilateral pleural effusions.    < end of copied text >   Patient is a 82y old  Female who presents with a chief complaint of     HPI:     82-year-old female past medical history of hyponatremia, Cataracts, AMS, presenting for weakness and rash.  Daughter at bedside states for the past 1 to 2 days patient has been more tired and more confused, where she is typically AO x 3 and ambulatory with a walker she has been able to walk around as easily and does not answer questions to where she usually does.  Originally had a pimple on her chin starting few days ago and since this morning has been worsening redness to the right side of her face and jaw, with some whitish-yellow colored drainage. Rash is more painful this morning. No fevers, chills.  No vomiting, diarrhea, other stool changes at home.  No urinary complaints, shortness of breath, chest pain.  No recent travel, sick contacts. No falls. Family not present at my exam, patient knows she is int [facility] and reporting pain in her chin. Says she has been having headaches for years. No further history available.    ER: afebrile, no leucocytosis  UA 15 WBC      prior hospital charts reviewed [  ]  primary team notes reviewed [  x]  other consultant notes reviewed [  x]    PAST MEDICAL & SURGICAL HISTORY:  Bilateral cataracts      Syncope      H/O elbow surgery      H/O fracture of leg      H/O surgical procedure  ILR 10/2021          Allergies  Allergy Status Unknown    ANTIMICROBIALS (past 90 days)  MEDICATIONS  (STANDING):          MEDICATIONS  (STANDING):    SOCIAL HISTORY:  Not available    FAMILY HISTORY:  No pertinent family history in first degree relatives      REVIEW OF SYSTEMS  [x  ] ROS unobtainable because:  patient unable to provide  [  ] All other systems negative except as noted below:	    Constitutional:  [ ] fever [ ] chills  [ ] weight loss  [ ] weakness  Skin:  [ ] rash [ ] phlebitis	  Eyes: [ ] icterus [ ] pain  [ ] discharge	  ENMT: [ ] sore throat  [ ] thrush [ ] ulcers [ ] exudates  Respiratory: [ ] dyspnea [ ] hemoptysis [ ] cough [ ] sputum	  Cardiovascular:  [ ] chest pain [ ] palpitations [ ] edema	  Gastrointestinal:  [ ] nausea [ ] vomiting [ ] diarrhea [ ] constipation [ ] pain	  Genitourinary:  [ ] dysuria [ ] frequency [ ] hematuria [ ] discharge [ ] flank pain  [ ] incontinence  Musculoskeletal:  [ ] myalgias [ ] arthralgias [ ] arthritis  [ ] back pain  Neurological:  [ ] headache [ ] seizures  [ ] confusion/altered mental status  Psychiatric:  [ ] anxiety [ ] depression	  Hematology/Lymphatics:  [ ] lymphadenopathy  Endocrine:  [ ] adrenal [ ] thyroid  Allergic/Immunologic:	 [ ] transplant [ ] seasonal    Vital Signs Last 24 Hrs  T(F): 99.2 (12-26-23 @ 11:00), Max: 99.2 (12-26-23 @ 11:00)  Vital Signs Last 24 Hrs  HR: 93 (12-26-23 @ 11:00) (78 - 93)  BP: 199/81 (12-26-23 @ 11:00) (163/101 - 199/81)  RR: 18 (12-26-23 @ 11:00)  SpO2: 100% (12-26-23 @ 11:00) (98% - 100%)  Wt(kg): --    PHYSICAL EXAM:    General: Patient in NAD  HEENT: NCAT, EOMI, PERRL, no oral lesions  CV: S1+S2, no m/r/g appreciated   Lungs: No respiratory distress, CTAB  Abd: Soft, nontender, no guarding, no rebound tenderness, + bowel sounds   : No suprapubic tenderness  Neuro: Alert and orientedX2, Neck supple, moves all extremities  Ext: No cyanosis, no edema  Skin: Papillomacular rash with crusting and surrounding erythema in Rt face and chin; not crossing midline                              12.5   8.25  )-----------( 313      ( 26 Dec 2023 09:55 )             35.6   12-26    129<L>  |  94<L>  |  11  ----------------------------<  130<H>  3.1<L>   |  22  |  0.48<L>    Ca    7.9<L>      26 Dec 2023 14:06    TPro  8.3  /  Alb  4.0  /  TBili  0.5  /  DBili  x   /  AST  44<H>  /  ALT  15  /  AlkPhos  117  12-26    Urinalysis Basic - ( 26 Dec 2023 14:06 )    Color: x / Appearance: x / SG: x / pH: x  Gluc: 130 mg/dL / Ketone: x  / Bili: x / Urobili: x   Blood: x / Protein: x / Nitrite: x   Leuk Esterase: x / RBC: x / WBC x   Sq Epi: x / Non Sq Epi: x / Bacteria: x    MICROBIOLOGY:              RADIOLOGY:  imaging below personally reviewed and agree with findings    < from: Xray Chest 1 View- PORTABLE-Urgent (Xray Chest 1 View- PORTABLE-Urgent .) (12.26.23 @ 08:48) >    ACC: [id number] EXAM:  XR CHEST PORTABLE URGENT 1V   ORDERED BY: HENRY GONZALEZ     PROCEDURE DATE:  12/26/2023          INTERPRETATION:  EXAMINATION: XR CHEST URGENT    CLINICAL INDICATION: eval acute infectious process    TECHNIQUE: Single frontal, portable view of the chest was obtained.    COMPARISON: Chest x-ray 3/14/2022.    FINDINGS:  Loop recorder.    The heart is enlarged on this projection.  The lungs are clear.  No pneumothorax.  Trace bilateral pleural effusions.  No acute bony abnormality.    IMPRESSION:  No focal opacities.  Trace bilateral pleural effusions.    < end of copied text >   Patient is a 82y old  Female who presents with a chief complaint of     HPI:     82-year-old female past medical history of hyponatremia, Cataracts, AMS, presenting for weakness and rash.  Daughter at bedside states for the past 1 to 2 days patient has been more tired and more confused, where she is typically AO x 3 and ambulatory with a walker she has been able to walk around as easily and does not answer questions to where she usually does.  Originally had a pimple on her chin starting few days ago and since this morning has been worsening redness to the right side of her face and jaw, with some whitish-yellow colored drainage. Rash is more painful this morning. No fevers, chills.  No vomiting, diarrhea, other stool changes at home.  No urinary complaints, shortness of breath, chest pain.  No recent travel, sick contacts. No falls. Family not present at my exam, patient knows she is int [facility] and reporting pain in her chin. Says she has been having headaches for years. No further history available.    ER: afebrile, no leucocytosis  UA 15 WBC      prior hospital charts reviewed [  ]  primary team notes reviewed [  x]  other consultant notes reviewed [  x]    PAST MEDICAL & SURGICAL HISTORY:  Bilateral cataracts      Syncope      H/O elbow surgery      H/O fracture of leg      H/O surgical procedure  ILR 10/2021          Allergies  Allergy Status Unknown    ANTIMICROBIALS (past 90 days)  MEDICATIONS  (STANDING):          MEDICATIONS  (STANDING):    SOCIAL HISTORY:  Not available    FAMILY HISTORY:  No pertinent family history in first degree relatives      REVIEW OF SYSTEMS  [x  ] ROS unobtainable because:  patient unable to provide  [  ] All other systems negative except as noted below:	    Constitutional:  [ ] fever [ ] chills  [ ] weight loss  [ ] weakness  Skin:  [ ] rash [ ] phlebitis	  Eyes: [ ] icterus [ ] pain  [ ] discharge	  ENMT: [ ] sore throat  [ ] thrush [ ] ulcers [ ] exudates  Respiratory: [ ] dyspnea [ ] hemoptysis [ ] cough [ ] sputum	  Cardiovascular:  [ ] chest pain [ ] palpitations [ ] edema	  Gastrointestinal:  [ ] nausea [ ] vomiting [ ] diarrhea [ ] constipation [ ] pain	  Genitourinary:  [ ] dysuria [ ] frequency [ ] hematuria [ ] discharge [ ] flank pain  [ ] incontinence  Musculoskeletal:  [ ] myalgias [ ] arthralgias [ ] arthritis  [ ] back pain  Neurological:  [ ] headache [ ] seizures  [ ] confusion/altered mental status  Psychiatric:  [ ] anxiety [ ] depression	  Hematology/Lymphatics:  [ ] lymphadenopathy  Endocrine:  [ ] adrenal [ ] thyroid  Allergic/Immunologic:	 [ ] transplant [ ] seasonal    Vital Signs Last 24 Hrs  T(F): 99.2 (12-26-23 @ 11:00), Max: 99.2 (12-26-23 @ 11:00)  Vital Signs Last 24 Hrs  HR: 93 (12-26-23 @ 11:00) (78 - 93)  BP: 199/81 (12-26-23 @ 11:00) (163/101 - 199/81)  RR: 18 (12-26-23 @ 11:00)  SpO2: 100% (12-26-23 @ 11:00) (98% - 100%)  Wt(kg): --    PHYSICAL EXAM:    General: Patient in NAD  HEENT: NCAT, EOMI, PERRL, no oral lesions  CV: S1+S2, no m/r/g appreciated   Lungs: No respiratory distress, CTAB  Abd: Soft, nontender, no guarding, no rebound tenderness, + bowel sounds   : No suprapubic tenderness  Neuro: Alert and orientedX2, Neck supple, moves all extremities  Ext: No cyanosis, no edema  Skin: Papillomacular rash with crusting and surrounding erythema in Rt face and chin; not crossing midline                              12.5   8.25  )-----------( 313      ( 26 Dec 2023 09:55 )             35.6   12-26    129<L>  |  94<L>  |  11  ----------------------------<  130<H>  3.1<L>   |  22  |  0.48<L>    Ca    7.9<L>      26 Dec 2023 14:06    TPro  8.3  /  Alb  4.0  /  TBili  0.5  /  DBili  x   /  AST  44<H>  /  ALT  15  /  AlkPhos  117  12-26    Urinalysis Basic - ( 26 Dec 2023 14:06 )    Color: x / Appearance: x / SG: x / pH: x  Gluc: 130 mg/dL / Ketone: x  / Bili: x / Urobili: x   Blood: x / Protein: x / Nitrite: x   Leuk Esterase: x / RBC: x / WBC x   Sq Epi: x / Non Sq Epi: x / Bacteria: x    MICROBIOLOGY:              RADIOLOGY:  imaging below personally reviewed and agree with findings    < from: Xray Chest 1 View- PORTABLE-Urgent (Xray Chest 1 View- PORTABLE-Urgent .) (12.26.23 @ 08:48) >    ACC: [id number] EXAM:  XR CHEST PORTABLE URGENT 1V   ORDERED BY: HENRY GONZALEZ     PROCEDURE DATE:  12/26/2023          INTERPRETATION:  EXAMINATION: XR CHEST URGENT    CLINICAL INDICATION: eval acute infectious process    TECHNIQUE: Single frontal, portable view of the chest was obtained.    COMPARISON: Chest x-ray 3/14/2022.    FINDINGS:  Loop recorder.    The heart is enlarged on this projection.  The lungs are clear.  No pneumothorax.  Trace bilateral pleural effusions.  No acute bony abnormality.    IMPRESSION:  No focal opacities.  Trace bilateral pleural effusions.    < end of copied text >   Patient is a 82y old  Female who presents with a chief complaint of     HPI:     82-year-old female past medical history of hyponatremia, Cataracts, AMS, presenting for weakness and rash.  Daughter at bedside states for the past 1 to 2 days patient has been more tired and more confused, where she is typically AO x 3 and ambulatory with a walker she has been able to walk around as easily and does not answer questions to where she usually does.  Originally had a pimple on her chin starting few days ago and since this morning has been worsening redness to the right side of her face and jaw, with some whitish-yellow colored drainage. Rash is more painful this morning. No fevers, chills.  No vomiting, diarrhea, other stool changes at home.  No urinary complaints, shortness of breath, chest pain.  No recent travel, sick contacts. No falls. Family not present at my exam, patient knows she is int [facility] and reporting pain in her chin. Says she has been having headaches for years. No further history available.    ER: afebrile, no leucocytosis  UA 15 WBC      prior hospital charts reviewed [  ]  primary team notes reviewed [  x]  other consultant notes reviewed [  x]    PAST MEDICAL & SURGICAL HISTORY:  Bilateral cataracts      Syncope      H/O elbow surgery      H/O fracture of leg      H/O surgical procedure  ILR 10/2021          Allergies  Allergy Status Unknown    ANTIMICROBIALS (past 90 days)  MEDICATIONS  (STANDING):          MEDICATIONS  (STANDING):    SOCIAL HISTORY:  Not available  No known history of tobacco or illicit drugs    FAMILY HISTORY:  No pertinent family history in first degree relatives: dementia      REVIEW OF SYSTEMS  [x  ] ROS unobtainable because:  patient unable to provide  [  ] All other systems negative except as noted below:	    Constitutional:  [ ] fever [ ] chills  [ ] weight loss  [ ] weakness  Skin:  [ ] rash [ ] phlebitis	  Eyes: [ ] icterus [ ] pain  [ ] discharge	  ENMT: [ ] sore throat  [ ] thrush [ ] ulcers [ ] exudates  Respiratory: [ ] dyspnea [ ] hemoptysis [ ] cough [ ] sputum	  Cardiovascular:  [ ] chest pain [ ] palpitations [ ] edema	  Gastrointestinal:  [ ] nausea [ ] vomiting [ ] diarrhea [ ] constipation [ ] pain	  Genitourinary:  [ ] dysuria [ ] frequency [ ] hematuria [ ] discharge [ ] flank pain  [ ] incontinence  Musculoskeletal:  [ ] myalgias [ ] arthralgias [ ] arthritis  [ ] back pain  Neurological:  [ ] headache [ ] seizures  [ ] confusion/altered mental status  Psychiatric:  [ ] anxiety [ ] depression	  Hematology/Lymphatics:  [ ] lymphadenopathy  Endocrine:  [ ] adrenal [ ] thyroid  Allergic/Immunologic:	 [ ] transplant [ ] seasonal    Vital Signs Last 24 Hrs  T(F): 99.2 (12-26-23 @ 11:00), Max: 99.2 (12-26-23 @ 11:00)  Vital Signs Last 24 Hrs  HR: 93 (12-26-23 @ 11:00) (78 - 93)  BP: 199/81 (12-26-23 @ 11:00) (163/101 - 199/81)  RR: 18 (12-26-23 @ 11:00)  SpO2: 100% (12-26-23 @ 11:00) (98% - 100%)  Wt(kg): --    PHYSICAL EXAM:    General: Patient in NAD  HEENT: NCAT, EOMI, PERRL, no oral lesions  CV: S1+S2, no m/r/g appreciated   Lungs: No respiratory distress, CTAB  Abd: Soft, nontender, no guarding, no rebound tenderness, + bowel sounds   : No suprapubic tenderness  Neuro: Alert and orientedX2, Neck supple, moves all extremities  Ext: No cyanosis, no edema  Skin: Papillomacular rash with crusting and surrounding erythema in Rt face and chin; not crossing midline                              12.5   8.25  )-----------( 313      ( 26 Dec 2023 09:55 )             35.6   12-26    129<L>  |  94<L>  |  11  ----------------------------<  130<H>  3.1<L>   |  22  |  0.48<L>    Ca    7.9<L>      26 Dec 2023 14:06    TPro  8.3  /  Alb  4.0  /  TBili  0.5  /  DBili  x   /  AST  44<H>  /  ALT  15  /  AlkPhos  117  12-26    Urinalysis Basic - ( 26 Dec 2023 14:06 )    Color: x / Appearance: x / SG: x / pH: x  Gluc: 130 mg/dL / Ketone: x  / Bili: x / Urobili: x   Blood: x / Protein: x / Nitrite: x   Leuk Esterase: x / RBC: x / WBC x   Sq Epi: x / Non Sq Epi: x / Bacteria: x    MICROBIOLOGY:              RADIOLOGY:  imaging below personally reviewed and agree with findings    < from: Xray Chest 1 View- PORTABLE-Urgent (Xray Chest 1 View- PORTABLE-Urgent .) (12.26.23 @ 08:48) >    ACC: [id number] EXAM:  XR CHEST PORTABLE URGENT 1V   ORDERED BY: HENRY GONZALEZ     PROCEDURE DATE:  12/26/2023          INTERPRETATION:  EXAMINATION: XR CHEST URGENT    CLINICAL INDICATION: eval acute infectious process    TECHNIQUE: Single frontal, portable view of the chest was obtained.    COMPARISON: Chest x-ray 3/14/2022.    FINDINGS:  Loop recorder.    The heart is enlarged on this projection.  The lungs are clear.  No pneumothorax.  Trace bilateral pleural effusions.  No acute bony abnormality.    IMPRESSION:  No focal opacities.  Trace bilateral pleural effusions.    < end of copied text >   Patient is a 82y old  Female who presents with a chief complaint of     HPI:     82-year-old female past medical history of hyponatremia, Cataracts, AMS, presenting for weakness and rash.  Daughter at bedside states for the past 1 to 2 days patient has been more tired and more confused, where she is typically AO x 3 and ambulatory with a walker she has been able to walk around as easily and does not answer questions to where she usually does.  Originally had a pimple on her chin starting few days ago and since this morning has been worsening redness to the right side of her face and jaw, with some whitish-yellow colored drainage. Rash is more painful this morning. No fevers, chills.  No vomiting, diarrhea, other stool changes at home.  No urinary complaints, shortness of breath, chest pain.  No recent travel, sick contacts. No falls. Family not present at my exam, patient knows she is int [facility] and reporting pain in her chin. Says she has been having headaches for years. No further history available.    ER: afebrile, no leucocytosis  UA 15 WBC      prior hospital charts reviewed [  ]  primary team notes reviewed [  x]  other consultant notes reviewed [  x]    PAST MEDICAL & SURGICAL HISTORY:  Bilateral cataracts      Syncope      H/O elbow surgery      H/O fracture of leg      H/O surgical procedure  ILR 10/2021          Allergies  Allergy Status Unknown    ANTIMICROBIALS (past 90 days)  MEDICATIONS  (STANDING):          MEDICATIONS  (STANDING):    SOCIAL HISTORY:  Not available  No known history of tobacco or illicit drugs    FAMILY HISTORY:  No pertinent family history in first degree relatives: dementia      REVIEW OF SYSTEMS  [x  ] ROS unobtainable because:  patient unable to provide  [  ] All other systems negative except as noted below:	    Constitutional:  [ ] fever [ ] chills  [ ] weight loss  [ ] weakness  Skin:  [ ] rash [ ] phlebitis	  Eyes: [ ] icterus [ ] pain  [ ] discharge	  ENMT: [ ] sore throat  [ ] thrush [ ] ulcers [ ] exudates  Respiratory: [ ] dyspnea [ ] hemoptysis [ ] cough [ ] sputum	  Cardiovascular:  [ ] chest pain [ ] palpitations [ ] edema	  Gastrointestinal:  [ ] nausea [ ] vomiting [ ] diarrhea [ ] constipation [ ] pain	  Genitourinary:  [ ] dysuria [ ] frequency [ ] hematuria [ ] discharge [ ] flank pain  [ ] incontinence  Musculoskeletal:  [ ] myalgias [ ] arthralgias [ ] arthritis  [ ] back pain  Neurological:  [ ] headache [ ] seizures  [ ] confusion/altered mental status  Psychiatric:  [ ] anxiety [ ] depression	  Hematology/Lymphatics:  [ ] lymphadenopathy  Endocrine:  [ ] adrenal [ ] thyroid  Allergic/Immunologic:	 [ ] transplant [ ] seasonal    Vital Signs Last 24 Hrs  T(F): 99.2 (12-26-23 @ 11:00), Max: 99.2 (12-26-23 @ 11:00)  Vital Signs Last 24 Hrs  HR: 93 (12-26-23 @ 11:00) (78 - 93)  BP: 199/81 (12-26-23 @ 11:00) (163/101 - 199/81)  RR: 18 (12-26-23 @ 11:00)  SpO2: 100% (12-26-23 @ 11:00) (98% - 100%)  Wt(kg): --    PHYSICAL EXAM:    General: Patient in NAD  HEENT: NCAT, EOMI, PERRL, no oral lesions  CV: S1+S2, no m/r/g appreciated   Lungs: No respiratory distress, CTAB  Abd: Soft, nontender, no guarding, no rebound tenderness, + bowel sounds   : No suprapubic tenderness  Neuro: Alert and orientedX2, Neck supple, moves all extremities  Ext: No cyanosis, no edema  Skin: Papillomacular rash with crusting and surrounding erythema in Rt face and chin; not crossing midline                              12.5   8.25  )-----------( 313      ( 26 Dec 2023 09:55 )             35.6   12-26    129<L>  |  94<L>  |  11  ----------------------------<  130<H>  3.1<L>   |  22  |  0.48<L>    Ca    7.9<L>      26 Dec 2023 14:06    TPro  8.3  /  Alb  4.0  /  TBili  0.5  /  DBili  x   /  AST  44<H>  /  ALT  15  /  AlkPhos  117  12-26    Urinalysis Basic - ( 26 Dec 2023 14:06 )    Color: x / Appearance: x / SG: x / pH: x  Gluc: 130 mg/dL / Ketone: x  / Bili: x / Urobili: x   Blood: x / Protein: x / Nitrite: x   Leuk Esterase: x / RBC: x / WBC x   Sq Epi: x / Non Sq Epi: x / Bacteria: x    MICROBIOLOGY:              RADIOLOGY:  imaging below personally reviewed and agree with findings    < from: Xray Chest 1 View- PORTABLE-Urgent (Xray Chest 1 View- PORTABLE-Urgent .) (12.26.23 @ 08:48) >    ACC: [id number] EXAM:  XR CHEST PORTABLE URGENT 1V   ORDERED BY: HENRY GONZALEZ     PROCEDURE DATE:  12/26/2023          INTERPRETATION:  EXAMINATION: XR CHEST URGENT    CLINICAL INDICATION: eval acute infectious process    TECHNIQUE: Single frontal, portable view of the chest was obtained.    COMPARISON: Chest x-ray 3/14/2022.    FINDINGS:  Loop recorder.    The heart is enlarged on this projection.  The lungs are clear.  No pneumothorax.  Trace bilateral pleural effusions.  No acute bony abnormality.    IMPRESSION:  No focal opacities.  Trace bilateral pleural effusions.    < end of copied text >

## 2023-12-27 LAB
ANION GAP SERPL CALC-SCNC: 13 MMOL/L — SIGNIFICANT CHANGE UP (ref 7–14)
ANION GAP SERPL CALC-SCNC: 13 MMOL/L — SIGNIFICANT CHANGE UP (ref 7–14)
ANION GAP SERPL CALC-SCNC: 15 MMOL/L — HIGH (ref 7–14)
ANION GAP SERPL CALC-SCNC: 15 MMOL/L — HIGH (ref 7–14)
ANION GAP SERPL CALC-SCNC: 16 MMOL/L — HIGH (ref 7–14)
ANION GAP SERPL CALC-SCNC: 16 MMOL/L — HIGH (ref 7–14)
APPEARANCE UR: CLEAR — SIGNIFICANT CHANGE UP
APPEARANCE UR: CLEAR — SIGNIFICANT CHANGE UP
APTT BLD: 25.6 SEC — SIGNIFICANT CHANGE UP (ref 24.5–35.6)
APTT BLD: 25.6 SEC — SIGNIFICANT CHANGE UP (ref 24.5–35.6)
BACTERIA # UR AUTO: ABNORMAL /HPF
BACTERIA # UR AUTO: ABNORMAL /HPF
BASE EXCESS BLDV CALC-SCNC: -2.3 MMOL/L — LOW (ref -2–3)
BASE EXCESS BLDV CALC-SCNC: -2.3 MMOL/L — LOW (ref -2–3)
BILIRUB UR-MCNC: NEGATIVE — SIGNIFICANT CHANGE UP
BILIRUB UR-MCNC: NEGATIVE — SIGNIFICANT CHANGE UP
BUN SERPL-MCNC: 10 MG/DL — SIGNIFICANT CHANGE UP (ref 7–23)
CA-I SERPL-SCNC: 1.17 MMOL/L — SIGNIFICANT CHANGE UP (ref 1.15–1.33)
CA-I SERPL-SCNC: 1.17 MMOL/L — SIGNIFICANT CHANGE UP (ref 1.15–1.33)
CALCIUM SERPL-MCNC: 8.3 MG/DL — LOW (ref 8.4–10.5)
CALCIUM SERPL-MCNC: 8.5 MG/DL — SIGNIFICANT CHANGE UP (ref 8.4–10.5)
CALCIUM SERPL-MCNC: 8.5 MG/DL — SIGNIFICANT CHANGE UP (ref 8.4–10.5)
CAST: 0 /LPF — SIGNIFICANT CHANGE UP (ref 0–4)
CAST: 0 /LPF — SIGNIFICANT CHANGE UP (ref 0–4)
CHLORIDE BLDV-SCNC: 92 MMOL/L — LOW (ref 96–108)
CHLORIDE BLDV-SCNC: 92 MMOL/L — LOW (ref 96–108)
CHLORIDE SERPL-SCNC: 89 MMOL/L — LOW (ref 98–107)
CHLORIDE SERPL-SCNC: 89 MMOL/L — LOW (ref 98–107)
CHLORIDE SERPL-SCNC: 91 MMOL/L — LOW (ref 98–107)
CHLORIDE SERPL-SCNC: 91 MMOL/L — LOW (ref 98–107)
CHLORIDE SERPL-SCNC: 92 MMOL/L — LOW (ref 98–107)
CHLORIDE SERPL-SCNC: 92 MMOL/L — LOW (ref 98–107)
CO2 BLDV-SCNC: 21.8 MMOL/L — LOW (ref 22–26)
CO2 BLDV-SCNC: 21.8 MMOL/L — LOW (ref 22–26)
CO2 SERPL-SCNC: 17 MMOL/L — LOW (ref 22–31)
CO2 SERPL-SCNC: 17 MMOL/L — LOW (ref 22–31)
CO2 SERPL-SCNC: 18 MMOL/L — LOW (ref 22–31)
COLOR SPEC: YELLOW — SIGNIFICANT CHANGE UP
COLOR SPEC: YELLOW — SIGNIFICANT CHANGE UP
CREAT ?TM UR-MCNC: 54 MG/DL — SIGNIFICANT CHANGE UP
CREAT ?TM UR-MCNC: 54 MG/DL — SIGNIFICANT CHANGE UP
CREAT SERPL-MCNC: 0.43 MG/DL — LOW (ref 0.5–1.3)
CREAT SERPL-MCNC: 0.43 MG/DL — LOW (ref 0.5–1.3)
CREAT SERPL-MCNC: 0.44 MG/DL — LOW (ref 0.5–1.3)
CREAT SERPL-MCNC: 0.44 MG/DL — LOW (ref 0.5–1.3)
CREAT SERPL-MCNC: 0.49 MG/DL — LOW (ref 0.5–1.3)
CREAT SERPL-MCNC: 0.49 MG/DL — LOW (ref 0.5–1.3)
CULTURE RESULTS: SIGNIFICANT CHANGE UP
CULTURE RESULTS: SIGNIFICANT CHANGE UP
DIFF PNL FLD: NEGATIVE — SIGNIFICANT CHANGE UP
DIFF PNL FLD: NEGATIVE — SIGNIFICANT CHANGE UP
EGFR: 94 ML/MIN/1.73M2 — SIGNIFICANT CHANGE UP
EGFR: 94 ML/MIN/1.73M2 — SIGNIFICANT CHANGE UP
EGFR: 97 ML/MIN/1.73M2 — SIGNIFICANT CHANGE UP
GAS PNL BLDV: 115 MMOL/L — CRITICAL LOW (ref 136–145)
GAS PNL BLDV: 115 MMOL/L — CRITICAL LOW (ref 136–145)
GAS PNL BLDV: SIGNIFICANT CHANGE UP
GLUCOSE BLDC GLUCOMTR-MCNC: 100 MG/DL — HIGH (ref 70–99)
GLUCOSE BLDC GLUCOMTR-MCNC: 100 MG/DL — HIGH (ref 70–99)
GLUCOSE BLDV-MCNC: 99 MG/DL — SIGNIFICANT CHANGE UP (ref 70–99)
GLUCOSE BLDV-MCNC: 99 MG/DL — SIGNIFICANT CHANGE UP (ref 70–99)
GLUCOSE SERPL-MCNC: 100 MG/DL — HIGH (ref 70–99)
GLUCOSE SERPL-MCNC: 100 MG/DL — HIGH (ref 70–99)
GLUCOSE SERPL-MCNC: 105 MG/DL — HIGH (ref 70–99)
GLUCOSE SERPL-MCNC: 105 MG/DL — HIGH (ref 70–99)
GLUCOSE SERPL-MCNC: 93 MG/DL — SIGNIFICANT CHANGE UP (ref 70–99)
GLUCOSE SERPL-MCNC: 93 MG/DL — SIGNIFICANT CHANGE UP (ref 70–99)
GLUCOSE UR QL: NEGATIVE MG/DL — SIGNIFICANT CHANGE UP
GLUCOSE UR QL: NEGATIVE MG/DL — SIGNIFICANT CHANGE UP
HCO3 BLDV-SCNC: 21 MMOL/L — LOW (ref 22–29)
HCO3 BLDV-SCNC: 21 MMOL/L — LOW (ref 22–29)
HCT VFR BLD CALC: 35.3 % — SIGNIFICANT CHANGE UP (ref 34.5–45)
HCT VFR BLD CALC: 35.3 % — SIGNIFICANT CHANGE UP (ref 34.5–45)
HCT VFR BLDA CALC: 33 % — LOW (ref 34.5–46.5)
HCT VFR BLDA CALC: 33 % — LOW (ref 34.5–46.5)
HGB BLD CALC-MCNC: 11.1 G/DL — LOW (ref 11.7–16.1)
HGB BLD CALC-MCNC: 11.1 G/DL — LOW (ref 11.7–16.1)
HGB BLD-MCNC: 12.7 G/DL — SIGNIFICANT CHANGE UP (ref 11.5–15.5)
HGB BLD-MCNC: 12.7 G/DL — SIGNIFICANT CHANGE UP (ref 11.5–15.5)
INR BLD: 0.99 RATIO — SIGNIFICANT CHANGE UP (ref 0.85–1.18)
INR BLD: 0.99 RATIO — SIGNIFICANT CHANGE UP (ref 0.85–1.18)
KETONES UR-MCNC: 15 MG/DL
KETONES UR-MCNC: 15 MG/DL
LACTATE BLDV-MCNC: 1.4 MMOL/L — SIGNIFICANT CHANGE UP (ref 0.5–2)
LACTATE BLDV-MCNC: 1.4 MMOL/L — SIGNIFICANT CHANGE UP (ref 0.5–2)
LEUKOCYTE ESTERASE UR-ACNC: ABNORMAL
LEUKOCYTE ESTERASE UR-ACNC: ABNORMAL
MAGNESIUM SERPL-MCNC: 1.9 MG/DL — SIGNIFICANT CHANGE UP (ref 1.6–2.6)
MCHC RBC-ENTMCNC: 29.5 PG — SIGNIFICANT CHANGE UP (ref 27–34)
MCHC RBC-ENTMCNC: 29.5 PG — SIGNIFICANT CHANGE UP (ref 27–34)
MCHC RBC-ENTMCNC: 36 GM/DL — SIGNIFICANT CHANGE UP (ref 32–36)
MCHC RBC-ENTMCNC: 36 GM/DL — SIGNIFICANT CHANGE UP (ref 32–36)
MCV RBC AUTO: 82.1 FL — SIGNIFICANT CHANGE UP (ref 80–100)
MCV RBC AUTO: 82.1 FL — SIGNIFICANT CHANGE UP (ref 80–100)
NITRITE UR-MCNC: NEGATIVE — SIGNIFICANT CHANGE UP
NITRITE UR-MCNC: NEGATIVE — SIGNIFICANT CHANGE UP
NRBC # BLD: 0 /100 WBCS — SIGNIFICANT CHANGE UP (ref 0–0)
NRBC # BLD: 0 /100 WBCS — SIGNIFICANT CHANGE UP (ref 0–0)
NRBC # FLD: 0 K/UL — SIGNIFICANT CHANGE UP (ref 0–0)
NRBC # FLD: 0 K/UL — SIGNIFICANT CHANGE UP (ref 0–0)
OSMOLALITY SERPL: 261 MOSM/KG — LOW (ref 275–295)
OSMOLALITY SERPL: 261 MOSM/KG — LOW (ref 275–295)
OSMOLALITY UR: 577 MOSM/KG — SIGNIFICANT CHANGE UP (ref 50–1200)
OSMOLALITY UR: 577 MOSM/KG — SIGNIFICANT CHANGE UP (ref 50–1200)
PCO2 BLDV: 30 MMHG — LOW (ref 39–52)
PCO2 BLDV: 30 MMHG — LOW (ref 39–52)
PH BLDV: 7.45 — HIGH (ref 7.32–7.43)
PH BLDV: 7.45 — HIGH (ref 7.32–7.43)
PH UR: 7 — SIGNIFICANT CHANGE UP (ref 5–8)
PH UR: 7 — SIGNIFICANT CHANGE UP (ref 5–8)
PHOSPHATE SERPL-MCNC: 2.3 MG/DL — LOW (ref 2.5–4.5)
PLATELET # BLD AUTO: 276 K/UL — SIGNIFICANT CHANGE UP (ref 150–400)
PLATELET # BLD AUTO: 276 K/UL — SIGNIFICANT CHANGE UP (ref 150–400)
PO2 BLDV: 59 MMHG — HIGH (ref 25–45)
PO2 BLDV: 59 MMHG — HIGH (ref 25–45)
POTASSIUM BLDV-SCNC: 3.7 MMOL/L — SIGNIFICANT CHANGE UP (ref 3.5–5.1)
POTASSIUM BLDV-SCNC: 3.7 MMOL/L — SIGNIFICANT CHANGE UP (ref 3.5–5.1)
POTASSIUM SERPL-MCNC: 3.8 MMOL/L — SIGNIFICANT CHANGE UP (ref 3.5–5.3)
POTASSIUM SERPL-MCNC: 3.8 MMOL/L — SIGNIFICANT CHANGE UP (ref 3.5–5.3)
POTASSIUM SERPL-MCNC: 4 MMOL/L — SIGNIFICANT CHANGE UP (ref 3.5–5.3)
POTASSIUM SERPL-SCNC: 3.8 MMOL/L — SIGNIFICANT CHANGE UP (ref 3.5–5.3)
POTASSIUM SERPL-SCNC: 3.8 MMOL/L — SIGNIFICANT CHANGE UP (ref 3.5–5.3)
POTASSIUM SERPL-SCNC: 4 MMOL/L — SIGNIFICANT CHANGE UP (ref 3.5–5.3)
POTASSIUM UR-SCNC: 43.3 MMOL/L — SIGNIFICANT CHANGE UP
POTASSIUM UR-SCNC: 43.3 MMOL/L — SIGNIFICANT CHANGE UP
PROT ?TM UR-MCNC: 21 MG/DL — SIGNIFICANT CHANGE UP
PROT ?TM UR-MCNC: 21 MG/DL — SIGNIFICANT CHANGE UP
PROT UR-MCNC: 30 MG/DL
PROT UR-MCNC: 30 MG/DL
PROT/CREAT UR-RTO: 0.4 RATIO — HIGH (ref 0–0.2)
PROT/CREAT UR-RTO: 0.4 RATIO — HIGH (ref 0–0.2)
PROTHROM AB SERPL-ACNC: 11.2 SEC — SIGNIFICANT CHANGE UP (ref 9.5–13)
PROTHROM AB SERPL-ACNC: 11.2 SEC — SIGNIFICANT CHANGE UP (ref 9.5–13)
RBC # BLD: 4.3 M/UL — SIGNIFICANT CHANGE UP (ref 3.8–5.2)
RBC # BLD: 4.3 M/UL — SIGNIFICANT CHANGE UP (ref 3.8–5.2)
RBC # FLD: 11.9 % — SIGNIFICANT CHANGE UP (ref 10.3–14.5)
RBC # FLD: 11.9 % — SIGNIFICANT CHANGE UP (ref 10.3–14.5)
RBC CASTS # UR COMP ASSIST: 5 /HPF — HIGH (ref 0–4)
RBC CASTS # UR COMP ASSIST: 5 /HPF — HIGH (ref 0–4)
REVIEW: SIGNIFICANT CHANGE UP
REVIEW: SIGNIFICANT CHANGE UP
SAO2 % BLDV: 91.4 % — HIGH (ref 67–88)
SAO2 % BLDV: 91.4 % — HIGH (ref 67–88)
SODIUM SERPL-SCNC: 122 MMOL/L — LOW (ref 135–145)
SODIUM SERPL-SCNC: 122 MMOL/L — LOW (ref 135–145)
SODIUM SERPL-SCNC: 123 MMOL/L — LOW (ref 135–145)
SODIUM SERPL-SCNC: 123 MMOL/L — LOW (ref 135–145)
SODIUM SERPL-SCNC: 124 MMOL/L — LOW (ref 135–145)
SODIUM SERPL-SCNC: 124 MMOL/L — LOW (ref 135–145)
SODIUM UR-SCNC: 140 MMOL/L — SIGNIFICANT CHANGE UP
SODIUM UR-SCNC: 140 MMOL/L — SIGNIFICANT CHANGE UP
SP GR SPEC: 1.02 — SIGNIFICANT CHANGE UP (ref 1–1.03)
SP GR SPEC: 1.02 — SIGNIFICANT CHANGE UP (ref 1–1.03)
SPECIMEN SOURCE: SIGNIFICANT CHANGE UP
SPECIMEN SOURCE: SIGNIFICANT CHANGE UP
SQUAMOUS # UR AUTO: 3 /HPF — SIGNIFICANT CHANGE UP (ref 0–5)
SQUAMOUS # UR AUTO: 3 /HPF — SIGNIFICANT CHANGE UP (ref 0–5)
URATE SERPL-MCNC: 3.1 MG/DL — SIGNIFICANT CHANGE UP (ref 2.5–7)
URATE SERPL-MCNC: 3.1 MG/DL — SIGNIFICANT CHANGE UP (ref 2.5–7)
UROBILINOGEN FLD QL: 0.2 MG/DL — SIGNIFICANT CHANGE UP (ref 0.2–1)
UROBILINOGEN FLD QL: 0.2 MG/DL — SIGNIFICANT CHANGE UP (ref 0.2–1)
UUN UR-MCNC: 459.7 MG/DL — SIGNIFICANT CHANGE UP
UUN UR-MCNC: 459.7 MG/DL — SIGNIFICANT CHANGE UP
WBC # BLD: 8.51 K/UL — SIGNIFICANT CHANGE UP (ref 3.8–10.5)
WBC # BLD: 8.51 K/UL — SIGNIFICANT CHANGE UP (ref 3.8–10.5)
WBC # FLD AUTO: 8.51 K/UL — SIGNIFICANT CHANGE UP (ref 3.8–10.5)
WBC # FLD AUTO: 8.51 K/UL — SIGNIFICANT CHANGE UP (ref 3.8–10.5)
WBC UR QL: 7 /HPF — HIGH (ref 0–5)
WBC UR QL: 7 /HPF — HIGH (ref 0–5)

## 2023-12-27 PROCEDURE — 99222 1ST HOSP IP/OBS MODERATE 55: CPT | Mod: GC

## 2023-12-27 PROCEDURE — 99232 SBSQ HOSP IP/OBS MODERATE 35: CPT

## 2023-12-27 PROCEDURE — 70450 CT HEAD/BRAIN W/O DYE: CPT | Mod: 26

## 2023-12-27 PROCEDURE — 99232 SBSQ HOSP IP/OBS MODERATE 35: CPT | Mod: GC

## 2023-12-27 RX ORDER — SODIUM CHLORIDE 9 MG/ML
1 INJECTION INTRAMUSCULAR; INTRAVENOUS; SUBCUTANEOUS THREE TIMES A DAY
Refills: 0 | Status: DISCONTINUED | OUTPATIENT
Start: 2023-12-27 | End: 2023-12-27

## 2023-12-27 RX ORDER — POTASSIUM PHOSPHATE, MONOBASIC POTASSIUM PHOSPHATE, DIBASIC 236; 224 MG/ML; MG/ML
30 INJECTION, SOLUTION INTRAVENOUS ONCE
Refills: 0 | Status: COMPLETED | OUTPATIENT
Start: 2023-12-27 | End: 2023-12-27

## 2023-12-27 RX ORDER — INFLUENZA VIRUS VACCINE 15; 15; 15; 15 UG/.5ML; UG/.5ML; UG/.5ML; UG/.5ML
0.7 SUSPENSION INTRAMUSCULAR ONCE
Refills: 0 | Status: DISCONTINUED | OUTPATIENT
Start: 2023-12-27 | End: 2024-01-09

## 2023-12-27 RX ORDER — SODIUM CHLORIDE 9 MG/ML
1 INJECTION INTRAMUSCULAR; INTRAVENOUS; SUBCUTANEOUS THREE TIMES A DAY
Refills: 0 | Status: DISCONTINUED | OUTPATIENT
Start: 2023-12-27 | End: 2024-01-02

## 2023-12-27 RX ORDER — SODIUM CHLORIDE 9 MG/ML
1000 INJECTION INTRAMUSCULAR; INTRAVENOUS; SUBCUTANEOUS
Refills: 0 | Status: DISCONTINUED | OUTPATIENT
Start: 2023-12-27 | End: 2023-12-27

## 2023-12-27 RX ORDER — SODIUM CHLORIDE 9 MG/ML
1 INJECTION INTRAMUSCULAR; INTRAVENOUS; SUBCUTANEOUS
Refills: 0 | Status: DISCONTINUED | OUTPATIENT
Start: 2023-12-27 | End: 2023-12-27

## 2023-12-27 RX ORDER — ACETAMINOPHEN 500 MG
1000 TABLET ORAL ONCE
Refills: 0 | Status: COMPLETED | OUTPATIENT
Start: 2023-12-27 | End: 2023-12-27

## 2023-12-27 RX ORDER — SODIUM CHLORIDE 5 G/100ML
500 INJECTION, SOLUTION INTRAVENOUS
Refills: 0 | Status: COMPLETED | OUTPATIENT
Start: 2023-12-27 | End: 2023-12-27

## 2023-12-27 RX ORDER — AMPICILLIN SODIUM AND SULBACTAM SODIUM 250; 125 MG/ML; MG/ML
3 INJECTION, POWDER, FOR SUSPENSION INTRAMUSCULAR; INTRAVENOUS EVERY 6 HOURS
Refills: 0 | Status: DISCONTINUED | OUTPATIENT
Start: 2023-12-27 | End: 2024-01-01

## 2023-12-27 RX ADMIN — Medication 400 MILLIGRAM(S): at 08:55

## 2023-12-27 RX ADMIN — AMPICILLIN SODIUM AND SULBACTAM SODIUM 200 GRAM(S): 250; 125 INJECTION, POWDER, FOR SUSPENSION INTRAMUSCULAR; INTRAVENOUS at 13:05

## 2023-12-27 RX ADMIN — POTASSIUM PHOSPHATE, MONOBASIC POTASSIUM PHOSPHATE, DIBASIC 83.33 MILLIMOLE(S): 236; 224 INJECTION, SOLUTION INTRAVENOUS at 10:31

## 2023-12-27 RX ADMIN — SODIUM CHLORIDE 100 MILLILITER(S): 9 INJECTION INTRAMUSCULAR; INTRAVENOUS; SUBCUTANEOUS at 10:20

## 2023-12-27 RX ADMIN — AMPICILLIN SODIUM AND SULBACTAM SODIUM 200 GRAM(S): 250; 125 INJECTION, POWDER, FOR SUSPENSION INTRAMUSCULAR; INTRAVENOUS at 18:05

## 2023-12-27 RX ADMIN — Medication 1000 MILLIGRAM(S): at 09:15

## 2023-12-27 RX ADMIN — AMPICILLIN SODIUM AND SULBACTAM SODIUM 200 GRAM(S): 250; 125 INJECTION, POWDER, FOR SUSPENSION INTRAMUSCULAR; INTRAVENOUS at 06:51

## 2023-12-27 RX ADMIN — Medication 110 MILLIGRAM(S): at 18:16

## 2023-12-27 RX ADMIN — Medication 110 MILLIGRAM(S): at 06:51

## 2023-12-27 RX ADMIN — SODIUM CHLORIDE 30 MILLILITER(S): 5 INJECTION, SOLUTION INTRAVENOUS at 21:49

## 2023-12-27 RX ADMIN — SODIUM CHLORIDE 1 GRAM(S): 9 INJECTION INTRAMUSCULAR; INTRAVENOUS; SUBCUTANEOUS at 18:07

## 2023-12-27 NOTE — PROGRESS NOTE ADULT - PROBLEM SELECTOR PLAN 3
Patient A&Ox2 at time of interview, reported to be A&Ox3 at baseline. May be in the setting of hyponatremia.  -CT head noncon  -MRI head with IV contrast per ID Reported to be A&Ox3 at baseline. Now with worsening mental status, likely in the setting of hyponatremia.  -CT head non contrast  -MRI head with IV contrast per ID  -Treatment of hyponatremia as above

## 2023-12-27 NOTE — CONSULT NOTE ADULT - ASSESSMENT
83F whom presented to the hospital with weakness, confusion, and facial rash, found to be persistently hyponatremic despite receiving 2L of NS. 83F whom presented to the hospital with weakness, confusion, and facial rash, found to have encephalopathy and acute on chronic hyponatremia

## 2023-12-27 NOTE — PROGRESS NOTE ADULT - PROBLEM SELECTOR PLAN 2
Na 123 --> 129 on admission. 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 Patient s/p 2L NS in ED.  -f/u repeat BMP to recheck Na, ordered stat  -f/u urine studies Na 123 on admission. 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 Patient s/p 2L NS in ED. 12/27 Na 122.  -Nephrology consulted, f/u recs  -Will try NS at 100cc/hr for now  -Repeat BMP in afternoon

## 2023-12-27 NOTE — ED ADULT NURSE REASSESSMENT NOTE - NS ED NURSE REASSESS COMMENT FT1
report given to Bela germain RN, all questions answered. pt currently A&OX2. pt cleaned, linens changed, and new diaper applied. pt resting in stretcher in NAD, RR even and unlabored. safety measures maintained, side rails up x2. awaiting transportation to unit.

## 2023-12-27 NOTE — PROGRESS NOTE ADULT - PROBLEM SELECTOR PLAN 1
-ID consulted, appreciate recs  -f/u HSV/VZV lesion PCR  -Airborne/contact precautions  -Obtain LP per ID --> spoke with ID attending, ideally would get LP before acyclovir but if logistically challenging can give acyclovir first. Will obtain meningoencephalitis panel, cell count, glucose, protein etc., will f/u with finalized ID note.  -Acyclovir 10 mg/Kg Q8hrs; adjust renally based on Cr clearance --> no weight available, getting stat weight  -Maintenance IV fluids while on acyclovir  -Augmentin 825-125 mg PO BID -ID consulted, appreciate recs  -f/u HSV/VZV lesion PCR  -Airborne/contact precautions  -Obtain LP per ID --> spoke with ID attending, ideally would get LP before acyclovir but if logistically challenging can give acyclovir first. Will obtain meningoencephalitis panel, cell count, glucose, protein etc., will f/u with finalized ID note.  -Acyclovir 10 mg/Kg q12h  -Maintenance IV fluids while on acyclovir  -Augmentin 825-125 mg PO BID

## 2023-12-27 NOTE — CONSULT NOTE ADULT - CONSULT REQUESTED BY NAME
ED TO INPATIENT HANDOFF NOTE:    ADMISSION DIAGNOSIS:   Alcohol intoxication with delirium (CMS/HCC) [F10.921]    VITALS:  Vitals:    01/24/23 2315 01/24/23 2330 01/24/23 2345   Pulse: 76 84 98   Resp: 16 17 18   SpO2: 93% 93% 97%   BP: 100/58 102/55 109/55       Oxygen Needs  room air    Mental Status   Alert and oriented    Safety     History of violent behavior    Tele: Yes      RHYTHM: NSR      Tele Box:[phone]  Final Check completed with centralized telemetry unit prior to ED transfer.     Mobility    Unsteady Gait and Up with 1 Assist    Isolation  None    Residence:   House    Additional Information:     ER Nurse: Rosemary Amezquita RN     Phone:[phone]  
NA
Pt dry heaving and feeling that he is going to vomit, medication orders received.   
Primary team

## 2023-12-27 NOTE — PATIENT PROFILE ADULT - FUNCTIONAL ASSESSMENT - BASIC MOBILITY 6.
2-calculated by average/Not able to assess (calculate score using [facility] averaging method) 2-calculated by average/Not able to assess (calculate score using [facility] averaging method)

## 2023-12-27 NOTE — CONSULT NOTE ADULT - SUBJECTIVE AND OBJECTIVE BOX
NEPHROLOGY CONSULTATION NOTE    83F whom presented to the hospital with weakness, confusion, and facial rash. The patient is very lethargic, unable to participate in the interview. Per her daughter at bedside, she is normally independent, interactive, alert, and conversant.    PAST MEDICAL & SURGICAL HISTORY:  Bilateral cataracts      Syncope      H/O elbow surgery      H/O fracture of leg      H/O surgical procedure  ILR 10/2021        Allergies:  Allergy Status Unknown    Home Medications Reviewed  Hospital Medications:   MEDICATIONS  (STANDING):  acyclovir IVPB 500 milliGRAM(s) IV Intermittent every 12 hours  ampicillin/sulbactam  IVPB 3 Gram(s) IV Intermittent every 6 hours  sodium chloride 1 Gram(s) Oral three times a day    SOCIAL HISTORY:  Denies ETOH,Smoking,   FAMILY HISTORY:  No pertinent family history in first degree relatives        REVIEW OF SYSTEMS:  CONSTITUTIONAL: No weakness, fevers or chills  EYES/ENT: No visual changes;  No vertigo or throat pain   NECK: No pain or stiffness  RESPIRATORY: No cough, wheezing, hemoptysis; No shortness of breath  CARDIOVASCULAR: No chest pain or palpitations.  GASTROINTESTINAL: No abdominal or epigastric pain. No nausea, vomiting, or hematemesis; No diarrhea or constipation. No melena or hematochezia.  GENITOURINARY: No dysuria, frequency, foamy urine, urinary urgency, incontinence or hematuria  NEUROLOGICAL: No numbness or weakness  SKIN: No itching, burning, rashes, or lesions   VASCULAR: No bilateral lower extremity edema.   All other review of systems is negative unless indicated above.    VITALS:  Vital Signs Last 24 Hrs  T(C): 36.7 (27 Dec 2023 10:30), Max: 37.8 (26 Dec 2023 17:06)  T(F): 98.1 (27 Dec 2023 10:30), Max: 100.1 (26 Dec 2023 17:06)  HR: 69 (27 Dec 2023 10:30) (69 - 96)  BP: 124/62 (27 Dec 2023 10:30) (124/62 - 195/70)  RR: 20 (27 Dec 2023 10:30) (18 - 22)  SpO2: 100% (27 Dec 2023 10:30) (98% - 100%)    Parameters below as of 27 Dec 2023 10:30  Patient On (Oxygen Delivery Method): room air        12-26 @ 07:01  -  12-27 @ 07:00  --------------------------------------------------------  IN: 200 mL / OUT: 0 mL / NET: 200 mL        Weight (kg): 51.7 (12-26 @ 18:42)  PHYSICAL EXAM:  Constitutional: lethargic, difficult to arouse  HEENT: anicteric sclera  Neck: No JVD  Respiratory: CTAB, no wheezes, rales or rhonchi  Cardiovascular: S1, S2, RRR  Gastrointestinal: BS+, soft, NT/ND  Extremities: No cyanosis or clubbing. No peripheral edema  Neurological: no focal deficits appreciated  Psychiatric: lethargic, sleeping  : No CVA tenderness. No moses.   Skin: crusting over facial rash    LABS:  12-27    122<L>  |  89<L>  |  10  ----------------------------<  100<H>  3.8   |  18<L>  |  0.44<L>    Ca    8.5      27 Dec 2023 05:00  Phos  2.3     12-27  Mg     1.90     12-27    TPro  8.3  /  Alb  4.0  /  TBili  0.5  /  DBili      /  AST  44<H>  /  ALT  15  /  AlkPhos  117  12-26    Creatinine Trend: 0.44 <--, 0.43 <--, 0.48 <--, 0.60 <--                        12.7   8.51  )-----------( 276      ( 27 Dec 2023 05:00 )             35.3     Urine Studies:  Urinalysis Basic - ( 27 Dec 2023 05:00 )    Color:  / Appearance:  / SG:  / pH:   Gluc: 100 mg/dL / Ketone:   / Bili:  / Urobili:    Blood:  / Protein:  / Nitrite:    Leuk Esterase:  / RBC:  / WBC    Sq Epi:  / Non Sq Epi:  / Bacteria:

## 2023-12-27 NOTE — PATIENT PROFILE ADULT - FALL HARM RISK - HARM RISK INTERVENTIONS
Assistance with ambulation/Assistance OOB with selected safe patient handling equipment/Communicate Risk of Fall with Harm to all staff/Discuss with provider need for PT consult/Monitor gait and stability/Reinforce activity limits and safety measures with patient and family/Tailored Fall Risk Interventions/Visual Cue: Yellow wristband and red socks/Bed in lowest position, wheels locked, appropriate side rails in place/Call bell, personal items and telephone in reach/Instruct patient to call for assistance before getting out of bed or chair/Non-slip footwear when patient is out of bed/[place] to call system/Physically safe environment - no spills, clutter or unnecessary equipment/Purposeful Proactive Rounding/Room/bathroom lighting operational, light cord in reach Assistance with ambulation/Assistance OOB with selected safe patient handling equipment/Communicate Risk of Fall with Harm to all staff/Discuss with provider need for PT consult/Monitor gait and stability/Reinforce activity limits and safety measures with patient and family/Tailored Fall Risk Interventions/Visual Cue: Yellow wristband and red socks/Bed in lowest position, wheels locked, appropriate side rails in place/Call bell, personal items and telephone in reach/Instruct patient to call for assistance before getting out of bed or chair/Non-slip footwear when patient is out of bed/[place] to call system/Physically safe environment - no spills, clutter or unnecessary equipment/Purposeful Proactive Rounding/Room/bathroom lighting operational, light cord in reach

## 2023-12-27 NOTE — PROGRESS NOTE ADULT - PROBLEM SELECTOR PLAN 5
Patient with elevated BP to 195/70. Med rec unable to be obtained at this time, on chart review no reported history of BP.  -Ordered PO hydralazine stat  -If no improvement overnight, night team can give IV labetalol Patient with elevated BP to 195/70. Per daughter no history of HTN, does not take antihypertensives. Improvement with PO hydralazine to SBP 150s.  -Continue to monitor  -Can consider PO hydral or IV labetalol if further BP management needed

## 2023-12-27 NOTE — PROGRESS NOTE ADULT - SUBJECTIVE AND OBJECTIVE BOX
PROGRESS NOTE:     Patient is a 83y old  Female who presents with a chief complaint of facial rash (26 Dec 2023 16:52)      SUBJECTIVE / OVERNIGHT EVENTS:  No acute events overnight. Patient examined at bedside with no acute complaints.             MEDICATIONS  (STANDING):  acyclovir IVPB 500 milliGRAM(s) IV Intermittent every 12 hours  ampicillin/sulbactam  IVPB 3 Gram(s) IV Intermittent every 6 hours  sodium chloride 1 Gram(s) Oral three times a day    MEDICATIONS  (PRN):      CAPILLARY BLOOD GLUCOSE      POCT Blood Glucose.: 100 mg/dL (27 Dec 2023 04:38)    I&O's Summary      VITALS:   T(C): 37.1 (12-27-23 @ 06:48), Max: 37.8 (12-26-23 @ 17:06)  HR: 91 (12-27-23 @ 06:48) (84 - 96)  BP: 158/88 (12-27-23 @ 06:48) (151/92 - 199/81)  RR: 20 (12-27-23 @ 06:48) (18 - 22)  SpO2: 100% (12-27-23 @ 05:02) (98% - 100%)    GENERAL: NAD, lying in bed comfortably  HEAD:  Atraumatic, normocephalic  EYES: EOMI, PERRLA, conjunctiva and sclera clear  ENT: Moist mucous membranes  NECK: Supple, no JVD  HEART: Regular rate and rhythm, no murmurs, rubs, or gallops  LUNGS: Unlabored respirations.  Clear to auscultation bilaterally, no crackles, wheezing, or rhonchi  ABDOMEN: Soft, nontender, nondistended, +BS  EXTREMITIES: 2+ peripheral pulses bilaterally. No clubbing, cyanosis, or edema  NERVOUS SYSTEM:  A&Ox3, no focal deficits   SKIN: No rashes or lesions    LABS:                        12.7   8.51  )-----------( 276      ( 27 Dec 2023 05:00 )             35.3     12-27    122<L>  |  89<L>  |  10  ----------------------------<  100<H>  3.8   |  18<L>  |  0.44<L>    Ca    8.5      27 Dec 2023 05:00  Phos  2.3     12-27  Mg     1.90     12-27    TPro  8.3  /  Alb  4.0  /  TBili  0.5  /  DBili  x   /  AST  44<H>  /  ALT  15  /  AlkPhos  117  12-26          Urinalysis Basic - ( 27 Dec 2023 05:00 )    Color: x / Appearance: x / SG: x / pH: x  Gluc: 100 mg/dL / Ketone: x  / Bili: x / Urobili: x   Blood: x / Protein: x / Nitrite: x   Leuk Esterase: x / RBC: x / WBC x   Sq Epi: x / Non Sq Epi: x / Bacteria: x         PROGRESS NOTE:     Patient is a 83y old  Female who presents with a chief complaint of facial rash (26 Dec 2023 16:52)      SUBJECTIVE / OVERNIGHT EVENTS:  RRT overnight for AMS. Patient A&Ox1 at most during RRT. Night team spoke with nephro, attempted salt tabs but patient spat out. This AM patient altered, does not respond to questions as well as yesterday. Unable to assess ROS.            MEDICATIONS  (STANDING):  acyclovir IVPB 500 milliGRAM(s) IV Intermittent every 12 hours  ampicillin/sulbactam  IVPB 3 Gram(s) IV Intermittent every 6 hours  sodium chloride 1 Gram(s) Oral three times a day    MEDICATIONS  (PRN):      CAPILLARY BLOOD GLUCOSE      POCT Blood Glucose.: 100 mg/dL (27 Dec 2023 04:38)    I&O's Summary      VITALS:   T(C): 37.1 (12-27-23 @ 06:48), Max: 37.8 (12-26-23 @ 17:06)  HR: 91 (12-27-23 @ 06:48) (84 - 96)  BP: 158/88 (12-27-23 @ 06:48) (151/92 - 199/81)  RR: 20 (12-27-23 @ 06:48) (18 - 22)  SpO2: 100% (12-27-23 @ 05:02) (98% - 100%)    GENERAL: NAD, lying in bed comfortably  HEAD:  Atraumatic, normocephalic  EYES: EOMI, conjunctiva and sclera clear  HEART: Regular rate and rhythm, no murmurs, rubs, or gallops  LUNGS: Unlabored respirations. Clear to auscultation bilaterally, no crackles, wheezing, or rhonchi  ABDOMEN: Soft, nontender, nondistended, +BS  EXTREMITIES: No clubbing, cyanosis, or edema  NERVOUS SYSTEM:  A&Ox0, minimally follows commands  SKIN: Erythema of right jawline and chin with crusting noted at corner of mouth, below lip, and by the chin; rash not crossing midline    LABS:                        12.7   8.51  )-----------( 276      ( 27 Dec 2023 05:00 )             35.3     12-27    122<L>  |  89<L>  |  10  ----------------------------<  100<H>  3.8   |  18<L>  |  0.44<L>    Ca    8.5      27 Dec 2023 05:00  Phos  2.3     12-27  Mg     1.90     12-27    TPro  8.3  /  Alb  4.0  /  TBili  0.5  /  DBili  x   /  AST  44<H>  /  ALT  15  /  AlkPhos  117  12-26          Urinalysis Basic - ( 27 Dec 2023 05:00 )    Color: x / Appearance: x / SG: x / pH: x  Gluc: 100 mg/dL / Ketone: x  / Bili: x / Urobili: x   Blood: x / Protein: x / Nitrite: x   Leuk Esterase: x / RBC: x / WBC x   Sq Epi: x / Non Sq Epi: x / Bacteria: x         PROGRESS NOTE:     Patient is a 83y old  Female who presents with a chief complaint of facial rash (26 Dec 2023 16:52)      SUBJECTIVE / OVERNIGHT EVENTS:  RRT overnight for AMS. Patient A&Ox1 at most during RRT. Night team spoke with nephro, attempted salt tabs but patient spat out. This AM patient altered, does not respond to questions as well as yesterday. Unable to assess ROS.            MEDICATIONS  (STANDING):  acyclovir IVPB 500 milliGRAM(s) IV Intermittent every 12 hours  ampicillin/sulbactam  IVPB 3 Gram(s) IV Intermittent every 6 hours  sodium chloride 1 Gram(s) Oral three times a day    MEDICATIONS  (PRN):      CAPILLARY BLOOD GLUCOSE      POCT Blood Glucose.: 100 mg/dL (27 Dec 2023 04:38)    I&O's Summary      VITALS:   T(C): 37.1 (12-27-23 @ 06:48), Max: 37.8 (12-26-23 @ 17:06)  HR: 91 (12-27-23 @ 06:48) (84 - 96)  BP: 158/88 (12-27-23 @ 06:48) (151/92 - 199/81)  RR: 20 (12-27-23 @ 06:48) (18 - 22)  SpO2: 100% (12-27-23 @ 05:02) (98% - 100%)    GENERAL: NAD, lying in bed comfortably  HEAD:  Atraumatic, normocephalic  EYES: Unable to open eyes R more than left, worse than yesterday  HEART: Regular rate and rhythm, no murmurs, rubs, or gallops  LUNGS: Unlabored respirations. Clear to auscultation bilaterally, no crackles, wheezing, or rhonchi  ABDOMEN: Soft, nontender, nondistended, +BS  EXTREMITIES: No clubbing, cyanosis, or edema  NERVOUS SYSTEM:  A&Ox0-1, minimally follows commands  SKIN: Erythema of right jawline and chin with crusting noted at corner of mouth, below lip, and by the chin; rash not crossing midline    LABS:                        12.7   8.51  )-----------( 276      ( 27 Dec 2023 05:00 )             35.3     12-27    122<L>  |  89<L>  |  10  ----------------------------<  100<H>  3.8   |  18<L>  |  0.44<L>    Ca    8.5      27 Dec 2023 05:00  Phos  2.3     12-27  Mg     1.90     12-27    TPro  8.3  /  Alb  4.0  /  TBili  0.5  /  DBili  x   /  AST  44<H>  /  ALT  15  /  AlkPhos  117  12-26          Urinalysis Basic - ( 27 Dec 2023 05:00 )    Color: x / Appearance: x / SG: x / pH: x  Gluc: 100 mg/dL / Ketone: x  / Bili: x / Urobili: x   Blood: x / Protein: x / Nitrite: x   Leuk Esterase: x / RBC: x / WBC x   Sq Epi: x / Non Sq Epi: x / Bacteria: x

## 2023-12-27 NOTE — PROGRESS NOTE ADULT - ASSESSMENT
82-yo F w/ no pertinent contributing PMH, presenting for facial rash and ?AMS. Patient was noted to be fatigued and confused x1-2 days PTA. Baseline reportedly A&O x3 and ambulatory. Presenting with facial rash, which started from a "pimple" on the chin, which spread through the R-side of the chin over time. Painful rash this AM. On exam, patient is awake, alert. ?orientation difficult to assess. Does not follow simple command consistently. No nuchal rigidity. R CN V3 distribution of erythema, warmth, and patchy vesicular rash w/ scabbing. Tmax 100.1. No leukocytosis. Would assess for VZV rash w/ possible CSF involvement. 12/27 AM s/p RRT for AMS.     #Facial rash  #AMS  #Hyponatremia  #Low grade fever  - R CN V3 distribution of erythema, warmth, and patchy vesicular rash. Would assess for VZV rash. Please obtain HSV/VZV swab of the an open lesion.  - Does not consistently follow command. Fatigued. ?AMS. Noted to be hyponatremic at baseline. AMS worsening due to progression of hypoNa? Would rec Nephrology input.  - Continue with Unasyn 3g IV Q6H  - LP to assess for CSF involvement. Send meningoencephalitis PCR panel, cell counts, glucose, and protein. Send HSV/VZV study.  - MRI brain  - Continue with acyclovir 10 mg/kg IV Q12H (renally dosed). Provide ample IVF hydration with adequate tonicity  - Bed head elevation. Aspiration precautions.    Plan discussed with primary team house staff.  Thank you for this consult. Inpatient ID team will follow.    Guille Pérez MD, PhD  Attending Physician  Division of Infectious Diseases  Department of Medicine    Please contact through MS Teams message.  Office: [phone] (after 5 PM or weekend).    82-yo F w/ no pertinent contributing PMH, presenting for facial rash and ?AMS. Patient was noted to be fatigued and confused x1-2 days PTA. Baseline reportedly A&O x3 and ambulatory. Presenting with facial rash, which started from a "pimple" on the chin, which spread through the R-side of the chin over time. Painful rash this AM. On exam, patient is awake, alert. ?orientation difficult to assess. Does not follow simple command consistently. No nuchal rigidity. R CN V3 distribution of erythema, warmth, and patchy vesicular rash w/ scabbing. Tmax 100.1. No leukocytosis. Would assess for VZV rash w/ possible CSF involvement. 12/27 AM s/p RRT for AMS.     #Facial rash  #AMS  #Hyponatremia  #Low grade fever  - R CN V3 distribution of erythema, warmth, and patchy vesicular rash. Would assess for VZV rash. Please obtain HSV/VZV swab of the an open lesion.  - Does not consistently follow command. Fatigued. ?AMS. Noted to be hyponatremic at baseline. AMS worsening due to progression of hypoNa? Would rec Nephrology input.  - Continue with Unasyn 3g IV Q6H  - LP to assess for CSF involvement. Send meningoencephalitis PCR panel, cell counts, glucose, and protein. Send HSV/VZV study.  - MRI brain  - Continue with acyclovir 10 mg/kg IV Q12H (renally dosed). Provide ample IVF hydration with adequate tonicity  - Bed head elevation. Aspiration precautions.    Plan discussed with primary team house staff.  Thank you for this consult. Inpatient ID team will follow.    Guille Pérez MD, PhD  Attending Physician  Division of Infectious Diseases  Department of Medicine    Please contact through MS Teams message.  Office: [phone] (after 5 PM or weekend).    82-yo F w/ no pertinent contributing PMH, presenting for facial rash and ?AMS. Patient was noted to be fatigued and confused x1-2 days PTA. Baseline reportedly A&O x3 and ambulatory. Presenting with facial rash, which started from a "pimple" on the chin, which spread through the R-side of the chin over time. Painful rash this AM. On exam, patient is awake, alert. ?orientation difficult to assess. Does not follow simple command consistently. No nuchal rigidity. R CN V3 distribution of erythema, warmth, and patchy vesicular rash w/ scabbing. Tmax 100.1. No leukocytosis. Would assess for VZV rash w/ possible CSF involvement. 12/27 AM s/p RRT for AMS.     #Facial rash  #AMS  #Hyponatremia  #Low grade fever  - R CN V3 distribution of erythema, warmth, and patchy vesicular rash. Would assess for VZV rash. Please obtain HSV/VZV swab of an open lesion (not sent yet)  - Does not consistently follow command. Fatigued. ?AMS. Noted to be hyponatremic at baseline. AMS worsening due to progression of hypoNa? Would rec Nephrology input.  - Continue with Unasyn 3g IV Q6H  - LP to assess for CSF involvement. Send meningoencephalitis PCR panel, cell counts, glucose, and protein. Send HSV/VZV study.  - MRI brain  - Continue with acyclovir 10 mg/kg IV Q12H (renally dosed). Provide ample IVF hydration with adequate tonicity  - Bed head elevation. Aspiration precautions.    Plan discussed with primary team house staff.  Thank you for this consult. Inpatient ID team will follow.    Guille Pérez MD, PhD  Attending Physician  Division of Infectious Diseases  Department of Medicine    Please contact through MS Teams message.  Office: [phone] (after 5 PM or weekend).    82-yo F w/ no pertinent contributing PMH, presenting for facial rash and ?AMS. Patient was noted to be fatigued and confused x1-2 days PTA. Baseline reportedly A&O x3 and ambulatory. Presenting with facial rash, which started from a "pimple" on the chin, which spread through the R-side of the chin over time. Painful rash this AM. On exam, patient is awake, alert. ?orientation difficult to assess. Does not follow simple command consistently. No nuchal rigidity. R CN V3 distribution of erythema, warmth, and patchy vesicular rash w/ scabbing. Tmax 100.1. No leukocytosis. Would assess for VZV rash w/ possible CSF involvement. 12/27 AM s/p RRT for AMS.     #Facial rash  #AMS  #Hyponatremia  #Low grade fever  - R CN V3 distribution of erythema, warmth, and patchy vesicular rash. Would assess for VZV rash. Please obtain HSV/VZV swab of an open lesion (not sent yet)  - Does not consistently follow command. Fatigued. ?AMS. Noted to be hyponatremic at baseline. AMS worsening due to progression of hypoNa? Would rec Nephrology input.  - Continue with Unasyn 3g IV Q6H  - LP to assess for CSF involvement. Send meningoencephalitis PCR panel, cell counts, glucose, and protein. Send HSV/VZV study.  - MRI brain  - Continue with acyclovir 10 mg/kg IV Q12H (renally dosed). Provide ample IVF hydration with adequate tonicity  - Bed head elevation. Aspiration precautions.    Plan discussed with primary team house staff.  Thank you for this consult. Inpatient ID team will follow.    Guille Pérez MD, PhD  Attending Physician  Division of Infectious Diseases  Department of Medicine    Please contact through MS Teams message.  Office: [phone] (after 5 PM or weekend).

## 2023-12-27 NOTE — PROGRESS NOTE ADULT - PROBLEM SELECTOR PLAN 4
Weakness may be in the setting of AMS. Patient states herself that she walks with a walker.  -Consider PT consult Weakness may be in the setting of AMS. Patient states herself that she walks with a walker.  -Consider PT consult when AMS improved and patient able to participate

## 2023-12-27 NOTE — PROGRESS NOTE ADULT - PROBLEM SELECTOR PLAN 6
Diet: pending dysphagia screen. per daughter patient eats a regular diet, but currently does not have her dentures.  DVT ppx: SCDs --> if CTH neg, can start lovenox  Dispo: pending clinical improvement Diet: pending dysphagia screen. per daughter patient eats a regular diet, but currently does not have her dentures.  DVT ppx: SCDs --> if CTH neg and LP completed, can start lovenox  Dispo: pending clinical improvement

## 2023-12-27 NOTE — PROGRESS NOTE ADULT - SUBJECTIVE AND OBJECTIVE BOX
Follow Up:    Facial rash    Interval History/ROS:  Tmax 100.1. CTH no acute lesions. RRT this AM for AMS. Patient was seen and examined at bedside. Fatigued and does not readily respond. Answers her name when asked. Does not follow command. ROS limited.    Allergies  Allergy Status Unknown        ANTIMICROBIALS:  acyclovir IVPB 500 every 12 hours  ampicillin/sulbactam  IVPB 3 every 6 hours      OTHER MEDS:  MEDICATIONS  (STANDING):      Vital Signs Last 24 Hrs  T(C): 36.7 (27 Dec 2023 10:30), Max: 37.8 (26 Dec 2023 17:06)  T(F): 98.1 (27 Dec 2023 10:30), Max: 100.1 (26 Dec 2023 17:06)  HR: 69 (27 Dec 2023 10:30) (69 - 96)  BP: 124/62 (27 Dec 2023 10:30) (124/62 - 199/81)  BP(mean): --  RR: 20 (27 Dec 2023 10:30) (18 - 22)  SpO2: 100% (27 Dec 2023 10:30) (98% - 100%)    Parameters below as of 27 Dec 2023 10:30  Patient On (Oxygen Delivery Method): room air        PHYSICAL EXAM:  Constitutional: Fatigued, poorly responsive  HEAD/EYES: anicteric  ENT: Moist oral mucosa; Superficial lip desquamation; R V3 distribution of erythema and warmth, reduced from yesterday; patchy vesicular lesions w/o overt purulence  Cardiovascular:   normal S1, S2, no murmur, RRR  Respiratory:  clear BS bilaterally, no wheezes, no crackles  Neurologic: awake, answers name when asked                              12.7   8.51  )-----------( 276      ( 27 Dec 2023 05:00 )             35.3       12-27    122<L>  |  89<L>  |  10  ----------------------------<  100<H>  3.8   |  18<L>  |  0.44<L>    Ca    8.5      27 Dec 2023 05:00  Phos  2.3     12-27  Mg     1.90     12-27    TPro  8.3  /  Alb  4.0  /  TBili  0.5  /  DBili  x   /  AST  44<H>  /  ALT  15  /  AlkPhos  117  12-26      Urinalysis Basic - ( 27 Dec 2023 05:00 )    Color: x / Appearance: x / SG: x / pH: x  Gluc: 100 mg/dL / Ketone: x  / Bili: x / Urobili: x   Blood: x / Protein: x / Nitrite: x   Leuk Esterase: x / RBC: x / WBC x   Sq Epi: x / Non Sq Epi: x / Bacteria: x        MICROBIOLOGY:  v    No microbio data available this admission        RADIOLOGY:  Imaging below independently reviewed.  < from: CT Head No Cont (12.27.23 @ 09:53) >    IMPRESSION:  No acute intracranial hemorrhage or acute territorial infarct.  If   symptoms persist, follow-up MRI exam recommended.    < end of copied text >    < from: Xray Chest 1 View- PORTABLE-Urgent (Xray Chest 1 View- PORTABLE-Urgent .) (12.26.23 @ 08:48) >    IMPRESSION:  No focal opacities.  Trace bilateral pleural effusions.    < end of copied text >

## 2023-12-27 NOTE — PROGRESS NOTE ADULT - ATTENDING COMMENTS
82 year old female with history of chronic HA, vertigo, hyponatremia requiring hospitalization previously, now coming in with a couple days of generalized weakness, confusion, difficulty ambulating and facial rash. Here 24 hr tmax 100.1, HR 60-90s, SBP 120s-150s recently, saturating well on RA. Continued altered mentation with some improvement at times, daughter Farzana at bedside providing collateral.    #HypoNa: Hx chronic hypoNa with component of SIADH. Appreciate renal input. Holding off on additional IVF, most recent Na 124, repeat this afternoon pending. F/u additional renal recs. C/w fall precautions.    #Facial rash: concern for zoster, lesion swabbed for HSV/VZV and sent to laboratory, f/u results. C/w acyclovir    #AMS: Possibly iso hypoNa, metabolic 2/2 infection. F/u CTH, MRI brain, LP (Neuroradiology consulted, tentative plan for tomorrow) with meningoencephalitis PCR panel, cell counts, glucose, protein, HSV/VZV.     #Elevated BP: s/p 1 dose PO hydralazine with improvement, not on home antihypertensives. Most recent BP range acceptable, monitor closely.     Daughter Farzana updated at bedside.   D/w HS team.

## 2023-12-27 NOTE — CONSULT NOTE ADULT - PROBLEM SELECTOR RECOMMENDATION 9
Possibly contributing to altered mental status; suspect SIADH (since admission, sodium has gone from 123 to 122 despite 2L of NS)  - will need urine osmolality to guide tonicity of fluids needed to correct the hyponatremia  - check serum osm Acute on chronic hyponatremia, suspect SIADH  Possibly contributing to altered mental status, though seems unlikely would cause altered mental status to this degree (especially given that she is chronically hyponatremic), so agree with LP for encephalitis work up  - will need urine and serum osm to determine whether to give hypertonic saline  - hold off on further fluids until above labs are resulted

## 2023-12-28 DIAGNOSIS — E87.6 HYPOKALEMIA: ICD-10-CM

## 2023-12-28 LAB
ALBUMIN SERPL ELPH-MCNC: 3.5 G/DL — SIGNIFICANT CHANGE UP (ref 3.3–5)
ALBUMIN SERPL ELPH-MCNC: 3.5 G/DL — SIGNIFICANT CHANGE UP (ref 3.3–5)
ALP SERPL-CCNC: 97 U/L — SIGNIFICANT CHANGE UP (ref 40–120)
ALP SERPL-CCNC: 97 U/L — SIGNIFICANT CHANGE UP (ref 40–120)
ALT FLD-CCNC: 17 U/L — SIGNIFICANT CHANGE UP (ref 4–33)
ALT FLD-CCNC: 17 U/L — SIGNIFICANT CHANGE UP (ref 4–33)
ANION GAP SERPL CALC-SCNC: 11 MMOL/L — SIGNIFICANT CHANGE UP (ref 7–14)
ANION GAP SERPL CALC-SCNC: 15 MMOL/L — HIGH (ref 7–14)
ANION GAP SERPL CALC-SCNC: 15 MMOL/L — HIGH (ref 7–14)
AST SERPL-CCNC: 24 U/L — SIGNIFICANT CHANGE UP (ref 4–32)
AST SERPL-CCNC: 24 U/L — SIGNIFICANT CHANGE UP (ref 4–32)
BILIRUB SERPL-MCNC: 0.5 MG/DL — SIGNIFICANT CHANGE UP (ref 0.2–1.2)
BILIRUB SERPL-MCNC: 0.5 MG/DL — SIGNIFICANT CHANGE UP (ref 0.2–1.2)
BUN SERPL-MCNC: 10 MG/DL — SIGNIFICANT CHANGE UP (ref 7–23)
BUN SERPL-MCNC: 10 MG/DL — SIGNIFICANT CHANGE UP (ref 7–23)
BUN SERPL-MCNC: 9 MG/DL — SIGNIFICANT CHANGE UP (ref 7–23)
CALCIUM SERPL-MCNC: 8.1 MG/DL — LOW (ref 8.4–10.5)
CALCIUM SERPL-MCNC: 8.1 MG/DL — LOW (ref 8.4–10.5)
CALCIUM SERPL-MCNC: 8.3 MG/DL — LOW (ref 8.4–10.5)
CALCIUM SERPL-MCNC: 8.3 MG/DL — LOW (ref 8.4–10.5)
CALCIUM SERPL-MCNC: 8.5 MG/DL — SIGNIFICANT CHANGE UP (ref 8.4–10.5)
CALCIUM SERPL-MCNC: 8.5 MG/DL — SIGNIFICANT CHANGE UP (ref 8.4–10.5)
CHLORIDE SERPL-SCNC: 94 MMOL/L — LOW (ref 98–107)
CHLORIDE SERPL-SCNC: 97 MMOL/L — LOW (ref 98–107)
CHLORIDE SERPL-SCNC: 97 MMOL/L — LOW (ref 98–107)
CO2 SERPL-SCNC: 16 MMOL/L — LOW (ref 22–31)
CO2 SERPL-SCNC: 16 MMOL/L — LOW (ref 22–31)
CO2 SERPL-SCNC: 22 MMOL/L — SIGNIFICANT CHANGE UP (ref 22–31)
CREAT ?TM UR-MCNC: 40 MG/DL — SIGNIFICANT CHANGE UP
CREAT ?TM UR-MCNC: 40 MG/DL — SIGNIFICANT CHANGE UP
CREAT SERPL-MCNC: 0.42 MG/DL — LOW (ref 0.5–1.3)
CREAT SERPL-MCNC: 0.42 MG/DL — LOW (ref 0.5–1.3)
CREAT SERPL-MCNC: 0.46 MG/DL — LOW (ref 0.5–1.3)
CREAT SERPL-MCNC: 0.46 MG/DL — LOW (ref 0.5–1.3)
CREAT SERPL-MCNC: 0.48 MG/DL — LOW (ref 0.5–1.3)
CREAT SERPL-MCNC: 0.48 MG/DL — LOW (ref 0.5–1.3)
EGFR: 95 ML/MIN/1.73M2 — SIGNIFICANT CHANGE UP
EGFR: 98 ML/MIN/1.73M2 — SIGNIFICANT CHANGE UP
EGFR: 98 ML/MIN/1.73M2 — SIGNIFICANT CHANGE UP
GLUCOSE SERPL-MCNC: 103 MG/DL — HIGH (ref 70–99)
GLUCOSE SERPL-MCNC: 103 MG/DL — HIGH (ref 70–99)
GLUCOSE SERPL-MCNC: 97 MG/DL — SIGNIFICANT CHANGE UP (ref 70–99)
GLUCOSE SERPL-MCNC: 97 MG/DL — SIGNIFICANT CHANGE UP (ref 70–99)
GLUCOSE SERPL-MCNC: 99 MG/DL — SIGNIFICANT CHANGE UP (ref 70–99)
GLUCOSE SERPL-MCNC: 99 MG/DL — SIGNIFICANT CHANGE UP (ref 70–99)
HCT VFR BLD CALC: 34.7 % — SIGNIFICANT CHANGE UP (ref 34.5–45)
HCT VFR BLD CALC: 34.7 % — SIGNIFICANT CHANGE UP (ref 34.5–45)
HCT VFR BLD CALC: 38.1 % — SIGNIFICANT CHANGE UP (ref 34.5–45)
HCT VFR BLD CALC: 38.1 % — SIGNIFICANT CHANGE UP (ref 34.5–45)
HGB BLD-MCNC: 12.4 G/DL — SIGNIFICANT CHANGE UP (ref 11.5–15.5)
HGB BLD-MCNC: 12.4 G/DL — SIGNIFICANT CHANGE UP (ref 11.5–15.5)
HGB BLD-MCNC: 13.1 G/DL — SIGNIFICANT CHANGE UP (ref 11.5–15.5)
HGB BLD-MCNC: 13.1 G/DL — SIGNIFICANT CHANGE UP (ref 11.5–15.5)
HSV+VZV DNA SPEC QL NAA+PROBE: ABNORMAL
HSV+VZV DNA SPEC QL NAA+PROBE: ABNORMAL
MAGNESIUM SERPL-MCNC: 1.9 MG/DL — SIGNIFICANT CHANGE UP (ref 1.6–2.6)
MAGNESIUM SERPL-MCNC: 1.9 MG/DL — SIGNIFICANT CHANGE UP (ref 1.6–2.6)
MAGNESIUM SERPL-MCNC: 2 MG/DL — SIGNIFICANT CHANGE UP (ref 1.6–2.6)
MAGNESIUM SERPL-MCNC: 2 MG/DL — SIGNIFICANT CHANGE UP (ref 1.6–2.6)
MCHC RBC-ENTMCNC: 29.3 PG — SIGNIFICANT CHANGE UP (ref 27–34)
MCHC RBC-ENTMCNC: 29.3 PG — SIGNIFICANT CHANGE UP (ref 27–34)
MCHC RBC-ENTMCNC: 29.4 PG — SIGNIFICANT CHANGE UP (ref 27–34)
MCHC RBC-ENTMCNC: 29.4 PG — SIGNIFICANT CHANGE UP (ref 27–34)
MCHC RBC-ENTMCNC: 34.4 GM/DL — SIGNIFICANT CHANGE UP (ref 32–36)
MCHC RBC-ENTMCNC: 34.4 GM/DL — SIGNIFICANT CHANGE UP (ref 32–36)
MCHC RBC-ENTMCNC: 35.7 GM/DL — SIGNIFICANT CHANGE UP (ref 32–36)
MCHC RBC-ENTMCNC: 35.7 GM/DL — SIGNIFICANT CHANGE UP (ref 32–36)
MCV RBC AUTO: 82.2 FL — SIGNIFICANT CHANGE UP (ref 80–100)
MCV RBC AUTO: 82.2 FL — SIGNIFICANT CHANGE UP (ref 80–100)
MCV RBC AUTO: 85.2 FL — SIGNIFICANT CHANGE UP (ref 80–100)
MCV RBC AUTO: 85.2 FL — SIGNIFICANT CHANGE UP (ref 80–100)
NRBC # BLD: 0 /100 WBCS — SIGNIFICANT CHANGE UP (ref 0–0)
NRBC # FLD: 0 K/UL — SIGNIFICANT CHANGE UP (ref 0–0)
OSMOLALITY UR: 568 MOSM/KG — SIGNIFICANT CHANGE UP (ref 50–1200)
OSMOLALITY UR: 568 MOSM/KG — SIGNIFICANT CHANGE UP (ref 50–1200)
PHOSPHATE SERPL-MCNC: 2.4 MG/DL — LOW (ref 2.5–4.5)
PHOSPHATE SERPL-MCNC: 2.4 MG/DL — LOW (ref 2.5–4.5)
PHOSPHATE SERPL-MCNC: 3 MG/DL — SIGNIFICANT CHANGE UP (ref 2.5–4.5)
PHOSPHATE SERPL-MCNC: 3 MG/DL — SIGNIFICANT CHANGE UP (ref 2.5–4.5)
PLATELET # BLD AUTO: 289 K/UL — SIGNIFICANT CHANGE UP (ref 150–400)
PLATELET # BLD AUTO: 289 K/UL — SIGNIFICANT CHANGE UP (ref 150–400)
PLATELET # BLD AUTO: 297 K/UL — SIGNIFICANT CHANGE UP (ref 150–400)
PLATELET # BLD AUTO: 297 K/UL — SIGNIFICANT CHANGE UP (ref 150–400)
POTASSIUM SERPL-MCNC: 3.4 MMOL/L — LOW (ref 3.5–5.3)
POTASSIUM SERPL-MCNC: 3.4 MMOL/L — LOW (ref 3.5–5.3)
POTASSIUM SERPL-MCNC: 3.5 MMOL/L — SIGNIFICANT CHANGE UP (ref 3.5–5.3)
POTASSIUM SERPL-MCNC: 3.5 MMOL/L — SIGNIFICANT CHANGE UP (ref 3.5–5.3)
POTASSIUM SERPL-MCNC: 4.8 MMOL/L — SIGNIFICANT CHANGE UP (ref 3.5–5.3)
POTASSIUM SERPL-MCNC: 4.8 MMOL/L — SIGNIFICANT CHANGE UP (ref 3.5–5.3)
POTASSIUM SERPL-SCNC: 3.4 MMOL/L — LOW (ref 3.5–5.3)
POTASSIUM SERPL-SCNC: 3.4 MMOL/L — LOW (ref 3.5–5.3)
POTASSIUM SERPL-SCNC: 3.5 MMOL/L — SIGNIFICANT CHANGE UP (ref 3.5–5.3)
POTASSIUM SERPL-SCNC: 3.5 MMOL/L — SIGNIFICANT CHANGE UP (ref 3.5–5.3)
POTASSIUM SERPL-SCNC: 4.8 MMOL/L — SIGNIFICANT CHANGE UP (ref 3.5–5.3)
POTASSIUM SERPL-SCNC: 4.8 MMOL/L — SIGNIFICANT CHANGE UP (ref 3.5–5.3)
POTASSIUM UR-SCNC: 38 MMOL/L — SIGNIFICANT CHANGE UP
POTASSIUM UR-SCNC: 38 MMOL/L — SIGNIFICANT CHANGE UP
PROT ?TM UR-MCNC: 19 MG/DL — SIGNIFICANT CHANGE UP
PROT ?TM UR-MCNC: 19 MG/DL — SIGNIFICANT CHANGE UP
PROT SERPL-MCNC: 7.1 G/DL — SIGNIFICANT CHANGE UP (ref 6–8.3)
PROT SERPL-MCNC: 7.1 G/DL — SIGNIFICANT CHANGE UP (ref 6–8.3)
PROT/CREAT UR-RTO: 0.5 RATIO — HIGH (ref 0–0.2)
PROT/CREAT UR-RTO: 0.5 RATIO — HIGH (ref 0–0.2)
RBC # BLD: 4.22 M/UL — SIGNIFICANT CHANGE UP (ref 3.8–5.2)
RBC # BLD: 4.22 M/UL — SIGNIFICANT CHANGE UP (ref 3.8–5.2)
RBC # BLD: 4.47 M/UL — SIGNIFICANT CHANGE UP (ref 3.8–5.2)
RBC # BLD: 4.47 M/UL — SIGNIFICANT CHANGE UP (ref 3.8–5.2)
RBC # FLD: 11.9 % — SIGNIFICANT CHANGE UP (ref 10.3–14.5)
RBC # FLD: 11.9 % — SIGNIFICANT CHANGE UP (ref 10.3–14.5)
RBC # FLD: 12 % — SIGNIFICANT CHANGE UP (ref 10.3–14.5)
RBC # FLD: 12 % — SIGNIFICANT CHANGE UP (ref 10.3–14.5)
SODIUM SERPL-SCNC: 125 MMOL/L — LOW (ref 135–145)
SODIUM SERPL-SCNC: 125 MMOL/L — LOW (ref 135–145)
SODIUM SERPL-SCNC: 127 MMOL/L — LOW (ref 135–145)
SODIUM SERPL-SCNC: 127 MMOL/L — LOW (ref 135–145)
SODIUM SERPL-SCNC: 130 MMOL/L — LOW (ref 135–145)
SODIUM SERPL-SCNC: 130 MMOL/L — LOW (ref 135–145)
SODIUM UR-SCNC: 173 MMOL/L — SIGNIFICANT CHANGE UP
SODIUM UR-SCNC: 173 MMOL/L — SIGNIFICANT CHANGE UP
SPECIMEN SOURCE: SIGNIFICANT CHANGE UP
SPECIMEN SOURCE: SIGNIFICANT CHANGE UP
UUN UR-MCNC: 381.4 MG/DL — SIGNIFICANT CHANGE UP
UUN UR-MCNC: 381.4 MG/DL — SIGNIFICANT CHANGE UP
WBC # BLD: 8.14 K/UL — SIGNIFICANT CHANGE UP (ref 3.8–10.5)
WBC # BLD: 8.14 K/UL — SIGNIFICANT CHANGE UP (ref 3.8–10.5)
WBC # BLD: 9.03 K/UL — SIGNIFICANT CHANGE UP (ref 3.8–10.5)
WBC # BLD: 9.03 K/UL — SIGNIFICANT CHANGE UP (ref 3.8–10.5)
WBC # FLD AUTO: 8.14 K/UL — SIGNIFICANT CHANGE UP (ref 3.8–10.5)
WBC # FLD AUTO: 8.14 K/UL — SIGNIFICANT CHANGE UP (ref 3.8–10.5)
WBC # FLD AUTO: 9.03 K/UL — SIGNIFICANT CHANGE UP (ref 3.8–10.5)
WBC # FLD AUTO: 9.03 K/UL — SIGNIFICANT CHANGE UP (ref 3.8–10.5)

## 2023-12-28 PROCEDURE — 99232 SBSQ HOSP IP/OBS MODERATE 35: CPT | Mod: GC

## 2023-12-28 PROCEDURE — 99232 SBSQ HOSP IP/OBS MODERATE 35: CPT

## 2023-12-28 RX ORDER — SODIUM CHLORIDE 5 G/100ML
500 INJECTION, SOLUTION INTRAVENOUS
Refills: 0 | Status: DISCONTINUED | OUTPATIENT
Start: 2023-12-28 | End: 2023-12-29

## 2023-12-28 RX ADMIN — SODIUM CHLORIDE 30 MILLILITER(S): 5 INJECTION, SOLUTION INTRAVENOUS at 03:59

## 2023-12-28 RX ADMIN — AMPICILLIN SODIUM AND SULBACTAM SODIUM 200 GRAM(S): 250; 125 INJECTION, POWDER, FOR SUSPENSION INTRAMUSCULAR; INTRAVENOUS at 12:33

## 2023-12-28 RX ADMIN — SODIUM CHLORIDE 1 GRAM(S): 9 INJECTION INTRAMUSCULAR; INTRAVENOUS; SUBCUTANEOUS at 14:30

## 2023-12-28 RX ADMIN — Medication 110 MILLIGRAM(S): at 06:47

## 2023-12-28 RX ADMIN — AMPICILLIN SODIUM AND SULBACTAM SODIUM 200 GRAM(S): 250; 125 INJECTION, POWDER, FOR SUSPENSION INTRAMUSCULAR; INTRAVENOUS at 00:49

## 2023-12-28 RX ADMIN — AMPICILLIN SODIUM AND SULBACTAM SODIUM 200 GRAM(S): 250; 125 INJECTION, POWDER, FOR SUSPENSION INTRAMUSCULAR; INTRAVENOUS at 06:47

## 2023-12-28 RX ADMIN — SODIUM CHLORIDE 1 GRAM(S): 9 INJECTION INTRAMUSCULAR; INTRAVENOUS; SUBCUTANEOUS at 06:47

## 2023-12-28 RX ADMIN — SODIUM CHLORIDE 1 GRAM(S): 9 INJECTION INTRAMUSCULAR; INTRAVENOUS; SUBCUTANEOUS at 00:48

## 2023-12-28 RX ADMIN — Medication 110 MILLIGRAM(S): at 18:09

## 2023-12-28 RX ADMIN — AMPICILLIN SODIUM AND SULBACTAM SODIUM 200 GRAM(S): 250; 125 INJECTION, POWDER, FOR SUSPENSION INTRAMUSCULAR; INTRAVENOUS at 17:35

## 2023-12-28 NOTE — PROGRESS NOTE ADULT - PROBLEM SELECTOR PLAN 5
Patient with elevated BP to 195/70. Per daughter no history of HTN, does not take antihypertensives. Improvement with PO hydralazine to SBP 150s.  -Continue to monitor  -Can consider PO hydral or IV labetalol if further BP management needed

## 2023-12-28 NOTE — PROGRESS NOTE ADULT - ASSESSMENT
83F whom presented to the hospital with weakness, confusion, and facial rash, found to have encephalopathy and acute on chronic hyponatremia

## 2023-12-28 NOTE — PROGRESS NOTE ADULT - PROBLEM SELECTOR PLAN 6
Diet: pending dysphagia screen. per daughter patient eats a regular diet, but currently does not have her dentures.  DVT ppx: SCDs --> if CTH neg and LP completed, can start lovenox  Dispo: pending clinical improvement Diet: Regular (no beef)  DVT ppx: SCDs --> if CTH neg and LP completed, can start lovenox  Dispo: pending clinical improvement

## 2023-12-28 NOTE — PROGRESS NOTE ADULT - PROBLEM SELECTOR PLAN 1
Acute on chronic hyponatremia, suspect SIADH given high urine osms  Improved with hypertonic saline  Suspect improvement of mental status more related to treatment of underlying infectious process  Possibly contributing to altered mental status, though seems unlikely would cause altered mental status to this degree (especially given that she is chronically hyponatremic)  - continue with salt tabs and fluid restriction today, as appears to be correcting now  - monitor repeat sodium with AM labs Acute on chronic hyponatremia, suspect SIADH given high urine osms  Improved with hypertonic saline  Suspect improvement of mental status more related to treatment of underlying infectious process  - continue with salt tabs and fluid restriction today, as appears to be correcting now  - monitor repeat sodium with AM labs

## 2023-12-28 NOTE — PROGRESS NOTE ADULT - PROBLEM SELECTOR PLAN 2
Na 123 on admission. 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 Patient s/p 2L NS in ED. 12/27 Na 122.  -Nephrology consulted, f/u recs  -Will try NS at 100cc/hr for now  -Repeat BMP in afternoon Na 123 on admission. 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 Patient s/p 2L NS in ED. 12/27 Na 122.  -Nephrology consulted, appreciate recs  -ovn 12/28 received 8hr 3% NS @ 30cc/hr--> Na 130 in AM     -holding further HTS at this time pending nephro recs  -q6h BMP

## 2023-12-28 NOTE — PROGRESS NOTE ADULT - SUBJECTIVE AND OBJECTIVE BOX
[facility] Division of Kidney Diseases & Hypertension  FOLLOW UP NOTE  Chief Complaint:Altered mental status        24 hour events/subjective: The patient is notably more awake and alert today. Still quite confused, knows she's in the hospital but not which hospital and thinks it's 1984.        PAST HISTORY  --------------------------------------------------------------------------------  No significant changes to PMH, PSH, FHx, SHx, unless otherwise noted    ALLERGIES & MEDICATIONS  --------------------------------------------------------------------------------  Allergies    Allergy Status Unknown    Intolerances      Standing Inpatient Medications  acyclovir IVPB 500 milliGRAM(s) IV Intermittent every 12 hours  ampicillin/sulbactam  IVPB 3 Gram(s) IV Intermittent every 6 hours  influenza  Vaccine (HIGH DOSE) 0.7 milliLiter(s) IntraMuscular once  sodium chloride 1 Gram(s) Oral three times a day  sodium chloride 3%. 500 milliLiter(s) IV Continuous <Continuous>    PRN Inpatient Medications      REVIEW OF SYSTEMS  --------------------------------------------------------------------------------  Gen: No  fevers/chills  Skin: No rashes  Head/Eyes/Ears/Mouth: No headache; Normal hearing; Normal vision w/o blurriness  Respiratory: No dyspnea, cough, wheezing, hemoptysis  CV: No chest pain, PND, orthopnea  GI: No abdominal pain, diarrhea, constipation, nausea, vomiting  : No increased frequency, dysuria, hematuria, nocturia  MSK: +knee and back pain  Neuro: No dizziness/lightheadedness, weakness, seizures, numbness, tingling      All other systems were reviewed and are negative, except as noted.    VITALS/PHYSICAL EXAM  --------------------------------------------------------------------------------  T(C): 36.4 (12-28-23 @ 05:15), Max: 37.3 (12-27-23 @ 21:49)  HR: 93 (12-28-23 @ 05:15) (84 - 97)  BP: 141/91 (12-28-23 @ 05:15) (126/61 - 169/81)  RR: 17 (12-28-23 @ 05:15) (17 - 20)  SpO2: 100% (12-28-23 @ 05:15) (100% - 100%)  Height (cm): 160 (12-28-23 @ 08:49)  Weight (kg): 51.7 (12-26-23 @ 18:42)  BMI (kg/m2): 20.2 (12-28-23 @ 08:49)  BSA (m2): 1.52 (12-28-23 @ 08:49)      12-27-23 @ 07:01  -  12-28-23 @ 07:00  --------------------------------------------------------  IN: 1400 mL / OUT: 1550 mL / NET: -150 mL      Physical Exam:  	Gen: NAD, well-appearing  	HEENT: PERRL, supple neck, clear oropharynx  	Pulm: CTA B/L  	CV: RRR, S1S2;  	Abd: soft, nontender/nondistended  	: No suprapubic tenderness                      Extremities: no bilateral LE edema noted.                       Neuro: No focal deficits; remains encephalopathic, A&Ox1-2  	Skin: Warm, +crusted lesions over face    LABS/STUDIES  --------------------------------------------------------------------------------              12.4   8.14  >-----------<  297      [12-28-23 @ 06:50]              34.7     127  |  94  |  9   ----------------------------<  103      [12-28-23 @ 09:46]  3.4   |  22  |  0.48        Ca     8.5     [12-28-23 @ 09:46]      Mg     1.90     [12-28-23 @ 06:50]      Phos  2.4     [12-28-23 @ 06:50]    TPro  7.1  /  Alb  3.5  /  TBili  0.5  /  DBili  x   /  AST  24  /  ALT  17  /  AlkPhos  97  [12-28-23 @ 06:50]    PT/INR: PT 11.2 , INR 0.99       [12-27-23 @ 16:45]  PTT: 25.6       [12-27-23 @ 16:45]    Uric acid 3.1      [12-27-23 @ 13:50]  Serum Osmolality 261      [12-27-23 @ 13:50]    Creatinine Trend:  SCr 0.48 [12-28 @ 09:46]  SCr 0.46 [12-28 @ 06:50]  SCr 0.42 [12-28 @ 02:10]  SCr 0.49 [12-27 @ 13:50]  SCr 0.44 [12-27 @ 05:00]    Urinalysis - [12-28-23 @ 09:46]      Color  / Appearance  / SG  / pH       Gluc 103 / Ketone   / Bili  / Urobili        Blood  / Protein  / Leuk Est  / Nitrite       RBC  / WBC  / Hyaline  / Gran  / Sq Epi  / Non Sq Epi  / Bacteria     Urine Creatinine 54      [12-27-23 @ 13:25]  Urine Protein 21      [12-27-23 @ 13:25]  Urine Sodium 140      [12-27-23 @ 13:25]  Urine Urea Nitrogen 459.7      [12-27-23 @ 13:25]  Urine Potassium 43.3      [12-27-23 @ 13:25]  Urine Osmolality 577      [12-27-23 @ 13:25]          Division of Kidney Diseases & Hypertension  FOLLOW UP NOTE  Chief Complaint:Altered mental status        24 hour events/subjective: The patient is notably more awake and alert today. Still quite confused, knows she's in the hospital but not which hospital and thinks it's 1985.        PAST HISTORY  --------------------------------------------------------------------------------  No significant changes to PMH, PSH, FHx, SHx, unless otherwise noted    ALLERGIES & MEDICATIONS  --------------------------------------------------------------------------------  Allergies    Allergy Status Unknown    Intolerances      Standing Inpatient Medications  acyclovir IVPB 500 milliGRAM(s) IV Intermittent every 12 hours  ampicillin/sulbactam  IVPB 3 Gram(s) IV Intermittent every 6 hours  influenza  Vaccine (HIGH DOSE) 0.7 milliLiter(s) IntraMuscular once  sodium chloride 1 Gram(s) Oral three times a day  sodium chloride 3%. 500 milliLiter(s) IV Continuous <Continuous>    PRN Inpatient Medications      REVIEW OF SYSTEMS  --------------------------------------------------------------------------------  Gen: No  fevers/chills  Skin: No rashes  Head/Eyes/Ears/Mouth: No headache; Normal hearing; Normal vision w/o blurriness  Respiratory: No dyspnea, cough, wheezing, hemoptysis  CV: No chest pain, PND, orthopnea  GI: No abdominal pain, diarrhea, constipation, nausea, vomiting  : No increased frequency, dysuria, hematuria, nocturia  MSK: +knee and back pain  Neuro: No dizziness/lightheadedness, weakness, seizures, numbness, tingling      All other systems were reviewed and are negative, except as noted.    VITALS/PHYSICAL EXAM  --------------------------------------------------------------------------------  T(C): 36.4 (12-28-23 @ 05:15), Max: 37.3 (12-27-23 @ 21:49)  HR: 93 (12-28-23 @ 05:15) (84 - 97)  BP: 141/91 (12-28-23 @ 05:15) (126/61 - 169/81)  RR: 17 (12-28-23 @ 05:15) (17 - 20)  SpO2: 100% (12-28-23 @ 05:15) (100% - 100%)  Height (cm): 160 (12-28-23 @ 08:49)  Weight (kg): 51.7 (12-26-23 @ 18:42)  BMI (kg/m2): 20.2 (12-28-23 @ 08:49)  BSA (m2): 1.52 (12-28-23 @ 08:49)      12-27-23 @ 07:01  -  12-28-23 @ 07:00  --------------------------------------------------------  IN: 1400 mL / OUT: 1550 mL / NET: -150 mL      Physical Exam:  	Gen: NAD, well-appearing  	HEENT: PERRL, supple neck, clear oropharynx  	Pulm: CTA B/L  	CV: RRR, S1S2;  	Abd: soft, nontender/nondistended  	: No suprapubic tenderness                      Extremities: no bilateral LE edema noted.                       Neuro: No focal deficits; remains encephalopathic, A&Ox1-2  	Skin: Warm, +crusted lesions over face    LABS/STUDIES  --------------------------------------------------------------------------------              12.4   8.14  >-----------<  297      [12-28-23 @ 06:50]              34.7     127  |  94  |  9   ----------------------------<  103      [12-28-23 @ 09:46]  3.4   |  22  |  0.48        Ca     8.5     [12-28-23 @ 09:46]      Mg     1.90     [12-28-23 @ 06:50]      Phos  2.4     [12-28-23 @ 06:50]    TPro  7.1  /  Alb  3.5  /  TBili  0.5  /  DBili  x   /  AST  24  /  ALT  17  /  AlkPhos  97  [12-28-23 @ 06:50]    PT/INR: PT 11.2 , INR 0.99       [12-27-23 @ 16:45]  PTT: 25.6       [12-27-23 @ 16:45]    Uric acid 3.1      [12-27-23 @ 13:50]  Serum Osmolality 261      [12-27-23 @ 13:50]    Creatinine Trend:  SCr 0.48 [12-28 @ 09:46]  SCr 0.46 [12-28 @ 06:50]  SCr 0.42 [12-28 @ 02:10]  SCr 0.49 [12-27 @ 13:50]  SCr 0.44 [12-27 @ 05:00]    Urinalysis - [12-28-23 @ 09:46]      Color  / Appearance  / SG  / pH       Gluc 103 / Ketone   / Bili  / Urobili        Blood  / Protein  / Leuk Est  / Nitrite       RBC  / WBC  / Hyaline  / Gran  / Sq Epi  / Non Sq Epi  / Bacteria     Urine Creatinine 54      [12-27-23 @ 13:25]  Urine Protein 21      [12-27-23 @ 13:25]  Urine Sodium 140      [12-27-23 @ 13:25]  Urine Urea Nitrogen 459.7      [12-27-23 @ 13:25]  Urine Potassium 43.3      [12-27-23 @ 13:25]  Urine Osmolality 577      [12-27-23 @ 13:25]

## 2023-12-28 NOTE — PROGRESS NOTE ADULT - ATTENDING COMMENTS
82 year old female with history of chronic HA, vertigo, hyponatremia requiring hospitalization previously, now coming in with a couple days of generalized weakness, confusion, difficulty ambulating and facial rash. Afebrile, saturating well on RA. This morning, wide awake, both eyes open, answering simple questions, following simple commands. Only oriented to self (not year, month or location).     #HypoNa: Hx chronic hypoNa with component of SIADH. Appreciate renal input. Started on hypertonic saline per renal, Na up to 130 this am, repeat down to 127, f/u additional renal recs.     #Facial rash: concern for zoster, lesion swabbed for HSV/VZV and sent to laboratory, f/u results. C/w acyclovir. Discussed with infection control, given less than 3 dermatome involvement, hospital policy is for standard precautions (not advising airborne/contact precautions).     #AMS: Possibly iso hypoNa, metabolic 2/2 infection. CTH without acute findings, follow up MRI brain and LP (Neuroradiology consulted, f/u timing) with meningoencephalitis PCR panel, cell counts, glucose, protein, HSV/VZV.     #Elevated BP: s/p 1 dose PO hydralazine with improvement, not on home antihypertensives. Most recent BP range acceptable, monitor closely.     D/w HS team

## 2023-12-28 NOTE — PROGRESS NOTE ADULT - SUBJECTIVE AND OBJECTIVE BOX
Follow Up:    Facial rash    Interval History/ROS:  VSS ON. Patient was seen and examined at bedside. More awake than yesterday. Answers properly. Daughter at bedside. Update provided. Patient offers no subjective discomforts including facial pain or fever.    Allergies  Allergy Status Unknown        ANTIMICROBIALS:  acyclovir IVPB 500 every 12 hours  ampicillin/sulbactam  IVPB 3 every 6 hours      OTHER MEDS:  MEDICATIONS  (STANDING):  influenza  Vaccine (HIGH DOSE) 0.7 once      Vital Signs Last 24 Hrs  T(C): 36.8 (28 Dec 2023 13:30), Max: 37.3 (27 Dec 2023 21:49)  T(F): 98.3 (28 Dec 2023 13:30), Max: 99.1 (27 Dec 2023 21:49)  HR: 83 (28 Dec 2023 13:30) (83 - 97)  BP: 150/89 (28 Dec 2023 13:30) (126/61 - 169/81)  BP(mean): --  RR: 18 (28 Dec 2023 13:30) (17 - 20)  SpO2: 100% (28 Dec 2023 13:30) (100% - 100%)    Parameters below as of 28 Dec 2023 13:30  Patient On (Oxygen Delivery Method): room air        PHYSICAL EXAM:  Constitutional: Nontoxic at rest  HEAD/EYES: anicteric  ENT: Moist oral mucosa; Superficial lip desquamation; R V3 distribution of erythema and warmth; patchy vesicular lesions w/o overt purulence, all crusted  Cardiovascular:   normal S1, S2, no murmur, RRR  Respiratory:  clear BS bilaterally, no wheezes, no crackles  Neurologic: awake, alert                            12.4   8.14  )-----------( 297      ( 28 Dec 2023 06:50 )             34.7       12-28    127<L>  |  94<L>  |  9   ----------------------------<  103<H>  3.4<L>   |  22  |  0.48<L>    Ca    8.5      28 Dec 2023 09:46  Phos  2.4     12-28  Mg     1.90     12-28    TPro  7.1  /  Alb  3.5  /  TBili  0.5  /  DBili  x   /  AST  24  /  ALT  17  /  AlkPhos  97  12-28      Urinalysis Basic - ( 28 Dec 2023 09:46 )    Color: x / Appearance: x / SG: x / pH: x  Gluc: 103 mg/dL / Ketone: x  / Bili: x / Urobili: x   Blood: x / Protein: x / Nitrite: x   Leuk Esterase: x / RBC: x / WBC x   Sq Epi: x / Non Sq Epi: x / Bacteria: x        MICROBIOLOGY:  v  Clean Catch Clean Catch (Midstream)  12-26-23   <10,000 CFU/mL Normal Urogenital Rosa M  --  --      .Blood Blood-Peripheral  12-26-23   No growth at 48 Hours  --  --      .Blood Blood-Peripheral  12-26-23   No growth at 48 Hours  --  --                RADIOLOGY:  Imaging below independently reviewed.  < from: CT Head No Cont (12.27.23 @ 09:53) >  IMPRESSION:  No acute intracranial hemorrhage or acute territorial infarct.  If   symptoms persist, follow-up MRI exam recommended.    < end of copied text >

## 2023-12-28 NOTE — PROGRESS NOTE ADULT - ASSESSMENT
82-yo F w/ no pertinent contributing PMH, presenting for facial rash and ?AMS. Patient was noted to be fatigued and confused x1-2 days PTA. Baseline reportedly A&O x3 and ambulatory. Presenting with facial rash, which started from a "pimple" on the chin, which spread through the R-side of the chin over time. Painful rash this AM. On exam, patient is awake, alert. ?orientation difficult to assess. Does not follow simple command consistently. No nuchal rigidity. R CN V3 distribution of erythema, warmth, and patchy vesicular rash w/ scabbing. Tmax 100.1. No leukocytosis. Would assess for VZV rash w/ possible CSF involvement. 12/27 AM s/p RRT for AMS.     #Facial rash  #AMS  #Hyponatremia  #Low grade fever  - R CN V3 distribution of erythema, warmth, and patchy vesicular rash. Would assess for VZV rash. HSV/VZV swab pending.  - Mental status improving with sodium correction and continued acyclovir.   - Continue with Unasyn 3g IV Q6H and acyclovir 10 mg/kg IV Q12H (renal dose). Provide ample IVF hydration w/ adequate tonicity. Nephrology input appreciated.  - LP to assess for CSF involvement. Send meningoencephalitis PCR panel, cell counts, glucose, and protein. Send HSV/VZV study.  - MRI brain  - Bed head elevation. Aspiration precautions.    Plan discussed with primary team house staff.  Thank you for this consult. Inpatient ID team will follow.    Guille Pérez MD, PhD  Attending Physician  Division of Infectious Diseases  Department of Medicine    Please contact through MS Teams message.  Office: [phone] (after 5 PM or weekend).  82-yo F w/ no pertinent contributing PMH, presenting for facial rash and ?AMS. Patient was noted to be fatigued and confused x1-2 days PTA. Baseline reportedly A&O x3 and ambulatory. Presenting with facial rash, which started from a "pimple" on the chin, which spread through the R-side of the chin over time. Painful rash this AM. On exam, patient is awake, alert. ?orientation difficult to assess. Does not follow simple command consistently. No nuchal rigidity. R CN V3 distribution of erythema, warmth, and patchy vesicular rash w/ scabbing. Tmax 100.1. No leukocytosis. Would assess for VZV rash w/ possible CSF involvement. 12/27 AM s/p RRT for AMS.     #Facial rash  #AMS  #Hyponatremia  #Low grade fever  - R CN V3 distribution of erythema, warmth, and patchy vesicular rash. Would assess for VZV rash. HSV/VZV swab pending.  - Mental status improving with sodium correction and continued acyclovir.   - Continue with Unasyn 3g IV Q6H and acyclovir 10 mg/kg IV Q12H (renal dose). Provide ample IVF hydration w/ adequate tonicity. Nephrology input appreciated.  - LP to assess for CSF involvement. Send meningoencephalitis PCR panel, cell counts, glucose, and protein. Send HSV/VZV study.  - MRI brain  - Bed head elevation. Aspiration precautions.    Plan discussed with primary team house staff.  Thank you for this consult. Inpatient ID team will follow.    Guille Pérez MD, PhD  Attending Physician  Division of Infectious Diseases  Department of Medicine    Please contact through MS Teams message.  Office: [phone] (after 5 PM or weekend).

## 2023-12-28 NOTE — PROGRESS NOTE ADULT - SUBJECTIVE AND OBJECTIVE BOX
INTERVAL: Patient received HTS overnight; 3% NS @ 30cc/hr x 8hr. NAEO  SUBJECTIVE: Patient examined bedside this AM. Pt drowsy, rousable, but only nodding yes/no to questions. No pain, no fever/chills, rash is not bothering her. No other ROS elicited    OBJECTIVE:  ICU Vital Signs Last 24 Hrs  T(C): 36.4 (28 Dec 2023 05:15), Max: 37.3 (27 Dec 2023 21:49)  T(F): 97.5 (28 Dec 2023 05:15), Max: 99.1 (27 Dec 2023 21:49)  HR: 93 (28 Dec 2023 05:15) (69 - 97)  BP: 141/91 (28 Dec 2023 05:15) (124/62 - 169/81)  BP(mean): --  ABP: --  ABP(mean): --  RR: 17 (28 Dec 2023 05:15) (17 - 20)  SpO2: 100% (28 Dec 2023 05:15) (100% - 100%)    O2 Parameters below as of 28 Dec 2023 05:15  Patient On (Oxygen Delivery Method): room air              12-27 @ 07:01  -  12-28 @ 07:00  --------------------------------------------------------  IN: 1400 mL / OUT: 1550 mL / NET: -150 mL      CAPILLARY BLOOD GLUCOSE      POCT Blood Glucose.: 100 mg/dL (27 Dec 2023 04:38)      PHYSICAL EXAM:  General: Drowsy, NAD, laying in bed, on RA  HEENT: significant rheum present, but on separation eyes not injected, PERRLA, non-icteric  Neck:  symmetric,  JVD absent  Respiratory: Limited to anterior exam; Clear to ascultation bilaterally, no crackles/rales, no Resp distress; no accessory muscle use  Cardiovascular:  RRR, no murmurs/rubs/gallops  Abdomen: Soft, NT, ND  Extremities: No edema noted  Skin: No rashes or lesions noted  Neurological: unable to assess   Psychiatry: unable to assess     PRN Meds:      LABS:                        12.4   8.14  )-----------( 297      ( 28 Dec 2023 06:50 )             34.7     Hgb Trend: 12.4<--, 13.1<--, 12.7<--, 12.5<--  12-28    130<L>  |  97<L>  |  9   ----------------------------<  97  3.5   |  22  |  0.46<L>    Ca    8.1<L>      28 Dec 2023 06:50  Phos  2.4     12-28  Mg     1.90     12-28    TPro  7.1  /  Alb  3.5  /  TBili  0.5  /  DBili  x   /  AST  24  /  ALT  17  /  AlkPhos  97  12-28    Creatinine Trend: 0.46<--, 0.42<--, 0.49<--, 0.44<--, 0.43<--, 0.48<--  PT/INR - ( 27 Dec 2023 16:45 )   PT: 11.2 sec;   INR: 0.99 ratio         PTT - ( 27 Dec 2023 16:45 )  PTT:25.6 sec  Urinalysis Basic - ( 28 Dec 2023 06:50 )    Color: x / Appearance: x / SG: x / pH: x  Gluc: 97 mg/dL / Ketone: x  / Bili: x / Urobili: x   Blood: x / Protein: x / Nitrite: x   Leuk Esterase: x / RBC: x / WBC x   Sq Epi: x / Non Sq Epi: x / Bacteria: x        Venous Blood Gas:  12-27 @ 05:00  7.45/30/59/21/91.4  VBG Lactate: 1.4      MICROBIOLOGY:     Culture - Urine (collected 26 Dec 2023 12:12)  Source: Clean Catch Clean Catch (Midstream)  Final Report (27 Dec 2023 13:59):    <10,000 CFU/mL Normal Urogenital Rosa M    Culture - Blood (collected 26 Dec 2023 08:45)  Source: .Blood Blood-Peripheral  Preliminary Report (27 Dec 2023 15:10):    No growth at 24 hours    Culture - Blood (collected 26 Dec 2023 08:30)  Source: .Blood Blood-Peripheral  Preliminary Report (27 Dec 2023 15:10):    No growth at 24 hours        RADIOLOGY:  [ ] Reviewed and interpreted by me    EKG: INTERVAL: Patient received HTS overnight; 3% NS @ 30cc/hr x 8hr. NAEO    SUBJECTIVE: Patient examined bedside this AM. Pt drowsy, rousable, but only nodding yes/no to questions. No pain, no fever/chills, rash is not bothering her. No other ROS elicited    OBJECTIVE:  ICU Vital Signs Last 24 Hrs  T(C): 36.4 (28 Dec 2023 05:15), Max: 37.3 (27 Dec 2023 21:49)  T(F): 97.5 (28 Dec 2023 05:15), Max: 99.1 (27 Dec 2023 21:49)  HR: 93 (28 Dec 2023 05:15) (69 - 97)  BP: 141/91 (28 Dec 2023 05:15) (124/62 - 169/81)  BP(mean): --  ABP: --  ABP(mean): --  RR: 17 (28 Dec 2023 05:15) (17 - 20)  SpO2: 100% (28 Dec 2023 05:15) (100% - 100%)    O2 Parameters below as of 28 Dec 2023 05:15  Patient On (Oxygen Delivery Method): room air    12-27 @ 07:01  -  12-28 @ 07:00  --------------------------------------------------------  IN: 1400 mL / OUT: 1550 mL / NET: -150 mL      CAPILLARY BLOOD GLUCOSE      POCT Blood Glucose.: 100 mg/dL (27 Dec 2023 04:38)      PHYSICAL EXAM:  General: Drowsy, NAD, laying in bed, on RA  HEENT: significant rheum present, but on separation eyes not injected, PERRLA, non-icteric  Neck:  symmetric,  JVD absent  Respiratory: Limited to anterior exam; Clear to ascultation bilaterally, no crackles/rales, no Resp distress; no accessory muscle use  Cardiovascular:  RRR, no murmurs/rubs/gallops  Abdomen: Soft, NT, ND  Extremities: No edema noted  Skin: No rashes or lesions noted  Neurological: unable to assess   Psychiatry: unable to assess     PRN Meds:      LABS:                        12.4   8.14  )-----------( 297      ( 28 Dec 2023 06:50 )             34.7     Hgb Trend: 12.4<--, 13.1<--, 12.7<--, 12.5<--  12-28    130<L>  |  97<L>  |  9   ----------------------------<  97  3.5   |  22  |  0.46<L>    Ca    8.1<L>      28 Dec 2023 06:50  Phos  2.4     12-28  Mg     1.90     12-28    TPro  7.1  /  Alb  3.5  /  TBili  0.5  /  DBili  x   /  AST  24  /  ALT  17  /  AlkPhos  97  12-28    Creatinine Trend: 0.46<--, 0.42<--, 0.49<--, 0.44<--, 0.43<--, 0.48<--  PT/INR - ( 27 Dec 2023 16:45 )   PT: 11.2 sec;   INR: 0.99 ratio         PTT - ( 27 Dec 2023 16:45 )  PTT:25.6 sec  Urinalysis Basic - ( 28 Dec 2023 06:50 )    Color: x / Appearance: x / SG: x / pH: x  Gluc: 97 mg/dL / Ketone: x  / Bili: x / Urobili: x   Blood: x / Protein: x / Nitrite: x   Leuk Esterase: x / RBC: x / WBC x   Sq Epi: x / Non Sq Epi: x / Bacteria: x        Venous Blood Gas:  12-27 @ 05:00  7.45/30/59/21/91.4  VBG Lactate: 1.4      MICROBIOLOGY:     Culture - Urine (collected 26 Dec 2023 12:12)  Source: Clean Catch Clean Catch (Midstream)  Final Report (27 Dec 2023 13:59):    <10,000 CFU/mL Normal Urogenital Rosa M    Culture - Blood (collected 26 Dec 2023 08:45)  Source: .Blood Blood-Peripheral  Preliminary Report (27 Dec 2023 15:10):    No growth at 24 hours    Culture - Blood (collected 26 Dec 2023 08:30)  Source: .Blood Blood-Peripheral  Preliminary Report (27 Dec 2023 15:10):    No growth at 24 hours    RADIOLOGY:  Reviewed

## 2023-12-28 NOTE — PROGRESS NOTE ADULT - PROBLEM SELECTOR PLAN 3
Reported to be A&Ox3 at baseline. Now with worsening mental status, likely in the setting of hyponatremia.  -CT head non contrast  -MRI head with IV contrast per ID  -Treatment of hyponatremia as above Reported to be A&Ox3 at baseline. Now with worsening mental status, likely in the setting of hyponatremia.  -CT head non contrast unremarkable  -MRI head with IV contrast per ID  -Treatment of hyponatremia as above

## 2023-12-28 NOTE — PROGRESS NOTE ADULT - PROBLEM SELECTOR PLAN 4
Weakness may be in the setting of AMS. Patient states herself that she walks with a walker.  -Consider PT consult when AMS improved and patient able to participate

## 2023-12-28 NOTE — PROGRESS NOTE ADULT - PROBLEM SELECTOR PLAN 1
-ID consulted, appreciate recs  -f/u HSV/VZV lesion PCR  -Airborne/contact precautions  -Obtain LP per ID --> spoke with ID attending, ideally would get LP before acyclovir but if logistically challenging can give acyclovir first. Will obtain meningoencephalitis panel, cell count, glucose, protein etc., will f/u with finalized ID note.  -Acyclovir 10 mg/Kg q12h  -Maintenance IV fluids while on acyclovir  -Augmentin 825-125 mg PO BID -ID consulted, appreciate recs  -f/u HSV/VZV lesion PCR  -Airborne/contact precautions  -Obtain LP per ID --> spoke with ID attending, ideally would get LP before acyclovir but if logistically challenging can give acyclovir first. Will obtain meningoencephalitis panel, cell count, glucose, protein etc., will f/u with finalized ID note.  -Acyclovir 10 mg/Kg q12h  -Maintenance IV fluids while on acyclovir  -Patient altered, so Augmentin transitioned to Unasyn, 12/27 -

## 2023-12-29 ENCOUNTER — TRANSCRIPTION ENCOUNTER (OUTPATIENT)
Age: 83
End: 2023-12-29

## 2023-12-29 LAB
ALBUMIN SERPL ELPH-MCNC: 3.4 G/DL — SIGNIFICANT CHANGE UP (ref 3.3–5)
ALBUMIN SERPL ELPH-MCNC: 3.4 G/DL — SIGNIFICANT CHANGE UP (ref 3.3–5)
ALP SERPL-CCNC: 91 U/L — SIGNIFICANT CHANGE UP (ref 40–120)
ALP SERPL-CCNC: 91 U/L — SIGNIFICANT CHANGE UP (ref 40–120)
ALT FLD-CCNC: 11 U/L — SIGNIFICANT CHANGE UP (ref 4–33)
ALT FLD-CCNC: 11 U/L — SIGNIFICANT CHANGE UP (ref 4–33)
ANION GAP SERPL CALC-SCNC: 11 MMOL/L — SIGNIFICANT CHANGE UP (ref 7–14)
ANION GAP SERPL CALC-SCNC: 11 MMOL/L — SIGNIFICANT CHANGE UP (ref 7–14)
ANION GAP SERPL CALC-SCNC: 13 MMOL/L — SIGNIFICANT CHANGE UP (ref 7–14)
ANION GAP SERPL CALC-SCNC: 13 MMOL/L — SIGNIFICANT CHANGE UP (ref 7–14)
ANION GAP SERPL CALC-SCNC: 9 MMOL/L — SIGNIFICANT CHANGE UP (ref 7–14)
ANION GAP SERPL CALC-SCNC: 9 MMOL/L — SIGNIFICANT CHANGE UP (ref 7–14)
APPEARANCE CSF: CLEAR — SIGNIFICANT CHANGE UP
APPEARANCE CSF: CLEAR — SIGNIFICANT CHANGE UP
APPEARANCE SPUN FLD: COLORLESS — SIGNIFICANT CHANGE UP
APPEARANCE SPUN FLD: COLORLESS — SIGNIFICANT CHANGE UP
APTT BLD: 25.4 SEC — SIGNIFICANT CHANGE UP (ref 24.5–35.6)
APTT BLD: 25.4 SEC — SIGNIFICANT CHANGE UP (ref 24.5–35.6)
AST SERPL-CCNC: 21 U/L — SIGNIFICANT CHANGE UP (ref 4–32)
AST SERPL-CCNC: 21 U/L — SIGNIFICANT CHANGE UP (ref 4–32)
BACTERIAL AG PNL SER: 0 % — SIGNIFICANT CHANGE UP
BACTERIAL AG PNL SER: 0 % — SIGNIFICANT CHANGE UP
BASOPHILS # BLD AUTO: 0 K/UL — SIGNIFICANT CHANGE UP (ref 0–0.2)
BASOPHILS # BLD AUTO: 0 K/UL — SIGNIFICANT CHANGE UP (ref 0–0.2)
BASOPHILS NFR BLD AUTO: 0 % — SIGNIFICANT CHANGE UP (ref 0–2)
BASOPHILS NFR BLD AUTO: 0 % — SIGNIFICANT CHANGE UP (ref 0–2)
BILIRUB SERPL-MCNC: 0.5 MG/DL — SIGNIFICANT CHANGE UP (ref 0.2–1.2)
BILIRUB SERPL-MCNC: 0.5 MG/DL — SIGNIFICANT CHANGE UP (ref 0.2–1.2)
BLD GP AB SCN SERPL QL: NEGATIVE — SIGNIFICANT CHANGE UP
BUN SERPL-MCNC: 10 MG/DL — SIGNIFICANT CHANGE UP (ref 7–23)
BUN SERPL-MCNC: 10 MG/DL — SIGNIFICANT CHANGE UP (ref 7–23)
BUN SERPL-MCNC: 8 MG/DL — SIGNIFICANT CHANGE UP (ref 7–23)
BUN SERPL-MCNC: 8 MG/DL — SIGNIFICANT CHANGE UP (ref 7–23)
BUN SERPL-MCNC: 9 MG/DL — SIGNIFICANT CHANGE UP (ref 7–23)
BUN SERPL-MCNC: 9 MG/DL — SIGNIFICANT CHANGE UP (ref 7–23)
CALCIUM SERPL-MCNC: 8.2 MG/DL — LOW (ref 8.4–10.5)
CALCIUM SERPL-MCNC: 8.2 MG/DL — LOW (ref 8.4–10.5)
CALCIUM SERPL-MCNC: 8.4 MG/DL — SIGNIFICANT CHANGE UP (ref 8.4–10.5)
CALCIUM SERPL-MCNC: 8.4 MG/DL — SIGNIFICANT CHANGE UP (ref 8.4–10.5)
CALCIUM SERPL-MCNC: 8.5 MG/DL — SIGNIFICANT CHANGE UP (ref 8.4–10.5)
CALCIUM SERPL-MCNC: 8.5 MG/DL — SIGNIFICANT CHANGE UP (ref 8.4–10.5)
CHLORIDE SERPL-SCNC: 93 MMOL/L — LOW (ref 98–107)
CHLORIDE SERPL-SCNC: 94 MMOL/L — LOW (ref 98–107)
CHLORIDE SERPL-SCNC: 94 MMOL/L — LOW (ref 98–107)
CO2 SERPL-SCNC: 22 MMOL/L — SIGNIFICANT CHANGE UP (ref 22–31)
CO2 SERPL-SCNC: 24 MMOL/L — SIGNIFICANT CHANGE UP (ref 22–31)
CO2 SERPL-SCNC: 24 MMOL/L — SIGNIFICANT CHANGE UP (ref 22–31)
COLOR CSF: COLORLESS — SIGNIFICANT CHANGE UP
COLOR CSF: COLORLESS — SIGNIFICANT CHANGE UP
CREAT SERPL-MCNC: 0.39 MG/DL — LOW (ref 0.5–1.3)
CREAT SERPL-MCNC: 0.39 MG/DL — LOW (ref 0.5–1.3)
CREAT SERPL-MCNC: 0.45 MG/DL — LOW (ref 0.5–1.3)
CREAT SERPL-MCNC: 0.45 MG/DL — LOW (ref 0.5–1.3)
CREAT SERPL-MCNC: 0.47 MG/DL — LOW (ref 0.5–1.3)
CREAT SERPL-MCNC: 0.47 MG/DL — LOW (ref 0.5–1.3)
EGFR: 95 ML/MIN/1.73M2 — SIGNIFICANT CHANGE UP
EGFR: 95 ML/MIN/1.73M2 — SIGNIFICANT CHANGE UP
EGFR: 96 ML/MIN/1.73M2 — SIGNIFICANT CHANGE UP
EGFR: 96 ML/MIN/1.73M2 — SIGNIFICANT CHANGE UP
EGFR: 99 ML/MIN/1.73M2 — SIGNIFICANT CHANGE UP
EGFR: 99 ML/MIN/1.73M2 — SIGNIFICANT CHANGE UP
EOSINOPHIL # BLD AUTO: 0.33 K/UL — SIGNIFICANT CHANGE UP (ref 0–0.5)
EOSINOPHIL # BLD AUTO: 0.33 K/UL — SIGNIFICANT CHANGE UP (ref 0–0.5)
EOSINOPHIL # CSF: 0 % — SIGNIFICANT CHANGE UP
EOSINOPHIL # CSF: 0 % — SIGNIFICANT CHANGE UP
EOSINOPHIL NFR BLD AUTO: 3.5 % — SIGNIFICANT CHANGE UP (ref 0–6)
EOSINOPHIL NFR BLD AUTO: 3.5 % — SIGNIFICANT CHANGE UP (ref 0–6)
GLUCOSE CSF-MCNC: 52 MG/DL — SIGNIFICANT CHANGE UP (ref 40–70)
GLUCOSE CSF-MCNC: 52 MG/DL — SIGNIFICANT CHANGE UP (ref 40–70)
GLUCOSE SERPL-MCNC: 101 MG/DL — HIGH (ref 70–99)
GLUCOSE SERPL-MCNC: 101 MG/DL — HIGH (ref 70–99)
GLUCOSE SERPL-MCNC: 103 MG/DL — HIGH (ref 70–99)
GLUCOSE SERPL-MCNC: 103 MG/DL — HIGH (ref 70–99)
GLUCOSE SERPL-MCNC: 98 MG/DL — SIGNIFICANT CHANGE UP (ref 70–99)
GLUCOSE SERPL-MCNC: 98 MG/DL — SIGNIFICANT CHANGE UP (ref 70–99)
GRAM STN FLD: SIGNIFICANT CHANGE UP
GRAM STN FLD: SIGNIFICANT CHANGE UP
HCT VFR BLD CALC: 33.1 % — LOW (ref 34.5–45)
HCT VFR BLD CALC: 33.1 % — LOW (ref 34.5–45)
HGB BLD-MCNC: 11.5 G/DL — SIGNIFICANT CHANGE UP (ref 11.5–15.5)
HGB BLD-MCNC: 11.5 G/DL — SIGNIFICANT CHANGE UP (ref 11.5–15.5)
IANC: 4.4 K/UL — SIGNIFICANT CHANGE UP (ref 1.8–7.4)
IANC: 4.4 K/UL — SIGNIFICANT CHANGE UP (ref 1.8–7.4)
INR BLD: 1.04 RATIO — SIGNIFICANT CHANGE UP (ref 0.85–1.18)
INR BLD: 1.04 RATIO — SIGNIFICANT CHANGE UP (ref 0.85–1.18)
LDH CSF L TO P-CCNC: 19 U/L — SIGNIFICANT CHANGE UP
LDH CSF L TO P-CCNC: 19 U/L — SIGNIFICANT CHANGE UP
LDH FLD-CCNC: 19 U/L — SIGNIFICANT CHANGE UP
LDH FLD-CCNC: 19 U/L — SIGNIFICANT CHANGE UP
LYMPHOCYTES # BLD AUTO: 2.03 K/UL — SIGNIFICANT CHANGE UP (ref 1–3.3)
LYMPHOCYTES # BLD AUTO: 2.03 K/UL — SIGNIFICANT CHANGE UP (ref 1–3.3)
LYMPHOCYTES # BLD AUTO: 21.7 % — SIGNIFICANT CHANGE UP (ref 13–44)
LYMPHOCYTES # BLD AUTO: 21.7 % — SIGNIFICANT CHANGE UP (ref 13–44)
LYMPHOCYTES # CSF: 88 % — SIGNIFICANT CHANGE UP
LYMPHOCYTES # CSF: 88 % — SIGNIFICANT CHANGE UP
MAGNESIUM SERPL-MCNC: 1.9 MG/DL — SIGNIFICANT CHANGE UP (ref 1.6–2.6)
MANUAL SMEAR VERIFICATION: SIGNIFICANT CHANGE UP
MANUAL SMEAR VERIFICATION: SIGNIFICANT CHANGE UP
MCHC RBC-ENTMCNC: 29 PG — SIGNIFICANT CHANGE UP (ref 27–34)
MCHC RBC-ENTMCNC: 29 PG — SIGNIFICANT CHANGE UP (ref 27–34)
MCHC RBC-ENTMCNC: 34.7 GM/DL — SIGNIFICANT CHANGE UP (ref 32–36)
MCHC RBC-ENTMCNC: 34.7 GM/DL — SIGNIFICANT CHANGE UP (ref 32–36)
MCV RBC AUTO: 83.4 FL — SIGNIFICANT CHANGE UP (ref 80–100)
MCV RBC AUTO: 83.4 FL — SIGNIFICANT CHANGE UP (ref 80–100)
MONOCYTES # BLD AUTO: 1.06 K/UL — HIGH (ref 0–0.9)
MONOCYTES # BLD AUTO: 1.06 K/UL — HIGH (ref 0–0.9)
MONOCYTES NFR BLD AUTO: 11.3 % — SIGNIFICANT CHANGE UP (ref 2–14)
MONOCYTES NFR BLD AUTO: 11.3 % — SIGNIFICANT CHANGE UP (ref 2–14)
MONOS+MACROS NFR CSF: 11 % — SIGNIFICANT CHANGE UP
MONOS+MACROS NFR CSF: 11 % — SIGNIFICANT CHANGE UP
NEUTROPHILS # BLD AUTO: 5.54 K/UL — SIGNIFICANT CHANGE UP (ref 1.8–7.4)
NEUTROPHILS # BLD AUTO: 5.54 K/UL — SIGNIFICANT CHANGE UP (ref 1.8–7.4)
NEUTROPHILS # CSF: 0 % — SIGNIFICANT CHANGE UP
NEUTROPHILS # CSF: 0 % — SIGNIFICANT CHANGE UP
NEUTROPHILS NFR BLD AUTO: 59.1 % — SIGNIFICANT CHANGE UP (ref 43–77)
NEUTROPHILS NFR BLD AUTO: 59.1 % — SIGNIFICANT CHANGE UP (ref 43–77)
NRBC NFR CSF: 118 CELLS/UL — CRITICAL HIGH (ref 0–5)
NRBC NFR CSF: 118 CELLS/UL — CRITICAL HIGH (ref 0–5)
OTHER CELLS CSF MANUAL: 0 % — SIGNIFICANT CHANGE UP
OTHER CELLS CSF MANUAL: 0 % — SIGNIFICANT CHANGE UP
PHOSPHATE SERPL-MCNC: 2.3 MG/DL — LOW (ref 2.5–4.5)
PHOSPHATE SERPL-MCNC: 2.3 MG/DL — LOW (ref 2.5–4.5)
PHOSPHATE SERPL-MCNC: 3.3 MG/DL — SIGNIFICANT CHANGE UP (ref 2.5–4.5)
PHOSPHATE SERPL-MCNC: 3.3 MG/DL — SIGNIFICANT CHANGE UP (ref 2.5–4.5)
PLAT MORPH BLD: NORMAL — SIGNIFICANT CHANGE UP
PLAT MORPH BLD: NORMAL — SIGNIFICANT CHANGE UP
PLATELET # BLD AUTO: 335 K/UL — SIGNIFICANT CHANGE UP (ref 150–400)
PLATELET # BLD AUTO: 335 K/UL — SIGNIFICANT CHANGE UP (ref 150–400)
PLATELET COUNT - ESTIMATE: NORMAL — SIGNIFICANT CHANGE UP
PLATELET COUNT - ESTIMATE: NORMAL — SIGNIFICANT CHANGE UP
POTASSIUM SERPL-MCNC: 3.3 MMOL/L — LOW (ref 3.5–5.3)
POTASSIUM SERPL-MCNC: 3.3 MMOL/L — LOW (ref 3.5–5.3)
POTASSIUM SERPL-MCNC: 3.5 MMOL/L — SIGNIFICANT CHANGE UP (ref 3.5–5.3)
POTASSIUM SERPL-SCNC: 3.3 MMOL/L — LOW (ref 3.5–5.3)
POTASSIUM SERPL-SCNC: 3.3 MMOL/L — LOW (ref 3.5–5.3)
POTASSIUM SERPL-SCNC: 3.5 MMOL/L — SIGNIFICANT CHANGE UP (ref 3.5–5.3)
PROT CSF-MCNC: 66 MG/DL — HIGH (ref 15–45)
PROT CSF-MCNC: 66 MG/DL — HIGH (ref 15–45)
PROT SERPL-MCNC: 6.9 G/DL — SIGNIFICANT CHANGE UP (ref 6–8.3)
PROT SERPL-MCNC: 6.9 G/DL — SIGNIFICANT CHANGE UP (ref 6–8.3)
PROTHROM AB SERPL-ACNC: 11.7 SEC — SIGNIFICANT CHANGE UP (ref 9.5–13)
PROTHROM AB SERPL-ACNC: 11.7 SEC — SIGNIFICANT CHANGE UP (ref 9.5–13)
RBC # BLD: 3.97 M/UL — SIGNIFICANT CHANGE UP (ref 3.8–5.2)
RBC # BLD: 3.97 M/UL — SIGNIFICANT CHANGE UP (ref 3.8–5.2)
RBC # CSF: 18 CELLS/UL — HIGH (ref 0–0)
RBC # CSF: 18 CELLS/UL — HIGH (ref 0–0)
RBC # FLD: 11.9 % — SIGNIFICANT CHANGE UP (ref 10.3–14.5)
RBC # FLD: 11.9 % — SIGNIFICANT CHANGE UP (ref 10.3–14.5)
RBC BLD AUTO: NORMAL — SIGNIFICANT CHANGE UP
RBC BLD AUTO: NORMAL — SIGNIFICANT CHANGE UP
RH IG SCN BLD-IMP: POSITIVE — SIGNIFICANT CHANGE UP
SMUDGE CELLS # BLD: PRESENT — SIGNIFICANT CHANGE UP
SMUDGE CELLS # BLD: PRESENT — SIGNIFICANT CHANGE UP
SODIUM SERPL-SCNC: 124 MMOL/L — LOW (ref 135–145)
SODIUM SERPL-SCNC: 124 MMOL/L — LOW (ref 135–145)
SODIUM SERPL-SCNC: 128 MMOL/L — LOW (ref 135–145)
SODIUM SERPL-SCNC: 128 MMOL/L — LOW (ref 135–145)
SODIUM SERPL-SCNC: 129 MMOL/L — LOW (ref 135–145)
SODIUM SERPL-SCNC: 129 MMOL/L — LOW (ref 135–145)
SPECIMEN SOURCE: SIGNIFICANT CHANGE UP
SPECIMEN SOURCE: SIGNIFICANT CHANGE UP
TOTAL CELLS COUNTED, SPINAL FLUID: 100 CELLS — SIGNIFICANT CHANGE UP
TOTAL CELLS COUNTED, SPINAL FLUID: 100 CELLS — SIGNIFICANT CHANGE UP
TUBE TYPE: SIGNIFICANT CHANGE UP
TUBE TYPE: SIGNIFICANT CHANGE UP
VARIANT LYMPHS # BLD: 4.4 % — SIGNIFICANT CHANGE UP (ref 0–6)
VARIANT LYMPHS # BLD: 4.4 % — SIGNIFICANT CHANGE UP (ref 0–6)
WBC # BLD: 9.37 K/UL — SIGNIFICANT CHANGE UP (ref 3.8–10.5)
WBC # BLD: 9.37 K/UL — SIGNIFICANT CHANGE UP (ref 3.8–10.5)
WBC # FLD AUTO: 9.37 K/UL — SIGNIFICANT CHANGE UP (ref 3.8–10.5)
WBC # FLD AUTO: 9.37 K/UL — SIGNIFICANT CHANGE UP (ref 3.8–10.5)

## 2023-12-29 PROCEDURE — 99233 SBSQ HOSP IP/OBS HIGH 50: CPT | Mod: GC

## 2023-12-29 PROCEDURE — 99232 SBSQ HOSP IP/OBS MODERATE 35: CPT

## 2023-12-29 PROCEDURE — 99232 SBSQ HOSP IP/OBS MODERATE 35: CPT | Mod: GC

## 2023-12-29 PROCEDURE — 62328 DX LMBR SPI PNXR W/FLUOR/CT: CPT

## 2023-12-29 RX ORDER — SODIUM CHLORIDE 9 MG/ML
1000 INJECTION INTRAMUSCULAR; INTRAVENOUS; SUBCUTANEOUS
Refills: 0 | Status: DISCONTINUED | OUTPATIENT
Start: 2023-12-29 | End: 2023-12-30

## 2023-12-29 RX ORDER — POTASSIUM PHOSPHATE, MONOBASIC POTASSIUM PHOSPHATE, DIBASIC 236; 224 MG/ML; MG/ML
30 INJECTION, SOLUTION INTRAVENOUS ONCE
Refills: 0 | Status: COMPLETED | OUTPATIENT
Start: 2023-12-29 | End: 2023-12-29

## 2023-12-29 RX ORDER — POTASSIUM CHLORIDE 20 MEQ
10 PACKET (EA) ORAL
Refills: 0 | Status: COMPLETED | OUTPATIENT
Start: 2023-12-29 | End: 2023-12-29

## 2023-12-29 RX ORDER — AMLODIPINE BESYLATE 2.5 MG/1
5 TABLET ORAL DAILY
Refills: 0 | Status: DISCONTINUED | OUTPATIENT
Start: 2023-12-29 | End: 2023-12-29

## 2023-12-29 RX ORDER — AMLODIPINE BESYLATE 2.5 MG/1
5 TABLET ORAL DAILY
Refills: 0 | Status: DISCONTINUED | OUTPATIENT
Start: 2023-12-29 | End: 2023-12-30

## 2023-12-29 RX ADMIN — Medication 110 MILLIGRAM(S): at 06:55

## 2023-12-29 RX ADMIN — SODIUM CHLORIDE 1 GRAM(S): 9 INJECTION INTRAMUSCULAR; INTRAVENOUS; SUBCUTANEOUS at 16:54

## 2023-12-29 RX ADMIN — AMPICILLIN SODIUM AND SULBACTAM SODIUM 200 GRAM(S): 250; 125 INJECTION, POWDER, FOR SUSPENSION INTRAMUSCULAR; INTRAVENOUS at 00:12

## 2023-12-29 RX ADMIN — SODIUM CHLORIDE 1 GRAM(S): 9 INJECTION INTRAMUSCULAR; INTRAVENOUS; SUBCUTANEOUS at 06:16

## 2023-12-29 RX ADMIN — POTASSIUM PHOSPHATE, MONOBASIC POTASSIUM PHOSPHATE, DIBASIC 83.33 MILLIMOLE(S): 236; 224 INJECTION, SOLUTION INTRAVENOUS at 09:38

## 2023-12-29 RX ADMIN — SODIUM CHLORIDE 1 GRAM(S): 9 INJECTION INTRAMUSCULAR; INTRAVENOUS; SUBCUTANEOUS at 00:17

## 2023-12-29 RX ADMIN — SODIUM CHLORIDE 75 MILLILITER(S): 9 INJECTION INTRAMUSCULAR; INTRAVENOUS; SUBCUTANEOUS at 18:15

## 2023-12-29 RX ADMIN — AMPICILLIN SODIUM AND SULBACTAM SODIUM 200 GRAM(S): 250; 125 INJECTION, POWDER, FOR SUSPENSION INTRAMUSCULAR; INTRAVENOUS at 06:20

## 2023-12-29 RX ADMIN — AMLODIPINE BESYLATE 5 MILLIGRAM(S): 2.5 TABLET ORAL at 15:29

## 2023-12-29 RX ADMIN — AMPICILLIN SODIUM AND SULBACTAM SODIUM 200 GRAM(S): 250; 125 INJECTION, POWDER, FOR SUSPENSION INTRAMUSCULAR; INTRAVENOUS at 22:33

## 2023-12-29 RX ADMIN — Medication 110 MILLIGRAM(S): at 18:47

## 2023-12-29 RX ADMIN — AMPICILLIN SODIUM AND SULBACTAM SODIUM 200 GRAM(S): 250; 125 INJECTION, POWDER, FOR SUSPENSION INTRAMUSCULAR; INTRAVENOUS at 15:29

## 2023-12-29 NOTE — DIETITIAN INITIAL EVALUATION ADULT - PROBLEM SELECTOR PLAN 5
Patient with elevated BP to 195/70. Med rec unable to be obtained at this time, on chart review no reported history of BP.  -Ordered PO hydralazine stat  -If no improvement overnight, night team can give IV labetalol

## 2023-12-29 NOTE — PROGRESS NOTE ADULT - PROBLEM SELECTOR PLAN 1
Pt. with acute on chronic hyponatremia in the setting of pain. On review of [place] HIE, SNa was low atr 122 on 12/27. Pt. received 3% hypertonic saline on 12/27. SNa increased to 125 on 12/28. Pt. currently receiving salt tablets 1 gm tid. SNa improved to 129 today (12/29). Serum osm is low at 261, urine osm elevated to 568, and urine sodium elevated at 173. Pt. with SIADH. Recommend to continue with salt tablets at current dose. Fluid restriction to <1L/day. Monitor SNa.    If you have any questions, please feel free to contact me  Hugh Corona  Nephrology Fellow  [phone] / [employer] Teams(Preferred)  (After 5pm or on weekends please page the on-call fellow) Pt. with acute on chronic hyponatremia in the setting of pain. On review of [place] HIE, SNa was low atr 122 on 12/27. Pt. received 3% hypertonic saline on 12/27. SNa increased to 125 on 12/28. Pt. currently receiving salt tablets 1 gm tid. SNa improved to 129 today (12/29). Serum osm is low at 261, urine osm elevated to 568, and urine sodium elevated at 173. Pt. with SIADH. Recommend to continue with salt tablets at current dose. Fluid restriction to <1L/day. Monitor SNa.    If you have any questions, please feel free to contact me  Hugh Corona  Nephrology Fellow  [phone] / [employer] Teams(Preferred)  (After 5pm or on weekends please page the on-call fellow)

## 2023-12-29 NOTE — PROGRESS NOTE ADULT - PROBLEM SELECTOR PLAN 1
-ID consulted, appreciate recs  -f/u HSV/VZV lesion PCR  -Airborne/contact precautions  -Obtain LP per ID --> spoke with ID attending, ideally would get LP before acyclovir but if logistically challenging can give acyclovir first. Will obtain meningoencephalitis panel, cell count, glucose, protein etc., will f/u with finalized ID note.  -Acyclovir 10 mg/Kg q12h  -Maintenance IV fluids while on acyclovir  -Patient altered, so Augmentin transitioned to Unasyn, 12/27 - -ID consulted, appreciate recs  -VZV lesion PCR +  -Continue acyclovir (12/26-) @ 10mg/kg q12h  -Maintenance IV fluids while on acyclovir  -Continue unasyn (12/27-)

## 2023-12-29 NOTE — DIETITIAN INITIAL EVALUATION ADULT - PROBLEM SELECTOR PLAN 1
-ID consulted, appreciate recs  -f/u HSV/VZV lesion PCR  -Airborne/contact precautions  -Obtain LP per ID --> spoke with ID attending, ideally would get LP before acyclovir but if logistically challenging can give acyclovir first. Will obtain meningoencephalitis panel, cell count, glucose, protein etc., will f/u with finalized ID note.  -Acyclovir 10 mg/Kg Q8hrs; adjust renally based on Cr clearance --> no weight available, getting stat weight  -Maintenance IV fluids while on acyclovir  -Augmentin 825-125 mg PO BID

## 2023-12-29 NOTE — DIETITIAN INITIAL EVALUATION ADULT - PROBLEM SELECTOR PLAN 2
Na 123 --> 129 on admission. 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 Patient s/p 2L NS in ED.  -f/u repeat BMP to recheck Na, ordered stat  -f/u urine studies

## 2023-12-29 NOTE — PROVIDER CONTACT NOTE (CRITICAL VALUE NOTIFICATION) - ACTION/TREATMENT ORDERED:
Provider Notified. No new orders.
Provider Notified. repeat CSF required.
no treatment ordered yet. plan of care ongoing.

## 2023-12-29 NOTE — DISCHARGE NOTE PROVIDER - CARE PROVIDER_API CALL
EZIO JUNE  [address]  [place] [address]  Phone: [phone]  Fax: [phone]  Follow Up Time: 1 week   EZIO JUNE  [address]  [place] [address]  Phone: [phone]  Fax: [phone]  Follow Up Time: 1 week

## 2023-12-29 NOTE — DIETITIAN INITIAL EVALUATION ADULT - PROBLEM SELECTOR PLAN 6
Diet: pending dysphagia screen. per daughter patient eats a regular diet, but currently does not have her dentures.  DVT ppx: SCDs --> if CTH neg, can start lovenox  Dispo: pending clinical improvement

## 2023-12-29 NOTE — PROGRESS NOTE ADULT - SUBJECTIVE AND OBJECTIVE BOX
[facility] Division of Kidney Diseases & Hypertension  FOLLOW UP NOTE  [phone]--------------------------------------------------------------------------------    Chief Complaint:Altered mental status    24 hour events/subjective: Pt. was seen and evaluated at bedside earlier today. Reports feeling better. denies any headaches, fevers/chills, chest pain, SOB, and LE edema.    PAST HISTORY  --------------------------------------------------------------------------------  No significant changes to PMH, PSH, FHx, SHx, unless otherwise noted    ALLERGIES & MEDICATIONS  --------------------------------------------------------------------------------  Allergies    Allergy Status Unknown    Intolerances      Standing Inpatient Medications  acyclovir IVPB 500 milliGRAM(s) IV Intermittent every 12 hours  amLODIPine   Tablet 5 milliGRAM(s) Oral daily  ampicillin/sulbactam  IVPB 3 Gram(s) IV Intermittent every 6 hours  influenza  Vaccine (HIGH DOSE) 0.7 milliLiter(s) IntraMuscular once  sodium chloride 1 Gram(s) Oral three times a day  sodium chloride 3%. 500 milliLiter(s) IV Continuous <Continuous>    PRN Inpatient Medications      REVIEW OF SYSTEMS  --------------------------------------------------------------------------------  See HPI    VITALS/PHYSICAL EXAM  --------------------------------------------------------------------------------  T(C): 36.3 (12-29-23 @ 09:20), Max: 37 (12-29-23 @ 01:00)  HR: 78 (12-29-23 @ 09:20) (78 - 91)  BP: 160/88 (12-29-23 @ 09:20) (147/91 - 166/83)  RR: 18 (12-29-23 @ 09:20) (16 - 18)  SpO2: 100% (12-29-23 @ 09:20) (100% - 100%)  Wt(kg): --  Height (cm): 160 (12-28-23 @ 08:49)    Physical Exam:  Gen: NAD, well-appearing  HEENT: PERRL, supple neck, clear oropharynx  Pulm: CTA B/L  CV: RRR, S1S2;  Abd: soft, nontender/nondistended  : No suprapubic tenderness  Extremities: no bilateral LE edema noted.   Neuro: No focal deficits; remains encephalopathic, A&Ox1-2  Skin: Warm, +crusted lesions over face    LABS/STUDIES  --------------------------------------------------------------------------------              11.5   9.37  >-----------<  335      [12-29-23 @ 04:01]              33.1     129  |  94  |  9   ----------------------------<  101      [12-29-23 @ 09:45]  3.5   |  24  |  0.45        Ca     8.4     [12-29-23 @ 09:45]      Mg     1.90     [12-29-23 @ 04:01]      Phos  2.3     [12-29-23 @ 04:01]    TPro  6.9  /  Alb  3.4  /  TBili  0.5  /  DBili  x   /  AST  21  /  ALT  11  /  AlkPhos  91  [12-29-23 @ 04:01]    PT/INR: PT 11.7 , INR 1.04       [12-29-23 @ 04:01]  PTT: 25.4       [12-29-23 @ 04:01]    Uric acid 3.1      [12-27-23 @ 13:50]  Serum Osmolality 261      [12-27-23 @ 13:50]    Creatinine Trend:  SCr 0.45 [12-29 @ 09:45]  SCr 0.47 [12-29 @ 04:01]  SCr 0.48 [12-28 @ 09:46]  SCr 0.46 [12-28 @ 06:50]  SCr 0.42 [12-28 @ 02:10]    Urine Creatinine 40      [12-28-23 @ 12:45]  Urine Protein 19      [12-28-23 @ 12:45]  Urine Sodium 173      [12-28-23 @ 12:45]  Urine Urea Nitrogen 381.4      [12-28-23 @ 12:45]  Urine Potassium 38.0      [12-28-23 @ 12:45]  Urine Osmolality 568      [12-28-23 @ 12:45] [facility] Division of Kidney Diseases & Hypertension  FOLLOW UP NOTE  [phone]--------------------------------------------------------------------------------    Chief Complaint:Altered mental status    24 hour events/subjective: Pt. was seen and evaluated at bedside earlier today. Reports feeling better. denies any headaches, fevers/chills, chest pain, SOB, and LE edema.    PAST HISTORY  --------------------------------------------------------------------------------  No significant changes to PMH, PSH, FHx, SHx, unless otherwise noted    ALLERGIES & MEDICATIONS  --------------------------------------------------------------------------------  Allergies    Allergy Status Unknown    Intolerances      Standing Inpatient Medications  acyclovir IVPB 500 milliGRAM(s) IV Intermittent every 12 hours  amLODIPine   Tablet 5 milliGRAM(s) Oral daily  ampicillin/sulbactam  IVPB 3 Gram(s) IV Intermittent every 6 hours  influenza  Vaccine (HIGH DOSE) 0.7 milliLiter(s) IntraMuscular once  sodium chloride 1 Gram(s) Oral three times a day  sodium chloride 3%. 500 milliLiter(s) IV Continuous <Continuous>    PRN Inpatient Medications      REVIEW OF SYSTEMS  --------------------------------------------------------------------------------  See HPI    VITALS/PHYSICAL EXAM  --------------------------------------------------------------------------------  T(C): 36.3 (12-29-23 @ 09:20), Max: 37 (12-29-23 @ 01:00)  HR: 78 (12-29-23 @ 09:20) (78 - 91)  BP: 160/88 (12-29-23 @ 09:20) (147/91 - 166/83)  RR: 18 (12-29-23 @ 09:20) (16 - 18)  SpO2: 100% (12-29-23 @ 09:20) (100% - 100%)  Wt(kg): --  Height (cm): 160 (12-28-23 @ 08:49)    Physical Exam:  Gen: NAD, well-appearing  HEENT: PERRL, supple neck, clear oropharynx  Pulm: CTA B/L  CV: RRR, S1S2;  Abd: soft, nontender/nondistended  : No suprapubic tenderness  Extremities: no bilateral LE edema noted.   Neuro: No focal deficits; remains encephalopathic, A&Ox1-2  Skin: Warm, +crusted lesions over face    LABS/STUDIES  --------------------------------------------------------------------------------              11.5   9.37  >-----------<  335      [12-29-23 @ 04:01]              33.1     129  |  94  |  9   ----------------------------<  101      [12-29-23 @ 09:45]  3.5   |  24  |  0.45        Ca     8.4     [12-29-23 @ 09:45]      Mg     1.90     [12-29-23 @ 04:01]      Phos  2.3     [12-29-23 @ 04:01]    TPro  6.9  /  Alb  3.4  /  TBili  0.5  /  DBili  x   /  AST  21  /  ALT  11  /  AlkPhos  91  [12-29-23 @ 04:01]    PT/INR: PT 11.7 , INR 1.04       [12-29-23 @ 04:01]  PTT: 25.4       [12-29-23 @ 04:01]    Uric acid 3.1      [12-27-23 @ 13:50]  Serum Osmolality 261      [12-27-23 @ 13:50]    Creatinine Trend:  SCr 0.45 [12-29 @ 09:45]  SCr 0.47 [12-29 @ 04:01]  SCr 0.48 [12-28 @ 09:46]  SCr 0.46 [12-28 @ 06:50]  SCr 0.42 [12-28 @ 02:10]    Urine Creatinine 40      [12-28-23 @ 12:45]  Urine Protein 19      [12-28-23 @ 12:45]  Urine Sodium 173      [12-28-23 @ 12:45]  Urine Urea Nitrogen 381.4      [12-28-23 @ 12:45]  Urine Potassium 38.0      [12-28-23 @ 12:45]  Urine Osmolality 568      [12-28-23 @ 12:45]

## 2023-12-29 NOTE — DIETITIAN INITIAL EVALUATION ADULT - PROBLEM SELECTOR PLAN 3
Patient A&Ox2 at time of interview, reported to be A&Ox3 at baseline. May be in the setting of hyponatremia.  -CT head noncon  -MRI head with IV contrast per ID

## 2023-12-29 NOTE — PROVIDER CONTACT NOTE (CRITICAL VALUE NOTIFICATION) - BACKGROUND
pt admitted for confusion, headache, loss of appetite
Patient's admitting diagnosis of Altered mental status.
Patient's admitting diagnosis of Altered mental status.

## 2023-12-29 NOTE — PROGRESS NOTE ADULT - ASSESSMENT
82-yo F w/ no pertinent contributing PMH, presenting for facial rash and ?AMS. Patient was noted to be fatigued and confused x1-2 days PTA. Baseline reportedly A&O x3 and ambulatory. Presenting with facial rash, which started from a "pimple" on the chin, which spread through the R-side of the chin over time. Painful rash this AM. On exam, patient is awake, alert. ?orientation difficult to assess. Does not follow simple command consistently. No nuchal rigidity. R CN V3 distribution of erythema, warmth, and patchy vesicular rash w/ scabbing, found to have VZV on swab. Febrile on presentation too. LP was performed 12/29, which revealed lymphocytic pleocytosis.    #Facial VZV rash  #AMS  #Hyponatremia  #Low grade fever  #Abnormal CSF finding  - R CN V3 distribution of erythema, warmth, and patchy vesicular rash. Would assess for VZV rash. HSV/VZV swab pending.  - Mental status improving with sodium correction and continued acyclovir.   - Continue with Unasyn 3g IV Q6H and acyclovir 10 mg/kg IV Q12H (renal dose). Provide ample IVF hydration w/ adequate tonicity. Nephrology input appreciated.  - LP done 12/29. Lymphocytic pleocytosis noted. Protein also elevated. MEP pending.  - Would continue Unasyn x5d total.  - Given lymphocytic dominance of pleocytosis, c/f CSF viral involvement. Would treat for CSF VZV infection x14d IV acyclovir. Note that patient will need a PICC line if discharged. Not compatible with dosing via midline.  - Note that LP was done on day 4 of acyclovir. False negativity possible.  - MRI and MRA of the brain (c/f possible VZV vasculopathy)  - Monitor for neuro status  - Bed head elevation. Aspiration precautions.    Plan discussed with primary team house staff.  Thank you for this consult. Inpatient ID team will peripherally follow.    Guille Pérez MD, PhD  Attending Physician  Division of Infectious Diseases  Department of Medicine    Please contact through MS Teams message.  Office: [phone] (after 5 PM or weekend).    82-yo F w/ no pertinent contributing PMH, presenting for facial rash and ?AMS. Patient was noted to be fatigued and confused x1-2 days PTA. Baseline reportedly A&O x3 and ambulatory. Presenting with facial rash, which started from a "pimple" on the chin, which spread through the R-side of the chin over time. Painful rash this AM. On exam, patient is awake, alert. ?orientation difficult to assess. Does not follow simple command consistently. No nuchal rigidity. R CN V3 distribution of erythema, warmth, and patchy vesicular rash w/ scabbing, found to have VZV on swab. Febrile on presentation too. LP was performed 12/29, which revealed lymphocytic pleocytosis.    #Facial VZV rash  #AMS  #Hyponatremia  #Low grade fever  #Abnormal CSF finding  - R CN V3 distribution of erythema, warmth, and patchy vesicular rash. Would assess for VZV rash. HSV/VZV swab pending.  - Mental status improving with sodium correction and continued acyclovir.   - Continue with Unasyn 3g IV Q6H and acyclovir 10 mg/kg IV Q12H (renal dose). Provide ample IVF hydration w/ adequate tonicity. Nephrology input appreciated.  - LP done 12/29. Lymphocytic pleocytosis noted. Protein also elevated. MEP pending.  - Would continue Unasyn x5d total.  - Given lymphocytic dominance of pleocytosis, c/f CSF viral involvement. Would treat for CSF VZV infection x14d IV acyclovir. Note that patient will need a PICC line if discharged. Not compatible with dosing via midline.  - Note that LP was done on day 4 of acyclovir. False negativity possible.  - MRI and MRA of the brain (c/f possible VZV vasculopathy)  - Monitor for neuro status  - Bed head elevation. Aspiration precautions.    Plan discussed with primary team house staff.  Thank you for this consult. Inpatient ID team will peripherally follow.    Guille Pérez MD, PhD  Attending Physician  Division of Infectious Diseases  Department of Medicine    Please contact through MS Teams message.  Office: [phone] (after 5 PM or weekend).

## 2023-12-29 NOTE — DIETITIAN INITIAL EVALUATION ADULT - PERTINENT MEDS FT
MEDICATIONS  (STANDING):  acyclovir IVPB 500 milliGRAM(s) IV Intermittent every 12 hours  amLODIPine   Tablet 5 milliGRAM(s) Oral daily  ampicillin/sulbactam  IVPB 3 Gram(s) IV Intermittent every 6 hours  influenza  Vaccine (HIGH DOSE) 0.7 milliLiter(s) IntraMuscular once  sodium chloride 1 Gram(s) Oral three times a day  sodium chloride 3%. 500 milliLiter(s) (30 mL/Hr) IV Continuous <Continuous>    MEDICATIONS  (PRN):

## 2023-12-29 NOTE — DISCHARGE NOTE PROVIDER - NSDCCPCAREPLAN_GEN_ALL_CORE_FT
PRINCIPAL DISCHARGE DIAGNOSIS  Diagnosis: Altered mental state  Assessment and Plan of Treatment:       SECONDARY DISCHARGE DIAGNOSES  Diagnosis: Hyponatremia  Assessment and Plan of Treatment: On admission your Na was noted to be low at 123. You have a history of low sodium, also known as hyponatremia.     PRINCIPAL DISCHARGE DIAGNOSIS  Diagnosis: Altered mental state  Assessment and Plan of Treatment: You can into the hospital with altered mentation, and was found to have Zoster infection as well as low sodium of 123 (you have a history of low sodium). During your hospitalization, you were treated with IV anti-viral called acyclovir for a total of 14 days. Your facial rash and mentation improved. You were also treated for your low sodium and given salt tabs 3 times a day. A CAT scan and MRI of the head did not show any new findings. Overall, your mentation improved to baseline and you were discharged home. If you develop any fever, altered mentation, weakness and imbalance, please contact [facility] primary care doctor immediately. You may need to return to the Emergency Room.     PRINCIPAL DISCHARGE DIAGNOSIS  Diagnosis: Altered mental state  Assessment and Plan of Treatment: You can into the hospital with altered mentation, and was found to have Zoster infection as well as low sodium of 123 (you have a history of low sodium). During your hospitalization, you were treated with IV anti-viral called acyclovir for a total of 14 days. Your facial rash and mentation improved. You were also treated for your low sodium and given salt tabs 3 times a day. A CAT scan and MRI of the head did not show any new findings. Overall, your mentation improved to baseline and you were discharged home. If you develop any fever, altered mentation, weakness and imbalance, please contact [facility] primary care doctor immediately. You may need to return to the Emergency Room.     PRINCIPAL DISCHARGE DIAGNOSIS  Diagnosis: Altered mental state  Assessment and Plan of Treatment: You can into the hospital with altered mentation, and was found to have Zoster infection as well as low sodium of 123 (you have a history of low sodium). During your hospitalization, you were treated with IV anti-viral called acyclovir for a total of 14 days. Your facial rash and mentation improved. You were also treated for your low sodium and given salt tabs 3 times a day. A CT scan and MRI of the head did not show any new findings. Overall, your mentation improved to baseline and you were discharged home. If you develop any fever, altered mentation, weakness and imbalance, please contact [facility] primary care doctor immediately. You may need to return to the Emergency Room.     PRINCIPAL DISCHARGE DIAGNOSIS  Diagnosis: Altered mental state  Assessment and Plan of Treatment: You can into the hospital with altered mentation, and was found to have Zoster infection as well as low sodium of 123 (you have a history of low sodium). During your hospitalization, you were treated with IV anti-viral called acyclovir for a total of 14 days. Your facial rash and mentation improved. You were also treated for your low sodium and given salt tabs 3 times a day. A CT scan and MRI of the head did not show any new findings. Overall, your mentation improved to baseline and you were discharged home. If you develop any fever, altered mentation, weakness and imbalance, please contact [facility] primary care doctor immediately. You may need to return to the Emergency Room.

## 2023-12-29 NOTE — PROGRESS NOTE ADULT - SUBJECTIVE AND OBJECTIVE BOX
PROGRESS NOTE:     Patient is a 83y old  Female who presents with a chief complaint of facial rash (28 Dec 2023 16:20)      SUBJECTIVE / OVERNIGHT EVENTS:  No acute events overnight. Patient examined at bedside with no acute complaints.       MEDICATIONS  (STANDING):  acyclovir IVPB 500 milliGRAM(s) IV Intermittent every 12 hours  ampicillin/sulbactam  IVPB 3 Gram(s) IV Intermittent every 6 hours  influenza  Vaccine (HIGH DOSE) 0.7 milliLiter(s) IntraMuscular once  sodium chloride 1 Gram(s) Oral three times a day  sodium chloride 3%. 500 milliLiter(s) (30 mL/Hr) IV Continuous <Continuous>    MEDICATIONS  (PRN):      CAPILLARY BLOOD GLUCOSE        I&O's Summary      VITALS:   T(C): 36.7 (12-29-23 @ 05:20), Max: 37 (12-29-23 @ 01:00)  HR: 84 (12-29-23 @ 05:20) (80 - 91)  BP: 165/74 (12-29-23 @ 05:20) (144/89 - 166/83)  RR: 17 (12-29-23 @ 05:20) (16 - 18)  SpO2: 100% (12-29-23 @ 05:20) (98% - 100%)    GENERAL: NAD, lying in bed comfortably  HEAD:  Atraumatic, normocephalic  EYES: EOMI, PERRLA, conjunctiva and sclera clear  ENT: Moist mucous membranes  NECK: Supple, no JVD  HEART: Regular rate and rhythm, no murmurs, rubs, or gallops  LUNGS: Unlabored respirations.  Clear to auscultation bilaterally, no crackles, wheezing, or rhonchi  ABDOMEN: Soft, nontender, nondistended, +BS  EXTREMITIES: 2+ peripheral pulses bilaterally. No clubbing, cyanosis, or edema  NERVOUS SYSTEM:  A&Ox3, no focal deficits   SKIN: No rashes or lesions    LABS:                        11.5   9.37  )-----------( 335      ( 29 Dec 2023 04:01 )             33.1     12-29    124<L>  |  93<L>  |  10  ----------------------------<  103<H>  3.5   |  22  |  0.47<L>    Ca    8.5      29 Dec 2023 04:01  Phos  2.3     12-29  Mg     1.90     12-29    TPro  6.9  /  Alb  3.4  /  TBili  0.5  /  DBili  x   /  AST  21  /  ALT  11  /  AlkPhos  91  12-29    PT/INR - ( 29 Dec 2023 04:01 )   PT: 11.7 sec;   INR: 1.04 ratio         PTT - ( 29 Dec 2023 04:01 )  PTT:25.4 sec      Urinalysis Basic - ( 29 Dec 2023 04:01 )    Color: x / Appearance: x / SG: x / pH: x  Gluc: 103 mg/dL / Ketone: x  / Bili: x / Urobili: x   Blood: x / Protein: x / Nitrite: x   Leuk Esterase: x / RBC: x / WBC x   Sq Epi: x / Non Sq Epi: x / Bacteria: x        Culture - Urine (collected 26 Dec 2023 12:12)  Source: Clean Catch Clean Catch (Midstream)  Final Report (27 Dec 2023 13:59):    <10,000 CFU/mL Normal Urogenital Rosa M    Culture - Blood (collected 26 Dec 2023 08:45)  Source: .Blood Blood-Peripheral  Preliminary Report (28 Dec 2023 15:01):    No growth at 48 Hours    Culture - Blood (collected 26 Dec 2023 08:30)  Source: .Blood Blood-Peripheral  Preliminary Report (28 Dec 2023 15:01):    No growth at 48 Hours       PROGRESS NOTE:     Patient is a 83y old  Female who presents with a chief complaint of facial rash (28 Dec 2023 16:20)      SUBJECTIVE / OVERNIGHT EVENTS:  No acute events overnight. Patient examined at bedside with no acute complaints.       MEDICATIONS  (STANDING):  acyclovir IVPB 500 milliGRAM(s) IV Intermittent every 12 hours  ampicillin/sulbactam  IVPB 3 Gram(s) IV Intermittent every 6 hours  influenza  Vaccine (HIGH DOSE) 0.7 milliLiter(s) IntraMuscular once  sodium chloride 1 Gram(s) Oral three times a day  sodium chloride 3%. 500 milliLiter(s) (30 mL/Hr) IV Continuous <Continuous>    MEDICATIONS  (PRN):      CAPILLARY BLOOD GLUCOSE        I&O's Summary      VITALS:   T(C): 36.7 (12-29-23 @ 05:20), Max: 37 (12-29-23 @ 01:00)  HR: 84 (12-29-23 @ 05:20) (80 - 91)  BP: 165/74 (12-29-23 @ 05:20) (144/89 - 166/83)  RR: 17 (12-29-23 @ 05:20) (16 - 18)  SpO2: 100% (12-29-23 @ 05:20) (98% - 100%)    GENERAL: NAD, lying in bed comfortably  HEAD:  Atraumatic, normocephalic  EYES: EOMI, PERRLA, conjunctiva and sclera clear  ENT: Moist mucous membranes  NECK: Supple, no JVD  HEART: Regular rate and rhythm, no murmurs, rubs, or gallops  LUNGS: Unlabored respirations.  Clear to auscultation bilaterally, no crackles, wheezing, or rhonchi  ABDOMEN: Soft, nontender, nondistended, +BS  EXTREMITIES: 2+ peripheral pulses bilaterally. No clubbing, cyanosis, or edema  NERVOUS SYSTEM:  A&Ox3, no focal deficits   SKIN: No rashes or lesions    LABS:                        11.5   9.37  )-----------( 335      ( 29 Dec 2023 04:01 )             33.1     12-29    124<L>  |  93<L>  |  10  ----------------------------<  103<H>  3.5   |  22  |  0.47<L>    Ca    8.5      29 Dec 2023 04:01  Phos  2.3     12-29  Mg     1.90     12-29    TPro  6.9  /  Alb  3.4  /  TBili  0.5  /  DBili  x   /  AST  21  /  ALT  11  /  AlkPhos  91  12-29    PT/INR - ( 29 Dec 2023 04:01 )   PT: 11.7 sec;   INR: 1.04 ratio         PTT - ( 29 Dec 2023 04:01 )  PTT:25.4 sec      Urinalysis Basic - ( 29 Dec 2023 04:01 )    Color: x / Appearance: x / SG: x / pH: x  Gluc: 103 mg/dL / Ketone: x  / Bili: x / Urobili: x   Blood: x / Protein: x / Nitrite: x   Leuk Esterase: x / RBC: x / WBC x   Sq Epi: x / Non Sq Epi: x / Bacteria: x        Culture - Urine (collected 26 Dec 2023 12:12)  Source: Clean Catch Clean Catch (Midstream)  Final Report (27 Dec 2023 13:59):    <10,000 CFU/mL Normal Urogenital Rsoa M    Culture - Blood (collected 26 Dec 2023 08:45)  Source: .Blood Blood-Peripheral  Preliminary Report (28 Dec 2023 15:01):    No growth at 48 Hours    Culture - Blood (collected 26 Dec 2023 08:30)  Source: .Blood Blood-Peripheral  Preliminary Report (28 Dec 2023 15:01):    No growth at 48 Hours       PROGRESS NOTE:     Patient is a 83y old  Female who presents with a chief complaint of facial rash (28 Dec 2023 16:20)      SUBJECTIVE / OVERNIGHT EVENTS:  No acute events overnight. Pending LP with IR today.      MEDICATIONS  (STANDING):  acyclovir IVPB 500 milliGRAM(s) IV Intermittent every 12 hours  ampicillin/sulbactam  IVPB 3 Gram(s) IV Intermittent every 6 hours  influenza  Vaccine (HIGH DOSE) 0.7 milliLiter(s) IntraMuscular once  sodium chloride 1 Gram(s) Oral three times a day  sodium chloride 3%. 500 milliLiter(s) (30 mL/Hr) IV Continuous <Continuous>    MEDICATIONS  (PRN):      CAPILLARY BLOOD GLUCOSE        I&O's Summary      VITALS:   T(C): 36.7 (12-29-23 @ 05:20), Max: 37 (12-29-23 @ 01:00)  HR: 84 (12-29-23 @ 05:20) (80 - 91)  BP: 165/74 (12-29-23 @ 05:20) (144/89 - 166/83)  RR: 17 (12-29-23 @ 05:20) (16 - 18)  SpO2: 100% (12-29-23 @ 05:20) (98% - 100%)    GENERAL: NAD, lying in bed comfortably  HEAD:  Atraumatic, normocephalic  EYES: EOMI, PERRLA, conjunctiva and sclera clear  ENT: Moist mucous membranes  NECK: Supple, no JVD  HEART: Regular rate and rhythm, no murmurs, rubs, or gallops  LUNGS: Unlabored respirations.  Clear to auscultation bilaterally, no crackles, wheezing, or rhonchi  ABDOMEN: Soft, nontender, nondistended, +BS  EXTREMITIES: 2+ peripheral pulses bilaterally. No clubbing, cyanosis, or edema  NERVOUS SYSTEM:  A&Ox3, no focal deficits   SKIN: No rashes or lesions    General: Drowsy, NAD, laying in bed, on RA  HEENT: significant rheum present, but on separation eyes not injected, PERRLA, non-icteric  Neck:  symmetric,  JVD absent  Respiratory: Limited to anterior exam; Clear to ascultation bilaterally, no crackles/rales, no Resp distress; no accessory muscle use  Cardiovascular:  RRR, no murmurs/rubs/gallops  Abdomen: Soft, NT, ND  Extremities: No edema noted  Skin: No rashes or lesions noted  Neurological: unable to assess   Psychiatry: unable to assess       LABS:                        11.5   9.37  )-----------( 335      ( 29 Dec 2023 04:01 )             33.1     12-29    124<L>  |  93<L>  |  10  ----------------------------<  103<H>  3.5   |  22  |  0.47<L>    Ca    8.5      29 Dec 2023 04:01  Phos  2.3     12-29  Mg     1.90     12-29    TPro  6.9  /  Alb  3.4  /  TBili  0.5  /  DBili  x   /  AST  21  /  ALT  11  /  AlkPhos  91  12-29    PT/INR - ( 29 Dec 2023 04:01 )   PT: 11.7 sec;   INR: 1.04 ratio         PTT - ( 29 Dec 2023 04:01 )  PTT:25.4 sec      Urinalysis Basic - ( 29 Dec 2023 04:01 )    Color: x / Appearance: x / SG: x / pH: x  Gluc: 103 mg/dL / Ketone: x  / Bili: x / Urobili: x   Blood: x / Protein: x / Nitrite: x   Leuk Esterase: x / RBC: x / WBC x   Sq Epi: x / Non Sq Epi: x / Bacteria: x        Culture - Urine (collected 26 Dec 2023 12:12)  Source: Clean Catch Clean Catch (Midstream)  Final Report (27 Dec 2023 13:59):    <10,000 CFU/mL Normal Urogenital Rosa M    Culture - Blood (collected 26 Dec 2023 08:45)  Source: .Blood Blood-Peripheral  Preliminary Report (28 Dec 2023 15:01):    No growth at 48 Hours    Culture - Blood (collected 26 Dec 2023 08:30)  Source: .Blood Blood-Peripheral  Preliminary Report (28 Dec 2023 15:01):    No growth at 48 Hours       PROGRESS NOTE:     Patient is a 83y old  Female who presents with a chief complaint of facial rash (28 Dec 2023 16:20)      SUBJECTIVE / OVERNIGHT EVENTS:  No acute events overnight. Pending LP with IR today.      MEDICATIONS  (STANDING):  acyclovir IVPB 500 milliGRAM(s) IV Intermittent every 12 hours  ampicillin/sulbactam  IVPB 3 Gram(s) IV Intermittent every 6 hours  influenza  Vaccine (HIGH DOSE) 0.7 milliLiter(s) IntraMuscular once  sodium chloride 1 Gram(s) Oral three times a day  sodium chloride 3%. 500 milliLiter(s) (30 mL/Hr) IV Continuous <Continuous>    MEDICATIONS  (PRN):      CAPILLARY BLOOD GLUCOSE        I&O's Summary      VITALS:   T(C): 36.7 (12-29-23 @ 05:20), Max: 37 (12-29-23 @ 01:00)  HR: 84 (12-29-23 @ 05:20) (80 - 91)  BP: 165/74 (12-29-23 @ 05:20) (144/89 - 166/83)  RR: 17 (12-29-23 @ 05:20) (16 - 18)  SpO2: 100% (12-29-23 @ 05:20) (98% - 100%)    GENERAL: NAD, lying in bed comfortably  HEAD:  Atraumatic, normocephalic  EYES: Eye discharge improved. EOMI, conjunctiva and sclera clear  HEART: Regular rate and rhythm, no murmurs, rubs, or gallops  LUNGS: Unlabored respirations.  Clear to auscultation bilaterally, no crackles, wheezing, or rhonchi  ABDOMEN: Soft, nontender, nondistended, +BS  EXTREMITIES: 2+ peripheral pulses bilaterally. No clubbing, cyanosis, or edema  NERVOUS SYSTEM: A&Ox2 (name, place)  SKIN: Improved rash by right jawline and chin      LABS:                        11.5   9.37  )-----------( 335      ( 29 Dec 2023 04:01 )             33.1     12-29    124<L>  |  93<L>  |  10  ----------------------------<  103<H>  3.5   |  22  |  0.47<L>    Ca    8.5      29 Dec 2023 04:01  Phos  2.3     12-29  Mg     1.90     12-29    TPro  6.9  /  Alb  3.4  /  TBili  0.5  /  DBili  x   /  AST  21  /  ALT  11  /  AlkPhos  91  12-29    PT/INR - ( 29 Dec 2023 04:01 )   PT: 11.7 sec;   INR: 1.04 ratio         PTT - ( 29 Dec 2023 04:01 )  PTT:25.4 sec      Urinalysis Basic - ( 29 Dec 2023 04:01 )    Color: x / Appearance: x / SG: x / pH: x  Gluc: 103 mg/dL / Ketone: x  / Bili: x / Urobili: x   Blood: x / Protein: x / Nitrite: x   Leuk Esterase: x / RBC: x / WBC x   Sq Epi: x / Non Sq Epi: x / Bacteria: x        Culture - Urine (collected 26 Dec 2023 12:12)  Source: Clean Catch Clean Catch (Midstream)  Final Report (27 Dec 2023 13:59):    <10,000 CFU/mL Normal Urogenital Rosa M    Culture - Blood (collected 26 Dec 2023 08:45)  Source: .Blood Blood-Peripheral  Preliminary Report (28 Dec 2023 15:01):    No growth at 48 Hours    Culture - Blood (collected 26 Dec 2023 08:30)  Source: .Blood Blood-Peripheral  Preliminary Report (28 Dec 2023 15:01):    No growth at 48 Hours

## 2023-12-29 NOTE — PROGRESS NOTE ADULT - ATTENDING COMMENTS
82 year old female with history of chronic HA, vertigo, hyponatremia requiring hospitalization previously, now coming in with a couple days of generalized weakness, confusion, difficulty ambulating and facial rash. Afebrile, saturating well on RA. Today, awake, laying in bed, oriented to person and place, not month or year. Answering simple questions/following simple commands.     #HypoNa: Hx chronic hypoNa with component of SIADH. Appreciate renal input. Na 124 this AM, repeat now up to 129. C/w salt tabs. F/u additional renal recs.     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 C/w acyclovir. Discussed with infection control, given less than 3 dermatome involvement, hospital policy is for standard precautions (not advising airborne/contact precautions). F/u renal regarding fluids with acyclovir (concern with worsening hypoNa iso SIADH if given additional NS with acyclovir).     #AMS: Possibly iso hypoNa, metabolic 2/2 infection. CTH without acute findings, follow up MRI brain and LP (Neuroradiology consulted, LP tentatively today pending availability) with meningoencephalitis PCR panel, cell counts, glucose, protein, HSV/VZV.     #Elevated BP: s/p 1 dose PO hydralazine with improvement, not on home antihypertensives. Start amlodipine given BP above range.     D/w HS team, NILA/DANN, RN

## 2023-12-29 NOTE — PROVIDER CONTACT NOTE (CRITICAL VALUE NOTIFICATION) - ASSESSMENT
Patient currently in IR.
pt is A&O x1, restless in bed
Patient asymptomatic. No acute distress noted.

## 2023-12-29 NOTE — PROGRESS NOTE ADULT - PROBLEM SELECTOR PLAN 4
Weakness may be in the setting of AMS. Patient states herself that she walks with a walker.  -Consider PT consult when AMS improved and patient able to participate Weakness may be in the setting of AMS. Patient states herself that she walks with a walker.  -PT consult

## 2023-12-29 NOTE — PROGRESS NOTE ADULT - PROBLEM SELECTOR PLAN 6
Diet: Regular (no beef)  DVT ppx: SCDs --> if CTH neg and LP completed, can start lovenox  Dispo: pending clinical improvement Diet: Regular (no beef)  DVT ppx: SCDs --> if LP completed, can consider lovenox  Dispo: pending clinical improvement Diet: Regular (no beef) --> NPO at this time for procedure  DVT ppx: SCDs --> if LP completed, can consider lovenox  Dispo: pending clinical improvement

## 2023-12-29 NOTE — DISCHARGE NOTE PROVIDER - NSDCFUADDAPPT_GEN_ALL_CORE_FT
- Please follow up with neurologist for the chronic atherosclerotic changes seen on MRI.  - Please follow up with primary doctor to review hospital course and review hypertensive medication - Please follow up with neurologist for the chronic atherosclerotic changes seen on MRI.  - Please follow up with primary doctor to review hospital course and review hypertensive medication.

## 2023-12-29 NOTE — DIETITIAN INITIAL EVALUATION ADULT - ADD RECOMMEND
1. Suggest advance diet post procedure.  2. Add PO supplement Ensure Plus High Protein (HP) 3x daily.

## 2023-12-29 NOTE — DISCHARGE NOTE PROVIDER - NSDCCPTREATMENT_GEN_ALL_CORE_FT
PRINCIPAL PROCEDURE  Procedure: MRI head  Findings and Treatment: IMPRESSION:  1.  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 Ischemic white matter disease and atrophy upper range typical for age. No evidence of acute intracranial injury.  2.  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 POSTERIOR CIRCULATION:  Intracranial atherosclerosis posterior cerebral arteries, at least mild-to-moderate.      SECONDARY PROCEDURE  Procedure: CT head  Findings and Treatment: There is no acute intracranial hemorrhage, edema, or mass effect. Moderate periventricular white matter hypoattenuation compatible with   chronic microvascular ischemic changes given the patient's age. No extra-axial collection. Mild age-related generalized cerebral volume loss. No hydrocephalus Basal cisterns are patent.  Visualized paranasal sinuses and mastoid air cells are clear.  Calvarium is intact.  IMPRESSION:  No acute hemorrhage or mass effect.      Procedure: Lumbar puncture  Findings and Treatment: CSF IgG Index (23 @ 19:53)   Quantitative Ig mg/dL  Albumin: 3252 mg/dL  IgG CSF: 8.7 mg/dL  CSF ALBU: 33.6 mg/dL  IgG/Albumin Ratio, Serum: 0.45 Ratio  IgG/Albumin Ratio, CSF: 0.26 Ratio  IgG Index: 0.6  IgG Synthesis: 5.0 mg/day  VDRL Titer, CSF: Nonreact  Lactate Dehydrogenase, CSF: 19  Protein, CSF: 66 mg/dL  Glucose, CSF: 52 mg/dL  Cerebrospinal Fluid Cell Count-1 (23 @ 12:52)   Tube Type: Tube 3  Other Cells - Spinal Fluid: 0 %  RBC Count - Spinal Fluid: 18  Total nucleated cell count, CSF: 118  CSF Color: Colorless  Eosinophil Count - Spinal Fluid: 0 %  CSF Appearance: Clear  CSF Lymphocytes: 88 %  CSF Monocytes/Macrophages: 11 %  CSF Neutrophils: 0 %  Appearance Spun: Colorless  Atypical Lymphocytes - CSF: 0 %  Total Cells Counted, Spinal Fluid: 100 Cells  CSF PCR Panel (23 @ 12:52)   CSF PCR Result: Detected  Escherichia coli K1: NotDetec  Haemophilus influenzae: NotDetec  Listeria monocytogenes: NotDetec  Neisseria meningitidis (encapsulated): NotDetec  Streptococcus agalactiae: NotDetec  Streptococcus pneumoniae: NotDetec  Cytomegalovirus: NotDetec  Enterovirus: NotDetec  Herpes simplex virus 1: NotDetec  Herpes simplex virus 2: NotDetec  Human Herpesvirus 6: NotDete  Human parechovirus: NotDete  Varicella zoster virus: Detected  Crypt[facility]formans[facility]gattii: [facility]  Culture - CSF with Gram Stain . (1229.23 @ 12:50)   Gram Stain: polymorphonuclear leukocytes seen per oil power field. No organisms seen per oil power field by cytocentrifuge  Culture Results: No growth     PRINCIPAL PROCEDURE  Procedure: MRI head  Findings and Treatment: IMPRESSION:  1.  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 Ischemic white matter disease and atrophy upper range typical for age. No evidence of acute intracranial injury.  2.  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 POSTERIOR CIRCULATION:  Intracranial atherosclerosis posterior cerebral arteries, at least mild-to-moderate.      SECONDARY PROCEDURE  Procedure: CT head  Findings and Treatment: There is no acute intracranial hemorrhage, edema, or mass effect. Moderate periventricular white matter hypoattenuation compatible with   chronic microvascular ischemic changes given the patient's age. No extra-axial collection. Mild age-related generalized cerebral volume loss. No hydrocephalus Basal cisterns are patent.  Visualized paranasal sinuses and mastoid air cells are clear.  Calvarium is intact.  IMPRESSION:  No acute hemorrhage or mass effect.      Procedure: Lumbar puncture  Findings and Treatment: CSF IgG Index (23 @ 19:53)   Quantitative Ig mg/dL  Albumin: 3252 mg/dL  IgG CSF: 8.7 mg/dL  CSF ALBU: 33.6 mg/dL  IgG/Albumin Ratio, Serum: 0.45 Ratio  IgG/Albumin Ratio, CSF: 0.26 Ratio  IgG Index: 0.6  IgG Synthesis: 5.0 mg/day  VDRL Titer, CSF: Nonreact  Lactate Dehydrogenase, CSF: 19  Protein, CSF: 66 mg/dL  Glucose, CSF: 52 mg/dL  Cerebrospinal Fluid Cell Count-1 (23 @ 12:52)   Tube Type: Tube 3  Other Cells - Spinal Fluid: 0 %  RBC Count - Spinal Fluid: 18  Total nucleated cell count, CSF: 118  CSF Color: Colorless  Eosinophil Count - Spinal Fluid: 0 %  CSF Appearance: Clear  CSF Lymphocytes: 88 %  CSF Monocytes/Macrophages: 11 %  CSF Neutrophils: 0 %  Appearance Spun: Colorless  Atypical Lymphocytes - CSF: 0 %  Total Cells Counted, Spinal Fluid: 100 Cells  CSF PCR Panel (23 @ 12:52)   CSF PCR Result: Detected  Escherichia coli K1: NotDetec  Haemophilus influenzae: NotDetec  Listeria monocytogenes: NotDetec  Neisseria meningitidis (encapsulated): NotDetec  Streptococcus agalactiae: NotDetec  Streptococcus pneumoniae: NotDetec  Cytomegalovirus: NotDetec  Enterovirus: NotDetec  Herpes simplex virus 1: NotDetec  Herpes simplex virus 2: NotDetec  Human Herpesvirus 6: NotDete  Human parechovirus: NotDete  Varicella zoster virus: Detected  Cryptococcus [facility]formans[facility]gattii[facility]  Culture - CSF with Gram Stain . (1229.23 @ 12:50)   Gram Stain: polymorphonuclear leukocytes seen per oil power field. No organisms seen per oil power field by cytocentrifuge  Culture Results: No growth

## 2023-12-29 NOTE — DIETITIAN INITIAL EVALUATION ADULT - OTHER INFO
82F with PMH hyponatremia, chronic headache, vertigo, s/p ILR 10/21, cataracts presenting with weakness and rash.     Pt A/Ox2, able to state she is in the hospital, unable to provide nutrition hx.   Currently NPO for LP with IR. Previously ordered for Regular diet, no beef. Nursing flow sheet noted poor/fair meal completion. No reported difficulty with chewing/swallowing or any nausea/vomiting.

## 2023-12-29 NOTE — DISCHARGE NOTE PROVIDER - PROVIDER TOKENS
PROVIDER:[TOKEN:[[id number]],FOLLOWUP:[1 week]] PROVIDER:[TOKEN:[[id number]],FOLLOWUP:[1 week]]

## 2023-12-29 NOTE — DISCHARGE NOTE PROVIDER - DETAILS OF MALNUTRITION DIAGNOSIS/DIAGNOSES
This patient has been assessed with a concern for Malnutrition and was treated during this hospitalization for the following Nutrition diagnosis/diagnoses:     -  12/29/2023: Moderate protein-calorie malnutrition

## 2023-12-29 NOTE — DISCHARGE NOTE PROVIDER - HOSPITAL COURSE
HPI:  82F with PMH hyponatremia, chronic headache, vertigo, s/p ILR 10/21, cataracts presenting with weakness and rash. Patient is A&Ox2 and unable to provide detailed history. Daughter not presently at bedside and could not be reached on the phone, collateral to be obtained later. Per ED note, daughter states that patient has been more tired and confused for x1-2 days and does not answer questions that she usually answers. At baseline, patient is reported to be A&Ox3 and ambulatory with a walker. Patient has had a pimple on her chin that was first noticed x1-2 days ago, and on day of admission worsened with erythema on the right side of her face and jaw and associated white/yellow-colored drainage. Patient endorses pain at the face of the rash. No reported fever, chills, shortness of breath, chest pain, vomiting, diarrhea, urinary complaints, recent travel, sick contacts. (26 Dec 2023 16:52)    Hospital Course:      Important Medication Changes and Reason:    Active or Pending Issues Requiring Follow-up:    Advanced Directives:   [ ] Full code  [ ] DNR  [ ] Hospice    Discharge Diagnoses:         HPI:  82F with PMH hyponatremia, chronic headache, vertigo, s/p ILR 10/21, cataracts presenting with weakness and rash. Patient is A&Ox2 and unable to provide detailed history. Daughter not presently at bedside and could not be reached on the phone, collateral to be obtained later. Per ED note, daughter states that patient has been more tired and confused for x1-2 days and does not answer questions that she usually answers. At baseline, patient is reported to be A&Ox3 and ambulatory with a walker. Patient has had a pimple on her chin that was first noticed x1-2 days ago, and on day of admission worsened with erythema on the right side of her face and jaw and associated white/yellow-colored drainage. Patient endorses pain at the face of the rash. No reported fever, chills, shortness of breath, chest pain, vomiting, diarrhea, urinary complaints, recent travel, sick contacts. (26 Dec 2023 16:52)    Hospital Course:  Patient was admitted for facial rash and AMS.    #Facial rash  Patient was evaluated by ID who recommended LP as well as acyclovir and PO augmentin. Given limits to scheduling an LP with sedation promptly, decision was made to start acyclovir prior to LP. Augmentin was not given as patient was altered and unable to take PO, and thus switched to unasyn. Patient underwent LP with sedation on 12/29 which showed ____.    #Hyponatremia  Patient was found to be hyponatremic on admission to 123. Per chart review, patient has a history of hyponatremia 2/2 SIADH that previously responded well to salt tabs. Patient was given 2L NS in ED with increase in Na to 129, but Na ON dropped back to 123. Patient had an RRT on 12/27 for AMS, A&Ox1 per rapid team. Urine studies obtained showed urine osm of 577, serum osm was 261. Nephrology was consulted and patient was given hypertonic saline for 4 hours. Patient was also started on salt tabs TID.     #HTN  Patient with no history of HTN or BP medications. Patient noted to have increasing BP to . Patient was started on amlodipine 5mg with good response.    Important Medication Changes and Reason:    Active or Pending Issues Requiring Follow-up:    Advanced Directives:   [ ] Full code  [ ] DNR  [ ] Hospice    Discharge Diagnoses:         HPI:  82F with PMH hyponatremia, chronic headache, vertigo, s/p ILR 10/21, cataracts presenting with weakness and rash. Patient is A&Ox2 and unable to provide detailed history. Daughter not presently at bedside and could not be reached on the phone, collateral to be obtained later. Per ED note, daughter states that patient has been more tired and confused for x1-2 days and does not answer questions that she usually answers. At baseline, patient is reported to be A&Ox3 and ambulatory with a walker. Patient has had a pimple on her chin that was first noticed x1-2 days ago, and on day of admission worsened with erythema on the right side of her face and jaw and associated white/yellow-colored drainage. Patient endorses pain at the face of the rash. No reported fever, chills, shortness of breath, chest pain, vomiting, diarrhea, urinary complaints, recent travel, sick contacts. (26 Dec 2023 16:52)    Hospital Course:  Patient was admitted for facial rash and AMS.    #Facial rash  Patient was evaluated by ID who recommended LP as well as acyclovir and PO augmentin. Given limits to scheduling an LP with sedation promptly, decision was made to start acyclovir prior to LP. Augmentin was not given as patient was altered and unable to take PO, and thus switched to unasyn. Patient underwent LP with sedation on 12/29 and results included CSF glucose 52, protein 66, cell count 118, neutrophils 0, lymphocytes 8, VSV+.     #Hyponatremia  Patient was found to be hyponatremic on admission to 123. Per chart review, patient has a history of hyponatremia 2/2 SIADH that previously responded well to salt tabs. Patient was given 2L NS in ED with increase in Na to 129, but Na ON dropped back to 123. Patient had an RRT on 12/27 for AMS, A&Ox1 per rapid team. Urine studies obtained showed urine osm of 577, serum osm was 261. Nephrology was consulted and patient was given hypertonic saline for 4 hours. Patient was also started on salt tabs TID.     #HTN  Patient with no history of HTN or BP medications. Patient noted to have increasing BP to . Patient was started on amlodipine 5mg with good response.    #Deconditioning    Important Medication Changes and Reason:    Active or Pending Issues Requiring Follow-up:    Advanced Directives:   [X] Full code  [ ] DNR  [ ] Hospice    Discharge Diagnoses:  VZV Encephalitis         HPI:  82F with PMH hyponatremia, chronic headache, vertigo, s/p ILR 10/21, cataracts presenting with weakness and rash. Patient is A&Ox2 and unable to provide detailed history. Daughter not presently at bedside and could not be reached on the phone, collateral to be obtained later. Per ED note, daughter states that patient has been more tired and confused for x1-2 days and does not answer questions that she usually answers. At baseline, patient is reported to be A&Ox3 and ambulatory with a walker. Patient has had a pimple on her chin that was first noticed x1-2 days ago, and on day of admission worsened with erythema on the right side of her face and jaw and associated white/yellow-colored drainage. Patient endorses pain at the face of the rash. No reported fever, chills, shortness of breath, chest pain, vomiting, diarrhea, urinary complaints, recent travel, sick contacts. (26 Dec 2023 16:52)    Hospital Course:  Patient was admitted for facial rash and AMS.    #Facial rash  Patient was evaluated by ID who recommended LP as well as acyclovir and PO augmentin. Given limits to scheduling an LP with sedation promptly, decision was made to start acyclovir prior to LP. Augmentin was not given as patient was altered and unable to take PO, and thus switched to unasyn. Patient underwent LP with sedation on 12/29 and results included CSF glucose 52, protein 66, cell count 118, neutrophils 0, lymphocytes 8, VSV+.     #Hyponatremia  Patient was found to be hyponatremic on admission to 123. Per chart review, patient has a history of hyponatremia 2/2 SIADH that previously responded well to salt tabs. Patient was given 2L NS in ED with increase in Na to 129, but Na ON dropped back to 123. Patient had an RRT on 12/27 for AMS, A&Ox1 per rapid team. Urine studies obtained showed urine osm of 577, serum osm was 261. Nephrology was consulted and patient was given hypertonic saline for 4 hours. Patient was also started on salt tabs TID.     #HTN  Patient with no history of HTN or BP medications. Patient noted to have increasing BP to . Patient was started on amlodipine 5mg with good response.    #Deconditioning  Patient evaluated by PT.    Important Medication Changes and Reason:    Active or Pending Issues Requiring Follow-up:    Advanced Directives:   [X] Full code  [ ] DNR  [ ] Hospice    Discharge Diagnoses:  VZV Encephalitis         HPI:  82F with PMH hyponatremia, chronic headache, vertigo, s/p ILR 10/21, cataracts presenting with weakness and rash. Patient is A&Ox2 and unable to provide detailed history. Daughter not presently at bedside and could not be reached on the phone, collateral to be obtained later. Per ED note, daughter states that patient has been more tired and confused for x1-2 days and does not answer questions that she usually answers. At baseline, patient is reported to be A&Ox3 and ambulatory with a walker. Patient has had a pimple on her chin that was first noticed x1-2 days ago, and on day of admission worsened with erythema on the right side of her face and jaw and associated white/yellow-colored drainage. Patient endorses pain at the face of the rash. No reported fever, chills, shortness of breath, chest pain, vomiting, diarrhea, urinary complaints, recent travel, sick contacts. (26 Dec 2023 16:52)    Hospital Course:  Patient was admitted for facial rash and AMS.    #Facial rash  Patient was evaluated by ID who recommended LP as well as acyclovir and PO augmentin. Given limits to scheduling an LP with sedation promptly, decision was made to start acyclovir prior to LP. Augmentin was not given as patient was altered and unable to take PO, and thus switched to unasyn. Patient underwent LP with sedation on 12/29 and results included CSF glucose 52, protein 66, cell count 118, neutrophils 0, lymphocytes 8, VSV+. She was started on IV acyclovir, and completed 14 day course.     #AMS  CT head without any acute changes. MRI head with findings of chronic changes and atherosclerosis changes. With treatment of hyponatremia and VZV, patient's mentation improved and returned to baseline.    #Hyponatremia  Patient was found to be hyponatremic on admission to 123. Per chart review, patient has a history of hyponatremia 2/2 SIADH that previously responded well to salt tabs. Patient was given 2L NS in ED with increase in Na to 129, but Na ON dropped back to 123. Patient had an RRT on 12/27 for AMS, A&Ox1 per rapid team. Urine studies obtained showed urine osm of 577, serum osm was 261. Nephrology was consulted and patient was given hypertonic saline for 4 hours. Patient was also started on salt tabs TID. Her hyponatremia improved and she was continued on salt tabs TID.    #GENNY on CKD  Patient was found to have rising creatinine while on acyclovir. Acyclovir was adjusted by renal dosing. She was started on maintenance fluids and nephrology was following. Her Creatinine improved and returned to baseline with fluids.    #HTN  Patient with no history of HTN or BP medications. Patient noted to have increasing BP to . Patient was started on amlodipine 5mg with good response, and uptitrated to 10mg daily.    #Deconditioning  Patient evaluated by PT. Recommend rehab services given patient requires 2 person maximal assistance and limited functional status with walker ~15 feet. PT recommends rehab for strength training however family would like patient to return home for home PT.     Important Medication Changes and Reason:  - Please continue to take amlodipine 10mg daily    Active or Pending Issues Requiring Follow-up:  - Please follow up with neurologist for the chronic atherosclerotic changes seen on MRI.  - Please follow up with primary doctor to review hospital course and review hypertensive medications.    Advanced Directives:   [X] Full code  [ ] DNR  [ ] Hospice    Discharge Diagnoses:  VZV Encephalitis         HPI:  82F with PMH hyponatremia, chronic headache, vertigo, s/p ILR 10/21, cataracts presenting with weakness and rash. Patient is A&Ox2 and unable to provide detailed history. Daughter not presently at bedside and could not be reached on the phone, collateral to be obtained later. Per ED note, daughter states that patient has been more tired and confused for x1-2 days and does not answer questions that she usually answers. At baseline, patient is reported to be A&Ox3 and ambulatory with a walker. Patient has had a pimple on her chin that was first noticed x1-2 days ago, and on day of admission worsened with erythema on the right side of her face and jaw and associated white/yellow-colored drainage. Patient endorses pain at the face of the rash. No reported fever, chills, shortness of breath, chest pain, vomiting, diarrhea, urinary complaints, recent travel, sick contacts. (26 Dec 2023 16:52)    Hospital Course:  Patient was admitted for facial rash and AMS.    #Facial rash  Patient was evaluated by ID who recommended LP as well as acyclovir and PO augmentin. Given limits to scheduling an LP with sedation promptly, decision was made to start acyclovir prior to LP. Augmentin was not given as patient was altered and unable to take PO, and thus switched to unasyn. Patient underwent LP with sedation on 12/29 and results included CSF glucose 52, protein 66, cell count 118, neutrophils 0, lymphocytes 8, VSV+. She was started on IV acyclovir, and completed 14 day course.     #AMS  CT head without any acute changes. MRI head with findings of chronic changes and atherosclerosis changes. With treatment of hyponatremia and VZV, patient's mentation improved and returned to baseline.    #Hyponatremia  Patient was found to be hyponatremic on admission to 123. Per chart review, patient has a history of hyponatremia 2/2 SIADH that previously responded well to salt tabs. Patient was given 2L NS in ED with increase in Na to 129, but Na ON dropped back to 123. Patient had an RRT on 12/27 for AMS, A&Ox1 per rapid team. Urine studies obtained showed urine osm of 577, serum osm was 261. Nephrology was consulted and patient was given hypertonic saline for 4 hours. Patient was also started on salt tabs TID. Her hyponatremia improved and she was continued on salt tabs TID.    #GENNY on CKD  Patient was found to have rising creatinine while on acyclovir. Acyclovir was adjusted by renal dosing. She was started on maintenance fluids and nephrology was following. Her Creatinine improved and returned to baseline with fluids.    #HTN  Patient with no history of HTN or BP medications. Patient noted to have increasing BP to . Patient was started on amlodipine 5mg with good response, and uptitrated to 10mg daily.    #Deconditioning  Patient evaluated by PT. Recommend rehab services given patient requires 2 person maximal assistance and limited functional status with walker ~15 feet. PT recommends rehab for strength training however family would like patient to return home for home PT.     Important Medication Changes and Reason:  - Please continue to take amlodipine 10mg daily    Active or Pending Issues Requiring Follow-up:  - Please follow up with neurologist for the chronic atherosclerotic changes seen on MRI.  - Please follow up with primary doctor to review hospital course and review hypertensive me.    Advanced Directives:   [X] Full code  [ ] DNR  [ ] Hospice    Discharge Diagnoses:  VZV Encephalitis         HPI:  82F with PMH hyponatremia, chronic headache, vertigo, s/p ILR 10/21, cataracts presenting with weakness and rash. Patient is A&Ox2 and unable to provide detailed history. Daughter not presently at bedside and could not be reached on the phone, collateral to be obtained later. Per ED note, daughter states that patient has been more tired and confused for x1-2 days and does not answer questions that she usually answers. At baseline, patient is reported to be A&Ox3 and ambulatory with a walker. Patient has had a pimple on her chin that was first noticed x1-2 days ago, and on day of admission worsened with erythema on the right side of her face and jaw and associated white/yellow-colored drainage. Patient endorses pain at the face of the rash. No reported fever, chills, shortness of breath, chest pain, vomiting, diarrhea, urinary complaints, recent travel, sick contacts. (26 Dec 2023 16:52)    Hospital Course:  Patient was admitted for facial rash and AMS.    #Facial rash  Patient was evaluated by ID who recommended LP as well as acyclovir and PO augmentin. Given limits to scheduling an LP with sedation promptly, decision was made to start acyclovir prior to LP. Augmentin was not given as patient was altered and unable to take PO, and thus switched to unasyn. Patient underwent LP with sedation on 12/29 and results included CSF glucose 52, protein 66, cell count 118, neutrophils 0, lymphocytes 8, VSV+. She was started on IV acyclovir, and completed 14 day course.     #AMS  CT head without any acute changes. MRI head with findings of chronic changes and atherosclerosis changes. With treatment of hyponatremia and VZV, patient's mentation improved and returned to baseline.    #Hyponatremia  Patient was found to be hyponatremic on admission to 123. Per chart review, patient has a history of hyponatremia 2/2 SIADH that previously responded well to salt tabs. Patient was given 2L NS in ED with increase in Na to 129, but Na ON dropped back to 123. Patient had an RRT on 12/27 for AMS, A&Ox1 per rapid team. Urine studies obtained showed urine osm of 577, serum osm was 261. Nephrology was consulted and patient was given hypertonic saline for 4 hours. Patient was also started on salt tabs TID. Her hyponatremia improved and she was continued on salt tabs TID.    #GENNY on CKD  Patient was found to have rising creatinine while on acyclovir. Acyclovir was adjusted by renal dosing. She was started on maintenance fluids and nephrology was following. Her Creatinine improved and returned to baseline with fluids.    #HTN  Patient with no history of HTN or BP medications. Patient noted to have increasing BP to . Patient was started on amlodipine 5mg with good response, and uptitrated to 10mg daily.    #Deconditioning  Patient evaluated by PT. Recommend rehab services given patient requires 2 person maximal assistance and limited functional status with walker ~15 feet. PT recommends rehab for strength training however family would like patient to return home for home PT.     Important Medication Changes and Reason:  - Please continue to take amlodipine 10mg daily    Active or Pending Issues Requiring Follow-up:  - Please follow up with neurologist for the chronic atherosclerotic changes seen on MRI.  - Please follow up with primary doctor to review hospital course and review hypertensive medications    Advanced Directives:   [X] Full code  [ ] DNR  [ ] Hospice    Discharge Diagnoses:  VZV Encephalitis

## 2023-12-29 NOTE — PROGRESS NOTE ADULT - ATTENDING COMMENTS
improved Na level 129.   continue with Na tabs I tab tid  given HTN, can add lasix 20mg daily po. it will help both HTN and hyponatremia improved Na level 129.   continue with Na tabs I tab tid  given that she is on IV acyclovir, please start NS at 75 cc/hr and monitor Cr and Na karey  avoid contrast studies, ACE-I, ARB, or NSAIDs

## 2023-12-29 NOTE — DISCHARGE NOTE PROVIDER - NSFOLLOWUPCLINICS_GEN_ALL_ED_FT
[facility]  Neurology  [address] [place] [address] [address] [place] [address]  Phone: [phone]  Fax:      [facility]  Neurology  [address] [place] [address] [address] [place] [address]  Phone: [phone]  Fax:

## 2023-12-29 NOTE — DIETITIAN NUTRITION RISK NOTIFICATION - TREATMENT: THE FOLLOWING DIET HAS BEEN RECOMMENDED
Diet, NPO after Midnight:      NPO Start Date: 28-Dec-2023,   NPO Start Time: 23:59  Except Medications (12-28-23 @ 14:31) [Active]  Diet, Regular:   No Beef (12-27-23 @ 18:39) [Active]

## 2023-12-29 NOTE — DIETITIAN INITIAL EVALUATION ADULT - PROBLEM SELECTOR PLAN 4
Weakness may be in the setting of AMS. Patient states herself that she walks with a walker.  -Consider PT consult

## 2023-12-29 NOTE — DISCHARGE NOTE PROVIDER - NSDCMRMEDTOKEN_GEN_ALL_CORE_FT
aspirin 81 mg oral delayed release tablet: 1 tab(s) orally once a day  atorvastatin 80 mg oral tablet: 1 tab(s) orally once a day (at bedtime)  Occupational Therapy : 3 times a week, Occupational Therapy   Physical therapy: 3-5x/week  polyethylene glycol 3350 oral powder for reconstitution: 17 gram(s) orally once a day  senna oral tablet: 2 tab(s) orally once a day (at bedtime)  sodium chloride 1 g oral tablet: 1 tab(s) orally 3 times a day   Tylenol Extra Strength 500 mg oral tablet: 2 tab(s) orally once a day, As Needed   atorvastatin 80 mg oral tablet: 1 tab(s) orally once a day (at bedtime)  Occupational Therapy : 3 times a week, Occupational Therapy   Physical therapy: 3-5x/week  sodium chloride 1 g oral tablet: 1 tab(s) orally 3 times a day   Tylenol Extra Strength 500 mg oral tablet: 2 tab(s) orally once a day, As Needed   sodium chloride 1 g oral tablet: 1 tab(s) orally 3 times a day   Tylenol Extra Strength 500 mg oral tablet: 2 tab(s) orally once a day, As Needed   acetaminophen 325 mg oral tablet: 2 tab(s) orally every 6 hours As needed Temp greater or equal to 38C (100.4F), Mild Pain (1 - 3)  amLODIPine 10 mg oral tablet: 1 tab(s) orally once a day  sodium chloride 1 g oral tablet: 1 tab(s) orally 3 times a day   acetaminophen 325 mg oral tablet: 2 tab(s) orally every 6 hours As needed Temp greater or equal to 38C (100.4F), Mild Pain (1 - 3)  amLODIPine 10 mg oral tablet: 1 tab(s) orally once a day  sodium chloride 1 g oral tablet: 1 tab(s) orally 3 times a day  sodium chloride 1 g oral tablet: 1 tab(s) orally 3 times a day

## 2023-12-29 NOTE — PROGRESS NOTE ADULT - PROBLEM SELECTOR PLAN 2
Na 123 on admission. 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 Patient s/p 2L NS in ED. 12/27 Na 122.  -Nephrology consulted, appreciate recs  -ovn 12/28 received 8hr 3% NS @ 30cc/hr--> Na 130 in AM     -holding further HTS at this time pending nephro recs  -q6h BMP Na 123 on admission. 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 Patient s/p 2L NS in ED.  -Nephrology consulted, appreciate recs  -12/28 overnight received 8hr 3% NS @ 30cc/hr--> Na 130 12/28 AM  -Holding further HTS at this time pending nephro recs --> Na 124 12/29 AM, f/u recs  -q6h BMP

## 2023-12-29 NOTE — DIETITIAN INITIAL EVALUATION ADULT - PERTINENT LABORATORY DATA
12-29    124<L>  |  93<L>  |  10  ----------------------------<  103<H>  3.5   |  22  |  0.47<L>    Ca    8.5      29 Dec 2023 04:01  Phos  2.3     12-29  Mg     1.90     12-29    TPro  6.9  /  Alb  3.4  /  TBili  0.5  /  DBili  x   /  AST  21  /  ALT  11  /  AlkPhos  91  12-29

## 2023-12-29 NOTE — PROGRESS NOTE ADULT - PROBLEM SELECTOR PLAN 3
Reported to be A&Ox3 at baseline. Now with worsening mental status, likely in the setting of hyponatremia.  -CT head non contrast unremarkable  -MRI head with IV contrast per ID  -Treatment of hyponatremia as above Reported to be A&Ox3 at baseline. Now with worsening mental status, likely in the setting of hyponatremia.  -CT head non contrast unremarkable  -MRI head with IV contrast per ID  -Treatment of hyponatremia as above  -Per ID recs pending LP 12/29 --> f/u meningoencephalitis PCR panel, cell counts, protein, glucose Reported to be A&Ox3 at baseline. Now with worsening mental status, likely in the setting of hyponatremia.  -CT head non contrast unremarkable  -MRI head with IV contrast per ID  -Treatment of hyponatremia as above  -Per ID recs pending LP 12/29 to assess for CNS involvement --> f/u meningoencephalitis PCR panel, cell counts, protein, glucose

## 2023-12-29 NOTE — CHART NOTE - NSCHARTNOTEFT_GEN_A_CORE
PRE-INTERVENTIONAL RADIOLOGY PROCEDURE NOTE      Patient Age: 83y    Patient Gender: Female    Procedure: Lumbar Puncture     Diagnosis/Indication: Encephalitis     Interventional Radiology Attending Physician: Dr. Blanchard     Ordering Attending Physician: Dr. Chelly Butler     Pertinent Medical History: 82 year old female with history of chronic HA, vertigo, hyponatremia requiring hospitalization previously, now coming in with a couple days of generalized weakness, confusion, difficulty ambulating and facial rash. 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     Pertinent labs:                      11.5   9.37  )-----------( 335      ( 29 Dec 2023 04:01 )             33.1       12-29    124<L>  |  93<L>  |  10  ----------------------------<  103<H>  3.5   |  22  |  0.47<L>    Ca    8.5      29 Dec 2023 04:01  Phos  2.3     12-29  Mg     1.90     12-29    TPro  6.9  /  Alb  3.4  /  TBili  0.5  /  DBili  x   /  AST  21  /  ALT  11  /  AlkPhos  91  12-29      PT/INR - ( 29 Dec 2023 04:01 )   PT: 11.7 sec;   INR: 1.04 ratio         PTT - ( 29 Dec 2023 04:01 )  PTT:25.4 sec        Patient and Family Aware ? Yes

## 2023-12-29 NOTE — PROGRESS NOTE ADULT - PROBLEM SELECTOR PLAN 5
Patient with elevated BP to 195/70. Per daughter no history of HTN, does not take antihypertensives. Improvement with PO hydralazine to SBP 150s.  -Continue to monitor  -Can consider PO hydral or IV labetalol if further BP management needed Patient with elevated BP to 195/70. Per daughter no history of HTN, does not take antihypertensives. Improvement with PO hydralazine to SBP 150s.  -12/29 /74  -Trial of amlodipine 5mg starting 12/29

## 2023-12-29 NOTE — PROGRESS NOTE ADULT - SUBJECTIVE AND OBJECTIVE BOX
Follow Up:    VZV    Interval History/ROS:  VSS ON. S/p IR-guided LP. Lymphocytic pleocytosis noted. Patient was seen and examined at bedside. No acute complaints. No fever.    Allergies  Allergy Status Unknown        ANTIMICROBIALS:  acyclovir IVPB 500 every 12 hours  ampicillin/sulbactam  IVPB 3 every 6 hours      OTHER MEDS:  MEDICATIONS  (STANDING):  amLODIPine   Tablet 5 daily  influenza  Vaccine (HIGH DOSE) 0.7 once      Vital Signs Last 24 Hrs  T(C): 36.5 (29 Dec 2023 15:28), Max: 37 (29 Dec 2023 01:00)  T(F): 97.7 (29 Dec 2023 15:28), Max: 98.6 (29 Dec 2023 01:00)  HR: 69 (29 Dec 2023 15:28) (69 - 91)  BP: 155/97 (29 Dec 2023 15:28) (147/91 - 166/83)  BP(mean): --  RR: 18 (29 Dec 2023 15:28) (16 - 18)  SpO2: 100% (29 Dec 2023 15:28) (100% - 100%)    Parameters below as of 29 Dec 2023 15:28  Patient On (Oxygen Delivery Method): room air        PHYSICAL EXAM:  Constitutional: Nontoxic at rest  HEAD/EYES: anicteric  ENT: Moist oral mucosa; Superficial lip desquamation; R V3 distribution of erythema; patchy vesicular lesions w/o overt purulence, all crusted  Cardiovascular:   normal S1, S2, no murmur, RRR  Respiratory:  clear BS bilaterally, no wheezes, no crackles  Neurologic: awake, alert                          11.5   9.37  )-----------( 335      ( 29 Dec 2023 04:01 )             33.1       12-29    129<L>  |  94<L>  |  9   ----------------------------<  101<H>  3.5   |  24  |  0.45<L>    Ca    8.4      29 Dec 2023 09:45  Phos  2.3     12-29  Mg     1.90     12-29    TPro  6.9  /  Alb  3.4  /  TBili  0.5  /  DBili  x   /  AST  21  /  ALT  11  /  AlkPhos  91  12-29    Protein, CSF (12.29.23 @ 12:52)   Protein, CSF: 66 mg/dL  Gucose, CSF: 52 mg/dL (12.29.23 @ 12:52)     Cerebrospinal Fluid Cell Count-1 (12.29.23 @ 12:52)   Tube Type: Tube 3  Other Cells - Spinal Fluid: 0 %  CSF Appearance: Clear  CSF Lymphocytes: 88 %  CSF Monocytes/Macrophages: 11 %  CSF Neutrophils: 0 %  Appearance Spun: Colorless  Atypical Lymphocytes - CSF: 0 %  Total Cells Counted, Spinal Fluid: 100 Cells  RBC Count - Spinal Fluid: 18: Red Cell count correlates with the number and proportion of cells on   cytospin preparation. cells/uL  Total Nucleated Cell Count, CSF: 118: TYPE:(C=Critical, N=Notification, A=Abnormal) _   TESTS: _CSF WBC   DATE/TIME CALLED: _12/29/2023 13:31:15 EST   CALLED TO: MERLYN WASHINGTON RN   READ BACK (2 Patient Identifiers)(Y/N): _Y   READ BACK VALUES (Y/N): _Y   CALLED BY: _LESIA cells/uL  CSF Color: Colorless  Eosinophil Count - Spinal Fluid: 0 %      Culture - CSF with Gram Stain . (12.29.23 @ 12:50)   Gram Stain:   polymorphonuclear leukocytes seen per oil power field   No organisms seen per oil power field   by cytocentrifuge  Specimen Source: .CSF CSF    Urinalysis Basic - ( 29 Dec 2023 09:45 )    Color: x / Appearance: x / SG: x / pH: x  Gluc: 101 mg/dL / Ketone: x  / Bili: x / Urobili: x   Blood: x / Protein: x / Nitrite: x   Leuk Esterase: x / RBC: x / WBC x   Sq Epi: x / Non Sq Epi: x / Bacteria: x        MICROBIOLOGY:  v  .CSF CSF  12-29-23 --  --    polymorphonuclear leukocytes seen per oil power field  No organisms seen per oil power field  by cytocentrifuge      Clean Catch Clean Catch (Midstream)  12-26-23   <10,000 CFU/mL Normal Urogenital Rosa M  --  --      .Blood Blood-Peripheral  12-26-23   No growth at 72 Hours  --  --      .Blood Blood-Peripheral  12-26-23   No growth at 72 Hours  --  --                RADIOLOGY:  Imaging below independently reviewed.  < from: CT Head No Cont (12.27.23 @ 09:53) >    IMPRESSION:  No acute intracranial hemorrhage or acute territorial infarct.  If   symptoms persist, follow-up MRI exam recommended.    < end of copied text >

## 2023-12-29 NOTE — DIETITIAN INITIAL EVALUATION ADULT - NS FNS DIET ORDER
Diet, NPO after Midnight:      NPO Start Date: 28-Dec-2023,   NPO Start Time: 23:59  Except Medications (12-28-23 @ 14:31)

## 2023-12-30 LAB
ALBUMIN SERPL ELPH-MCNC: 3.6 G/DL — SIGNIFICANT CHANGE UP (ref 3.3–5)
ALBUMIN SERPL ELPH-MCNC: 3.6 G/DL — SIGNIFICANT CHANGE UP (ref 3.3–5)
ALP SERPL-CCNC: 92 U/L — SIGNIFICANT CHANGE UP (ref 40–120)
ALP SERPL-CCNC: 92 U/L — SIGNIFICANT CHANGE UP (ref 40–120)
ALT FLD-CCNC: 18 U/L — SIGNIFICANT CHANGE UP (ref 4–33)
ALT FLD-CCNC: 18 U/L — SIGNIFICANT CHANGE UP (ref 4–33)
ANION GAP SERPL CALC-SCNC: 12 MMOL/L — SIGNIFICANT CHANGE UP (ref 7–14)
ANION GAP SERPL CALC-SCNC: 12 MMOL/L — SIGNIFICANT CHANGE UP (ref 7–14)
AST SERPL-CCNC: 19 U/L — SIGNIFICANT CHANGE UP (ref 4–32)
AST SERPL-CCNC: 19 U/L — SIGNIFICANT CHANGE UP (ref 4–32)
BILIRUB SERPL-MCNC: 0.6 MG/DL — SIGNIFICANT CHANGE UP (ref 0.2–1.2)
BILIRUB SERPL-MCNC: 0.6 MG/DL — SIGNIFICANT CHANGE UP (ref 0.2–1.2)
BUN SERPL-MCNC: 7 MG/DL — SIGNIFICANT CHANGE UP (ref 7–23)
BUN SERPL-MCNC: 7 MG/DL — SIGNIFICANT CHANGE UP (ref 7–23)
C NEOFORM RRNA SPEC NAA+PROBE-ACNC: SIGNIFICANT CHANGE UP
C NEOFORM RRNA SPEC NAA+PROBE-ACNC: SIGNIFICANT CHANGE UP
CALCIUM SERPL-MCNC: 8.6 MG/DL — SIGNIFICANT CHANGE UP (ref 8.4–10.5)
CALCIUM SERPL-MCNC: 8.6 MG/DL — SIGNIFICANT CHANGE UP (ref 8.4–10.5)
CHLORIDE SERPL-SCNC: 94 MMOL/L — LOW (ref 98–107)
CHLORIDE SERPL-SCNC: 94 MMOL/L — LOW (ref 98–107)
CMV DNA CSF QL NAA+PROBE: SIGNIFICANT CHANGE UP
CMV DNA CSF QL NAA+PROBE: SIGNIFICANT CHANGE UP
CO2 SERPL-SCNC: 22 MMOL/L — SIGNIFICANT CHANGE UP (ref 22–31)
CO2 SERPL-SCNC: 22 MMOL/L — SIGNIFICANT CHANGE UP (ref 22–31)
CREAT SERPL-MCNC: 0.41 MG/DL — LOW (ref 0.5–1.3)
CREAT SERPL-MCNC: 0.41 MG/DL — LOW (ref 0.5–1.3)
CSF PCR RESULT: DETECTED
CSF PCR RESULT: DETECTED
E COLI K1 DNA CSF QL NAA+NON-PROBE: SIGNIFICANT CHANGE UP
E COLI K1 DNA CSF QL NAA+NON-PROBE: SIGNIFICANT CHANGE UP
EGFR: 98 ML/MIN/1.73M2 — SIGNIFICANT CHANGE UP
EGFR: 98 ML/MIN/1.73M2 — SIGNIFICANT CHANGE UP
ESCHERICHIA COLI K1: SIGNIFICANT CHANGE UP
ESCHERICHIA COLI K1: SIGNIFICANT CHANGE UP
EV RNA CSF QL NAA+PROBE: SIGNIFICANT CHANGE UP
EV RNA CSF QL NAA+PROBE: SIGNIFICANT CHANGE UP
GLUCOSE SERPL-MCNC: 111 MG/DL — HIGH (ref 70–99)
GLUCOSE SERPL-MCNC: 111 MG/DL — HIGH (ref 70–99)
GP B STREP DNA SPEC QL NAA+PROBE: SIGNIFICANT CHANGE UP
GP B STREP DNA SPEC QL NAA+PROBE: SIGNIFICANT CHANGE UP
HAEM INFLU DNA SPEC QL NAA+PROBE: SIGNIFICANT CHANGE UP
HAEM INFLU DNA SPEC QL NAA+PROBE: SIGNIFICANT CHANGE UP
HCT VFR BLD CALC: 33.5 % — LOW (ref 34.5–45)
HCT VFR BLD CALC: 33.5 % — LOW (ref 34.5–45)
HGB BLD-MCNC: 11.9 G/DL — SIGNIFICANT CHANGE UP (ref 11.5–15.5)
HGB BLD-MCNC: 11.9 G/DL — SIGNIFICANT CHANGE UP (ref 11.5–15.5)
HHV6 DNA CSF QL NAA+PROBE: SIGNIFICANT CHANGE UP
HHV6 DNA CSF QL NAA+PROBE: SIGNIFICANT CHANGE UP
HSV1 DNA CSF QL NAA+PROBE: SIGNIFICANT CHANGE UP
HSV1 DNA CSF QL NAA+PROBE: SIGNIFICANT CHANGE UP
HSV2 DNA CSF QL NAA+PROBE: SIGNIFICANT CHANGE UP
HSV2 DNA CSF QL NAA+PROBE: SIGNIFICANT CHANGE UP
L MONOCYTOG DNA SPEC QL NAA+PROBE: SIGNIFICANT CHANGE UP
L MONOCYTOG DNA SPEC QL NAA+PROBE: SIGNIFICANT CHANGE UP
MAGNESIUM SERPL-MCNC: 1.9 MG/DL — SIGNIFICANT CHANGE UP (ref 1.6–2.6)
MAGNESIUM SERPL-MCNC: 1.9 MG/DL — SIGNIFICANT CHANGE UP (ref 1.6–2.6)
MCHC RBC-ENTMCNC: 29.2 PG — SIGNIFICANT CHANGE UP (ref 27–34)
MCHC RBC-ENTMCNC: 29.2 PG — SIGNIFICANT CHANGE UP (ref 27–34)
MCHC RBC-ENTMCNC: 35.5 GM/DL — SIGNIFICANT CHANGE UP (ref 32–36)
MCHC RBC-ENTMCNC: 35.5 GM/DL — SIGNIFICANT CHANGE UP (ref 32–36)
MCV RBC AUTO: 82.1 FL — SIGNIFICANT CHANGE UP (ref 80–100)
MCV RBC AUTO: 82.1 FL — SIGNIFICANT CHANGE UP (ref 80–100)
N MEN DNA SPEC QL NAA+PROBE: SIGNIFICANT CHANGE UP
N MEN DNA SPEC QL NAA+PROBE: SIGNIFICANT CHANGE UP
NRBC # BLD: 0 /100 WBCS — SIGNIFICANT CHANGE UP (ref 0–0)
NRBC # BLD: 0 /100 WBCS — SIGNIFICANT CHANGE UP (ref 0–0)
NRBC # FLD: 0 K/UL — SIGNIFICANT CHANGE UP (ref 0–0)
NRBC # FLD: 0 K/UL — SIGNIFICANT CHANGE UP (ref 0–0)
PARECHOVIRUS A RNA SPEC QL NAA+PROBE: SIGNIFICANT CHANGE UP
PARECHOVIRUS A RNA SPEC QL NAA+PROBE: SIGNIFICANT CHANGE UP
PHOSPHATE SERPL-MCNC: 2.8 MG/DL — SIGNIFICANT CHANGE UP (ref 2.5–4.5)
PHOSPHATE SERPL-MCNC: 2.8 MG/DL — SIGNIFICANT CHANGE UP (ref 2.5–4.5)
PLATELET # BLD AUTO: 339 K/UL — SIGNIFICANT CHANGE UP (ref 150–400)
PLATELET # BLD AUTO: 339 K/UL — SIGNIFICANT CHANGE UP (ref 150–400)
POTASSIUM SERPL-MCNC: 3.8 MMOL/L — SIGNIFICANT CHANGE UP (ref 3.5–5.3)
POTASSIUM SERPL-MCNC: 3.8 MMOL/L — SIGNIFICANT CHANGE UP (ref 3.5–5.3)
POTASSIUM SERPL-SCNC: 3.8 MMOL/L — SIGNIFICANT CHANGE UP (ref 3.5–5.3)
POTASSIUM SERPL-SCNC: 3.8 MMOL/L — SIGNIFICANT CHANGE UP (ref 3.5–5.3)
PROT SERPL-MCNC: 7.2 G/DL — SIGNIFICANT CHANGE UP (ref 6–8.3)
PROT SERPL-MCNC: 7.2 G/DL — SIGNIFICANT CHANGE UP (ref 6–8.3)
RBC # BLD: 4.08 M/UL — SIGNIFICANT CHANGE UP (ref 3.8–5.2)
RBC # BLD: 4.08 M/UL — SIGNIFICANT CHANGE UP (ref 3.8–5.2)
RBC # FLD: 11.8 % — SIGNIFICANT CHANGE UP (ref 10.3–14.5)
RBC # FLD: 11.8 % — SIGNIFICANT CHANGE UP (ref 10.3–14.5)
S PNEUM DNA SPEC QL NAA+PROBE: SIGNIFICANT CHANGE UP
S PNEUM DNA SPEC QL NAA+PROBE: SIGNIFICANT CHANGE UP
SODIUM SERPL-SCNC: 128 MMOL/L — LOW (ref 135–145)
SODIUM SERPL-SCNC: 128 MMOL/L — LOW (ref 135–145)
VZV DNA CSF QL NAA+PROBE: DETECTED
VZV DNA CSF QL NAA+PROBE: DETECTED
WBC # BLD: 7.42 K/UL — SIGNIFICANT CHANGE UP (ref 3.8–10.5)
WBC # BLD: 7.42 K/UL — SIGNIFICANT CHANGE UP (ref 3.8–10.5)
WBC # FLD AUTO: 7.42 K/UL — SIGNIFICANT CHANGE UP (ref 3.8–10.5)
WBC # FLD AUTO: 7.42 K/UL — SIGNIFICANT CHANGE UP (ref 3.8–10.5)

## 2023-12-30 PROCEDURE — 99233 SBSQ HOSP IP/OBS HIGH 50: CPT

## 2023-12-30 PROCEDURE — 99233 SBSQ HOSP IP/OBS HIGH 50: CPT | Mod: FS

## 2023-12-30 RX ORDER — AMLODIPINE BESYLATE 2.5 MG/1
10 TABLET ORAL DAILY
Refills: 0 | Status: DISCONTINUED | OUTPATIENT
Start: 2023-12-31 | End: 2024-01-09

## 2023-12-30 RX ORDER — LANOLIN ALCOHOL/MO/W.PET/CERES
3 CREAM (GRAM) TOPICAL AT BEDTIME
Refills: 0 | Status: DISCONTINUED | OUTPATIENT
Start: 2023-12-30 | End: 2024-01-09

## 2023-12-30 RX ORDER — ENOXAPARIN SODIUM 100 MG/ML
40 INJECTION SUBCUTANEOUS EVERY 24 HOURS
Refills: 0 | Status: DISCONTINUED | OUTPATIENT
Start: 2023-12-30 | End: 2024-01-03

## 2023-12-30 RX ORDER — AMLODIPINE BESYLATE 2.5 MG/1
5 TABLET ORAL ONCE
Refills: 0 | Status: COMPLETED | OUTPATIENT
Start: 2023-12-30 | End: 2023-12-30

## 2023-12-30 RX ORDER — ACETAMINOPHEN 500 MG
650 TABLET ORAL EVERY 6 HOURS
Refills: 0 | Status: DISCONTINUED | OUTPATIENT
Start: 2023-12-30 | End: 2024-01-09

## 2023-12-30 RX ORDER — SODIUM CHLORIDE 9 MG/ML
1000 INJECTION INTRAMUSCULAR; INTRAVENOUS; SUBCUTANEOUS
Refills: 0 | Status: DISCONTINUED | OUTPATIENT
Start: 2023-12-30 | End: 2024-01-08

## 2023-12-30 RX ORDER — SODIUM CHLORIDE 9 MG/ML
1000 INJECTION INTRAMUSCULAR; INTRAVENOUS; SUBCUTANEOUS
Refills: 0 | Status: COMPLETED | OUTPATIENT
Start: 2023-12-30 | End: 2023-12-30

## 2023-12-30 RX ADMIN — AMLODIPINE BESYLATE 5 MILLIGRAM(S): 2.5 TABLET ORAL at 13:32

## 2023-12-30 RX ADMIN — Medication 100 MILLIEQUIVALENT(S): at 01:45

## 2023-12-30 RX ADMIN — SODIUM CHLORIDE 1 GRAM(S): 9 INJECTION INTRAMUSCULAR; INTRAVENOUS; SUBCUTANEOUS at 22:06

## 2023-12-30 RX ADMIN — AMPICILLIN SODIUM AND SULBACTAM SODIUM 200 GRAM(S): 250; 125 INJECTION, POWDER, FOR SUSPENSION INTRAMUSCULAR; INTRAVENOUS at 23:00

## 2023-12-30 RX ADMIN — AMLODIPINE BESYLATE 5 MILLIGRAM(S): 2.5 TABLET ORAL at 05:59

## 2023-12-30 RX ADMIN — SODIUM CHLORIDE 1 GRAM(S): 9 INJECTION INTRAMUSCULAR; INTRAVENOUS; SUBCUTANEOUS at 05:44

## 2023-12-30 RX ADMIN — Medication 100 MILLIEQUIVALENT(S): at 00:07

## 2023-12-30 RX ADMIN — SODIUM CHLORIDE 75 MILLILITER(S): 9 INJECTION INTRAMUSCULAR; INTRAVENOUS; SUBCUTANEOUS at 18:33

## 2023-12-30 RX ADMIN — Medication 110 MILLIGRAM(S): at 18:32

## 2023-12-30 RX ADMIN — AMPICILLIN SODIUM AND SULBACTAM SODIUM 200 GRAM(S): 250; 125 INJECTION, POWDER, FOR SUSPENSION INTRAMUSCULAR; INTRAVENOUS at 15:48

## 2023-12-30 RX ADMIN — SODIUM CHLORIDE 1 GRAM(S): 9 INJECTION INTRAMUSCULAR; INTRAVENOUS; SUBCUTANEOUS at 00:08

## 2023-12-30 RX ADMIN — ENOXAPARIN SODIUM 40 MILLIGRAM(S): 100 INJECTION SUBCUTANEOUS at 13:32

## 2023-12-30 RX ADMIN — SODIUM CHLORIDE 1 GRAM(S): 9 INJECTION INTRAMUSCULAR; INTRAVENOUS; SUBCUTANEOUS at 13:32

## 2023-12-30 RX ADMIN — Medication 100 MILLIEQUIVALENT(S): at 00:49

## 2023-12-30 RX ADMIN — AMPICILLIN SODIUM AND SULBACTAM SODIUM 200 GRAM(S): 250; 125 INJECTION, POWDER, FOR SUSPENSION INTRAMUSCULAR; INTRAVENOUS at 09:22

## 2023-12-30 RX ADMIN — AMPICILLIN SODIUM AND SULBACTAM SODIUM 200 GRAM(S): 250; 125 INJECTION, POWDER, FOR SUSPENSION INTRAMUSCULAR; INTRAVENOUS at 03:14

## 2023-12-30 RX ADMIN — Medication 110 MILLIGRAM(S): at 05:43

## 2023-12-30 NOTE — PHYSICAL THERAPY INITIAL EVALUATION ADULT - PERTINENT HX OF CURRENT PROBLEM, REHAB EVAL
83 year old female presenting with weakness and rash. 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 Now post IR-guided LP 12/29, which revealed lymphocytic pleocytosis.

## 2023-12-30 NOTE — PHYSICAL THERAPY INITIAL EVALUATION ADULT - PATIENT PROFILE REVIEW, REHAB EVAL
Activity - Ambulate as tolerated. Pt cleared for PT evaluation prior to session by PRIYANK Madison./yes

## 2023-12-30 NOTE — PHYSICAL THERAPY INITIAL EVALUATION ADULT - ADDITIONAL COMMENTS
Pt confused, A&Ox1, unreliable historian. Care coordination states pt was independent in ambulation with a rolling walker and required assistance for ADLs. Pt lives in a house with family, 3 steps to enter and 10 inside.    Following evaluation, pt was left semireclined in bed in no distress, call bell in reach.

## 2023-12-30 NOTE — PROGRESS NOTE ADULT - PROBLEM SELECTOR PLAN 1
-ID consulted, appreciate recs  -VZV lesion PCR +  -Continue acyclovir (12/26-) @ 10mg/kg q12h --> for total of 14 days, if discharged would need PICC bc not compatible with dosing via midline  -Maintenance IV fluids while on acyclovir --> nephro rec NS  -Continue unasyn (12/27-) --> until 12/31 per ID  -s/p LP 12/29: per ID given lymphocytic dominance of pleocytosis, c/f CSF viral involvement  -MRI and MRA of the brain (c/f possible VZV vasculopathy) -ID consulted, appreciate recs  -VZV lesion PCR +  -Continue acyclovir (12/26-) @ 10mg/kg q12h --> for total of 14 days, if discharged would need PICC bc not compatible with dosing via midline  -Maintenance IV fluids while on acyclovir --> nephro rec NS  -Continue unasyn (12/27-) --> until 12/31 per ID  -s/p LP 12/29: per ID given lymphocytic dominance of pleocytosis, c/f CSF viral involvement --> CSF VZV +  -MRI and MRA of the brain (c/f possible VZV vasculopathy) 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 +  -MRI and MRA of the brain (c/f possible VZV vasculopathy)  -Continue acyclovir (12/26-) @ 10mg/kg q12h --> for total of 14 days, if discharged would need PICC bc not compatible with dosing via midline  -Maintenance IV fluids while on acyclovir --> NS x2 hrs with acyclovir at 75cc/hr  -Continue unasyn (12/27-) --> until 12/31 per ID

## 2023-12-30 NOTE — PROGRESS NOTE ADULT - PROBLEM SELECTOR PROBLEM 2
Service Date: 08/31/2020      CARDIOLOGY FOLLOWUP       HISTORY OF PRESENT ILLNESS:  I had the pleasure of seeing Mr. Navarro in followup at the [facility] today.  He is a very pleasant 66-year-old gentleman who I last saw in 05/2019.  He has a history of coronary artery disease and is status post stenting of the distal and ostial circumflex as well as the first obtuse marginal in 1998.  He has severe, probably familial, dyslipidemia with a markedly elevated LDL, although we have been able to control this well with Repatha and atorvastatin.  He also has a history of mild aortic stenosis.      He was recently seen in the emergency department at [facility] on 06/29/2020 for dyspnea on exertion.  He underwent COVID-19 testing which was negative.  A CT of the chest was also within normal limits.  He subsequently returned to the emergency department on 07/06/2020 with a syncopal episode that occurred in the context of ankle pain after a shelf fell on his ankle.  Once again, his evaluation was essentially unremarkable but given the ongoing dyspnea on exertion and syncopal episode, the patient and his family were concerned about a potential cardiac etiology and contacted our office for further evaluation.      He underwent a Lexiscan nuclear stress test on 07/15/2020.  This demonstrated a medium-sized area of moderate ischemia involving the [id number] anterior and anterolateral segments of the left ventricle.  Given these findings, I recommended coronary angiography.  He also underwent an echocardiogram which demonstrated mild progression in his aortic stenosis which was now in the moderate range.      He underwent coronary angiography on 07/21/2020 and was found to have significant stenosis of the ostium of the second obtuse marginal.  He underwent successful intervention with a drug-eluting stent at the time.  His other coronary arteries demonstrated mild-to-moderate nonobstructive  disease in a left dominant system.      Since his intervention, he has done remarkably well.  He states that his dyspnea on exertion has essentially subsided.  He denies any palpitations, syncope or presyncope.  Denies any PND, orthopnea or lower extremity edema.      PHYSICAL EXAMINATION:  Dictated below.      LABORATORY STUDIES:  Lipids on 2019 demonstrated total cholesterol 136, HDL 61, LDL 62 and triglycerides of 63.      IMPRESSION:   1.  Coronary artery disease status post recent PCI to the second obtuse marginal and prior circumflex/obtuse marginal revascularization in .   2.  Significant dyslipidemia characterized by severe LDL elevation.  This has normalized with the use of Repatha and atorvastatin.   3.  History of moderate aortic stenosis.   4.  Moderate left internal carotid artery stenosis on carotid ultrasound which has been stable.      Mr. Chapa is doing well post revascularization.  There is no evidence of angina or congestive heart failure.  His medications are appropriate and I will make no changes today.      We did go over the rationale for dual antiplatelet therapy as prescribed for 1 year.  Regarding his aortic stenosis/carotid disease, I will order a followup echocardiogram and ultrasound of the carotids for approximately 1 year.  Assuming his clinical status remains stable, I will see him in followup at that time.  It was a pleasure seeing him in followup.         LATONYA NAPIER MD             D: 2020   T: 2020   MT: MIRELLA      Name:     MARLENY CHAPA   MRN:      [id number]        Account:      [id number]   :      1954           Service Date: 2020      Document: [id number]     Hyponatremia

## 2023-12-30 NOTE — PROGRESS NOTE ADULT - PROBLEM SELECTOR PLAN 2
Na 123 on admission. 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 Patient s/p 2L NS in ED.  -Nephrology consulted, appreciate recs  -12/28 overnight received 8hr 3% NS @ 30cc/hr--> Na 130 12/28 AM  -Holding further HTS at this time pending nephro recs  -12/29 gave NS at 75cc/hr with acyclovir per nephro recs  -q6h BMP Na 123 on admission. 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 Patient s/p 2L NS in ED.  -Nephrology consulted, appreciate recs  -Holding further HTS at this time pending nephro recs  -Will give NS x2 hrs with acyclovir at 75cc/hr  -Next BMP in AM

## 2023-12-30 NOTE — PROGRESS NOTE ADULT - PROBLEM SELECTOR PLAN 6
Diet: Regular (no beef) --> NPO at this time for procedure  DVT ppx: SCDs --> if LP completed, can consider lovenox  Dispo: pending clinical improvement Diet: Regular (no beef)  DVT ppx: lovenox 40  Dispo: pending clinical improvement

## 2023-12-30 NOTE — PROGRESS NOTE ADULT - ATTENDING COMMENTS
82 year old female with history of chronic HA, vertigo, hyponatremia requiring hospitalization previously, now coming in with a couple days of generalized weakness, confusion, difficulty ambulating and facial rash. Afebrile, saturating well on RA. Exam similar to yesterday - awake in bed, oriented to person and place, not month or year. Answering simple questions/following simple commands. Family bedside - pt able to recognize and introduce with repeat prompting -- son, daughter, and nephew. All agree that pt has improved since presentation. Explained disease process of VZV encephalitis and treatment plan, in agreement. PT recs KONG, will need to discuss with pt and family if in line with goals. Discussed BP control -- pt without hx HTN, increased amlodipine to 10mg today; likely in setting of infection and stress of hospitalization, hopefully BP improves with time and on DC.    #HypoNa: Hx chronic hypoNa with component of SIADH. Appreciate renal input. Na 128 this AM. C/w salt tabs -- consider increasing to 2g TID? F/u renal recs.     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 C/w acyclovir. Discussed with infection control, given less than 3 dermatome involvement, hospital policy is for standard precautions (not advising airborne/contact precautions). Renal recs IVF immediately post acyclovir x2h to minimize AE and given fact that it can not be simultaneously run with fluids.     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     #Elevated BP: s/p 1 dose PO hydralazine with improvement, not on home antihypertensives. Amlodipine started 12/29, increased to 10mg today.    D/w HS team, CM/DANN, RN, family. Rest as outlined above.

## 2023-12-30 NOTE — PROGRESS NOTE ADULT - PROBLEM SELECTOR PLAN 4
Weakness may be in the setting of AMS. Patient states herself that she walks with a walker.  -PT consult Weakness may be in the setting of AMS. Patient states herself that she walks with a walker.  -PT consulted

## 2023-12-30 NOTE — PROGRESS NOTE ADULT - SUBJECTIVE AND OBJECTIVE BOX
[facility] DIVISION OF KIDNEY DISEASES AND HYPERTENSION -- FOLLOW UP NOTE  --------------------------------------------------------------------------------  Chief Complaint: Hyponatremia    24 hour events/subjective:  Patient seen and examined without complaints today. No pain anywhere.       PAST HISTORY  --------------------------------------------------------------------------------  No significant changes to PMH, PSH, FHx, SHx, unless otherwise noted    ALLERGIES & MEDICATIONS  --------------------------------------------------------------------------------  Allergies    Allergy Status Unknown    Intolerances      Standing Inpatient Medications  acyclovir IVPB 500 milliGRAM(s) IV Intermittent every 12 hours  ampicillin/sulbactam  IVPB 3 Gram(s) IV Intermittent every 6 hours  enoxaparin Injectable 40 milliGRAM(s) SubCutaneous every 24 hours  influenza  Vaccine (HIGH DOSE) 0.7 milliLiter(s) IntraMuscular once  sodium chloride 1 Gram(s) Oral three times a day  sodium chloride 0.9%. 1000 milliLiter(s) IV Continuous <Continuous>    PRN Inpatient Medications        VITALS/PHYSICAL EXAM  --------------------------------------------------------------------------------  T(C): 36.6 (12-30-23 @ 13:30), Max: 36.6 (12-30-23 @ 00:00)  HR: 89 (12-30-23 @ 13:30) (80 - 95)  BP: 167/69 (12-30-23 @ 13:30) (132/80 - 167/69)  RR: 18 (12-30-23 @ 13:30) (18 - 18)  SpO2: 96% (12-30-23 @ 13:30) (96% - 100%)  Wt(kg): --        12-29-23 @ 07:01  -  12-30-23 @ 07:00  --------------------------------------------------------  IN: 0 mL / OUT: 1250 mL / NET: -1250 mL      Physical Exam:  Gen: NAD, well-appearing  HEENT: PERRL, supple neck, clear oropharynx  Pulm: CTA B/L  CV: RRR, S1S2;  Abd: soft, nontender/nondistended  : No suprapubic tenderness  Extremities: no bilateral LE edema noted.   Neuro: No focal deficits;  A&Ox1-2  Skin: Warm, +crusted lesions over face      LABS/STUDIES  --------------------------------------------------------------------------------              11.9   7.42  >-----------<  339      [12-30-23 @ 06:28]              33.5     128  |  94  |  7   ----------------------------<  111      [12-30-23 @ 06:28]  3.8   |  22  |  0.41        Ca     8.6     [12-30-23 @ 06:28]      Mg     1.90     [12-30-23 @ 06:28]      Phos  2.8     [12-30-23 @ 06:28]    TPro  7.2  /  Alb  3.6  /  TBili  0.6  /  DBili  x   /  AST  19  /  ALT  18  /  AlkPhos  92  [12-30-23 @ 06:28]    PT/INR: PT 11.7 , INR 1.04       [12-29-23 @ 04:01]  PTT: 25.4       [12-29-23 @ 04:01]      Creatinine Trend:  SCr 0.41 [12-30 @ 06:28]  SCr 0.39 [12-29 @ 19:20]  SCr 0.45 [12-29 @ 09:45]  SCr 0.47 [12-29 @ 04:01]  SCr 0.48 [12-28 @ 09:46]    Urinalysis - [12-30-23 @ 06:28]      Color  / Appearance  / SG  / pH       Gluc 111 / Ketone   / Bili  / Urobili        Blood  / Protein  / Leuk Est  / Nitrite       RBC  / WBC  / Hyaline  / Gran  / Sq Epi  / Non Sq Epi  / Bacteria     Urine Creatinine 40      [12-28-23 @ 12:45]  Urine Protein 19      [12-28-23 @ 12:45]  Urine Sodium 173      [12-28-23 @ 12:45]  Urine Urea Nitrogen 381.4      [12-28-23 @ 12:45]  Urine Potassium 38.0      [12-28-23 @ 12:45]  Urine Osmolality 568      [12-28-23 @ 12:45]         [facility] DIVISION OF KIDNEY DISEASES AND HYPERTENSION -- FOLLOW UP NOTE  --------------------------------------------------------------------------------  Chief Complaint: Hyponatremia    24 hour events/subjective:  Patient seen and examined without complaints today. No pain anywhere.       PAST HISTORY  --------------------------------------------------------------------------------  No significant changes to PMH, PSH, FHx, SHx, unless otherwise noted    ALLERGIES & MEDICATIONS  --------------------------------------------------------------------------------  Allergies    Allergy Status Unknown    Intolerances      Standing Inpatient Medications  acyclovir IVPB 500 milliGRAM(s) IV Intermittent every 12 hours  ampicillin/sulbactam  IVPB 3 Gram(s) IV Intermittent every 6 hours  enoxaparin Injectable 40 milliGRAM(s) SubCutaneous every 24 hours  influenza  Vaccine (HIGH DOSE) 0.7 milliLiter(s) IntraMuscular once  sodium chloride 1 Gram(s) Oral three times a day  sodium chloride 0.9%. 1000 milliLiter(s) IV Continuous <Continuous>    PRN Inpatient Medications        VITALS/PHYSICAL EXAM  --------------------------------------------------------------------------------  T(C): 36.6 (12-30-23 @ 13:30), Max: 36.6 (12-30-23 @ 00:00)  HR: 89 (12-30-23 @ 13:30) (80 - 95)  BP: 167/69 (12-30-23 @ 13:30) (132/80 - 167/69)  RR: 18 (12-30-23 @ 13:30) (18 - 18)  SpO2: 96% (12-30-23 @ 13:30) (96% - 100%)  Wt(kg): --        12-29-23 @ 07:01  -  12-30-23 @ 07:00  --------------------------------------------------------  IN: 0 mL / OUT: 1250 mL / NET: -1250 mL      Physical Exam:  Gen: NAD, well-appearing  HEENT: PERRL, supple neck, clear oropharynx  Pulm: CTA B/L  CV: RRR, S1S2;  Abd: soft, nontender/nondistended  : No suprapubic tenderness  Extremities: no bilateral LE edema noted.   Neuro: No focal deficits;  A&Ox1-2  Skin: Warm, +crusted lesions over face      LABS/STUDIES  --------------------------------------------------------------------------------              11.9   7.42  >-----------<  339      [12-30-23 @ 06:28]              33.5     128  |  94  |  7   ----------------------------<  111      [12-30-23 @ 06:28]  3.8   |  22  |  0.41        Ca     8.6     [12-30-23 @ 06:28]      Mg     1.90     [12-30-23 @ 06:28]      Phos  2.8     [12-30-23 @ 06:28]    TPro  7.2  /  Alb  3.6  /  TBili  0.6  /  DBili  x   /  AST  19  /  ALT  18  /  AlkPhos  92  [12-30-23 @ 06:28]    PT/INR: PT 11.7 , INR 1.04       [12-29-23 @ 04:01]  PTT: 25.4       [12-29-23 @ 04:01]      Creatinine Trend:  SCr 0.41 [12-30 @ 06:28]  SCr 0.39 [12-29 @ 19:20]  SCr 0.45 [12-29 @ 09:45]  SCr 0.47 [12-29 @ 04:01]  SCr 0.48 [12-28 @ 09:46]    Urinalysis - [12-30-23 @ 06:28]      Color  / Appearance  / SG  / pH       Gluc 111 / Ketone   / Bili  / Urobili        Blood  / Protein  / Leuk Est  / Nitrite       RBC  / WBC  / Hyaline  / Gran  / Sq Epi  / Non Sq Epi  / Bacteria     Urine Creatinine 40      [12-28-23 @ 12:45]  Urine Protein 19      [12-28-23 @ 12:45]  Urine Sodium 173      [12-28-23 @ 12:45]  Urine Urea Nitrogen 381.4      [12-28-23 @ 12:45]  Urine Potassium 38.0      [12-28-23 @ 12:45]  Urine Osmolality 568      [12-28-23 @ 12:45]

## 2023-12-30 NOTE — PROGRESS NOTE ADULT - PROBLEM SELECTOR PLAN 1
Pt. with acute on chronic hyponatremia in the setting of pain. On review of [facility] HIE, SNa was low atr 122 on 12/27. Pt. received 3% hypertonic saline on 12/27. SNa increased to 125 on 12/28. Pt. currently receiving salt tablets 1 gm tid. SNa lower today 128 (12/30) from 129. Serum osm is low at 261, urine osm elevated to 568, and urine sodium elevated at 173. Pt. with SIADH. Recommend to continue with salt tablets at current dose. Fluid restriction to <1L/day. Start 2% HTS @ 40cc/hr x 6 hrs. Monitor SNa.    If you have any questions, please feel free to contact fellow  Richard Chua MD  Nephrology Fellow  [employer](Preferred)/ X [phone]  (After 5pm or on weekends please page the on-call fellow) Pt. with acute on chronic hyponatremia in the setting of pain. On review of [place] HIE, SNa was low atr 122 on 12/27. Pt. received 3% hypertonic saline on 12/27. SNa increased to 125 on 12/28. Pt. currently receiving salt tablets 1 gm tid. SNa lower today 128 (12/30) from 129. Serum osm is low at 261, urine osm elevated to 568, and urine sodium elevated at 173. Pt. with SIADH. Recommend to continue with salt tablets at current dose. Fluid restriction to <1L/day. Start 2% HTS @ 40cc/hr x 6 hrs. Monitor SNa.    If you have any questions, please feel free to contact fellow  Richard Chua MD  Nephrology Fellow  [employer] Teams(Preferred)/ X [phone]  (After 5pm or on weekends please page the on-call fellow)

## 2023-12-30 NOTE — PROGRESS NOTE ADULT - PROBLEM SELECTOR PLAN 3
Reported to be A&Ox3 at baseline. Now with worsening mental status, likely in the setting of hyponatremia.  -CT head non contrast unremarkable  -MRI head with IV contrast per ID  -Treatment of hyponatremia as above  -Per ID recs pending LP 12/29 to assess for CNS involvement --> f/u meningoencephalitis PCR panel, cell counts, protein, glucose

## 2023-12-30 NOTE — PROGRESS NOTE ADULT - PROBLEM SELECTOR PLAN 5
Patient with elevated BP to 195/70. Per daughter no history of HTN, does not take antihypertensives. Improvement with PO hydralazine to SBP 150s.  -12/29 /74  -Trial of amlodipine 5mg starting 12/29 Patient with elevated BP to 195/70. Per daughter no history of HTN, does not take antihypertensives. Improvement with PO hydralazine to SBP 150s.  -12/29 /74  -Trial of amlodipine 5mg starting 12/29 --> increased to 10mg 12/30

## 2023-12-30 NOTE — PROGRESS NOTE ADULT - SUBJECTIVE AND OBJECTIVE BOX
PROGRESS NOTE:     Patient is a 83y old  Female who presents with a chief complaint of facial rash (29 Dec 2023 19:14)      SUBJECTIVE / OVERNIGHT EVENTS:  No acute events overnight. Patient examined at bedside with no acute complaints.       MEDICATIONS  (STANDING):  acyclovir IVPB 500 milliGRAM(s) IV Intermittent every 12 hours  amLODIPine   Tablet 5 milliGRAM(s) Oral daily  ampicillin/sulbactam  IVPB 3 Gram(s) IV Intermittent every 6 hours  influenza  Vaccine (HIGH DOSE) 0.7 milliLiter(s) IntraMuscular once  sodium chloride 1 Gram(s) Oral three times a day  sodium chloride 0.9%. 1000 milliLiter(s) (75 mL/Hr) IV Continuous <Continuous>    MEDICATIONS  (PRN):      CAPILLARY BLOOD GLUCOSE        I&O's Summary    29 Dec 2023 07:01  -  30 Dec 2023 06:46  --------------------------------------------------------  IN: 0 mL / OUT: 1250 mL / NET: -1250 mL        VITALS:   T(C): 36.4 (12-30-23 @ 05:59), Max: 36.6 (12-30-23 @ 00:00)  HR: 80 (12-30-23 @ 05:59) (69 - 95)  BP: 160/94 (12-30-23 @ 05:59) (132/80 - 160/94)  RR: 18 (12-30-23 @ 05:59) (18 - 18)  SpO2: 100% (12-30-23 @ 05:59) (100% - 100%)    GENERAL: NAD, lying in bed comfortably  HEAD:  Atraumatic, normocephalic  EYES: Eye discharge improved. EOMI, conjunctiva and sclera clear  HEART: Regular rate and rhythm, no murmurs, rubs, or gallops  LUNGS: Unlabored respirations.  Clear to auscultation bilaterally, no crackles, wheezing, or rhonchi  ABDOMEN: Soft, nontender, nondistended, +BS  EXTREMITIES: 2+ peripheral pulses bilaterally. No clubbing, cyanosis, or edema  NERVOUS SYSTEM: A&Ox2 (name, place)  SKIN: Improved rash by right jawline and chin    LABS:                        11.5   9.37  )-----------( 335      ( 29 Dec 2023 04:01 )             33.1     12-29    128<L>  |  93<L>  |  8   ----------------------------<  98  3.3<L>   |  22  |  0.39<L>    Ca    8.2<L>      29 Dec 2023 19:20  Phos  3.3     12-29  Mg     1.90     12-29    TPro  6.9  /  Alb  3.4  /  TBili  0.5  /  DBili  x   /  AST  21  /  ALT  11  /  AlkPhos  91  12-29    PT/INR - ( 29 Dec 2023 04:01 )   PT: 11.7 sec;   INR: 1.04 ratio         PTT - ( 29 Dec 2023 04:01 )  PTT:25.4 sec      Urinalysis Basic - ( 29 Dec 2023 19:20 )    Color: x / Appearance: x / SG: x / pH: x  Gluc: 98 mg/dL / Ketone: x  / Bili: x / Urobili: x   Blood: x / Protein: x / Nitrite: x   Leuk Esterase: x / RBC: x / WBC x   Sq Epi: x / Non Sq Epi: x / Bacteria: x        Culture - CSF with Gram Stain (collected 29 Dec 2023 12:50)  Source: .CSF CSF  Gram Stain (29 Dec 2023 15:45):    polymorphonuclear leukocytes seen per oil power field    No organisms seen per oil power field    by cytocentrifuge       PROGRESS NOTE:     Patient is a 83y old  Female who presents with a chief complaint of facial rash (29 Dec 2023 19:14)      SUBJECTIVE / OVERNIGHT EVENTS:  No acute events overnight. Patient examined at bedside, appears more confused than prior day. A&Ox1, thinks she is at home.    MEDICATIONS  (STANDING):  acyclovir IVPB 500 milliGRAM(s) IV Intermittent every 12 hours  amLODIPine   Tablet 5 milliGRAM(s) Oral daily  ampicillin/sulbactam  IVPB 3 Gram(s) IV Intermittent every 6 hours  influenza  Vaccine (HIGH DOSE) 0.7 milliLiter(s) IntraMuscular once  sodium chloride 1 Gram(s) Oral three times a day  sodium chloride 0.9%. 1000 milliLiter(s) (75 mL/Hr) IV Continuous <Continuous>    MEDICATIONS  (PRN):      CAPILLARY BLOOD GLUCOSE        I&O's Summary    29 Dec 2023 07:01  -  30 Dec 2023 06:46  --------------------------------------------------------  IN: 0 mL / OUT: 1250 mL / NET: -1250 mL        VITALS:   T(C): 36.4 (12-30-23 @ 05:59), Max: 36.6 (12-30-23 @ 00:00)  HR: 80 (12-30-23 @ 05:59) (69 - 95)  BP: 160/94 (12-30-23 @ 05:59) (132/80 - 160/94)  RR: 18 (12-30-23 @ 05:59) (18 - 18)  SpO2: 100% (12-30-23 @ 05:59) (100% - 100%)    GENERAL: NAD, lying in bed, confused  HEAD:  Atraumatic, normocephalic  EYES: Eye discharge resolved. EOMI, conjunctiva and sclera clear  HEART: Regular rate and rhythm, no murmurs, rubs, or gallops  LUNGS: Unlabored respirations. Clear to auscultation bilaterally, no crackles, wheezing, or rhonchi  ABDOMEN: Soft, nontender, nondistended, +BS  EXTREMITIES: 2+ peripheral pulses bilaterally. No clubbing, cyanosis, or edema  NERVOUS SYSTEM: A&Ox1  SKIN: Improved rash by right jawline and chin    LABS:                        11.5   9.37  )-----------( 335      ( 29 Dec 2023 04:01 )             33.1     12-29    128<L>  |  93<L>  |  8   ----------------------------<  98  3.3<L>   |  22  |  0.39<L>    Ca    8.2<L>      29 Dec 2023 19:20  Phos  3.3     12-29  Mg     1.90     12-29    TPro  6.9  /  Alb  3.4  /  TBili  0.5  /  DBili  x   /  AST  21  /  ALT  11  /  AlkPhos  91  12-29    PT/INR - ( 29 Dec 2023 04:01 )   PT: 11.7 sec;   INR: 1.04 ratio         PTT - ( 29 Dec 2023 04:01 )  PTT:25.4 sec      Urinalysis Basic - ( 29 Dec 2023 19:20 )    Color: x / Appearance: x / SG: x / pH: x  Gluc: 98 mg/dL / Ketone: x  / Bili: x / Urobili: x   Blood: x / Protein: x / Nitrite: x   Leuk Esterase: x / RBC: x / WBC x   Sq Epi: x / Non Sq Epi: x / Bacteria: x        Culture - CSF with Gram Stain (collected 29 Dec 2023 12:50)  Source: .CSF CSF  Gram Stain (29 Dec 2023 15:45):    polymorphonuclear leukocytes seen per oil power field    No organisms seen per oil power field    by cytocentrifuge       PROGRESS NOTE:     Patient is a 83y old  Female who presents with a chief complaint of facial rash (29 Dec 2023 19:14)      SUBJECTIVE / OVERNIGHT EVENTS:  No acute events overnight. Patient examined at bedside, appears more confused than prior day. A&Ox1, thinks she is at home.    MEDICATIONS  (STANDING):  acyclovir IVPB 500 milliGRAM(s) IV Intermittent every 12 hours  amLODIPine   Tablet 5 milliGRAM(s) Oral daily  ampicillin/sulbactam  IVPB 3 Gram(s) IV Intermittent every 6 hours  influenza  Vaccine (HIGH DOSE) 0.7 milliLiter(s) IntraMuscular once  sodium chloride 1 Gram(s) Oral three times a day  sodium chloride 0.9%. 1000 milliLiter(s) (75 mL/Hr) IV Continuous <Continuous>    MEDICATIONS  (PRN):      CAPILLARY BLOOD GLUCOSE        I&O's Summary    29 Dec 2023 07:01  -  30 Dec 2023 06:46  --------------------------------------------------------  IN: 0 mL / OUT: 1250 mL / NET: -1250 mL        VITALS:   T(C): 36.4 (12-30-23 @ 05:59), Max: 36.6 (12-30-23 @ 00:00)  HR: 80 (12-30-23 @ 05:59) (69 - 95)  BP: 160/94 (12-30-23 @ 05:59) (132/80 - 160/94)  RR: 18 (12-30-23 @ 05:59) (18 - 18)  SpO2: 100% (12-30-23 @ 05:59) (100% - 100%)    GENERAL: NAD, lying in bed, confused  HEAD:  Atraumatic, normocephalic  EYES: Eye discharge resolved. EOMI, conjunctiva and sclera clear  HEART: Regular rate and rhythm, no murmurs, rubs, or gallops  LUNGS: Unlabored respirations. Clear to auscultation bilaterally, no crackles, wheezing, or rhonchi  ABDOMEN: Soft, nontender, nondistended, +BS  EXTREMITIES: 2+ peripheral pulses bilaterally. No clubbing, cyanosis, or edema  NERVOUS SYSTEM: A&Ox1, very confused  SKIN: Improved rash by right jawline and chin    LABS:                        11.5   9.37  )-----------( 335      ( 29 Dec 2023 04:01 )             33.1     12-29    128<L>  |  93<L>  |  8   ----------------------------<  98  3.3<L>   |  22  |  0.39<L>    Ca    8.2<L>      29 Dec 2023 19:20  Phos  3.3     12-29  Mg     1.90     12-29    TPro  6.9  /  Alb  3.4  /  TBili  0.5  /  DBili  x   /  AST  21  /  ALT  11  /  AlkPhos  91  12-29    PT/INR - ( 29 Dec 2023 04:01 )   PT: 11.7 sec;   INR: 1.04 ratio         PTT - ( 29 Dec 2023 04:01 )  PTT:25.4 sec      Urinalysis Basic - ( 29 Dec 2023 19:20 )    Color: x / Appearance: x / SG: x / pH: x  Gluc: 98 mg/dL / Ketone: x  / Bili: x / Urobili: x   Blood: x / Protein: x / Nitrite: x   Leuk Esterase: x / RBC: x / WBC x   Sq Epi: x / Non Sq Epi: x / Bacteria: x        Culture - CSF with Gram Stain (collected 29 Dec 2023 12:50)  Source: .CSF CSF  Gram Stain (29 Dec 2023 15:45):    polymorphonuclear leukocytes seen per oil power field    No organisms seen per oil power field    by cytocentrifuge

## 2023-12-31 LAB
ALBUMIN SERPL ELPH-MCNC: 3.5 G/DL — SIGNIFICANT CHANGE UP (ref 3.3–5)
ALBUMIN SERPL ELPH-MCNC: 3.5 G/DL — SIGNIFICANT CHANGE UP (ref 3.3–5)
ALP SERPL-CCNC: 91 U/L — SIGNIFICANT CHANGE UP (ref 40–120)
ALP SERPL-CCNC: 91 U/L — SIGNIFICANT CHANGE UP (ref 40–120)
ALT FLD-CCNC: 12 U/L — SIGNIFICANT CHANGE UP (ref 4–33)
ALT FLD-CCNC: 12 U/L — SIGNIFICANT CHANGE UP (ref 4–33)
ANION GAP SERPL CALC-SCNC: 13 MMOL/L — SIGNIFICANT CHANGE UP (ref 7–14)
ANION GAP SERPL CALC-SCNC: 13 MMOL/L — SIGNIFICANT CHANGE UP (ref 7–14)
AST SERPL-CCNC: 19 U/L — SIGNIFICANT CHANGE UP (ref 4–32)
AST SERPL-CCNC: 19 U/L — SIGNIFICANT CHANGE UP (ref 4–32)
BILIRUB SERPL-MCNC: 0.7 MG/DL — SIGNIFICANT CHANGE UP (ref 0.2–1.2)
BILIRUB SERPL-MCNC: 0.7 MG/DL — SIGNIFICANT CHANGE UP (ref 0.2–1.2)
BUN SERPL-MCNC: 8 MG/DL — SIGNIFICANT CHANGE UP (ref 7–23)
BUN SERPL-MCNC: 8 MG/DL — SIGNIFICANT CHANGE UP (ref 7–23)
CALCIUM SERPL-MCNC: 8.9 MG/DL — SIGNIFICANT CHANGE UP (ref 8.4–10.5)
CALCIUM SERPL-MCNC: 8.9 MG/DL — SIGNIFICANT CHANGE UP (ref 8.4–10.5)
CHLORIDE SERPL-SCNC: 98 MMOL/L — SIGNIFICANT CHANGE UP (ref 98–107)
CHLORIDE SERPL-SCNC: 98 MMOL/L — SIGNIFICANT CHANGE UP (ref 98–107)
CO2 SERPL-SCNC: 23 MMOL/L — SIGNIFICANT CHANGE UP (ref 22–31)
CO2 SERPL-SCNC: 23 MMOL/L — SIGNIFICANT CHANGE UP (ref 22–31)
CREAT SERPL-MCNC: 0.52 MG/DL — SIGNIFICANT CHANGE UP (ref 0.5–1.3)
CREAT SERPL-MCNC: 0.52 MG/DL — SIGNIFICANT CHANGE UP (ref 0.5–1.3)
CULTURE RESULTS: SIGNIFICANT CHANGE UP
EGFR: 93 ML/MIN/1.73M2 — SIGNIFICANT CHANGE UP
EGFR: 93 ML/MIN/1.73M2 — SIGNIFICANT CHANGE UP
GLUCOSE SERPL-MCNC: 82 MG/DL — SIGNIFICANT CHANGE UP (ref 70–99)
GLUCOSE SERPL-MCNC: 82 MG/DL — SIGNIFICANT CHANGE UP (ref 70–99)
HCT VFR BLD CALC: 33 % — LOW (ref 34.5–45)
HCT VFR BLD CALC: 33 % — LOW (ref 34.5–45)
HGB BLD-MCNC: 11.6 G/DL — SIGNIFICANT CHANGE UP (ref 11.5–15.5)
HGB BLD-MCNC: 11.6 G/DL — SIGNIFICANT CHANGE UP (ref 11.5–15.5)
MAGNESIUM SERPL-MCNC: 2 MG/DL — SIGNIFICANT CHANGE UP (ref 1.6–2.6)
MAGNESIUM SERPL-MCNC: 2 MG/DL — SIGNIFICANT CHANGE UP (ref 1.6–2.6)
MCHC RBC-ENTMCNC: 29.4 PG — SIGNIFICANT CHANGE UP (ref 27–34)
MCHC RBC-ENTMCNC: 29.4 PG — SIGNIFICANT CHANGE UP (ref 27–34)
MCHC RBC-ENTMCNC: 35.2 GM/DL — SIGNIFICANT CHANGE UP (ref 32–36)
MCHC RBC-ENTMCNC: 35.2 GM/DL — SIGNIFICANT CHANGE UP (ref 32–36)
MCV RBC AUTO: 83.5 FL — SIGNIFICANT CHANGE UP (ref 80–100)
MCV RBC AUTO: 83.5 FL — SIGNIFICANT CHANGE UP (ref 80–100)
NRBC # BLD: 0 /100 WBCS — SIGNIFICANT CHANGE UP (ref 0–0)
NRBC # BLD: 0 /100 WBCS — SIGNIFICANT CHANGE UP (ref 0–0)
NRBC # FLD: 0 K/UL — SIGNIFICANT CHANGE UP (ref 0–0)
NRBC # FLD: 0 K/UL — SIGNIFICANT CHANGE UP (ref 0–0)
PHOSPHATE SERPL-MCNC: 3.5 MG/DL — SIGNIFICANT CHANGE UP (ref 2.5–4.5)
PHOSPHATE SERPL-MCNC: 3.5 MG/DL — SIGNIFICANT CHANGE UP (ref 2.5–4.5)
PLATELET # BLD AUTO: 362 K/UL — SIGNIFICANT CHANGE UP (ref 150–400)
PLATELET # BLD AUTO: 362 K/UL — SIGNIFICANT CHANGE UP (ref 150–400)
POTASSIUM SERPL-MCNC: 3.4 MMOL/L — LOW (ref 3.5–5.3)
POTASSIUM SERPL-MCNC: 3.4 MMOL/L — LOW (ref 3.5–5.3)
POTASSIUM SERPL-SCNC: 3.4 MMOL/L — LOW (ref 3.5–5.3)
POTASSIUM SERPL-SCNC: 3.4 MMOL/L — LOW (ref 3.5–5.3)
PROT SERPL-MCNC: 7.1 G/DL — SIGNIFICANT CHANGE UP (ref 6–8.3)
PROT SERPL-MCNC: 7.1 G/DL — SIGNIFICANT CHANGE UP (ref 6–8.3)
RBC # BLD: 3.95 M/UL — SIGNIFICANT CHANGE UP (ref 3.8–5.2)
RBC # BLD: 3.95 M/UL — SIGNIFICANT CHANGE UP (ref 3.8–5.2)
RBC # FLD: 12.1 % — SIGNIFICANT CHANGE UP (ref 10.3–14.5)
RBC # FLD: 12.1 % — SIGNIFICANT CHANGE UP (ref 10.3–14.5)
SODIUM SERPL-SCNC: 134 MMOL/L — LOW (ref 135–145)
SODIUM SERPL-SCNC: 134 MMOL/L — LOW (ref 135–145)
SPECIMEN SOURCE: SIGNIFICANT CHANGE UP
WBC # BLD: 6.91 K/UL — SIGNIFICANT CHANGE UP (ref 3.8–10.5)
WBC # BLD: 6.91 K/UL — SIGNIFICANT CHANGE UP (ref 3.8–10.5)
WBC # FLD AUTO: 6.91 K/UL — SIGNIFICANT CHANGE UP (ref 3.8–10.5)
WBC # FLD AUTO: 6.91 K/UL — SIGNIFICANT CHANGE UP (ref 3.8–10.5)

## 2023-12-31 PROCEDURE — 99232 SBSQ HOSP IP/OBS MODERATE 35: CPT

## 2023-12-31 RX ORDER — POTASSIUM CHLORIDE 20 MEQ
10 PACKET (EA) ORAL
Refills: 0 | Status: DISCONTINUED | OUTPATIENT
Start: 2023-12-31 | End: 2023-12-31

## 2023-12-31 RX ORDER — POTASSIUM CHLORIDE 20 MEQ
10 PACKET (EA) ORAL ONCE
Refills: 0 | Status: DISCONTINUED | OUTPATIENT
Start: 2023-12-31 | End: 2023-12-31

## 2023-12-31 RX ORDER — POTASSIUM CHLORIDE 20 MEQ
40 PACKET (EA) ORAL ONCE
Refills: 0 | Status: COMPLETED | OUTPATIENT
Start: 2023-12-31 | End: 2023-12-31

## 2023-12-31 RX ADMIN — AMPICILLIN SODIUM AND SULBACTAM SODIUM 200 GRAM(S): 250; 125 INJECTION, POWDER, FOR SUSPENSION INTRAMUSCULAR; INTRAVENOUS at 12:24

## 2023-12-31 RX ADMIN — Medication 110 MILLIGRAM(S): at 07:14

## 2023-12-31 RX ADMIN — Medication 40 MILLIEQUIVALENT(S): at 12:25

## 2023-12-31 RX ADMIN — ENOXAPARIN SODIUM 40 MILLIGRAM(S): 100 INJECTION SUBCUTANEOUS at 14:02

## 2023-12-31 RX ADMIN — Medication 110 MILLIGRAM(S): at 18:49

## 2023-12-31 RX ADMIN — Medication 3 MILLIGRAM(S): at 22:05

## 2023-12-31 RX ADMIN — AMPICILLIN SODIUM AND SULBACTAM SODIUM 200 GRAM(S): 250; 125 INJECTION, POWDER, FOR SUSPENSION INTRAMUSCULAR; INTRAVENOUS at 05:06

## 2023-12-31 RX ADMIN — SODIUM CHLORIDE 1 GRAM(S): 9 INJECTION INTRAMUSCULAR; INTRAVENOUS; SUBCUTANEOUS at 14:02

## 2023-12-31 RX ADMIN — SODIUM CHLORIDE 1 GRAM(S): 9 INJECTION INTRAMUSCULAR; INTRAVENOUS; SUBCUTANEOUS at 22:05

## 2023-12-31 RX ADMIN — SODIUM CHLORIDE 1 GRAM(S): 9 INJECTION INTRAMUSCULAR; INTRAVENOUS; SUBCUTANEOUS at 05:06

## 2023-12-31 RX ADMIN — AMLODIPINE BESYLATE 10 MILLIGRAM(S): 2.5 TABLET ORAL at 05:06

## 2023-12-31 RX ADMIN — AMPICILLIN SODIUM AND SULBACTAM SODIUM 200 GRAM(S): 250; 125 INJECTION, POWDER, FOR SUSPENSION INTRAMUSCULAR; INTRAVENOUS at 17:20

## 2023-12-31 RX ADMIN — AMPICILLIN SODIUM AND SULBACTAM SODIUM 200 GRAM(S): 250; 125 INJECTION, POWDER, FOR SUSPENSION INTRAMUSCULAR; INTRAVENOUS at 23:29

## 2023-12-31 NOTE — CHART NOTE - NSCHARTNOTEFT_GEN_A_CORE
SNa increased to 134 and almost back to baseline. Please replete potassium as this will also help with improving hyponatremia.     Signing off. Please reconsult as needed.   If you have any questions, please feel free to contact me.  Richard Chua MD  Nephrology Fellow  [id number] / [employer] Teams (Preferred)  (After 4pm or on weekends, please call the on-call Fellow) SNa increased to 134 and almost back to baseline. Please replete potassium as this will also help with improving hyponatremia.     Signing off. Please reconsult as needed.   If you have any questions, please feel free to contact me.  Richard Chua MD  Nephrology Fellow  [id number] / [employer] Teams (Preferred)  (After 4pm or on weekends, please call the on-call Fellow)

## 2023-12-31 NOTE — PROGRESS NOTE ADULT - PROBLEM SELECTOR PLAN 2
Na 123 on admission. 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 Patient s/p 2L NS in ED.  -Nephrology consulted, appreciate recs  -Holding further HTS at this time pending nephro recs  -Will give NS x2 hrs with acyclovir at 75cc/hr  -Next BMP in AM Na 123 on admission. 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 Patient s/p 2L NS in ED.  -Nephrology consulted, appreciate recs  -Salt tabs increased to 2mg TID 12/30  -Will give NS x2 hrs with acyclovir at 75cc/hr  -Per nephro, fluid restriction to <1L/day  -Per nephro, start 2% HTS @ 40cc/hr x 6 hrs Na 123 on admission. 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 Patient s/p 2L NS in ED. 12/31 Na 134.  -Nephrology consulted, appreciate recs  -Salt tabs 1mg TID 12/30  -Will give NS x2 hrs with acyclovir at 75cc/hr  -Nephro consulted, recommends continuing the above for now

## 2023-12-31 NOTE — PROGRESS NOTE ADULT - PROBLEM SELECTOR PLAN 6
Diet: Regular (no beef)  DVT ppx: lovenox 40  Dispo: pending clinical improvement Diet: Regular (no beef)  DVT ppx: lovenox 40  Dispo: pending clinical improvement and KONG

## 2023-12-31 NOTE — PROGRESS NOTE ADULT - PROBLEM SELECTOR PLAN 4
Weakness may be in the setting of AMS. Patient states herself that she walks with a walker.  -PT consulted Weakness may be in the setting of AMS. Patient states herself that she walks with a walker.  -PT consulted, rec KONG

## 2023-12-31 NOTE — PROGRESS NOTE ADULT - PROBLEM SELECTOR PLAN 5
Patient with elevated BP to 195/70. Per daughter no history of HTN, does not take antihypertensives. Improvement with PO hydralazine to SBP 150s.  -12/29 /74  -Trial of amlodipine 5mg starting 12/29 --> increased to 10mg 12/30

## 2023-12-31 NOTE — PROGRESS NOTE ADULT - PROBLEM SELECTOR PLAN 1
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

## 2023-12-31 NOTE — PROGRESS NOTE ADULT - PROBLEM SELECTOR PLAN 3
Reported to be A&Ox3 at baseline. Now with worsening mental status, likely in the setting of hyponatremia.  -CT head non contrast unremarkable  -MRI head with IV contrast per ID  -Treatment of hyponatremia as above  -Per ID recs pending LP 12/29 to assess for CNS involvement --> f/u meningoencephalitis PCR panel, cell counts, protein, glucose Reported to be A&Ox3 at baseline. 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  -MRI and MRA brain as above  -Acyclovir as above  -Treatment of hyponatremia as above

## 2023-12-31 NOTE — PROGRESS NOTE ADULT - ATTENDING COMMENTS
82 year old female with history of chronic HA, vertigo, hyponatremia requiring hospitalization previously, now coming in with a couple days of generalized weakness, confusion, difficulty ambulating and facial rash. Afebrile, saturating well on RA. Exam similar to yesterday - awake in bed, oriented to person and place, not month or year. Answering simple questions/following simple commands. Family not bedside today. PT recs KONG, will need to discuss with pt and family if in line with goals. Pt without hx HTN, increased amlodipine to 10mg today; likely in setting of infection and stress of hospitalization, hopefully BP improves with time and on DC.    #HypoNa: Hx chronic hypoNa with component of SIADH. Appreciate renal input. Na 128 today, improving. C/w salt tabs 1g TID.    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 C/w acyclovir. Discussed with infection control, given less than 3 dermatome involvement, hospital policy is for standard precautions (not advising airborne/contact precautions). Renal recs IVF immediately post acyclovir x2h to minimize AE and given fact that it can not be simultaneously run with fluids.     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     #Elevated BP: s/p 1 dose PO hydralazine with improvement, not on home antihypertensives. Amlodipine started 12/29, increased to 10mg now.    D/w HS team, NILA/DANN, RN, family. Rest as outlined above.

## 2023-12-31 NOTE — PROGRESS NOTE ADULT - SUBJECTIVE AND OBJECTIVE BOX
PROGRESS NOTE:     Patient is a 83y old  Female who presents with a chief complaint of facial rash (30 Dec 2023 16:15)      SUBJECTIVE / OVERNIGHT EVENTS:    No acute events overnight. Patient examined at bedside with no acute complaints.     Pain:  Bowel Movements:  Urination:  OOB:  PT:    REVIEW OF SYSTEMS:    CONSTITUTIONAL: No weakness, fevers or chills  EYES/ENT: No visual changes;  No vertigo or throat pain   NECK: No pain or stiffness  RESPIRATORY: No cough, wheezing, hemoptysis; No shortness of breath  CARDIOVASCULAR: No chest pain or palpitations  GASTROINTESTINAL: No abdominal or epigastric pain. No nausea, vomiting, or hematemesis; No diarrhea or constipation. No melena or hematochezia.  GENITOURINARY: No dysuria, frequency or hematuria  NEUROLOGICAL: No numbness or weakness  SKIN: No itching, rashes      MEDICATIONS  (STANDING):  acyclovir IVPB 500 milliGRAM(s) IV Intermittent every 12 hours  amLODIPine   Tablet 10 milliGRAM(s) Oral daily  ampicillin/sulbactam  IVPB 3 Gram(s) IV Intermittent every 6 hours  enoxaparin Injectable 40 milliGRAM(s) SubCutaneous every 24 hours  influenza  Vaccine (HIGH DOSE) 0.7 milliLiter(s) IntraMuscular once  melatonin 3 milliGRAM(s) Oral at bedtime  sodium chloride 1 Gram(s) Oral three times a day  sodium chloride 0.9%. 1000 milliLiter(s) (75 mL/Hr) IV Continuous <Continuous>  sodium chloride 0.9%. 1000 milliLiter(s) (75 mL/Hr) IV Continuous <Continuous>    MEDICATIONS  (PRN):  acetaminophen     Tablet .. 650 milliGRAM(s) Oral every 6 hours PRN Temp greater or equal to 38C (100.4F), Mild Pain (1 - 3)      CAPILLARY BLOOD GLUCOSE        I&O's Summary      VITALS:   T(C): 37.1 (12-30-23 @ 22:10), Max: 37.1 (12-30-23 @ 22:10)  HR: 95 (12-30-23 @ 22:10) (89 - 95)  BP: 176/82 (12-30-23 @ 22:10) (167/69 - 176/82)  RR: 18 (12-30-23 @ 22:10) (18 - 18)  SpO2: 100% (12-30-23 @ 22:10) (96% - 100%)    GENERAL: NAD, lying in bed comfortably  HEAD:  Atraumatic, normocephalic  EYES: EOMI, PERRLA, conjunctiva and sclera clear  ENT: Moist mucous membranes  NECK: Supple, no JVD  HEART: Regular rate and rhythm, no murmurs, rubs, or gallops  LUNGS: Unlabored respirations.  Clear to auscultation bilaterally, no crackles, wheezing, or rhonchi  ABDOMEN: Soft, nontender, nondistended, +BS  EXTREMITIES: 2+ peripheral pulses bilaterally. No clubbing, cyanosis, or edema  NERVOUS SYSTEM:  A&Ox3, no focal deficits   SKIN: No rashes or lesions    LABS:                        11.9   7.42  )-----------( 339      ( 30 Dec 2023 06:28 )             33.5     12-30    128<L>  |  94<L>  |  7   ----------------------------<  111<H>  3.8   |  22  |  0.41<L>    Ca    8.6      30 Dec 2023 06:28  Phos  2.8     12-30  Mg     1.90     12-30    TPro  7.2  /  Alb  3.6  /  TBili  0.6  /  DBili  x   /  AST  19  /  ALT  18  /  AlkPhos  92  12-30          Urinalysis Basic - ( 30 Dec 2023 06:28 )    Color: x / Appearance: x / SG: x / pH: x  Gluc: 111 mg/dL / Ketone: x  / Bili: x / Urobili: x   Blood: x / Protein: x / Nitrite: x   Leuk Esterase: x / RBC: x / WBC x   Sq Epi: x / Non Sq Epi: x / Bacteria: x        Culture - CSF with Gram Stain (collected 29 Dec 2023 12:50)  Source: .CSF CSF  Gram Stain (29 Dec 2023 15:45):    polymorphonuclear leukocytes seen per oil power field    No organisms seen per oil power field    by cytocentrifuge  Preliminary Report (30 Dec 2023 07:40):    No growth      PROGRESS NOTE:     Patient is a 83y old  Female who presents with a chief complaint of facial rash (30 Dec 2023 16:15)      SUBJECTIVE / OVERNIGHT EVENTS:  No acute events overnight. Patient is A&Ox1, able to ask for water, but otherwise unable to engage in further conversation.    MEDICATIONS  (STANDING):  acyclovir IVPB 500 milliGRAM(s) IV Intermittent every 12 hours  amLODIPine   Tablet 10 milliGRAM(s) Oral daily  ampicillin/sulbactam  IVPB 3 Gram(s) IV Intermittent every 6 hours  enoxaparin Injectable 40 milliGRAM(s) SubCutaneous every 24 hours  influenza  Vaccine (HIGH DOSE) 0.7 milliLiter(s) IntraMuscular once  melatonin 3 milliGRAM(s) Oral at bedtime  sodium chloride 1 Gram(s) Oral three times a day  sodium chloride 0.9%. 1000 milliLiter(s) (75 mL/Hr) IV Continuous <Continuous>  sodium chloride 0.9%. 1000 milliLiter(s) (75 mL/Hr) IV Continuous <Continuous>    MEDICATIONS  (PRN):  acetaminophen     Tablet .. 650 milliGRAM(s) Oral every 6 hours PRN Temp greater or equal to 38C (100.4F), Mild Pain (1 - 3)      CAPILLARY BLOOD GLUCOSE        I&O's Summary      VITALS:   T(C): 37.1 (12-30-23 @ 22:10), Max: 37.1 (12-30-23 @ 22:10)  HR: 95 (12-30-23 @ 22:10) (89 - 95)  BP: 176/82 (12-30-23 @ 22:10) (167/69 - 176/82)  RR: 18 (12-30-23 @ 22:10) (18 - 18)  SpO2: 100% (12-30-23 @ 22:10) (96% - 100%)    GENERAL: NAD, lying in bed, confused  HEAD:  Atraumatic, normocephalic  EYES: Eye discharge resolved. EOMI, conjunctiva and sclera clear  HEART: Regular rate and rhythm, no murmurs, rubs, or gallops  LUNGS: Unlabored respirations. Clear to auscultation bilaterally, no crackles, wheezing, or rhonchi  ABDOMEN: Soft, nontender, nondistended, +BS  EXTREMITIES: 2+ peripheral pulses bilaterally. No clubbing, cyanosis, or edema  NERVOUS SYSTEM: A&Ox1 (knows name, thinks she is at home)  SKIN: Improved rash by right jawline and chin, erythema resolved and rash appears to be scabbing    LABS:                        11.9   7.42  )-----------( 339      ( 30 Dec 2023 06:28 )             33.5     12-30    128<L>  |  94<L>  |  7   ----------------------------<  111<H>  3.8   |  22  |  0.41<L>    Ca    8.6      30 Dec 2023 06:28  Phos  2.8     12-30  Mg     1.90     12-30    TPro  7.2  /  Alb  3.6  /  TBili  0.6  /  DBili  x   /  AST  19  /  ALT  18  /  AlkPhos  92  12-30          Urinalysis Basic - ( 30 Dec 2023 06:28 )    Color: x / Appearance: x / SG: x / pH: x  Gluc: 111 mg/dL / Ketone: x  / Bili: x / Urobili: x   Blood: x / Protein: x / Nitrite: x   Leuk Esterase: x / RBC: x / WBC x   Sq Epi: x / Non Sq Epi: x / Bacteria: x        Culture - CSF with Gram Stain (collected 29 Dec 2023 12:50)  Source: .CSF CSF  Gram Stain (29 Dec 2023 15:45):    polymorphonuclear leukocytes seen per oil power field    No organisms seen per oil power field    by cytocentrifuge  Preliminary Report (30 Dec 2023 07:40):    No growth

## 2024-01-01 LAB
ALBUMIN SERPL ELPH-MCNC: 3.6 G/DL — SIGNIFICANT CHANGE UP (ref 3.3–5)
ALBUMIN SERPL ELPH-MCNC: 3.6 G/DL — SIGNIFICANT CHANGE UP (ref 3.3–5)
ALP SERPL-CCNC: 89 U/L — SIGNIFICANT CHANGE UP (ref 40–120)
ALP SERPL-CCNC: 89 U/L — SIGNIFICANT CHANGE UP (ref 40–120)
ALT FLD-CCNC: 15 U/L — SIGNIFICANT CHANGE UP (ref 4–33)
ALT FLD-CCNC: 15 U/L — SIGNIFICANT CHANGE UP (ref 4–33)
ANION GAP SERPL CALC-SCNC: 15 MMOL/L — HIGH (ref 7–14)
ANION GAP SERPL CALC-SCNC: 15 MMOL/L — HIGH (ref 7–14)
AST SERPL-CCNC: 28 U/L — SIGNIFICANT CHANGE UP (ref 4–32)
AST SERPL-CCNC: 28 U/L — SIGNIFICANT CHANGE UP (ref 4–32)
BILIRUB SERPL-MCNC: 0.6 MG/DL — SIGNIFICANT CHANGE UP (ref 0.2–1.2)
BILIRUB SERPL-MCNC: 0.6 MG/DL — SIGNIFICANT CHANGE UP (ref 0.2–1.2)
BUN SERPL-MCNC: 12 MG/DL — SIGNIFICANT CHANGE UP (ref 7–23)
BUN SERPL-MCNC: 12 MG/DL — SIGNIFICANT CHANGE UP (ref 7–23)
CALCIUM SERPL-MCNC: 9.4 MG/DL — SIGNIFICANT CHANGE UP (ref 8.4–10.5)
CALCIUM SERPL-MCNC: 9.4 MG/DL — SIGNIFICANT CHANGE UP (ref 8.4–10.5)
CHLORIDE SERPL-SCNC: 97 MMOL/L — LOW (ref 98–107)
CHLORIDE SERPL-SCNC: 97 MMOL/L — LOW (ref 98–107)
CO2 SERPL-SCNC: 20 MMOL/L — LOW (ref 22–31)
CO2 SERPL-SCNC: 20 MMOL/L — LOW (ref 22–31)
CREAT SERPL-MCNC: 0.63 MG/DL — SIGNIFICANT CHANGE UP (ref 0.5–1.3)
CREAT SERPL-MCNC: 0.63 MG/DL — SIGNIFICANT CHANGE UP (ref 0.5–1.3)
EGFR: 89 ML/MIN/1.73M2 — SIGNIFICANT CHANGE UP
EGFR: 89 ML/MIN/1.73M2 — SIGNIFICANT CHANGE UP
GLUCOSE SERPL-MCNC: 100 MG/DL — HIGH (ref 70–99)
GLUCOSE SERPL-MCNC: 100 MG/DL — HIGH (ref 70–99)
HCT VFR BLD CALC: 34.6 % — SIGNIFICANT CHANGE UP (ref 34.5–45)
HCT VFR BLD CALC: 34.6 % — SIGNIFICANT CHANGE UP (ref 34.5–45)
HGB BLD-MCNC: 11.9 G/DL — SIGNIFICANT CHANGE UP (ref 11.5–15.5)
HGB BLD-MCNC: 11.9 G/DL — SIGNIFICANT CHANGE UP (ref 11.5–15.5)
MAGNESIUM SERPL-MCNC: 1.7 MG/DL — SIGNIFICANT CHANGE UP (ref 1.6–2.6)
MAGNESIUM SERPL-MCNC: 1.7 MG/DL — SIGNIFICANT CHANGE UP (ref 1.6–2.6)
MCHC RBC-ENTMCNC: 29.2 PG — SIGNIFICANT CHANGE UP (ref 27–34)
MCHC RBC-ENTMCNC: 29.2 PG — SIGNIFICANT CHANGE UP (ref 27–34)
MCHC RBC-ENTMCNC: 34.4 GM/DL — SIGNIFICANT CHANGE UP (ref 32–36)
MCHC RBC-ENTMCNC: 34.4 GM/DL — SIGNIFICANT CHANGE UP (ref 32–36)
MCV RBC AUTO: 85 FL — SIGNIFICANT CHANGE UP (ref 80–100)
MCV RBC AUTO: 85 FL — SIGNIFICANT CHANGE UP (ref 80–100)
NRBC # BLD: 0 /100 WBCS — SIGNIFICANT CHANGE UP (ref 0–0)
NRBC # BLD: 0 /100 WBCS — SIGNIFICANT CHANGE UP (ref 0–0)
NRBC # FLD: 0 K/UL — SIGNIFICANT CHANGE UP (ref 0–0)
NRBC # FLD: 0 K/UL — SIGNIFICANT CHANGE UP (ref 0–0)
PHOSPHATE SERPL-MCNC: 3.5 MG/DL — SIGNIFICANT CHANGE UP (ref 2.5–4.5)
PHOSPHATE SERPL-MCNC: 3.5 MG/DL — SIGNIFICANT CHANGE UP (ref 2.5–4.5)
PLATELET # BLD AUTO: 420 K/UL — HIGH (ref 150–400)
PLATELET # BLD AUTO: 420 K/UL — HIGH (ref 150–400)
POTASSIUM SERPL-MCNC: 4 MMOL/L — SIGNIFICANT CHANGE UP (ref 3.5–5.3)
POTASSIUM SERPL-MCNC: 4 MMOL/L — SIGNIFICANT CHANGE UP (ref 3.5–5.3)
POTASSIUM SERPL-SCNC: 4 MMOL/L — SIGNIFICANT CHANGE UP (ref 3.5–5.3)
POTASSIUM SERPL-SCNC: 4 MMOL/L — SIGNIFICANT CHANGE UP (ref 3.5–5.3)
PROT SERPL-MCNC: 7.2 G/DL — SIGNIFICANT CHANGE UP (ref 6–8.3)
PROT SERPL-MCNC: 7.2 G/DL — SIGNIFICANT CHANGE UP (ref 6–8.3)
RBC # BLD: 4.07 M/UL — SIGNIFICANT CHANGE UP (ref 3.8–5.2)
RBC # BLD: 4.07 M/UL — SIGNIFICANT CHANGE UP (ref 3.8–5.2)
RBC # FLD: 12 % — SIGNIFICANT CHANGE UP (ref 10.3–14.5)
RBC # FLD: 12 % — SIGNIFICANT CHANGE UP (ref 10.3–14.5)
SODIUM SERPL-SCNC: 132 MMOL/L — LOW (ref 135–145)
SODIUM SERPL-SCNC: 132 MMOL/L — LOW (ref 135–145)
WBC # BLD: 9.78 K/UL — SIGNIFICANT CHANGE UP (ref 3.8–10.5)
WBC # BLD: 9.78 K/UL — SIGNIFICANT CHANGE UP (ref 3.8–10.5)
WBC # FLD AUTO: 9.78 K/UL — SIGNIFICANT CHANGE UP (ref 3.8–10.5)
WBC # FLD AUTO: 9.78 K/UL — SIGNIFICANT CHANGE UP (ref 3.8–10.5)

## 2024-01-01 PROCEDURE — 99232 SBSQ HOSP IP/OBS MODERATE 35: CPT

## 2024-01-01 RX ORDER — MAGNESIUM SULFATE 500 MG/ML
2 VIAL (ML) INJECTION ONCE
Refills: 0 | Status: COMPLETED | OUTPATIENT
Start: 2024-01-01 | End: 2024-01-01

## 2024-01-01 RX ORDER — SODIUM CHLORIDE 9 MG/ML
1000 INJECTION INTRAMUSCULAR; INTRAVENOUS; SUBCUTANEOUS
Refills: 0 | Status: COMPLETED | OUTPATIENT
Start: 2024-01-01 | End: 2024-01-01

## 2024-01-01 RX ORDER — SODIUM CHLORIDE 9 MG/ML
1000 INJECTION INTRAMUSCULAR; INTRAVENOUS; SUBCUTANEOUS
Refills: 0 | Status: DISCONTINUED | OUTPATIENT
Start: 2024-01-01 | End: 2024-01-01

## 2024-01-01 RX ADMIN — ENOXAPARIN SODIUM 40 MILLIGRAM(S): 100 INJECTION SUBCUTANEOUS at 17:31

## 2024-01-01 RX ADMIN — AMPICILLIN SODIUM AND SULBACTAM SODIUM 200 GRAM(S): 250; 125 INJECTION, POWDER, FOR SUSPENSION INTRAMUSCULAR; INTRAVENOUS at 05:41

## 2024-01-01 RX ADMIN — SODIUM CHLORIDE 1 GRAM(S): 9 INJECTION INTRAMUSCULAR; INTRAVENOUS; SUBCUTANEOUS at 05:41

## 2024-01-01 RX ADMIN — Medication 3 MILLIGRAM(S): at 21:50

## 2024-01-01 RX ADMIN — Medication 110 MILLIGRAM(S): at 07:05

## 2024-01-01 RX ADMIN — SODIUM CHLORIDE 1 GRAM(S): 9 INJECTION INTRAMUSCULAR; INTRAVENOUS; SUBCUTANEOUS at 13:35

## 2024-01-01 RX ADMIN — Medication 110 MILLIGRAM(S): at 17:17

## 2024-01-01 RX ADMIN — SODIUM CHLORIDE 1 GRAM(S): 9 INJECTION INTRAMUSCULAR; INTRAVENOUS; SUBCUTANEOUS at 21:51

## 2024-01-01 RX ADMIN — SODIUM CHLORIDE 75 MILLILITER(S): 9 INJECTION INTRAMUSCULAR; INTRAVENOUS; SUBCUTANEOUS at 17:17

## 2024-01-01 RX ADMIN — SODIUM CHLORIDE 75 MILLILITER(S): 9 INJECTION INTRAMUSCULAR; INTRAVENOUS; SUBCUTANEOUS at 07:08

## 2024-01-01 RX ADMIN — AMLODIPINE BESYLATE 10 MILLIGRAM(S): 2.5 TABLET ORAL at 05:41

## 2024-01-01 RX ADMIN — Medication 25 GRAM(S): at 12:05

## 2024-01-01 NOTE — PROGRESS NOTE ADULT - SUBJECTIVE AND OBJECTIVE BOX
Patient is a 83y old  Female who presents with a chief complaint of facial rash (31 Dec 2023 07:21)    INTERVAL HPI/OVERNIGHT EVENTS: No acute overnight events. S&E at bedside. No new complaints.    FAMILY HISTORY:  No pertinent family history in first degree relatives    Allergy Status Unknown    MEDS:  acetaminophen     Tablet .. 650 milliGRAM(s) Oral every 6 hours PRN  acyclovir IVPB 500 milliGRAM(s) IV Intermittent every 12 hours  amLODIPine   Tablet 10 milliGRAM(s) Oral daily  ampicillin/sulbactam  IVPB 3 Gram(s) IV Intermittent every 6 hours  enoxaparin Injectable 40 milliGRAM(s) SubCutaneous every 24 hours  influenza  Vaccine (HIGH DOSE) 0.7 milliLiter(s) IntraMuscular once  melatonin 3 milliGRAM(s) Oral at bedtime  sodium chloride 1 Gram(s) Oral three times a day  sodium chloride 0.9%. 1000 milliLiter(s) IV Continuous <Continuous>  sodium chloride 0.9%. 1000 milliLiter(s) IV Continuous <Continuous>    T(C): 36.8 (01-01-24 @ 05:39), Max: 37.2 (12-31-23 @ 14:00)  HR: 71 (01-01-24 @ 05:39) (69 - 86)  BP: 135/65 (01-01-24 @ 05:39) (118/69 - 141/79)  RR: 18 (01-01-24 @ 05:39) (17 - 18)  SpO2: 100% (01-01-24 @ 05:39) (94% - 100%)    PHYSICAL EXAM:  GENERAL: NAD, lying in bed, confused  HEAD:  Atraumatic, normocephalic  EYES: Eye discharge resolved. EOMI, conjunctiva and sclera clear  HEART: Regular rate and rhythm, no murmurs, rubs, or gallops  LUNGS: Unlabored respirations. Clear to auscultation bilaterally, no crackles, wheezing, or rhonchi  ABDOMEN: Soft, nontender, nondistended, +BS  EXTREMITIES: 2+ peripheral pulses bilaterally. No clubbing, cyanosis, or edema  NERVOUS SYSTEM: A&Ox1 (knows name, thinks she is at home)  SKIN: Improved rash by right jawline and chin, erythema resolved and rash appears to be scabbing    Consultant(s) Notes Reviewed:  [x ] YES  [ ] NO  Care Discussed with Consultants/Other Providers [ x] YES  [ ] NO    LABS:             11.9   9.78  )-----------( 420      ( 01 Jan 2024 06:30 )             34.6     134<L>  |  98  |  8   ----------------------------<  82  3.4<L>   |  23  |  0.52    Ca    8.9      31 Dec 2023 06:10  Phos  3.5     12-31  Mg     2.00     12-31    TPro  7.1  /  Alb  3.5  /  TBili  0.7  /  DBili  x   /  AST  19  /  ALT  12  /  AlkPhos  91  12-31    RADIOLOGY & ADDITIONAL TESTS:    IR Procedure (12.29.23 @ 12:13)  IMPRESSION: Fluoroscopic-guided lumbar puncture     CT Head No Cont (12.27.23 @ 09:53)  No acute intracranial hemorrhage or acute territorial infarct.  If   symptoms persist, follow-up MRI exam recommended.    Xray Chest 1 View- PORTABLE-Urgent (12.26.23 @ 08:48)  No focal opacities.  Trace bilateral pleural effusions.   Patient is a 83y old  Female who presents with a chief complaint of facial rash (31 Dec 2023 07:21)    INTERVAL HPI/OVERNIGHT EVENTS: No acute overnight events. S&E at bedside. No new complaints.    FAMILY HISTORY:  No pertinent family history in first degree relatives    Allergy Status Unknown    MEDS:  acetaminophen     Tablet .. 650 milliGRAM(s) Oral every 6 hours PRN  acyclovir IVPB 500 milliGRAM(s) IV Intermittent every 12 hours  amLODIPine   Tablet 10 milliGRAM(s) Oral daily  ampicillin/sulbactam  IVPB 3 Gram(s) IV Intermittent every 6 hours  enoxaparin Injectable 40 milliGRAM(s) SubCutaneous every 24 hours  influenza  Vaccine (HIGH DOSE) 0.7 milliLiter(s) IntraMuscular once  melatonin 3 milliGRAM(s) Oral at bedtime  sodium chloride 1 Gram(s) Oral three times a day  sodium chloride 0.9%. 1000 milliLiter(s) IV Continuous <Continuous>  sodium chloride 0.9%. 1000 milliLiter(s) IV Continuous <Continuous>    T(C): 36.8 (01-01-24 @ 05:39), Max: 37.2 (12-31-23 @ 14:00)  HR: 71 (01-01-24 @ 05:39) (69 - 86)  BP: 135/65 (01-01-24 @ 05:39) (118/69 - 141/79)  RR: 18 (01-01-24 @ 05:39) (17 - 18)  SpO2: 100% (01-01-24 @ 05:39) (94% - 100%)    PHYSICAL EXAM:  GENERAL: NAD, lying in bed, confused  HEAD:  Atraumatic, normocephalic  EYES: Eye discharge resolved. EOMI, conjunctiva and sclera clear  HEART: Regular rate and rhythm, no murmurs, rubs, or gallops  LUNGS: Unlabored respirations. Clear to auscultation bilaterally, no crackles, wheezing, or rhonchi  ABDOMEN: Soft, nontender, nondistended, +BS  EXTREMITIES: 2+ peripheral pulses bilaterally. No clubbing, cyanosis, or edema  NERVOUS SYSTEM: A&Ox1-2 (knows name, hospital setting)  SKIN: Improved rash by right jawline and chin, erythema resolved and rash is burnt out, scabbing, w/o oozing    Consultant(s) Notes Reviewed:  [x ] YES  [ ] NO  Care Discussed with Consultants/Other Providers [ x] YES  [ ] NO    LABS:             11.9   9.78  )-----------( 420      ( 01 Jan 2024 06:30 )             34.6     134<L>  |  98  |  8   ----------------------------<  82  3.4<L>   |  23  |  0.52    Ca    8.9      31 Dec 2023 06:10  Phos  3.5     12-31  Mg     2.00     12-31    TPro  7.1  /  Alb  3.5  /  TBili  0.7  /  DBili  x   /  AST  19  /  ALT  12  /  AlkPhos  91  12-31    RADIOLOGY & ADDITIONAL TESTS:    IR Procedure (12.29.23 @ 12:13)  IMPRESSION: Fluoroscopic-guided lumbar puncture     CT Head No Cont (12.27.23 @ 09:53)  No acute intracranial hemorrhage or acute territorial infarct.  If   symptoms persist, follow-up MRI exam recommended.    Xray Chest 1 View- PORTABLE-Urgent (12.26.23 @ 08:48)  No focal opacities.  Trace bilateral pleural effusions.

## 2024-01-01 NOTE — PROGRESS NOTE ADULT - PROBLEM SELECTOR PLAN 4
Weakness may be in the setting of AMS. Patient states herself that she walks with a walker.  -PT consulted, rec KONG

## 2024-01-01 NOTE — PROGRESS NOTE ADULT - ASSESSMENT
82F with PMH hyponatremia, chronic headache, vertigo, s/p ILR 10/21, cataracts presenting with weakness and rash.  82F with PMH hyponatremia, chronic headache, vertigo, s/p ILR 10/21, cataracts presenting with weakness and rash c/f VZV encephalitis.

## 2024-01-01 NOTE — PROGRESS NOTE ADULT - PROBLEM SELECTOR PLAN 1
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 +  -MRI and MRA of the brain (c/f possible VZV vasculopathy)  -c/w acyclovir (12/26-) @ 10mg/kg q12h --> for total of 14 days, if discharged would need PICC bc not compatible with dosing via midline  -Maintenance IV fluids while on acyclovir --> NS x2 hrs with acyclovir at 75cc/hr  -c/w unasyn (12/27- 12/31) per ID 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 +  -MRI and MRA of the brain (c/f possible VZV vasculopathy)  -s/p unasyn (12/27- 12/31) per ID  -c/w acyclovir (12/26-) @ 10mg/kg q12h --> for total of 14 days, if discharged would need PICC bc not compatible with dosing via midline  -Maintenance IV fluids while on acyclovir --> NS x2 hrs with acyclovir at 75cc/hr

## 2024-01-01 NOTE — PROGRESS NOTE ADULT - TIME BILLING
Review of laboratory data, radiology results, consultant recommendations, EMR documentation, discussion with patient/residents/advanced care providers and interdisciplinary staff.

## 2024-01-01 NOTE — PROGRESS NOTE ADULT - PROBLEM SELECTOR PLAN 2
Na 123 on admission. 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 Patient s/p 2L NS in ED. 12/31 Na 134.  -Nephrology consulted, appreciate recs  -Salt tabs 1mg TID 12/30  -Will give NS x2 hrs with acyclovir at 75cc/hr  -Nephro consulted, improving with recs from above

## 2024-01-01 NOTE — PROGRESS NOTE ADULT - PROBLEM SELECTOR PLAN 3
Reported to be A&Ox3 at baseline. 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  -MRI and MRA brain as above  -Acyclovir as above  -Treatment of hyponatremia as above Reported to be A&Ox3 at baseline. 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  -MRI and MRA brain as above  -Acyclovir as above  -Treatment of hyponatremia as above  -MRI head ordered

## 2024-01-02 LAB
ALBUMIN CSF-MCNC: 33.6 MG/DL — HIGH (ref 14–25)
ALBUMIN CSF-MCNC: 33.6 MG/DL — HIGH (ref 14–25)
ALBUMIN SERPL ELPH-MCNC: 3.4 G/DL — SIGNIFICANT CHANGE UP (ref 3.3–5)
ALBUMIN SERPL ELPH-MCNC: 3.4 G/DL — SIGNIFICANT CHANGE UP (ref 3.3–5)
ALBUMIN SERPL ELPH-MCNC: 3252 MG/DL — LOW (ref 3500–5200)
ALBUMIN SERPL ELPH-MCNC: 3252 MG/DL — LOW (ref 3500–5200)
ALP SERPL-CCNC: 82 U/L — SIGNIFICANT CHANGE UP (ref 40–120)
ALP SERPL-CCNC: 82 U/L — SIGNIFICANT CHANGE UP (ref 40–120)
ALT FLD-CCNC: 19 U/L — SIGNIFICANT CHANGE UP (ref 4–33)
ALT FLD-CCNC: 19 U/L — SIGNIFICANT CHANGE UP (ref 4–33)
ANION GAP SERPL CALC-SCNC: 14 MMOL/L — SIGNIFICANT CHANGE UP (ref 7–14)
ANION GAP SERPL CALC-SCNC: 14 MMOL/L — SIGNIFICANT CHANGE UP (ref 7–14)
AST SERPL-CCNC: 22 U/L — SIGNIFICANT CHANGE UP (ref 4–32)
AST SERPL-CCNC: 22 U/L — SIGNIFICANT CHANGE UP (ref 4–32)
BILIRUB SERPL-MCNC: 0.7 MG/DL — SIGNIFICANT CHANGE UP (ref 0.2–1.2)
BILIRUB SERPL-MCNC: 0.7 MG/DL — SIGNIFICANT CHANGE UP (ref 0.2–1.2)
BUN SERPL-MCNC: 15 MG/DL — SIGNIFICANT CHANGE UP (ref 7–23)
BUN SERPL-MCNC: 15 MG/DL — SIGNIFICANT CHANGE UP (ref 7–23)
CALCIUM SERPL-MCNC: 9.2 MG/DL — SIGNIFICANT CHANGE UP (ref 8.4–10.5)
CALCIUM SERPL-MCNC: 9.2 MG/DL — SIGNIFICANT CHANGE UP (ref 8.4–10.5)
CHLORIDE SERPL-SCNC: 95 MMOL/L — LOW (ref 98–107)
CHLORIDE SERPL-SCNC: 95 MMOL/L — LOW (ref 98–107)
CO2 SERPL-SCNC: 22 MMOL/L — SIGNIFICANT CHANGE UP (ref 22–31)
CO2 SERPL-SCNC: 22 MMOL/L — SIGNIFICANT CHANGE UP (ref 22–31)
CREAT SERPL-MCNC: 0.74 MG/DL — SIGNIFICANT CHANGE UP (ref 0.5–1.3)
CREAT SERPL-MCNC: 0.74 MG/DL — SIGNIFICANT CHANGE UP (ref 0.5–1.3)
EGFR: 81 ML/MIN/1.73M2 — SIGNIFICANT CHANGE UP
EGFR: 81 ML/MIN/1.73M2 — SIGNIFICANT CHANGE UP
GLUCOSE SERPL-MCNC: 96 MG/DL — SIGNIFICANT CHANGE UP (ref 70–99)
GLUCOSE SERPL-MCNC: 96 MG/DL — SIGNIFICANT CHANGE UP (ref 70–99)
HCT VFR BLD CALC: 33.1 % — LOW (ref 34.5–45)
HCT VFR BLD CALC: 33.1 % — LOW (ref 34.5–45)
HGB BLD-MCNC: 11.2 G/DL — LOW (ref 11.5–15.5)
HGB BLD-MCNC: 11.2 G/DL — LOW (ref 11.5–15.5)
IGG CSF-MCNC: 8.7 MG/DL — HIGH
IGG CSF-MCNC: 8.7 MG/DL — HIGH
IGG FLD-MCNC: 1456 MG/DL — SIGNIFICANT CHANGE UP (ref 610–1660)
IGG FLD-MCNC: 1456 MG/DL — SIGNIFICANT CHANGE UP (ref 610–1660)
IGG SYNTH RATE SER+CSF CALC-MRATE: 5 MG/DAY — SIGNIFICANT CHANGE UP
IGG SYNTH RATE SER+CSF CALC-MRATE: 5 MG/DAY — SIGNIFICANT CHANGE UP
IGG/ALB CLEAR SER+CSF-RTO: 0.6 — SIGNIFICANT CHANGE UP
IGG/ALB CLEAR SER+CSF-RTO: 0.6 — SIGNIFICANT CHANGE UP
IGG/ALB CSF: 0.26 RATIO — HIGH
IGG/ALB CSF: 0.26 RATIO — HIGH
IGG/ALB SER: 0.45 RATIO — SIGNIFICANT CHANGE UP
IGG/ALB SER: 0.45 RATIO — SIGNIFICANT CHANGE UP
MAGNESIUM SERPL-MCNC: 1.7 MG/DL — SIGNIFICANT CHANGE UP (ref 1.6–2.6)
MAGNESIUM SERPL-MCNC: 1.7 MG/DL — SIGNIFICANT CHANGE UP (ref 1.6–2.6)
MCHC RBC-ENTMCNC: 29.2 PG — SIGNIFICANT CHANGE UP (ref 27–34)
MCHC RBC-ENTMCNC: 29.2 PG — SIGNIFICANT CHANGE UP (ref 27–34)
MCHC RBC-ENTMCNC: 33.8 GM/DL — SIGNIFICANT CHANGE UP (ref 32–36)
MCHC RBC-ENTMCNC: 33.8 GM/DL — SIGNIFICANT CHANGE UP (ref 32–36)
MCV RBC AUTO: 86.2 FL — SIGNIFICANT CHANGE UP (ref 80–100)
MCV RBC AUTO: 86.2 FL — SIGNIFICANT CHANGE UP (ref 80–100)
NRBC # BLD: 0 /100 WBCS — SIGNIFICANT CHANGE UP (ref 0–0)
NRBC # BLD: 0 /100 WBCS — SIGNIFICANT CHANGE UP (ref 0–0)
NRBC # FLD: 0 K/UL — SIGNIFICANT CHANGE UP (ref 0–0)
NRBC # FLD: 0 K/UL — SIGNIFICANT CHANGE UP (ref 0–0)
PHOSPHATE SERPL-MCNC: 3.7 MG/DL — SIGNIFICANT CHANGE UP (ref 2.5–4.5)
PHOSPHATE SERPL-MCNC: 3.7 MG/DL — SIGNIFICANT CHANGE UP (ref 2.5–4.5)
PLATELET # BLD AUTO: 419 K/UL — HIGH (ref 150–400)
PLATELET # BLD AUTO: 419 K/UL — HIGH (ref 150–400)
POTASSIUM SERPL-MCNC: 3.1 MMOL/L — LOW (ref 3.5–5.3)
POTASSIUM SERPL-MCNC: 3.1 MMOL/L — LOW (ref 3.5–5.3)
POTASSIUM SERPL-SCNC: 3.1 MMOL/L — LOW (ref 3.5–5.3)
POTASSIUM SERPL-SCNC: 3.1 MMOL/L — LOW (ref 3.5–5.3)
PROT SERPL-MCNC: 7.1 G/DL — SIGNIFICANT CHANGE UP (ref 6–8.3)
PROT SERPL-MCNC: 7.1 G/DL — SIGNIFICANT CHANGE UP (ref 6–8.3)
RBC # BLD: 3.84 M/UL — SIGNIFICANT CHANGE UP (ref 3.8–5.2)
RBC # BLD: 3.84 M/UL — SIGNIFICANT CHANGE UP (ref 3.8–5.2)
RBC # FLD: 12.2 % — SIGNIFICANT CHANGE UP (ref 10.3–14.5)
RBC # FLD: 12.2 % — SIGNIFICANT CHANGE UP (ref 10.3–14.5)
SODIUM SERPL-SCNC: 131 MMOL/L — LOW (ref 135–145)
SODIUM SERPL-SCNC: 131 MMOL/L — LOW (ref 135–145)
WBC # BLD: 9.54 K/UL — SIGNIFICANT CHANGE UP (ref 3.8–10.5)
WBC # BLD: 9.54 K/UL — SIGNIFICANT CHANGE UP (ref 3.8–10.5)
WBC # FLD AUTO: 9.54 K/UL — SIGNIFICANT CHANGE UP (ref 3.8–10.5)
WBC # FLD AUTO: 9.54 K/UL — SIGNIFICANT CHANGE UP (ref 3.8–10.5)

## 2024-01-02 PROCEDURE — 99232 SBSQ HOSP IP/OBS MODERATE 35: CPT | Mod: GC

## 2024-01-02 RX ORDER — POTASSIUM CHLORIDE 20 MEQ
40 PACKET (EA) ORAL ONCE
Refills: 0 | Status: COMPLETED | OUTPATIENT
Start: 2024-01-02 | End: 2024-01-02

## 2024-01-02 RX ORDER — POTASSIUM CHLORIDE 20 MEQ
40 PACKET (EA) ORAL EVERY 4 HOURS
Refills: 0 | Status: COMPLETED | OUTPATIENT
Start: 2024-01-02 | End: 2024-01-02

## 2024-01-02 RX ORDER — SODIUM CHLORIDE 9 MG/ML
1 INJECTION INTRAMUSCULAR; INTRAVENOUS; SUBCUTANEOUS THREE TIMES A DAY
Refills: 0 | Status: DISCONTINUED | OUTPATIENT
Start: 2024-01-03 | End: 2024-01-09

## 2024-01-02 RX ORDER — POTASSIUM CHLORIDE 20 MEQ
40 PACKET (EA) ORAL ONCE
Refills: 0 | Status: DISCONTINUED | OUTPATIENT
Start: 2024-01-02 | End: 2024-01-02

## 2024-01-02 RX ORDER — SODIUM CHLORIDE 9 MG/ML
2 INJECTION INTRAMUSCULAR; INTRAVENOUS; SUBCUTANEOUS THREE TIMES A DAY
Refills: 0 | Status: COMPLETED | OUTPATIENT
Start: 2024-01-02 | End: 2024-01-03

## 2024-01-02 RX ORDER — SODIUM CHLORIDE 9 MG/ML
1000 INJECTION INTRAMUSCULAR; INTRAVENOUS; SUBCUTANEOUS
Refills: 0 | Status: COMPLETED | OUTPATIENT
Start: 2024-01-02 | End: 2024-01-02

## 2024-01-02 RX ORDER — MAGNESIUM SULFATE 500 MG/ML
2 VIAL (ML) INJECTION ONCE
Refills: 0 | Status: COMPLETED | OUTPATIENT
Start: 2024-01-02 | End: 2024-01-02

## 2024-01-02 RX ADMIN — Medication 40 MILLIEQUIVALENT(S): at 19:17

## 2024-01-02 RX ADMIN — Medication 40 MILLIEQUIVALENT(S): at 15:45

## 2024-01-02 RX ADMIN — Medication 110 MILLIGRAM(S): at 17:38

## 2024-01-02 RX ADMIN — ENOXAPARIN SODIUM 40 MILLIGRAM(S): 100 INJECTION SUBCUTANEOUS at 13:12

## 2024-01-02 RX ADMIN — Medication 40 MILLIEQUIVALENT(S): at 12:07

## 2024-01-02 RX ADMIN — SODIUM CHLORIDE 2 GRAM(S): 9 INJECTION INTRAMUSCULAR; INTRAVENOUS; SUBCUTANEOUS at 21:31

## 2024-01-02 RX ADMIN — AMLODIPINE BESYLATE 10 MILLIGRAM(S): 2.5 TABLET ORAL at 05:58

## 2024-01-02 RX ADMIN — SODIUM CHLORIDE 2 GRAM(S): 9 INJECTION INTRAMUSCULAR; INTRAVENOUS; SUBCUTANEOUS at 19:17

## 2024-01-02 RX ADMIN — SODIUM CHLORIDE 1 GRAM(S): 9 INJECTION INTRAMUSCULAR; INTRAVENOUS; SUBCUTANEOUS at 05:56

## 2024-01-02 RX ADMIN — Medication 3 MILLIGRAM(S): at 21:33

## 2024-01-02 RX ADMIN — SODIUM CHLORIDE 2 GRAM(S): 9 INJECTION INTRAMUSCULAR; INTRAVENOUS; SUBCUTANEOUS at 13:10

## 2024-01-02 RX ADMIN — Medication 110 MILLIGRAM(S): at 05:56

## 2024-01-02 RX ADMIN — SODIUM CHLORIDE 75 MILLILITER(S): 9 INJECTION INTRAMUSCULAR; INTRAVENOUS; SUBCUTANEOUS at 17:42

## 2024-01-02 RX ADMIN — Medication 25 GRAM(S): at 09:07

## 2024-01-02 NOTE — PROGRESS NOTE ADULT - ATTENDING COMMENTS
82 year old female with history of chronic HA, vertigo, hyponatremia requiring hospitalization previously, now coming in with a couple days of generalized weakness, confusion, difficulty ambulating and facial rash. Afebrile, saturating well on RA. Exam similar to yesterday - awake in bed, oriented to person and place, not month or year. Answering simple questions/following simple commands. Family not bedside today. PT recs KONG, will need to discuss with pt and family if in line with goals. Pt without hx HTN, increased amlodipine to 10mg today; likely in setting of infection and stress of hospitalization, hopefully BP improves with time and on DC.    #HypoNa: Hx chronic hypoNa with component of SIADH. Appreciate renal input. Slightly improved.     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 C/w acyclovir. Discussed with infection control, given less than 3 dermatome involvement, hospital policy is for standard precautions (not advising airborne/contact precautions). Renal recs IVF immediately post acyclovir x2h to minimize AE and given fact that it can not be simultaneously run with fluids.     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     #Elevated BP: s/p 1 dose PO hydralazine with improvement, not on home antihypertensives. Amlodipine started 12/29, increased to 10mg now.    Discussed with daughter, patient is generally AOx2, ambulates by herself to the bathroom. Daughter requesting PT to be see patient.

## 2024-01-02 NOTE — PROGRESS NOTE ADULT - PROBLEM SELECTOR PLAN 3
Reported to be A&Ox3 at baseline. 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  -MRI and MRA brain as above  -Acyclovir as above  -Treatment of hyponatremia as above  -MRI head ordered

## 2024-01-02 NOTE — PROGRESS NOTE ADULT - PROBLEM SELECTOR PLAN 1
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 +  -MRI and MRA of the brain (c/f possible VZV vasculopathy)  -s/p unasyn (12/27- 12/31) per ID  -c/w acyclovir (12/26-) @ 10mg/kg q12h --> for total of 14 days, if discharged would need PICC bc not compatible with dosing via midline  -Maintenance IV fluids while on acyclovir --> NS x2 hrs with acyclovir at 75cc/hr 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 +  -MRI and MRA of the brain (c/f possible VZV vasculopathy)  -s/p unasyn (12/27- 12/31) per ID  -c/w acyclovir (12/26-) @ 10mg/kg q12h --> for total of 14 days, if discharged would need PICC bc not compatible with dosing via midline  -Maintenance IV fluids while on acyclovir --> NS x2 hrs with acyclovir at 75cc/hr  - 1/2: rash is fully closed and scabbed over, without erythema or drainage. infectious control discussed, no need for isolation, will bedboard to 9med

## 2024-01-02 NOTE — PROGRESS NOTE ADULT - ASSESSMENT
82F with PMH hyponatremia, chronic headache, vertigo, s/p ILR 10/21, cataracts presenting with weakness and rash c/f VZV encephalitis.

## 2024-01-02 NOTE — PROGRESS NOTE ADULT - PROBLEM SELECTOR PLAN 5
Patient with elevated BP to 195/70. Per daughter no history of HTN, does not take antihypertensives. Improvement with PO hydralazine to SBP 150s.  -12/29 /74  -Trial of amlodipine 10mg 12/30 Patient with elevated BP to 195/70. Per daughter no history of HTN, does not take antihypertensives. Improvement with PO hydralazine to SBP 150s.  -12/29 /74  -c/w amlodipine 10mg

## 2024-01-02 NOTE — PROGRESS NOTE ADULT - SUBJECTIVE AND OBJECTIVE BOX
Patient is a 83y old  Female who presents with a chief complaint of facial rash (01 Jan 2024 08:12)    INTERVAL HPI/OVERNIGHT EVENTS:    FAMILY HISTORY:  No pertinent family history in first degree relatives    Allergy Status Unknown    MEDS:  acetaminophen     Tablet .. 650 milliGRAM(s) Oral every 6 hours PRN  acyclovir IVPB 500 milliGRAM(s) IV Intermittent every 12 hours  amLODIPine   Tablet 10 milliGRAM(s) Oral daily  enoxaparin Injectable 40 milliGRAM(s) SubCutaneous every 24 hours  influenza  Vaccine (HIGH DOSE) 0.7 milliLiter(s) IntraMuscular once  melatonin 3 milliGRAM(s) Oral at bedtime  sodium chloride 1 Gram(s) Oral three times a day  sodium chloride 0.9%. 1000 milliLiter(s) IV Continuous <Continuous>  sodium chloride 0.9%. 1000 milliLiter(s) IV Continuous <Continuous>  sodium chloride 0.9%. 1000 milliLiter(s) IV Continuous <Continuous>      T(C): 36.7 (01-02-24 @ 06:00), Max: 37.1 (01-01-24 @ 14:28)  HR: 75 (01-02-24 @ 06:00) (70 - 90)  BP: 152/70 (01-02-24 @ 06:00) (132/72 - 152/70)  RR: 16 (01-02-24 @ 06:00) (16 - 17)  SpO2: 98% (01-02-24 @ 06:00) (98% - 100%)  Wt(kg): --    PHYSICAL EXAM:  GENERAL: NAD, lying in bed, confused  HEAD:  Atraumatic, normocephalic  EYES: Eye discharge resolved. EOMI, conjunctiva and sclera clear  HEART: Regular rate and rhythm, no murmurs, rubs, or gallops  LUNGS: Unlabored respirations. Clear to auscultation bilaterally, no crackles, wheezing, or rhonchi  ABDOMEN: Soft, nontender, nondistended, +BS  EXTREMITIES: 2+ peripheral pulses bilaterally. No clubbing, cyanosis, or edema  NERVOUS SYSTEM: A&Ox1-2 (knows name, hospital setting)  SKIN: Improved rash by right jawline and chin, erythema resolved and rash is burnt out, scabbing, w/o oozing    Consultant(s) Notes Reviewed:  [x ] YES  [ ] NO  Care Discussed with Consultants/Other Providers [ x] YES  [ ] NO    LABS:                        11.9   9.78  )-----------( 420      ( 01 Jan 2024 06:30 )             34.6     132<L>  |  97<L>  |  12  ----------------------------<  100<H>  4.0   |  20<L>  |  0.63    Ca    9.4      01 Jan 2024 06:30  Phos  3.5     01-01  Mg     1.70     01-01    TPro  7.2  /  Alb  3.6  /  TBili  0.6  /  DBili  x   /  AST  28  /  ALT  15  /  AlkPhos  89  01-01    Culture - CSF with Gram Stain (collected 29 Dec 2023 12:50)  Source: .CSF CSF  Gram Stain (29 Dec 2023 15:45):    polymorphonuclear leukocytes seen per oil power field    No organisms seen per oil power field    by cytocentrifuge  Preliminary Report (30 Dec 2023 07:40):    No growth  CSF PCR Panel (12.29.23 @ 12:52)  Varicella zoster virus: Detected: Test results confirmed by repeat. 12/30/2023 01:20:35 EST DJ  RADIOLOGY & ADDITIONAL TESTS:  CT Head No Cont (12.27.23 @ 09:53)  IMPRESSION:  No acute intracranial hemorrhage or acute territorial infarct.  If   symptoms persist, follow-up MRI exam recommended.    Xray Chest 1 View- PORTABLE-Urgent (12.26.23 @ 08:48)  IMPRESSION:  No focal opacities.  Trace bilateral pleural effusions.     Patient is a 83y old  Female who presents with a chief complaint of facial rash (01 Jan 2024 08:12)    INTERVAL HPI/OVERNIGHT EVENTS:  no acute overnight events. patient has no acute complaints this AM.    FAMILY HISTORY:  No pertinent family history in first degree relatives    Allergy Status Unknown    MEDS:  acetaminophen     Tablet .. 650 milliGRAM(s) Oral every 6 hours PRN  acyclovir IVPB 500 milliGRAM(s) IV Intermittent every 12 hours  amLODIPine   Tablet 10 milliGRAM(s) Oral daily  enoxaparin Injectable 40 milliGRAM(s) SubCutaneous every 24 hours  influenza  Vaccine (HIGH DOSE) 0.7 milliLiter(s) IntraMuscular once  melatonin 3 milliGRAM(s) Oral at bedtime  sodium chloride 1 Gram(s) Oral three times a day  sodium chloride 0.9%. 1000 milliLiter(s) IV Continuous <Continuous>  sodium chloride 0.9%. 1000 milliLiter(s) IV Continuous <Continuous>  sodium chloride 0.9%. 1000 milliLiter(s) IV Continuous <Continuous>      T(C): 36.7 (01-02-24 @ 06:00), Max: 37.1 (01-01-24 @ 14:28)  HR: 75 (01-02-24 @ 06:00) (70 - 90)  BP: 152/70 (01-02-24 @ 06:00) (132/72 - 152/70)  RR: 16 (01-02-24 @ 06:00) (16 - 17)  SpO2: 98% (01-02-24 @ 06:00) (98% - 100%)  Wt(kg): --    PHYSICAL EXAM:  GENERAL: NAD, lying in bed, confused  HEAD:  Atraumatic, normocephalic  EYES: Eye discharge resolved. EOMI, conjunctiva and sclera clear  HEART: Regular rate and rhythm, no murmurs, rubs, or gallops  LUNGS: Unlabored respirations. Clear to auscultation bilaterally, no crackles, wheezing, or rhonchi  ABDOMEN: Soft, nontender, nondistended, +BS  EXTREMITIES: 2+ peripheral pulses bilaterally. No clubbing, cyanosis, or edema  NERVOUS SYSTEM: A&Ox1-2 (knows name, hospital setting)  SKIN: Improved rash by right jawline and chin, erythema resolved and rash is burnt out, scabbing, w/o oozing    Consultant(s) Notes Reviewed:  [x ] YES  [ ] NO  Care Discussed with Consultants/Other Providers [ x] YES  [ ] NO    LABS:                        11.9   9.78  )-----------( 420      ( 01 Jan 2024 06:30 )             34.6     132<L>  |  97<L>  |  12  ----------------------------<  100<H>  4.0   |  20<L>  |  0.63    Ca    9.4      01 Jan 2024 06:30  Phos  3.5     01-01  Mg     1.70     01-01    TPro  7.2  /  Alb  3.6  /  TBili  0.6  /  DBili  x   /  AST  28  /  ALT  15  /  AlkPhos  89  01-01    Culture - CSF with Gram Stain (collected 29 Dec 2023 12:50)  Source: .CSF CSF  Gram Stain (29 Dec 2023 15:45):    polymorphonuclear leukocytes seen per oil power field    No organisms seen per oil power field    by cytocentrifuge  Preliminary Report (30 Dec 2023 07:40):    No growth  CSF PCR Panel (12.29.23 @ 12:52)  Varicella zoster virus: Detected: Test results confirmed by repeat. 12/30/2023 01:20:35 EST DJ  RADIOLOGY & ADDITIONAL TESTS:  CT Head No Cont (12.27.23 @ 09:53)  IMPRESSION:  No acute intracranial hemorrhage or acute territorial infarct.  If   symptoms persist, follow-up MRI exam recommended.    Xray Chest 1 View- PORTABLE-Urgent (12.26.23 @ 08:48)  IMPRESSION:  No focal opacities.  Trace bilateral pleural effusions.

## 2024-01-03 DIAGNOSIS — N17.9 ACUTE KIDNEY FAILURE, UNSPECIFIED: ICD-10-CM

## 2024-01-03 LAB
ALBUMIN SERPL ELPH-MCNC: 3.8 G/DL — SIGNIFICANT CHANGE UP (ref 3.3–5)
ALBUMIN SERPL ELPH-MCNC: 3.8 G/DL — SIGNIFICANT CHANGE UP (ref 3.3–5)
ALP SERPL-CCNC: 96 U/L — SIGNIFICANT CHANGE UP (ref 40–120)
ALP SERPL-CCNC: 96 U/L — SIGNIFICANT CHANGE UP (ref 40–120)
ALT FLD-CCNC: 21 U/L — SIGNIFICANT CHANGE UP (ref 4–33)
ALT FLD-CCNC: 21 U/L — SIGNIFICANT CHANGE UP (ref 4–33)
ANION GAP SERPL CALC-SCNC: 14 MMOL/L — SIGNIFICANT CHANGE UP (ref 7–14)
ANION GAP SERPL CALC-SCNC: 14 MMOL/L — SIGNIFICANT CHANGE UP (ref 7–14)
AST SERPL-CCNC: 25 U/L — SIGNIFICANT CHANGE UP (ref 4–32)
AST SERPL-CCNC: 25 U/L — SIGNIFICANT CHANGE UP (ref 4–32)
BILIRUB SERPL-MCNC: 0.6 MG/DL — SIGNIFICANT CHANGE UP (ref 0.2–1.2)
BILIRUB SERPL-MCNC: 0.6 MG/DL — SIGNIFICANT CHANGE UP (ref 0.2–1.2)
BUN SERPL-MCNC: 20 MG/DL — SIGNIFICANT CHANGE UP (ref 7–23)
BUN SERPL-MCNC: 20 MG/DL — SIGNIFICANT CHANGE UP (ref 7–23)
CALCIUM SERPL-MCNC: 9.8 MG/DL — SIGNIFICANT CHANGE UP (ref 8.4–10.5)
CALCIUM SERPL-MCNC: 9.8 MG/DL — SIGNIFICANT CHANGE UP (ref 8.4–10.5)
CHLORIDE SERPL-SCNC: 103 MMOL/L — SIGNIFICANT CHANGE UP (ref 98–107)
CHLORIDE SERPL-SCNC: 103 MMOL/L — SIGNIFICANT CHANGE UP (ref 98–107)
CO2 SERPL-SCNC: 20 MMOL/L — LOW (ref 22–31)
CO2 SERPL-SCNC: 20 MMOL/L — LOW (ref 22–31)
CREAT ?TM UR-MCNC: 21 MG/DL — SIGNIFICANT CHANGE UP
CREAT ?TM UR-MCNC: 21 MG/DL — SIGNIFICANT CHANGE UP
CREAT SERPL-MCNC: 1.48 MG/DL — HIGH (ref 0.5–1.3)
CREAT SERPL-MCNC: 1.48 MG/DL — HIGH (ref 0.5–1.3)
CULTURE RESULTS: NO GROWTH — SIGNIFICANT CHANGE UP
CULTURE RESULTS: NO GROWTH — SIGNIFICANT CHANGE UP
EGFR: 35 ML/MIN/1.73M2 — LOW
EGFR: 35 ML/MIN/1.73M2 — LOW
GLUCOSE BLDC GLUCOMTR-MCNC: 114 MG/DL — HIGH (ref 70–99)
GLUCOSE BLDC GLUCOMTR-MCNC: 114 MG/DL — HIGH (ref 70–99)
GLUCOSE SERPL-MCNC: 124 MG/DL — HIGH (ref 70–99)
GLUCOSE SERPL-MCNC: 124 MG/DL — HIGH (ref 70–99)
HCT VFR BLD CALC: 33.8 % — LOW (ref 34.5–45)
HCT VFR BLD CALC: 33.8 % — LOW (ref 34.5–45)
HGB BLD-MCNC: 11.9 G/DL — SIGNIFICANT CHANGE UP (ref 11.5–15.5)
HGB BLD-MCNC: 11.9 G/DL — SIGNIFICANT CHANGE UP (ref 11.5–15.5)
MAGNESIUM SERPL-MCNC: 2.3 MG/DL — SIGNIFICANT CHANGE UP (ref 1.6–2.6)
MAGNESIUM SERPL-MCNC: 2.3 MG/DL — SIGNIFICANT CHANGE UP (ref 1.6–2.6)
MCHC RBC-ENTMCNC: 29.8 PG — SIGNIFICANT CHANGE UP (ref 27–34)
MCHC RBC-ENTMCNC: 29.8 PG — SIGNIFICANT CHANGE UP (ref 27–34)
MCHC RBC-ENTMCNC: 35.2 GM/DL — SIGNIFICANT CHANGE UP (ref 32–36)
MCHC RBC-ENTMCNC: 35.2 GM/DL — SIGNIFICANT CHANGE UP (ref 32–36)
MCV RBC AUTO: 84.5 FL — SIGNIFICANT CHANGE UP (ref 80–100)
MCV RBC AUTO: 84.5 FL — SIGNIFICANT CHANGE UP (ref 80–100)
NRBC # BLD: 0 /100 WBCS — SIGNIFICANT CHANGE UP (ref 0–0)
NRBC # BLD: 0 /100 WBCS — SIGNIFICANT CHANGE UP (ref 0–0)
NRBC # FLD: 0 K/UL — SIGNIFICANT CHANGE UP (ref 0–0)
NRBC # FLD: 0 K/UL — SIGNIFICANT CHANGE UP (ref 0–0)
OSMOLALITY UR: 316 MOSM/KG — SIGNIFICANT CHANGE UP (ref 50–1200)
OSMOLALITY UR: 316 MOSM/KG — SIGNIFICANT CHANGE UP (ref 50–1200)
PHOSPHATE SERPL-MCNC: 3.8 MG/DL — SIGNIFICANT CHANGE UP (ref 2.5–4.5)
PHOSPHATE SERPL-MCNC: 3.8 MG/DL — SIGNIFICANT CHANGE UP (ref 2.5–4.5)
PLATELET # BLD AUTO: 425 K/UL — HIGH (ref 150–400)
PLATELET # BLD AUTO: 425 K/UL — HIGH (ref 150–400)
POTASSIUM SERPL-MCNC: 4.7 MMOL/L — SIGNIFICANT CHANGE UP (ref 3.5–5.3)
POTASSIUM SERPL-MCNC: 4.7 MMOL/L — SIGNIFICANT CHANGE UP (ref 3.5–5.3)
POTASSIUM SERPL-SCNC: 4.7 MMOL/L — SIGNIFICANT CHANGE UP (ref 3.5–5.3)
POTASSIUM SERPL-SCNC: 4.7 MMOL/L — SIGNIFICANT CHANGE UP (ref 3.5–5.3)
POTASSIUM UR-SCNC: 26.6 MMOL/L — SIGNIFICANT CHANGE UP
POTASSIUM UR-SCNC: 26.6 MMOL/L — SIGNIFICANT CHANGE UP
PROT ?TM UR-MCNC: 8 MG/DL — SIGNIFICANT CHANGE UP
PROT ?TM UR-MCNC: 8 MG/DL — SIGNIFICANT CHANGE UP
PROT SERPL-MCNC: 7.6 G/DL — SIGNIFICANT CHANGE UP (ref 6–8.3)
PROT SERPL-MCNC: 7.6 G/DL — SIGNIFICANT CHANGE UP (ref 6–8.3)
PROT/CREAT UR-RTO: 0.4 RATIO — HIGH (ref 0–0.2)
PROT/CREAT UR-RTO: 0.4 RATIO — HIGH (ref 0–0.2)
RBC # BLD: 4 M/UL — SIGNIFICANT CHANGE UP (ref 3.8–5.2)
RBC # BLD: 4 M/UL — SIGNIFICANT CHANGE UP (ref 3.8–5.2)
RBC # FLD: 12.2 % — SIGNIFICANT CHANGE UP (ref 10.3–14.5)
RBC # FLD: 12.2 % — SIGNIFICANT CHANGE UP (ref 10.3–14.5)
SODIUM SERPL-SCNC: 137 MMOL/L — SIGNIFICANT CHANGE UP (ref 135–145)
SODIUM SERPL-SCNC: 137 MMOL/L — SIGNIFICANT CHANGE UP (ref 135–145)
SODIUM UR-SCNC: 114 MMOL/L — SIGNIFICANT CHANGE UP
SODIUM UR-SCNC: 114 MMOL/L — SIGNIFICANT CHANGE UP
SPECIMEN SOURCE: SIGNIFICANT CHANGE UP
SPECIMEN SOURCE: SIGNIFICANT CHANGE UP
UUN UR-MCNC: 133 MG/DL — SIGNIFICANT CHANGE UP
UUN UR-MCNC: 133 MG/DL — SIGNIFICANT CHANGE UP
VDRL CSF-TITR: SIGNIFICANT CHANGE UP
VDRL CSF-TITR: SIGNIFICANT CHANGE UP
WBC # BLD: 11.06 K/UL — HIGH (ref 3.8–10.5)
WBC # BLD: 11.06 K/UL — HIGH (ref 3.8–10.5)
WBC # FLD AUTO: 11.06 K/UL — HIGH (ref 3.8–10.5)
WBC # FLD AUTO: 11.06 K/UL — HIGH (ref 3.8–10.5)

## 2024-01-03 PROCEDURE — 99233 SBSQ HOSP IP/OBS HIGH 50: CPT | Mod: FS,GC

## 2024-01-03 PROCEDURE — 76770 US EXAM ABDO BACK WALL COMP: CPT | Mod: 26

## 2024-01-03 PROCEDURE — 70450 CT HEAD/BRAIN W/O DYE: CPT | Mod: 26

## 2024-01-03 PROCEDURE — 99232 SBSQ HOSP IP/OBS MODERATE 35: CPT | Mod: GC

## 2024-01-03 RX ORDER — HEPARIN SODIUM 5000 [USP'U]/ML
5000 INJECTION INTRAVENOUS; SUBCUTANEOUS EVERY 8 HOURS
Refills: 0 | Status: DISCONTINUED | OUTPATIENT
Start: 2024-01-03 | End: 2024-01-09

## 2024-01-03 RX ORDER — ACYCLOVIR SODIUM 500 MG
500 VIAL (EA) INTRAVENOUS EVERY 24 HOURS
Refills: 0 | Status: DISCONTINUED | OUTPATIENT
Start: 2024-01-04 | End: 2024-01-08

## 2024-01-03 RX ORDER — SODIUM CHLORIDE 9 MG/ML
1000 INJECTION, SOLUTION INTRAVENOUS
Refills: 0 | Status: DISCONTINUED | OUTPATIENT
Start: 2024-01-03 | End: 2024-01-08

## 2024-01-03 RX ORDER — SODIUM CHLORIDE 9 MG/ML
1000 INJECTION INTRAMUSCULAR; INTRAVENOUS; SUBCUTANEOUS
Refills: 0 | Status: COMPLETED | OUTPATIENT
Start: 2024-01-03 | End: 2024-01-03

## 2024-01-03 RX ADMIN — HEPARIN SODIUM 5000 UNIT(S): 5000 INJECTION INTRAVENOUS; SUBCUTANEOUS at 21:44

## 2024-01-03 RX ADMIN — AMLODIPINE BESYLATE 10 MILLIGRAM(S): 2.5 TABLET ORAL at 06:27

## 2024-01-03 RX ADMIN — SODIUM CHLORIDE 1 GRAM(S): 9 INJECTION INTRAMUSCULAR; INTRAVENOUS; SUBCUTANEOUS at 14:55

## 2024-01-03 RX ADMIN — SODIUM CHLORIDE 75 MILLILITER(S): 9 INJECTION, SOLUTION INTRAVENOUS at 19:14

## 2024-01-03 RX ADMIN — HEPARIN SODIUM 5000 UNIT(S): 5000 INJECTION INTRAVENOUS; SUBCUTANEOUS at 14:55

## 2024-01-03 RX ADMIN — SODIUM CHLORIDE 1 GRAM(S): 9 INJECTION INTRAMUSCULAR; INTRAVENOUS; SUBCUTANEOUS at 21:44

## 2024-01-03 RX ADMIN — Medication 3 MILLIGRAM(S): at 22:31

## 2024-01-03 RX ADMIN — Medication 110 MILLIGRAM(S): at 06:22

## 2024-01-03 RX ADMIN — SODIUM CHLORIDE 2 GRAM(S): 9 INJECTION INTRAMUSCULAR; INTRAVENOUS; SUBCUTANEOUS at 06:28

## 2024-01-03 RX ADMIN — SODIUM CHLORIDE 75 MILLILITER(S): 9 INJECTION INTRAMUSCULAR; INTRAVENOUS; SUBCUTANEOUS at 06:22

## 2024-01-03 NOTE — PROGRESS NOTE ADULT - PROBLEM SELECTOR PLAN 4
Weakness may be in the setting of AMS. Patient states herself that she walks with a walker.  -PT consulted, rec KONG Reported to be A&Ox3 at baseline. 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  -MRI and MRA brain as above  -Acyclovir as above  -Treatment of hyponatremia as above  -Pt had unwitnessed fall 1/3, vitals stable, mentation at baseline, CTH negative  -MRI head ordered

## 2024-01-03 NOTE — PROGRESS NOTE ADULT - PROBLEM SELECTOR PLAN 5
Patient with elevated BP to 195/70. Per daughter no history of HTN, does not take antihypertensives. Improvement with PO hydralazine to SBP 150s.  -12/29 /74  -c/w amlodipine 10mg Weakness may be in the setting of AMS. Patient states herself that she walks with a walker.  -PT consulted, rec KONG

## 2024-01-03 NOTE — PROGRESS NOTE ADULT - PROBLEM SELECTOR PLAN 6
Diet: Regular (no beef)  DVT ppx: lovenox 40  Dispo: pending clinical improvement and KONG 34.4 Patient with elevated BP to 195/70. Per daughter no history of HTN, does not take antihypertensives. Improvement with PO hydralazine to SBP 150s.  -12/29 /74  -c/w amlodipine 10mg

## 2024-01-03 NOTE — PROGRESS NOTE ADULT - ATTENDING COMMENTS
GENNY  Hyponatremia    Rise in Cr in the setting of IV acyclovir use, assoc with crystal nephropathy  Would start on IV fluids to dilute, avoid further IV acyclovir or please spread out over a long time period if she needs it further

## 2024-01-03 NOTE — PROGRESS NOTE ADULT - ATTENDING COMMENTS
82 year old female with history of chronic HA, vertigo, hyponatremia requiring hospitalization previously, now coming in with a couple days of generalized weakness, confusion, difficulty ambulating and facial rash. Afebrile, saturating well on RA. Exam similar to yesterday - awake in bed, oriented to person and place, not month or year. Answering simple questions/following simple commands.    On acyclovir for vzv encephalitis, sodium improved, however now with new GENNY, will need to redose acyclovir. Reconsult nephro  Follow up ID for further recs.   Discussed with daughter, patient is generally AOx2, ambulates by herself to the bathroom. PT recommends KONG, however family would prefer home with PT.   Continue the rest of the work up and management as stated above.

## 2024-01-03 NOTE — PROGRESS NOTE ADULT - SUBJECTIVE AND OBJECTIVE BOX
[facility] DIVISION OF KIDNEY DISEASES AND HYPERTENSION   FOLLOW UP NOTE    --------------------------------------------------------------------------------  Chief Complaint:    24 hour events/subjective: Pt. was seen and examined today. Nephrology had been recently following for hyponatremia (improved) now reconsulted for GENNY.       PAST HISTORY  --------------------------------------------------------------------------------  No significant changes to PMH, PSH, FHx, SHx, unless otherwise noted    ALLERGIES & MEDICATIONS  --------------------------------------------------------------------------------  Allergies    Allergy Status Unknown    Intolerances      Standing Inpatient Medications  amLODIPine   Tablet 10 milliGRAM(s) Oral daily  heparin   Injectable 5000 Unit(s) SubCutaneous every 8 hours  influenza  Vaccine (HIGH DOSE) 0.7 milliLiter(s) IntraMuscular once  melatonin 3 milliGRAM(s) Oral at bedtime  sodium chloride 1 Gram(s) Oral three times a day  sodium chloride 0.9%. 1000 milliLiter(s) IV Continuous <Continuous>  sodium chloride 0.9%. 1000 milliLiter(s) IV Continuous <Continuous>    PRN Inpatient Medications  acetaminophen     Tablet .. 650 milliGRAM(s) Oral every 6 hours PRN      REVIEW OF SYSTEMS  --------------------------------------------------------------------------------  Gen: No fevers/chills  Head/Eyes/Ears: No HA   Respiratory: No dyspnea, cough  CV: No chest pain  GI: No abdominal pain, diarrhea  : No dysuria, hematuria  MSK: No  edema  Skin: No rashes  Heme: No easy bruising or bleeding    All other systems were reviewed and are negative, except as noted.    VITALS/PHYSICAL EXAM  --------------------------------------------------------------------------------  T(C): 36.4 (01-03-24 @ 12:37), Max: 36.8 (01-02-24 @ 17:58)  HR: 78 (01-03-24 @ 12:37) (75 - 92)  BP: 149/82 (01-03-24 @ 12:37) (146/69 - 154/91)  RR: 17 (01-03-24 @ 12:37) (17 - 18)  SpO2: 100% (01-03-24 @ 12:37) (97% - 100%)  Wt(kg): --        01-02-24 @ 07:01  -  01-03-24 @ 07:00  --------------------------------------------------------  IN: 800 mL / OUT: 750 mL / NET: 50 mL        Physical Exam:  	Gen: NAD, R sided dry crusted facial rash  	HEENT: Anicteric  	Pulm: CTA B/L  	CV: S1S2+  	Abd: Soft, +BS   	Ext: No LE edema B/L  	Neuro: Awake  	Skin: Warm and dry      LABS/STUDIES  --------------------------------------------------------------------------------              11.9   11.06 >-----------<  425      [01-03-24 @ 07:26]              33.8     137  |  103  |  20  ----------------------------<  124      [01-03-24 @ 07:26]  4.7   |  20  |  1.48        Ca     9.8     [01-03-24 @ 07:26]      Mg     2.30     [01-03-24 @ 07:26]      Phos  3.8     [01-03-24 @ 07:26]    TPro  7.6  /  Alb  3.8  /  TBili  0.6  /  DBili  x   /  AST  25  /  ALT  21  /  AlkPhos  96  [01-03-24 @ 07:26]          Creatinine Trend:  SCr 1.48 [01-03 @ 07:26]  SCr 0.74 [01-02 @ 06:30]  SCr 0.63 [01-01 @ 06:30]  SCr 0.52 [12-31 @ 06:10]  SCr 0.41 [12-30 @ 06:28]    Urinalysis - [01-03-24 @ 07:26]      Color  / Appearance  / SG  / pH       Gluc 124 / Ketone   / Bili  / Urobili        Blood  / Protein  / Leuk Est  / Nitrite       RBC  / WBC  / Hyaline  / Gran  / Sq Epi  / Non Sq Epi  / Bacteria     Urine Creatinine 40      [12-28-23 @ 12:45]  Urine Protein 19      [12-28-23 @ 12:45]  Urine Sodium 173      [12-28-23 @ 12:45]  Urine Urea Nitrogen 381.4      [12-28-23 @ 12:45]  Urine Potassium 38.0      [12-28-23 @ 12:45]  Urine Osmolality 568      [12-28-23 @ 12:45]        Tacrolimus  Cyclosporine  Sirolimus  Mycophenolate  BK PCR  CMV PCR  Parvo PCR  EBV PCR [facility] DIVISION OF KIDNEY DISEASES AND HYPERTENSION   FOLLOW UP NOTE    --------------------------------------------------------------------------------  Chief Complaint:    24 hour events/subjective: Pt. was seen and examined today. Nephrology had been recently following for hyponatremia (improved) now reconsulted for GENNY.       PAST HISTORY  --------------------------------------------------------------------------------  No significant changes to PMH, PSH, FHx, SHx, unless otherwise noted    ALLERGIES & MEDICATIONS  --------------------------------------------------------------------------------  Allergies    Allergy Status Unknown    Intolerances      Standing Inpatient Medications  amLODIPine   Tablet 10 milliGRAM(s) Oral daily  heparin   Injectable 5000 Unit(s) SubCutaneous every 8 hours  influenza  Vaccine (HIGH DOSE) 0.7 milliLiter(s) IntraMuscular once  melatonin 3 milliGRAM(s) Oral at bedtime  sodium chloride 1 Gram(s) Oral three times a day  sodium chloride 0.9%. 1000 milliLiter(s) IV Continuous <Continuous>  sodium chloride 0.9%. 1000 milliLiter(s) IV Continuous <Continuous>    PRN Inpatient Medications  acetaminophen     Tablet .. 650 milliGRAM(s) Oral every 6 hours PRN      REVIEW OF SYSTEMS  --------------------------------------------------------------------------------  Gen: No fevers/chills  Head/Eyes/Ears: No HA   Respiratory: No dyspnea, cough  CV: No chest pain  GI: No abdominal pain, diarrhea  : No dysuria, hematuria  MSK: No  edema  Skin: No rashes  Heme: No easy bruising or bleeding    All other systems were reviewed and are negative, except as noted.    VITALS/PHYSICAL EXAM  --------------------------------------------------------------------------------  T(C): 36.4 (01-03-24 @ 12:37), Max: 36.8 (01-02-24 @ 17:58)  HR: 78 (01-03-24 @ 12:37) (75 - 92)  BP: 149/82 (01-03-24 @ 12:37) (146/69 - 154/91)  RR: 17 (01-03-24 @ 12:37) (17 - 18)  SpO2: 100% (01-03-24 @ 12:37) (97% - 100%)  Wt(kg): --        01-02-24 @ 07:01  -  01-03-24 @ 07:00  --------------------------------------------------------  IN: 800 mL / OUT: 750 mL / NET: 50 mL        Physical Exam:  	Gen: NAD, R sided dry crusted facial rash  	HEENT: Anicteric  	Pulm: CTA B/L  	CV: S1S2+  	Abd: Soft, +BS   	Ext: No LE edema B/L  	Neuro: Awake  	Skin: Warm and dry      LABS/STUDIES  --------------------------------------------------------------------------------              11.9   11.06 >-----------<  425      [01-03-24 @ 07:26]              33.8     137  |  103  |  20  ----------------------------<  124      [01-03-24 @ 07:26]  4.7   |  20  |  1.48        Ca     9.8     [01-03-24 @ 07:26]      Mg     2.30     [01-03-24 @ 07:26]      Phos  3.8     [01-03-24 @ 07:26]    TPro  7.6  /  Alb  3.8  /  TBili  0.6  /  DBili  x   /  AST  25  /  ALT  21  /  AlkPhos  96  [01-03-24 @ 07:26]          Creatinine Trend:  SCr 1.48 [01-03 @ 07:26]  SCr 0.74 [01-02 @ 06:30]  SCr 0.63 [01-01 @ 06:30]  SCr 0.52 [12-31 @ 06:10]  SCr 0.41 [12-30 @ 06:28]    Urinalysis - [01-03-24 @ 07:26]      Color  / Appearance  / SG  / pH       Gluc 124 / Ketone   / Bili  / Urobili        Blood  / Protein  / Leuk Est  / Nitrite       RBC  / WBC  / Hyaline  / Gran  / Sq Epi  / Non Sq Epi  / Bacteria     Urine Creatinine 40      [12-28-23 @ 12:45]  Urine Protein 19      [12-28-23 @ 12:45]  Urine Sodium 173      [12-28-23 @ 12:45]  Urine Urea Nitrogen 381.4      [12-28-23 @ 12:45]  Urine Potassium 38.0      [12-28-23 @ 12:45]  Urine Osmolality 568      [12-28-23 @ 12:45]        Tacrolimus  Cyclosporine  Sirolimus  Mycophenolate  BK PCR  CMV PCR  Parvo PCR  EBV PCR

## 2024-01-03 NOTE — PROGRESS NOTE ADULT - PROBLEM SELECTOR PLAN 1
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 infectious control discussed, no need for isolation

## 2024-01-03 NOTE — PROGRESS NOTE ADULT - PROBLEM SELECTOR PLAN 3
Reported to be A&Ox3 at baseline. 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  -MRI and MRA brain as above  -Acyclovir as above  -Treatment of hyponatremia as above  -MRI head ordered Na 123 on admission. 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 Patient s/p 2L NS in ED. 12/31 Na 134.  -Nephrology consulted, appreciate recs  -Salt tabs 1mg TID 12/30  -Will give NS x2 hrs with acyclovir at 75cc/hr  -Nephro consulted, improving with recs from above  -resolved, ctm

## 2024-01-03 NOTE — PROGRESS NOTE ADULT - PROBLEM SELECTOR PLAN 1
GENNY -pre-renal vs acyclovir induced   Baseline Cr 0.4 - slowly trending up within 5 days to 1.48 (Jan 3). Renal US neg for hydro.     Recs:  -Repeat UA and Urine Lytes  -Obtain Bladder scan for PVR  -Can give LR @ 75ml/hr for 24 hours    -Will follow renal fx      Сергей Campos  Nephrology Fellow  Feel free to contact me on TEAMS  After 4 pm please contact the on-call Fellow. GENNY -pre-renal vs acyclovir induced   Baseline Cr 0.4 - slowly trending up within 5 days to 1.48 (Jan 3). Renal US neg for hydro.     Recs:  -Repeat UA and Urine Lytes  -Obtain Bladder scan for PVR  -Can give LR @ 75ml/hr for 24 hours   -Needs strict I/O   -Will follow renal fx      Сергей Campos  Nephrology Fellow  Feel free to contact me on TEAMS  After 4 pm please contact the on-call Fellow.

## 2024-01-03 NOTE — PROGRESS NOTE ADULT - SUBJECTIVE AND OBJECTIVE BOX
Patient is a 83y old  Female who presents with a chief complaint of facial rash (02 Jan 2024 07:39)    INTERVAL HPI/OVERNIGHT EVENTS: Pt was moved to 924B. She had rapid for unwitnessed fall, found on butt on floor. Vitals stable. AOx1-2 as mentating similar to prior to fall. CTH pending.     FAMILY HISTORY:  No pertinent family history in first degree relatives    Allergy Status Unknown    MEDS:  acetaminophen     Tablet .. 650 milliGRAM(s) Oral every 6 hours PRN  acyclovir IVPB 500 milliGRAM(s) IV Intermittent every 12 hours  amLODIPine   Tablet 10 milliGRAM(s) Oral daily  enoxaparin Injectable 40 milliGRAM(s) SubCutaneous every 24 hours  influenza  Vaccine (HIGH DOSE) 0.7 milliLiter(s) IntraMuscular once  melatonin 3 milliGRAM(s) Oral at bedtime  sodium chloride 1 Gram(s) Oral three times a day  sodium chloride 0.9%. 1000 milliLiter(s) IV Continuous <Continuous>  sodium chloride 0.9%. 1000 milliLiter(s) IV Continuous <Continuous>    T(C): 36.7 (01-03-24 @ 06:13), Max: 36.8 (01-02-24 @ 17:58)  HR: 75 (01-03-24 @ 06:13) (75 - 92)  BP: 154/91 (01-03-24 @ 06:13) (126/68 - 154/91)  RR: 17 (01-03-24 @ 06:13) (17 - 18)  SpO2: 100% (01-03-24 @ 06:13) (97% - 100%)    PHYSICAL EXAM:  GENERAL: NAD, lying in bed, confused  HEAD:  Atraumatic, normocephalic  EYES: Eye discharge resolved. EOMI, conjunctiva and sclera clear  HEART: Regular rate and rhythm, no murmurs, rubs, or gallops  LUNGS: Unlabored respirations. Clear to auscultation bilaterally, no crackles, wheezing, or rhonchi  ABDOMEN: Soft, nontender, nondistended, +BS  EXTREMITIES: 2+ peripheral pulses bilaterally. No clubbing, cyanosis, or edema  NERVOUS SYSTEM: A&Ox1-2 (knows name, hospital setting)  SKIN: Improved rash by right jawline and chin, erythema resolved and rash is burnt out, scabbing, w/o oozing    Consultant(s) Notes Reviewed:  [x ] YES  [ ] NO  Care Discussed with Consultants/Other Providers [ x] YES  [ ] NO    LABS:                        11.2   9.54  )-----------( 419      ( 02 Jan 2024 06:30 )             33.1     131<L>  |  95<L>  |  15  ----------------------------<  96  3.1<L>   |  22  |  0.74    Ca    9.2      02 Jan 2024 06:30  Phos  3.7     01-02  Mg     1.70     01-02    TPro  7.1  /  Alb  3.4  /  TBili  0.7  /  DBili  x   /  AST  22  /  ALT  19  /  AlkPhos  82  01-02      Culture - CSF with Gram Stain (collected 29 Dec 2023 12:50)  Source: .CSF CSF  Gram Stain (29 Dec 2023 15:45):    polymorphonuclear leukocytes seen per oil power field    No organisms seen per oil power field    by cytocentrifuge  Preliminary Report (30 Dec 2023 07:40):    No growth    CSF PCR Panel (12.29.23 @ 12:52)  Varicella zoster virus: Detected: Test results confirmed by repeat. 12/30/2023 01:20:35 EST DJ    RADIOLOGY & ADDITIONAL TESTS:  CT Head No Cont (12.27.23 @ 09:53)  IMPRESSION:  No acute intracranial hemorrhage or acute territorial infarct.  If   symptoms persist, follow-up MRI exam recommended.    Xray Chest 1 View- PORTABLE-Urgent (12.26.23 @ 08:48)  IMPRESSION:  No focal opacities.  Trace bilateral pleural effusions. Patient is a 83y old  Female who presents with a chief complaint of facial rash (02 Jan 2024 07:39)    INTERVAL HPI/OVERNIGHT EVENTS: Pt was moved to 924B. She had rapid for unwitnessed fall, found on butt on floor. Vitals stable. AOx1-2 as mentating similar to prior to fall. CTH neg. Family saw her yesterday, confirms she is at baseline mentation. Spoke to family, family wants to dc her home for home PT.    FAMILY HISTORY:  No pertinent family history in first degree relatives    Allergy Status Unknown    MEDS:  acetaminophen     Tablet .. 650 milliGRAM(s) Oral every 6 hours PRN  acyclovir IVPB 500 milliGRAM(s) IV Intermittent every 12 hours  amLODIPine   Tablet 10 milliGRAM(s) Oral daily  enoxaparin Injectable 40 milliGRAM(s) SubCutaneous every 24 hours  influenza  Vaccine (HIGH DOSE) 0.7 milliLiter(s) IntraMuscular once  melatonin 3 milliGRAM(s) Oral at bedtime  sodium chloride 1 Gram(s) Oral three times a day  sodium chloride 0.9%. 1000 milliLiter(s) IV Continuous <Continuous>  sodium chloride 0.9%. 1000 milliLiter(s) IV Continuous <Continuous>    T(C): 36.7 (01-03-24 @ 06:13), Max: 36.8 (01-02-24 @ 17:58)  HR: 75 (01-03-24 @ 06:13) (75 - 92)  BP: 154/91 (01-03-24 @ 06:13) (126/68 - 154/91)  RR: 17 (01-03-24 @ 06:13) (17 - 18)  SpO2: 100% (01-03-24 @ 06:13) (97% - 100%)    PHYSICAL EXAM:  GENERAL: NAD, lying in bed, confused  HEAD:  Atraumatic, normocephalic  EYES: Eye discharge resolved. EOMI, conjunctiva and sclera clear  HEART: Regular rate and rhythm, no murmurs, rubs, or gallops  LUNGS: Unlabored respirations. Clear to auscultation bilaterally, no crackles, wheezing, or rhonchi  ABDOMEN: Soft, nontender, nondistended, +BS  EXTREMITIES: 2+ peripheral pulses bilaterally. No clubbing, cyanosis, or edema  NERVOUS SYSTEM: A&Ox1-2 (knows name, hospital setting)  SKIN: Improved rash by right jawline and chin, erythema resolved and rash is burnt out, scabbing, w/o oozing    Consultant(s) Notes Reviewed:  [x ] YES  [ ] NO  Care Discussed with Consultants/Other Providers [ x] YES  [ ] NO    LABS:                        11.2   9.54  )-----------( 419      ( 02 Jan 2024 06:30 )             33.1     131<L>  |  95<L>  |  15  ----------------------------<  96  3.1<L>   |  22  |  0.74    Ca    9.2      02 Jan 2024 06:30  Phos  3.7     01-02  Mg     1.70     01-02    TPro  7.1  /  Alb  3.4  /  TBili  0.7  /  DBili  x   /  AST  22  /  ALT  19  /  AlkPhos  82  01-02      Culture - CSF with Gram Stain (collected 29 Dec 2023 12:50)  Source: .CSF CSF  Gram Stain (29 Dec 2023 15:45):    polymorphonuclear leukocytes seen per oil power field    No organisms seen per oil power field    by cytocentrifuge  Preliminary Report (30 Dec 2023 07:40):    No growth    CSF PCR Panel (12.29.23 @ 12:52)  Varicella zoster virus: Detected: Test results confirmed by repeat. 12/30/2023 01:20:35 EST DJ    RADIOLOGY & ADDITIONAL TESTS:  CT Head No Cont (12.27.23 @ 09:53)  IMPRESSION:  No acute intracranial hemorrhage or acute territorial infarct.  If   symptoms persist, follow-up MRI exam recommended.    Xray Chest 1 View- PORTABLE-Urgent (12.26.23 @ 08:48)  IMPRESSION:  No focal opacities.  Trace bilateral pleural effusions.

## 2024-01-03 NOTE — PROGRESS NOTE ADULT - PROBLEM SELECTOR PLAN 2
3/02/20 visit notes: in early April, stop the rifampin   Na 123 on admission. 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 Patient s/p 2L NS in ED. 12/31 Na 134.  -Nephrology consulted, appreciate recs  -Salt tabs 1mg TID 12/30  -Will give NS x2 hrs with acyclovir at 75cc/hr  -Nephro consulted, improving with recs from above

## 2024-01-03 NOTE — RAPID RESPONSE TEAM SUMMARY - NSSITUATIONBACKGROUNDRRT_GEN_ALL_CORE
Called by RN for unwitnessed fall  - pt here for hsv rash s/p completed treatment, aox1-2; pt's neighbor went to bathroom and noticed pt sitting on the floor   - pt was found sitting on the floor without head trauma  - full physical exam unremarkable, no c/f fracture   - unable to explain fall  - vitals stables, map > 70, sat RA, HR wnl   - Initiated fall protocol, pending CTH and constant observation, questionable bed alarm function
82F with PMH hyponatremia, chronic headache, vertigo, s/p ILR 10/21, cataracts presenting with weakness and rash. Patient was hyponatremic on admission to 123 and received 2 L NS. Sodium improved to 129. RRT called for AMS. Per nurse upon arrival on the floor the patient was not responsive. Upon arrival T:98.2 RR:20 HR: 108 BP: 170/121. Patient was arousable to pain. She was A&O x1 to person. Physical exam showed no focal deficits.  Repeat BMP and a VBG was ordered. AMS likely 2/2  metabolic encephalopathy. CT Head was ordered. Will follow up Na.

## 2024-01-04 LAB
ALBUMIN SERPL ELPH-MCNC: 3.7 G/DL — SIGNIFICANT CHANGE UP (ref 3.3–5)
ALBUMIN SERPL ELPH-MCNC: 3.7 G/DL — SIGNIFICANT CHANGE UP (ref 3.3–5)
ALP SERPL-CCNC: 96 U/L — SIGNIFICANT CHANGE UP (ref 40–120)
ALP SERPL-CCNC: 96 U/L — SIGNIFICANT CHANGE UP (ref 40–120)
ALT FLD-CCNC: 19 U/L — SIGNIFICANT CHANGE UP (ref 4–33)
ALT FLD-CCNC: 19 U/L — SIGNIFICANT CHANGE UP (ref 4–33)
ANION GAP SERPL CALC-SCNC: 16 MMOL/L — HIGH (ref 7–14)
ANION GAP SERPL CALC-SCNC: 16 MMOL/L — HIGH (ref 7–14)
AST SERPL-CCNC: 22 U/L — SIGNIFICANT CHANGE UP (ref 4–32)
AST SERPL-CCNC: 22 U/L — SIGNIFICANT CHANGE UP (ref 4–32)
BILIRUB SERPL-MCNC: 0.6 MG/DL — SIGNIFICANT CHANGE UP (ref 0.2–1.2)
BILIRUB SERPL-MCNC: 0.6 MG/DL — SIGNIFICANT CHANGE UP (ref 0.2–1.2)
BUN SERPL-MCNC: 18 MG/DL — SIGNIFICANT CHANGE UP (ref 7–23)
BUN SERPL-MCNC: 18 MG/DL — SIGNIFICANT CHANGE UP (ref 7–23)
CALCIUM SERPL-MCNC: 9.7 MG/DL — SIGNIFICANT CHANGE UP (ref 8.4–10.5)
CALCIUM SERPL-MCNC: 9.7 MG/DL — SIGNIFICANT CHANGE UP (ref 8.4–10.5)
CHLORIDE SERPL-SCNC: 97 MMOL/L — LOW (ref 98–107)
CHLORIDE SERPL-SCNC: 97 MMOL/L — LOW (ref 98–107)
CO2 SERPL-SCNC: 20 MMOL/L — LOW (ref 22–31)
CO2 SERPL-SCNC: 20 MMOL/L — LOW (ref 22–31)
CREAT SERPL-MCNC: 1.1 MG/DL — SIGNIFICANT CHANGE UP (ref 0.5–1.3)
CREAT SERPL-MCNC: 1.1 MG/DL — SIGNIFICANT CHANGE UP (ref 0.5–1.3)
EGFR: 50 ML/MIN/1.73M2 — LOW
EGFR: 50 ML/MIN/1.73M2 — LOW
GLUCOSE BLDC GLUCOMTR-MCNC: 125 MG/DL — HIGH (ref 70–99)
GLUCOSE BLDC GLUCOMTR-MCNC: 125 MG/DL — HIGH (ref 70–99)
GLUCOSE SERPL-MCNC: 99 MG/DL — SIGNIFICANT CHANGE UP (ref 70–99)
GLUCOSE SERPL-MCNC: 99 MG/DL — SIGNIFICANT CHANGE UP (ref 70–99)
HCT VFR BLD CALC: 35.2 % — SIGNIFICANT CHANGE UP (ref 34.5–45)
HCT VFR BLD CALC: 35.2 % — SIGNIFICANT CHANGE UP (ref 34.5–45)
HGB BLD-MCNC: 11.8 G/DL — SIGNIFICANT CHANGE UP (ref 11.5–15.5)
HGB BLD-MCNC: 11.8 G/DL — SIGNIFICANT CHANGE UP (ref 11.5–15.5)
MAGNESIUM SERPL-MCNC: 1.8 MG/DL — SIGNIFICANT CHANGE UP (ref 1.6–2.6)
MAGNESIUM SERPL-MCNC: 1.8 MG/DL — SIGNIFICANT CHANGE UP (ref 1.6–2.6)
MCHC RBC-ENTMCNC: 29.5 PG — SIGNIFICANT CHANGE UP (ref 27–34)
MCHC RBC-ENTMCNC: 29.5 PG — SIGNIFICANT CHANGE UP (ref 27–34)
MCHC RBC-ENTMCNC: 33.5 GM/DL — SIGNIFICANT CHANGE UP (ref 32–36)
MCHC RBC-ENTMCNC: 33.5 GM/DL — SIGNIFICANT CHANGE UP (ref 32–36)
MCV RBC AUTO: 88 FL — SIGNIFICANT CHANGE UP (ref 80–100)
MCV RBC AUTO: 88 FL — SIGNIFICANT CHANGE UP (ref 80–100)
NRBC # BLD: 0 /100 WBCS — SIGNIFICANT CHANGE UP (ref 0–0)
NRBC # BLD: 0 /100 WBCS — SIGNIFICANT CHANGE UP (ref 0–0)
NRBC # FLD: 0 K/UL — SIGNIFICANT CHANGE UP (ref 0–0)
NRBC # FLD: 0 K/UL — SIGNIFICANT CHANGE UP (ref 0–0)
PHOSPHATE SERPL-MCNC: 4.3 MG/DL — SIGNIFICANT CHANGE UP (ref 2.5–4.5)
PHOSPHATE SERPL-MCNC: 4.3 MG/DL — SIGNIFICANT CHANGE UP (ref 2.5–4.5)
PLATELET # BLD AUTO: 417 K/UL — HIGH (ref 150–400)
PLATELET # BLD AUTO: 417 K/UL — HIGH (ref 150–400)
POTASSIUM SERPL-MCNC: 4.1 MMOL/L — SIGNIFICANT CHANGE UP (ref 3.5–5.3)
POTASSIUM SERPL-MCNC: 4.1 MMOL/L — SIGNIFICANT CHANGE UP (ref 3.5–5.3)
POTASSIUM SERPL-SCNC: 4.1 MMOL/L — SIGNIFICANT CHANGE UP (ref 3.5–5.3)
POTASSIUM SERPL-SCNC: 4.1 MMOL/L — SIGNIFICANT CHANGE UP (ref 3.5–5.3)
PROT SERPL-MCNC: 7.7 G/DL — SIGNIFICANT CHANGE UP (ref 6–8.3)
PROT SERPL-MCNC: 7.7 G/DL — SIGNIFICANT CHANGE UP (ref 6–8.3)
RBC # BLD: 4 M/UL — SIGNIFICANT CHANGE UP (ref 3.8–5.2)
RBC # BLD: 4 M/UL — SIGNIFICANT CHANGE UP (ref 3.8–5.2)
RBC # FLD: 12.3 % — SIGNIFICANT CHANGE UP (ref 10.3–14.5)
RBC # FLD: 12.3 % — SIGNIFICANT CHANGE UP (ref 10.3–14.5)
SODIUM SERPL-SCNC: 133 MMOL/L — LOW (ref 135–145)
SODIUM SERPL-SCNC: 133 MMOL/L — LOW (ref 135–145)
WBC # BLD: 8.67 K/UL — SIGNIFICANT CHANGE UP (ref 3.8–10.5)
WBC # BLD: 8.67 K/UL — SIGNIFICANT CHANGE UP (ref 3.8–10.5)
WBC # FLD AUTO: 8.67 K/UL — SIGNIFICANT CHANGE UP (ref 3.8–10.5)
WBC # FLD AUTO: 8.67 K/UL — SIGNIFICANT CHANGE UP (ref 3.8–10.5)

## 2024-01-04 PROCEDURE — 99232 SBSQ HOSP IP/OBS MODERATE 35: CPT | Mod: GC

## 2024-01-04 RX ADMIN — HEPARIN SODIUM 5000 UNIT(S): 5000 INJECTION INTRAVENOUS; SUBCUTANEOUS at 05:09

## 2024-01-04 RX ADMIN — AMLODIPINE BESYLATE 10 MILLIGRAM(S): 2.5 TABLET ORAL at 05:09

## 2024-01-04 RX ADMIN — SODIUM CHLORIDE 1 GRAM(S): 9 INJECTION INTRAMUSCULAR; INTRAVENOUS; SUBCUTANEOUS at 12:51

## 2024-01-04 RX ADMIN — SODIUM CHLORIDE 75 MILLILITER(S): 9 INJECTION, SOLUTION INTRAVENOUS at 03:54

## 2024-01-04 RX ADMIN — HEPARIN SODIUM 5000 UNIT(S): 5000 INJECTION INTRAVENOUS; SUBCUTANEOUS at 21:47

## 2024-01-04 RX ADMIN — Medication 3 MILLIGRAM(S): at 21:47

## 2024-01-04 RX ADMIN — HEPARIN SODIUM 5000 UNIT(S): 5000 INJECTION INTRAVENOUS; SUBCUTANEOUS at 12:51

## 2024-01-04 RX ADMIN — Medication 110 MILLIGRAM(S): at 06:25

## 2024-01-04 RX ADMIN — SODIUM CHLORIDE 1 GRAM(S): 9 INJECTION INTRAMUSCULAR; INTRAVENOUS; SUBCUTANEOUS at 05:09

## 2024-01-04 RX ADMIN — SODIUM CHLORIDE 1 GRAM(S): 9 INJECTION INTRAMUSCULAR; INTRAVENOUS; SUBCUTANEOUS at 21:47

## 2024-01-04 NOTE — PROGRESS NOTE ADULT - SUBJECTIVE AND OBJECTIVE BOX
Patient is a 83y old  Female who presents with a chief complaint of facial rash (03 Jan 2024 17:16)      INTERVAL HPI/OVERNIGHT EVENTS:    FAMILY HISTORY:  No pertinent family history in first degree relatives        Allergy Status Unknown      MEDS:  acetaminophen     Tablet .. 650 milliGRAM(s) Oral every 6 hours PRN  acyclovir IVPB 500 milliGRAM(s) IV Intermittent every 24 hours  amLODIPine   Tablet 10 milliGRAM(s) Oral daily  heparin   Injectable 5000 Unit(s) SubCutaneous every 8 hours  influenza  Vaccine (HIGH DOSE) 0.7 milliLiter(s) IntraMuscular once  lactated ringers. 1000 milliLiter(s) IV Continuous <Continuous>  melatonin 3 milliGRAM(s) Oral at bedtime  sodium chloride 1 Gram(s) Oral three times a day  sodium chloride 0.9%. 1000 milliLiter(s) IV Continuous <Continuous>  sodium chloride 0.9%. 1000 milliLiter(s) IV Continuous <Continuous>      T(C): 36.4 (01-04-24 @ 05:03), Max: 36.7 (01-03-24 @ 21:50)  HR: 85 (01-04-24 @ 05:03) (77 - 85)  BP: 158/84 (01-04-24 @ 05:03) (149/82 - 158/84)  RR: 18 (01-04-24 @ 05:03) (17 - 18)  SpO2: 98% (01-04-24 @ 05:03) (95% - 100%)  Wt(kg): --    PHYSICAL EXAM:  GENERAL: NAD, lying in bed, confused  HEAD:  Atraumatic, normocephalic  EYES: Eye discharge resolved. EOMI, conjunctiva and sclera clear  HEART: Regular rate and rhythm, no murmurs, rubs, or gallops  LUNGS: Unlabored respirations. Clear to auscultation bilaterally, no crackles, wheezing, or rhonchi  ABDOMEN: Soft, nontender, nondistended, +BS  EXTREMITIES: 2+ peripheral pulses bilaterally. No clubbing, cyanosis, or edema  NERVOUS SYSTEM: A&Ox1-2 (knows name, hospital setting)  SKIN: Improved rash by right jawline and chin, erythema resolved and rash is burnt out, scabbing, w/o oozing    Consultant(s) Notes Reviewed:  [x ] YES  [ ] NO  Care Discussed with Consultants/Other Providers [ x] YES  [ ] NO    LABS:                        11.9   11.06 )-----------( 425      ( 03 Jan 2024 07:26 )             33.8     137  |  103  |  20  ----------------------------<  124<H>  4.7   |  20<L>  |  1.48<H>    Ca    9.8      03 Jan 2024 07:26  Phos  3.8     01-03  Mg     2.30     01-03    TPro  7.6  /  Alb  3.8  /  TBili  0.6  /  DBili  x   /  AST  25  /  ALT  21  /  AlkPhos  96  01-03    CSF PCR Panel (12.29.23 @ 12:52)  Varicella zoster virus: Detected: Test results confirmed by repeat. 12/30/2023 01:20:35 EST DJ    RADIOLOGY & ADDITIONAL TESTS:  CT Head No Cont (12.27.23 @ 09:53)  IMPRESSION:  No acute intracranial hemorrhage or acute territorial infarct.  If   symptoms persist, follow-up MRI exam recommended.    Xray Chest 1 View- PORTABLE-Urgent (12.26.23 @ 08:48)  IMPRESSION:  No focal opacities.  Trace bilateral pleural effusions. Patient is a 83y old  Female who presents with a chief complaint of facial rash (03 Jan 2024 17:16)      INTERVAL HPI/OVERNIGHT EVENTS: No acute events overnight. Given rising Cr, nephrology saw her yesterday with recs to give LR for 24hrs. Bladder scan 198, Renal US w/o hydronephrosis. CTH neg.    FAMILY HISTORY:  No pertinent family history in first degree relatives    Allergy Status Unknown    MEDS:  acetaminophen     Tablet .. 650 milliGRAM(s) Oral every 6 hours PRN  acyclovir IVPB 500 milliGRAM(s) IV Intermittent every 24 hours  amLODIPine   Tablet 10 milliGRAM(s) Oral daily  heparin   Injectable 5000 Unit(s) SubCutaneous every 8 hours  influenza  Vaccine (HIGH DOSE) 0.7 milliLiter(s) IntraMuscular once  lactated ringers. 1000 milliLiter(s) IV Continuous <Continuous>  melatonin 3 milliGRAM(s) Oral at bedtime  sodium chloride 1 Gram(s) Oral three times a day  sodium chloride 0.9%. 1000 milliLiter(s) IV Continuous <Continuous>  sodium chloride 0.9%. 1000 milliLiter(s) IV Continuous <Continuous>    T(C): 36.4 (01-04-24 @ 05:03), Max: 36.7 (01-03-24 @ 21:50)  HR: 85 (01-04-24 @ 05:03) (77 - 85)  BP: 158/84 (01-04-24 @ 05:03) (149/82 - 158/84)  RR: 18 (01-04-24 @ 05:03) (17 - 18)  SpO2: 98% (01-04-24 @ 05:03) (95% - 100%)    PHYSICAL EXAM:  GENERAL: NAD, lying in bed, confused  HEAD:  Atraumatic, normocephalic  EYES: Eye discharge resolved. EOMI, conjunctiva and sclera clear  HEART: Regular rate and rhythm, no murmurs, rubs, or gallops  LUNGS: Unlabored respirations. Clear to auscultation bilaterally, no crackles, wheezing, or rhonchi  ABDOMEN: Soft, nontender, nondistended, +BS  EXTREMITIES: 2+ peripheral pulses bilaterally. No clubbing, cyanosis, or edema  NERVOUS SYSTEM: A&Ox1-2 (knows name, hospital setting)  SKIN: Improved rash by right jawline and chin, erythema resolved and rash is burnt out, scabbing, w/o oozing    Consultant(s) Notes Reviewed:  [x ] YES  [ ] NO  Care Discussed with Consultants/Other Providers [ x] YES  [ ] NO    LABS:                        11.9   11.06 )-----------( 425      ( 03 Jan 2024 07:26 )             33.8     137  |  103  |  20  ----------------------------<  124<H>  4.7   |  20<L>  |  1.48<H>    Ca    9.8      03 Jan 2024 07:26  Phos  3.8     01-03  Mg     2.30     01-03    TPro  7.6  /  Alb  3.8  /  TBili  0.6  /  DBili  x   /  AST  25  /  ALT  21  /  AlkPhos  96  01-03    CSF PCR Panel (12.29.23 @ 12:52)  Varicella zoster virus: Detected: Test results confirmed by repeat. 12/30/2023 01:20:35 EST DJ    RADIOLOGY & ADDITIONAL TESTS:  US Kidney and Bladder (01.03.24 @ 11:33)  IMPRESSION: Sonographically normal kidneys.  No hydronephrosis.    < from: CT Head No Cont (01.03.24 @ 08:38) >  IMPRESSION: No acute hemorrhage or mass effect.    CT Head No Cont (12.27.23 @ 09:53)  IMPRESSION:  No acute intracranial hemorrhage or acute territorial infarct.  If   symptoms persist, follow-up MRI exam recommended.    Xray Chest 1 View- PORTABLE-Urgent (12.26.23 @ 08:48)  IMPRESSION:  No focal opacities.  Trace bilateral pleural effusions.

## 2024-01-04 NOTE — PROGRESS NOTE ADULT - PROBLEM SELECTOR PLAN 3
Na 123 on admission. 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 Patient s/p 2L NS in ED. 12/31 Na 134.  -Nephrology consulted, appreciate recs  -Salt tabs 1mg TID 12/30  -Will give NS x2 hrs with acyclovir at 75cc/hr  -Nephro consulted, improving with recs from above  -resolved, ctm

## 2024-01-04 NOTE — PROGRESS NOTE ADULT - PROBLEM SELECTOR PLAN 2
Cr from 0.7 -> 1.48  - FeNa 5.9 - suggest post-renal obstruction/ FeUrea 36.9 suggesting intrinsic etiology.  - Bladder scan 198  - Renal US w/o hydro  - Nephrology c/s - rec to give LR @75 x 24 hrs

## 2024-01-04 NOTE — PROGRESS NOTE ADULT - PROBLEM SELECTOR PLAN 5
Weakness may be in the setting of AMS. Patient states herself that she walks with a walker.  -PT consulted, rec KONG, however family would prefer home dc with home PT

## 2024-01-04 NOTE — PROGRESS NOTE ADULT - PROBLEM SELECTOR PLAN 6
Patient with elevated BP to 195/70. Per daughter no history of HTN, does not take antihypertensives. Improvement with PO hydralazine to SBP 150s.  -12/29 /74  -c/w amlodipine 10mg

## 2024-01-04 NOTE — PROGRESS NOTE ADULT - PROBLEM SELECTOR PLAN 4
Reported to be A&Ox3 at baseline. 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  -MRI and MRA brain as above  -Acyclovir as above  -Treatment of hyponatremia as above  -Pt had unwitnessed fall 1/3, vitals stable, mentation at baseline, CTH negative  -MRI head ordered

## 2024-01-04 NOTE — PROGRESS NOTE ADULT - ATTENDING COMMENTS
Patient seen and examined. Case discussed with the medical team on rounds. I agree with the findings and the plan above.    82 year old female with history of chronic HA, vertigo, hyponatremia requiring hospitalization previously, now coming in with a couple days of generalized weakness, confusion, difficulty ambulating and facial rash. Afebrile, saturating well on RA. Exam similar to yesterday - awake in bed, oriented to person and place, not month or year. Answering simple questions/following simple commands.    On acyclovir for vzv encephalitis, sodium improved, however on 1/3 with new GENNY, will need to redose acyclovir. Reconsulted nephro, Cr improved with fluids.  Discussed with daughter, patient is generally AOx2, ambulates by herself to the bathroom. PT recommends KONG, however family would prefer home with PT.   Continue the rest of the work up and management as stated above.

## 2024-01-04 NOTE — CHART NOTE - NSCHARTNOTEFT_GEN_A_CORE
PT with improvement in serum Cr after receiving IVF, likely prerenal. Would cont as needed, encourage po intake  Cont salt tabs    Will sign off for now, please call with questions

## 2024-01-05 LAB
ANION GAP SERPL CALC-SCNC: 13 MMOL/L — SIGNIFICANT CHANGE UP (ref 7–14)
ANION GAP SERPL CALC-SCNC: 13 MMOL/L — SIGNIFICANT CHANGE UP (ref 7–14)
BUN SERPL-MCNC: 24 MG/DL — HIGH (ref 7–23)
BUN SERPL-MCNC: 24 MG/DL — HIGH (ref 7–23)
CALCIUM SERPL-MCNC: 9.2 MG/DL — SIGNIFICANT CHANGE UP (ref 8.4–10.5)
CALCIUM SERPL-MCNC: 9.2 MG/DL — SIGNIFICANT CHANGE UP (ref 8.4–10.5)
CHLORIDE SERPL-SCNC: 95 MMOL/L — LOW (ref 98–107)
CHLORIDE SERPL-SCNC: 95 MMOL/L — LOW (ref 98–107)
CO2 SERPL-SCNC: 26 MMOL/L — SIGNIFICANT CHANGE UP (ref 22–31)
CO2 SERPL-SCNC: 26 MMOL/L — SIGNIFICANT CHANGE UP (ref 22–31)
CREAT SERPL-MCNC: 1.08 MG/DL — SIGNIFICANT CHANGE UP (ref 0.5–1.3)
CREAT SERPL-MCNC: 1.08 MG/DL — SIGNIFICANT CHANGE UP (ref 0.5–1.3)
EGFR: 51 ML/MIN/1.73M2 — LOW
EGFR: 51 ML/MIN/1.73M2 — LOW
GLUCOSE SERPL-MCNC: 133 MG/DL — HIGH (ref 70–99)
GLUCOSE SERPL-MCNC: 133 MG/DL — HIGH (ref 70–99)
HCT VFR BLD CALC: 36.3 % — SIGNIFICANT CHANGE UP (ref 34.5–45)
HCT VFR BLD CALC: 36.3 % — SIGNIFICANT CHANGE UP (ref 34.5–45)
HGB BLD-MCNC: 12.5 G/DL — SIGNIFICANT CHANGE UP (ref 11.5–15.5)
HGB BLD-MCNC: 12.5 G/DL — SIGNIFICANT CHANGE UP (ref 11.5–15.5)
MAGNESIUM SERPL-MCNC: 1.7 MG/DL — SIGNIFICANT CHANGE UP (ref 1.6–2.6)
MAGNESIUM SERPL-MCNC: 1.7 MG/DL — SIGNIFICANT CHANGE UP (ref 1.6–2.6)
MCHC RBC-ENTMCNC: 29.8 PG — SIGNIFICANT CHANGE UP (ref 27–34)
MCHC RBC-ENTMCNC: 29.8 PG — SIGNIFICANT CHANGE UP (ref 27–34)
MCHC RBC-ENTMCNC: 34.4 GM/DL — SIGNIFICANT CHANGE UP (ref 32–36)
MCHC RBC-ENTMCNC: 34.4 GM/DL — SIGNIFICANT CHANGE UP (ref 32–36)
MCV RBC AUTO: 86.6 FL — SIGNIFICANT CHANGE UP (ref 80–100)
MCV RBC AUTO: 86.6 FL — SIGNIFICANT CHANGE UP (ref 80–100)
NRBC # BLD: 0 /100 WBCS — SIGNIFICANT CHANGE UP (ref 0–0)
NRBC # BLD: 0 /100 WBCS — SIGNIFICANT CHANGE UP (ref 0–0)
NRBC # FLD: 0 K/UL — SIGNIFICANT CHANGE UP (ref 0–0)
NRBC # FLD: 0 K/UL — SIGNIFICANT CHANGE UP (ref 0–0)
PHOSPHATE SERPL-MCNC: 4.3 MG/DL — SIGNIFICANT CHANGE UP (ref 2.5–4.5)
PHOSPHATE SERPL-MCNC: 4.3 MG/DL — SIGNIFICANT CHANGE UP (ref 2.5–4.5)
PLATELET # BLD AUTO: 421 K/UL — HIGH (ref 150–400)
PLATELET # BLD AUTO: 421 K/UL — HIGH (ref 150–400)
POTASSIUM SERPL-MCNC: 4 MMOL/L — SIGNIFICANT CHANGE UP (ref 3.5–5.3)
POTASSIUM SERPL-MCNC: 4 MMOL/L — SIGNIFICANT CHANGE UP (ref 3.5–5.3)
POTASSIUM SERPL-SCNC: 4 MMOL/L — SIGNIFICANT CHANGE UP (ref 3.5–5.3)
POTASSIUM SERPL-SCNC: 4 MMOL/L — SIGNIFICANT CHANGE UP (ref 3.5–5.3)
RBC # BLD: 4.19 M/UL — SIGNIFICANT CHANGE UP (ref 3.8–5.2)
RBC # BLD: 4.19 M/UL — SIGNIFICANT CHANGE UP (ref 3.8–5.2)
RBC # FLD: 12.4 % — SIGNIFICANT CHANGE UP (ref 10.3–14.5)
RBC # FLD: 12.4 % — SIGNIFICANT CHANGE UP (ref 10.3–14.5)
SODIUM SERPL-SCNC: 134 MMOL/L — LOW (ref 135–145)
SODIUM SERPL-SCNC: 134 MMOL/L — LOW (ref 135–145)
WBC # BLD: 7.89 K/UL — SIGNIFICANT CHANGE UP (ref 3.8–10.5)
WBC # BLD: 7.89 K/UL — SIGNIFICANT CHANGE UP (ref 3.8–10.5)
WBC # FLD AUTO: 7.89 K/UL — SIGNIFICANT CHANGE UP (ref 3.8–10.5)
WBC # FLD AUTO: 7.89 K/UL — SIGNIFICANT CHANGE UP (ref 3.8–10.5)

## 2024-01-05 PROCEDURE — 99232 SBSQ HOSP IP/OBS MODERATE 35: CPT | Mod: GC

## 2024-01-05 RX ORDER — SODIUM CHLORIDE 9 MG/ML
1000 INJECTION INTRAMUSCULAR; INTRAVENOUS; SUBCUTANEOUS
Refills: 0 | Status: COMPLETED | OUTPATIENT
Start: 2024-01-05 | End: 2024-01-05

## 2024-01-05 RX ORDER — MAGNESIUM SULFATE 500 MG/ML
2 VIAL (ML) INJECTION ONCE
Refills: 0 | Status: COMPLETED | OUTPATIENT
Start: 2024-01-05 | End: 2024-01-05

## 2024-01-05 RX ADMIN — AMLODIPINE BESYLATE 10 MILLIGRAM(S): 2.5 TABLET ORAL at 06:09

## 2024-01-05 RX ADMIN — Medication 110 MILLIGRAM(S): at 06:10

## 2024-01-05 RX ADMIN — Medication 25 GRAM(S): at 13:21

## 2024-01-05 RX ADMIN — HEPARIN SODIUM 5000 UNIT(S): 5000 INJECTION INTRAVENOUS; SUBCUTANEOUS at 12:47

## 2024-01-05 RX ADMIN — SODIUM CHLORIDE 75 MILLILITER(S): 9 INJECTION INTRAMUSCULAR; INTRAVENOUS; SUBCUTANEOUS at 07:27

## 2024-01-05 RX ADMIN — HEPARIN SODIUM 5000 UNIT(S): 5000 INJECTION INTRAVENOUS; SUBCUTANEOUS at 06:10

## 2024-01-05 RX ADMIN — SODIUM CHLORIDE 1 GRAM(S): 9 INJECTION INTRAMUSCULAR; INTRAVENOUS; SUBCUTANEOUS at 06:09

## 2024-01-05 RX ADMIN — SODIUM CHLORIDE 1 GRAM(S): 9 INJECTION INTRAMUSCULAR; INTRAVENOUS; SUBCUTANEOUS at 12:46

## 2024-01-05 RX ADMIN — HEPARIN SODIUM 5000 UNIT(S): 5000 INJECTION INTRAVENOUS; SUBCUTANEOUS at 22:09

## 2024-01-05 RX ADMIN — SODIUM CHLORIDE 1 GRAM(S): 9 INJECTION INTRAMUSCULAR; INTRAVENOUS; SUBCUTANEOUS at 22:08

## 2024-01-05 RX ADMIN — Medication 3 MILLIGRAM(S): at 22:08

## 2024-01-05 NOTE — PROGRESS NOTE ADULT - SUBJECTIVE AND OBJECTIVE BOX
Patient is a 83y old  Female who presents with a chief complaint of facial rash (04 Jan 2024 07:17)    INTERVAL HPI/OVERNIGHT EVENTS: No acute overnight events. Pt w/o complaints    FAMILY HISTORY:  No pertinent family history in first degree relatives    Allergy Status Unknown    MEDS:  acetaminophen     Tablet .. 650 milliGRAM(s) Oral every 6 hours PRN  acyclovir IVPB 500 milliGRAM(s) IV Intermittent every 24 hours  amLODIPine   Tablet 10 milliGRAM(s) Oral daily  heparin   Injectable 5000 Unit(s) SubCutaneous every 8 hours  influenza  Vaccine (HIGH DOSE) 0.7 milliLiter(s) IntraMuscular once  lactated ringers. 1000 milliLiter(s) IV Continuous <Continuous>  melatonin 3 milliGRAM(s) Oral at bedtime  sodium chloride 1 Gram(s) Oral three times a day  sodium chloride 0.9%. 1000 milliLiter(s) IV Continuous <Continuous>  sodium chloride 0.9%. 1000 milliLiter(s) IV Continuous <Continuous>  sodium chloride 0.9%. 1000 milliLiter(s) IV Continuous <Continuous>    T(C): 36.5 (01-05-24 @ 05:57), Max: 36.6 (01-04-24 @ 13:40)  HR: 64 (01-05-24 @ 05:57) (64 - 79)  BP: 151/64 (01-05-24 @ 05:57) (131/75 - 159/66)  RR: 17 (01-05-24 @ 05:57) (17 - 18)  SpO2: 100% (01-05-24 @ 05:57) (100% - 100%)  Wt(kg): --    PHYSICAL EXAM:  GENERAL: NAD, lying in bed, confused  HEAD:  Atraumatic, normocephalic  EYES: Eye discharge resolved. EOMI, conjunctiva and sclera clear  HEART: Regular rate and rhythm, no murmurs, rubs, or gallops  LUNGS: Unlabored respirations. Clear to auscultation bilaterally, no crackles, wheezing, or rhonchi  ABDOMEN: Soft, nontender, nondistended, +BS  EXTREMITIES: 2+ peripheral pulses bilaterally. No clubbing, cyanosis, or edema  NERVOUS SYSTEM: A&Ox1-2 (knows name, hospital setting)  SKIN: Improved rash by right jawline and chin, erythema resolved and rash is burnt out, scabbing, w/o oozing    Consultant(s) Notes Reviewed:  [x ] YES  [ ] NO  Care Discussed with Consultants/Other Providers [ x] YES  [ ] NO    LABS:                        11.8   8.67  )-----------( 417      ( 04 Jan 2024 06:45 )             35.2     133<L>  |  97<L>  |  18  ----------------------------<  99  4.1   |  20<L>  |  1.10    Ca    9.7      04 Jan 2024 06:45  Phos  4.3     01-04  Mg     1.80     01-04    TPro  7.7  /  Alb  3.7  /  TBili  0.6  /  DBili  x   /  AST  22  /  ALT  19  /  AlkPhos  96  01-04      CSF PCR Panel (12.29.23 @ 12:52)  Varicella zoster virus: Detected: Test results confirmed by repeat. 12/30/2023 01:20:35    RADIOLOGY & ADDITIONAL TESTS:  US Kidney and Bladder (01.03.24 @ 11:33)  IMPRESSION: Sonographically normal kidneys.  No hydronephrosis.    < from: CT Head No Cont (01.03.24 @ 08:38) >  IMPRESSION: No acute hemorrhage or mass effect.    CT Head No Cont (12.27.23 @ 09:53)  IMPRESSION:  No acute intracranial hemorrhage or acute territorial infarct.  If   symptoms persist, follow-up MRI exam recommended.    Xray Chest 1 View- PORTABLE-Urgent (12.26.23 @ 08:48)  IMPRESSION:  No focal opacities.  Trace bilateral pleural effusions.

## 2024-01-05 NOTE — PROGRESS NOTE ADULT - ATTENDING COMMENTS
Patient seen and examined. Case discussed with the medical team on rounds. I agree with the findings and the plan above.    82 year old female with history of chronic HA, vertigo, hyponatremia requiring hospitalization previously, now coming in with a couple days of generalized weakness, confusion, difficulty ambulating and facial rash. Afebrile, saturating well on RA. Exam similar to yesterday - awake in bed, oriented to person and place, not month or year. Answering simple questions/following simple commands.    On acyclovir for vzv encephalitis, sodium improved, however on 1/3 with new GENNY, will need to redose acyclovir. Reconsulted nephro, Cr improved with fluids.  Discussed with daughter, patient is generally AOx2, ambulates by herself to the bathroom. PT recommends [facility], family initially would prefer home with PT. However after learning about the deconditioning, they may prefer rehab.  Continue the rest of the work up and management as stated above.

## 2024-01-06 LAB
ALBUMIN SERPL ELPH-MCNC: 3.6 G/DL — SIGNIFICANT CHANGE UP (ref 3.3–5)
ALBUMIN SERPL ELPH-MCNC: 3.6 G/DL — SIGNIFICANT CHANGE UP (ref 3.3–5)
ALP SERPL-CCNC: 89 U/L — SIGNIFICANT CHANGE UP (ref 40–120)
ALP SERPL-CCNC: 89 U/L — SIGNIFICANT CHANGE UP (ref 40–120)
ALT FLD-CCNC: 17 U/L — SIGNIFICANT CHANGE UP (ref 4–33)
ALT FLD-CCNC: 17 U/L — SIGNIFICANT CHANGE UP (ref 4–33)
ANION GAP SERPL CALC-SCNC: 16 MMOL/L — HIGH (ref 7–14)
ANION GAP SERPL CALC-SCNC: 16 MMOL/L — HIGH (ref 7–14)
AST SERPL-CCNC: 18 U/L — SIGNIFICANT CHANGE UP (ref 4–32)
AST SERPL-CCNC: 18 U/L — SIGNIFICANT CHANGE UP (ref 4–32)
BILIRUB SERPL-MCNC: 0.5 MG/DL — SIGNIFICANT CHANGE UP (ref 0.2–1.2)
BILIRUB SERPL-MCNC: 0.5 MG/DL — SIGNIFICANT CHANGE UP (ref 0.2–1.2)
BUN SERPL-MCNC: 23 MG/DL — SIGNIFICANT CHANGE UP (ref 7–23)
BUN SERPL-MCNC: 23 MG/DL — SIGNIFICANT CHANGE UP (ref 7–23)
CALCIUM SERPL-MCNC: 9 MG/DL — SIGNIFICANT CHANGE UP (ref 8.4–10.5)
CALCIUM SERPL-MCNC: 9 MG/DL — SIGNIFICANT CHANGE UP (ref 8.4–10.5)
CHLORIDE SERPL-SCNC: 96 MMOL/L — LOW (ref 98–107)
CHLORIDE SERPL-SCNC: 96 MMOL/L — LOW (ref 98–107)
CO2 SERPL-SCNC: 23 MMOL/L — SIGNIFICANT CHANGE UP (ref 22–31)
CO2 SERPL-SCNC: 23 MMOL/L — SIGNIFICANT CHANGE UP (ref 22–31)
CREAT SERPL-MCNC: 0.93 MG/DL — SIGNIFICANT CHANGE UP (ref 0.5–1.3)
CREAT SERPL-MCNC: 0.93 MG/DL — SIGNIFICANT CHANGE UP (ref 0.5–1.3)
EGFR: 61 ML/MIN/1.73M2 — SIGNIFICANT CHANGE UP
EGFR: 61 ML/MIN/1.73M2 — SIGNIFICANT CHANGE UP
GLUCOSE SERPL-MCNC: 95 MG/DL — SIGNIFICANT CHANGE UP (ref 70–99)
GLUCOSE SERPL-MCNC: 95 MG/DL — SIGNIFICANT CHANGE UP (ref 70–99)
HCT VFR BLD CALC: 33.8 % — LOW (ref 34.5–45)
HCT VFR BLD CALC: 33.8 % — LOW (ref 34.5–45)
HGB BLD-MCNC: 11.4 G/DL — LOW (ref 11.5–15.5)
HGB BLD-MCNC: 11.4 G/DL — LOW (ref 11.5–15.5)
MAGNESIUM SERPL-MCNC: 2.2 MG/DL — SIGNIFICANT CHANGE UP (ref 1.6–2.6)
MAGNESIUM SERPL-MCNC: 2.2 MG/DL — SIGNIFICANT CHANGE UP (ref 1.6–2.6)
MCHC RBC-ENTMCNC: 29.3 PG — SIGNIFICANT CHANGE UP (ref 27–34)
MCHC RBC-ENTMCNC: 29.3 PG — SIGNIFICANT CHANGE UP (ref 27–34)
MCHC RBC-ENTMCNC: 33.7 GM/DL — SIGNIFICANT CHANGE UP (ref 32–36)
MCHC RBC-ENTMCNC: 33.7 GM/DL — SIGNIFICANT CHANGE UP (ref 32–36)
MCV RBC AUTO: 86.9 FL — SIGNIFICANT CHANGE UP (ref 80–100)
MCV RBC AUTO: 86.9 FL — SIGNIFICANT CHANGE UP (ref 80–100)
NRBC # BLD: 0 /100 WBCS — SIGNIFICANT CHANGE UP (ref 0–0)
NRBC # BLD: 0 /100 WBCS — SIGNIFICANT CHANGE UP (ref 0–0)
NRBC # FLD: 0 K/UL — SIGNIFICANT CHANGE UP (ref 0–0)
NRBC # FLD: 0 K/UL — SIGNIFICANT CHANGE UP (ref 0–0)
PHOSPHATE SERPL-MCNC: 3.8 MG/DL — SIGNIFICANT CHANGE UP (ref 2.5–4.5)
PHOSPHATE SERPL-MCNC: 3.8 MG/DL — SIGNIFICANT CHANGE UP (ref 2.5–4.5)
PLATELET # BLD AUTO: 442 K/UL — HIGH (ref 150–400)
PLATELET # BLD AUTO: 442 K/UL — HIGH (ref 150–400)
POTASSIUM SERPL-MCNC: 3.3 MMOL/L — LOW (ref 3.5–5.3)
POTASSIUM SERPL-MCNC: 3.3 MMOL/L — LOW (ref 3.5–5.3)
POTASSIUM SERPL-SCNC: 3.3 MMOL/L — LOW (ref 3.5–5.3)
POTASSIUM SERPL-SCNC: 3.3 MMOL/L — LOW (ref 3.5–5.3)
PROT SERPL-MCNC: 7.4 G/DL — SIGNIFICANT CHANGE UP (ref 6–8.3)
PROT SERPL-MCNC: 7.4 G/DL — SIGNIFICANT CHANGE UP (ref 6–8.3)
RBC # BLD: 3.89 M/UL — SIGNIFICANT CHANGE UP (ref 3.8–5.2)
RBC # BLD: 3.89 M/UL — SIGNIFICANT CHANGE UP (ref 3.8–5.2)
RBC # FLD: 12.2 % — SIGNIFICANT CHANGE UP (ref 10.3–14.5)
RBC # FLD: 12.2 % — SIGNIFICANT CHANGE UP (ref 10.3–14.5)
SODIUM SERPL-SCNC: 135 MMOL/L — SIGNIFICANT CHANGE UP (ref 135–145)
SODIUM SERPL-SCNC: 135 MMOL/L — SIGNIFICANT CHANGE UP (ref 135–145)
WBC # BLD: 7.45 K/UL — SIGNIFICANT CHANGE UP (ref 3.8–10.5)
WBC # BLD: 7.45 K/UL — SIGNIFICANT CHANGE UP (ref 3.8–10.5)
WBC # FLD AUTO: 7.45 K/UL — SIGNIFICANT CHANGE UP (ref 3.8–10.5)
WBC # FLD AUTO: 7.45 K/UL — SIGNIFICANT CHANGE UP (ref 3.8–10.5)

## 2024-01-06 PROCEDURE — 99232 SBSQ HOSP IP/OBS MODERATE 35: CPT | Mod: GC

## 2024-01-06 RX ORDER — POTASSIUM CHLORIDE 20 MEQ
40 PACKET (EA) ORAL ONCE
Refills: 0 | Status: COMPLETED | OUTPATIENT
Start: 2024-01-06 | End: 2024-01-06

## 2024-01-06 RX ORDER — SODIUM CHLORIDE 9 MG/ML
1000 INJECTION INTRAMUSCULAR; INTRAVENOUS; SUBCUTANEOUS
Refills: 0 | Status: DISCONTINUED | OUTPATIENT
Start: 2024-01-06 | End: 2024-01-08

## 2024-01-06 RX ORDER — ACETAMINOPHEN 500 MG
975 TABLET ORAL ONCE
Refills: 0 | Status: COMPLETED | OUTPATIENT
Start: 2024-01-06 | End: 2024-01-06

## 2024-01-06 RX ADMIN — SODIUM CHLORIDE 75 MILLILITER(S): 9 INJECTION INTRAMUSCULAR; INTRAVENOUS; SUBCUTANEOUS at 08:28

## 2024-01-06 RX ADMIN — HEPARIN SODIUM 5000 UNIT(S): 5000 INJECTION INTRAVENOUS; SUBCUTANEOUS at 21:14

## 2024-01-06 RX ADMIN — Medication 3 MILLIGRAM(S): at 21:15

## 2024-01-06 RX ADMIN — HEPARIN SODIUM 5000 UNIT(S): 5000 INJECTION INTRAVENOUS; SUBCUTANEOUS at 06:48

## 2024-01-06 RX ADMIN — SODIUM CHLORIDE 1 GRAM(S): 9 INJECTION INTRAMUSCULAR; INTRAVENOUS; SUBCUTANEOUS at 13:32

## 2024-01-06 RX ADMIN — Medication 975 MILLIGRAM(S): at 12:51

## 2024-01-06 RX ADMIN — HEPARIN SODIUM 5000 UNIT(S): 5000 INJECTION INTRAVENOUS; SUBCUTANEOUS at 13:32

## 2024-01-06 RX ADMIN — Medication 975 MILLIGRAM(S): at 13:51

## 2024-01-06 RX ADMIN — SODIUM CHLORIDE 1 GRAM(S): 9 INJECTION INTRAMUSCULAR; INTRAVENOUS; SUBCUTANEOUS at 21:14

## 2024-01-06 RX ADMIN — SODIUM CHLORIDE 75 MILLILITER(S): 9 INJECTION INTRAMUSCULAR; INTRAVENOUS; SUBCUTANEOUS at 08:55

## 2024-01-06 RX ADMIN — Medication 110 MILLIGRAM(S): at 06:44

## 2024-01-06 RX ADMIN — Medication 40 MILLIEQUIVALENT(S): at 12:52

## 2024-01-06 NOTE — PROGRESS NOTE ADULT - SUBJECTIVE AND OBJECTIVE BOX
Patient is a 83y old  Female who presents with a chief complaint of facial rash (06 Jan 2024 06:28)     INTERVAL HPI/OVERNIGHT EVENTS:  - No acute events overnight    SUBJECTIVE  - Patient seen and evaluated at bedside  - Patient reports presence of bilateral ear pain, headache, and neck pain  - Patient reports absence of chest pain, dyspnea, abd pain    MEDICATIONS  (STANDING):  acyclovir IVPB 500 milliGRAM(s) IV Intermittent every 24 hours  amLODIPine   Tablet 10 milliGRAM(s) Oral daily  heparin   Injectable 5000 Unit(s) SubCutaneous every 8 hours  influenza  Vaccine (HIGH DOSE) 0.7 milliLiter(s) IntraMuscular once  lactated ringers. 1000 milliLiter(s) (75 mL/Hr) IV Continuous <Continuous>  melatonin 3 milliGRAM(s) Oral at bedtime  sodium chloride 1 Gram(s) Oral three times a day  sodium chloride 0.9%. 1000 milliLiter(s) (75 mL/Hr) IV Continuous <Continuous>  sodium chloride 0.9%. 1000 milliLiter(s) (75 mL/Hr) IV Continuous <Continuous>    MEDICATIONS  (PRN):  acetaminophen     Tablet .. 650 milliGRAM(s) Oral every 6 hours PRN Temp greater or equal to 38C (100.4F), Mild Pain (1 - 3)        REVIEW OF SYSTEMS: As indicated above; otherwise, LIMITED as patient is hard of hearing    VITAL SIGNS:  T(F): 97.7 (01-06-24 @ 05:20), Max: 98.3 (01-05-24 @ 21:12)  HR: 72 (01-06-24 @ 05:20) (70 - 77)  BP: 148/84 (01-06-24 @ 05:20) (135/61 - 150/68)  RR: 18 (01-06-24 @ 05:20) (17 - 18)  SpO2: 100% (01-06-24 @ 05:20) (98% - 100%)    PHYSICAL EXAM:  General: NAD, well-groomed, well-developed  Ear: notable to be tender in pre-auricular area  Chest: Clear to auscultation bilaterally; no obvious rales, rhonchi, or wheezing  Heart: Regular rate and rhythm; intact S1 and S2; no obvious murmurs, rubs, or gallops  Abd: Soft, nontender to palpation, nondistended  Nervous System: Appears alert and oriented to situation but may be lacking understanding with what is going on due to being hard of hearing  Psych: Appropriate affect  Ext: no peripheral LE edema bilaterally    LABS:                        11.4   7.45  )-----------( 442      ( 06 Jan 2024 05:30 )             33.8     06 Jan 2024 05:30    135    |  96     |  23     ----------------------------<  95     3.3     |  23     |  0.93     Ca    9.0        06 Jan 2024 05:30  Phos  3.8       06 Jan 2024 05:30  Mg     2.20      06 Jan 2024 05:30    TPro  7.4    /  Alb  3.6    /  TBili  0.5    /  DBili  x      /  AST  18     /  ALT  17     /  AlkPhos  89     06 Jan 2024 05:30    CAPILLARY BLOOD GLUCOSE        BLOOD CULTURE    RADIOLOGY & ADDITIONAL TESTS:    Imaging Personally Reviewed:  [X ] YES     Consultant(s) Notes Reviewed:  Yes    Care Discussed with Consultants/Other Providers: Yes

## 2024-01-06 NOTE — PROGRESS NOTE ADULT - ASSESSMENT
82F with PMH hyponatremia, chronic headache, vertigo, s/p ILR 10/21, cataracts presenting with weakness and rash c/f VZV encephalitis.  82F with PMH hyponatremia, chronic headache, vertigo, s/p ILR 10/21, cataracts presenting with weakness and rash c/f VZV encephalitis.

## 2024-01-06 NOTE — PROGRESS NOTE ADULT - ATTENDING COMMENTS
Patient seen and examined by myself , case discussed  with resident ,agree with the above finding and plan      82 year old female with history of chronic HA, vertigo, hyponatremia requiring hospitalization previously, now coming in with a couple days of generalized weakness, confusion, difficulty ambulating and facial rash. Afebrile, saturating well on RA. Exam similar to yesterday - awake in bed, oriented to person and place, not month or year. Answering simple questions/following simple commands.    On acyclovir for vzv encephalitis, sodium improved, however on 1/3 with new GENNY, will need to redose acyclovir. Reconsulted nephro, Cr improved with fluids. Awaiting MRI/MRA of head for encephalitis w/u    Hypokalemia : will supplement , mOnitor BMP   Discussed with daughter, patient is generally AOx2, ambulates by herself to the bathroom. PT recommends [facility], Anujer does not want rehab, will continue to follow   Continue the rest of the work up and management as stated above.

## 2024-01-06 NOTE — PROGRESS NOTE ADULT - PROBLEM SELECTOR PLAN 3
Na 123 on admission. 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 Patient s/p 2L NS in ED. 12/31 Na 134.  -Nephrology consulted, appreciate recs  -Salt tabs 1mg TID 12/30  -Will give NS x2 hrs with acyclovir at 75cc/hr  -Nephro consulted, improving with recs from above  -resolved, ctm RESOLVED Na 135 (1/6/24)  Na 123 on admission. 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 Patient s/p 2L NS in ED. 12/31 Na 134.  -Nephrology consulted, appreciate recs  -Salt tabs 1mg TID 12/30  -Will give NS x2 hrs with acyclovir at 75cc/hr  -Nephro consulted, improving with recs from above  -resolved, ctm

## 2024-01-06 NOTE — PROGRESS NOTE ADULT - PROBLEM SELECTOR PLAN 1
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 infectious control discussed, no need for isolation 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 +  -MRI and MRA of the brain (c/f possible VZV vasculopathy)  -s/p unasyn (12/27- 12/31) per ID  -c/w acyclovir (12/26-) @ 10mg/kg q12h --> for total of 14 days, if discharged would need PICC bc not compatible with dosing via midline  -Maintenance IV fluids while on acyclovir --> NS x2 hrs with acyclovir at 75cc/hr  - 1/2: rash is fully closed and scabbed over, without erythema or drainage. infectious control discussed, no need for isolation  -Otalgia, headache, and neck pain --> ordered po 975mg Tylenol; MRI head pending  -consider ENT consult given bilateral ear pain and head pain unless attributing to VZV infection/encephalitis; WBC wnl so not very concerning for acute or worsening infection at this time

## 2024-01-06 NOTE — PROGRESS NOTE ADULT - PROBLEM SELECTOR PLAN 5
Minocycline Counseling: Patient advised regarding possible photosensitivity and discoloration of the teeth, skin, lips, tongue and gums.  Patient instructed to avoid sunlight, if possible.  When exposed to sunlight, patients should wear protective clothing, sunglasses, and sunscreen.  The patient was instructed to call the office immediately if the following severe adverse effects occur:  hearing changes, easy bruising/bleeding, severe headache, or vision changes.  The patient verbalized understanding of the proper use and possible adverse effects of minocycline.  All of the patient's questions and concerns were addressed. Weakness may be in the setting of AMS. Patient states herself that she walks with a walker.  -PT consulted, rec [facility], however family would prefer home dc with home PT Azithromycin Counseling:  I discussed with the patient the risks of azithromycin including but not limited to GI upset, allergic reaction, drug rash, diarrhea, and yeast infections. Dapsone Pregnancy And Lactation Text: This medication is Pregnancy Category C and is not considered safe during pregnancy or breast feeding. High Dose Vitamin A Counseling: Side effects reviewed, pt to contact office should one occur. Topical Clindamycin Pregnancy And Lactation Text: This medication is Pregnancy Category B and is considered safe during pregnancy. It is unknown if it is excreted in breast milk. Benzoyl Peroxide Counseling: Patient counseled that medicine may cause skin irritation and bleach clothing.  In the event of skin irritation, the patient was advised to reduce the amount of the drug applied or use it less frequently.   The patient verbalized understanding of the proper use and possible adverse effects of benzoyl peroxide.  All of the patient's questions and concerns were addressed. Spironolactone Pregnancy And Lactation Text: This medication can cause feminization of the male fetus and should be avoided during pregnancy. The active metabolite is also found in breast milk. Azelaic Acid Counseling: Patient counseled that medicine may cause skin irritation and to avoid applying near the eyes.  In the event of skin irritation, the patient was advised to reduce the amount of the drug applied or use it less frequently.   The patient verbalized understanding of the proper use and possible adverse effects of azelaic acid.  All of the patient's questions and concerns were addressed. Isotretinoin Counseling: Patient should get monthly blood tests, not donate blood, not drive at night if vision affected, not share medication, and not undergo elective surgery for 6 months after tx completed. Side effects reviewed, pt to contact office should one occur. Sarecycline Pregnancy And Lactation Text: This medication is Pregnancy Category D and not consider safe during pregnancy. It is also excreted in breast milk. Birth Control Pills Pregnancy And Lactation Text: This medication should be avoided if pregnant and for the first 30 days post-partum. Detail Level: Zone Erythromycin Counseling:  I discussed with the patient the risks of erythromycin including but not limited to GI upset, allergic reaction, drug rash, diarrhea, increase in liver enzymes, and yeast infections. Bactrim Pregnancy And Lactation Text: This medication is Pregnancy Category D and is known to cause fetal risk.  It is also excreted in breast milk. Tazorac Pregnancy And Lactation Text: This medication is not safe during pregnancy. It is unknown if this medication is excreted in breast milk. Topical Retinoid Pregnancy And Lactation Text: This medication is Pregnancy Category C. It is unknown if this medication is excreted in breast milk. Aklief counseling:  Patient advised to apply a pea-sized amount only at bedtime and wait 30 minutes after washing their face before applying.  If too drying, patient may add a non-comedogenic moisturizer.  The most commonly reported side effects including irritation, redness, scaling, dryness, stinging, burning, itching, and increased risk of sunburn.  The patient verbalized understanding of the proper use and possible adverse effects of retinoids.  All of the patient's questions and concerns were addressed. Include Pregnancy/Lactation Warning?: No Doxycycline Counseling:  Patient counseled regarding possible photosensitivity and increased risk for sunburn.  Patient instructed to avoid sunlight, if possible.  When exposed to sunlight, patients should wear protective clothing, sunglasses, and sunscreen.  The patient was instructed to call the office immediately if the following severe adverse effects occur:  hearing changes, easy bruising/bleeding, severe headache, or vision changes.  The patient verbalized understanding of the proper use and possible adverse effects of doxycycline.  All of the patient's questions and concerns were addressed. Azithromycin Pregnancy And Lactation Text: This medication is considered safe during pregnancy and is also secreted in breast milk. High Dose Vitamin A Pregnancy And Lactation Text: High dose vitamin A therapy is contraindicated during pregnancy and breast feeding. Winlevi Counseling:  I discussed with the patient the risks of topical clascoterone including but not limited to erythema, scaling, itching, and stinging. Patient voiced their understanding. Tetracycline Counseling: Patient counseled regarding possible photosensitivity and increased risk for sunburn.  Patient instructed to avoid sunlight, if possible.  When exposed to sunlight, patients should wear protective clothing, sunglasses, and sunscreen.  The patient was instructed to call the office immediately if the following severe adverse effects occur:  hearing changes, easy bruising/bleeding, severe headache, or vision changes.  The patient verbalized understanding of the proper use and possible adverse effects of tetracycline.  All of the patient's questions and concerns were addressed. Patient understands to avoid pregnancy while on therapy due to potential birth defects. Topical Sulfur Applications Counseling: Topical Sulfur Counseling: Patient counseled that this medication may cause skin irritation or allergic reactions.  In the event of skin irritation, the patient was advised to reduce the amount of the drug applied or use it less frequently.   The patient verbalized understanding of the proper use and possible adverse effects of topical sulfur application.  All of the patient's questions and concerns were addressed. Benzoyl Peroxide Pregnancy And Lactation Text: This medication is Pregnancy Category C. It is unknown if benzoyl peroxide is excreted in breast milk. Dapsone Counseling: I discussed with the patient the risks of dapsone including but not limited to hemolytic anemia, agranulocytosis, rashes, methemoglobinemia, kidney failure, peripheral neuropathy, headaches, GI upset, and liver toxicity.  Patients who start dapsone require monitoring including baseline LFTs and weekly CBCs for the first month, then every month thereafter.  The patient verbalized understanding of the proper use and possible adverse effects of dapsone.  All of the patient's questions and concerns were addressed. Isotretinoin Pregnancy And Lactation Text: This medication is Pregnancy Category X and is considered extremely dangerous during pregnancy. It is unknown if it is excreted in breast milk. Spironolactone Counseling: Patient advised regarding risks of diarrhea, abdominal pain, hyperkalemia, birth defects (for female patients), liver toxicity and renal toxicity. The patient may need blood work to monitor liver and kidney function and potassium levels while on therapy. The patient verbalized understanding of the proper use and possible adverse effects of spironolactone.  All of the patient's questions and concerns were addressed. Topical Clindamycin Counseling: Patient counseled that this medication may cause skin irritation or allergic reactions.  In the event of skin irritation, the patient was advised to reduce the amount of the drug applied or use it less frequently.   The patient verbalized understanding of the proper use and possible adverse effects of clindamycin.  All of the patient's questions and concerns were addressed. Birth Control Pills Counseling: Birth Control Pill Counseling: I discussed with the patient the potential side effects of OCPs including but not limited to increased risk of stroke, heart attack, thrombophlebitis, deep venous thrombosis, hepatic adenomas, breast changes, GI upset, headaches, and depression.  The patient verbalized understanding of the proper use and possible adverse effects of OCPs. All of the patient's questions and concerns were addressed. Azelaic Acid Pregnancy And Lactation Text: This medication is considered safe during pregnancy and breast feeding. Erythromycin Pregnancy And Lactation Text: This medication is Pregnancy Category B and is considered safe during pregnancy. It is also excreted in breast milk. Sarecycline Counseling: Patient advised regarding possible photosensitivity and discoloration of the teeth, skin, lips, tongue and gums.  Patient instructed to avoid sunlight, if possible.  When exposed to sunlight, patients should wear protective clothing, sunglasses, and sunscreen.  The patient was instructed to call the office immediately if the following severe adverse effects occur:  hearing changes, easy bruising/bleeding, severe headache, or vision changes.  The patient verbalized understanding of the proper use and possible adverse effects of sarecycline.  All of the patient's questions and concerns were addressed. Aklief Pregnancy And Lactation Text: It is unknown if this medication is safe to use during pregnancy.  It is unknown if this medication is excreted in breast milk.  Breastfeeding women should use the topical cream on the smallest area of the skin for the shortest time needed while breastfeeding.  Do not apply to nipple and areola. Tazorac Counseling:  Patient advised that medication is irritating and drying.  Patient may need to apply sparingly and wash off after an hour before eventually leaving it on overnight.  The patient verbalized understanding of the proper use and possible adverse effects of tazorac.  All of the patient's questions and concerns were addressed. Winlevi Pregnancy And Lactation Text: This medication is considered safe during pregnancy and breastfeeding. Bactrim Counseling:  I discussed with the patient the risks of sulfa antibiotics including but not limited to GI upset, allergic reaction, drug rash, diarrhea, dizziness, photosensitivity, and yeast infections.  Rarely, more serious reactions can occur including but not limited to aplastic anemia, agranulocytosis, methemoglobinemia, blood dyscrasias, liver or kidney failure, lung infiltrates or desquamative/blistering drug rashes. Doxycycline Pregnancy And Lactation Text: This medication is Pregnancy Category D and not consider safe during pregnancy. It is also excreted in breast milk but is considered safe for shorter treatment courses. Topical Sulfur Applications Pregnancy And Lactation Text: This medication is Pregnancy Category C and has an unknown safety profile during pregnancy. It is unknown if this topical medication is excreted in breast milk. Topical Retinoid counseling:  Patient advised to apply a pea-sized amount only at bedtime and wait 30 minutes after washing their face before applying.  If too drying, patient may add a non-comedogenic moisturizer. The patient verbalized understanding of the proper use and possible adverse effects of retinoids.  All of the patient's questions and concerns were addressed.

## 2024-01-06 NOTE — PROGRESS NOTE ADULT - PROBLEM SELECTOR PLAN 2
Cr from 0.7 -> 1.48  - FeNa 5.9 - suggest post-renal obstruction/ FeUrea 36.9 suggesting intrinsic etiology.  - Bladder scan 198  - Renal US w/o hydro  - Nephrology c/s - rec to give LR @75 x 24 hrs Resolved; Cr currently wnl  Cr from 0.7 -> 1.48  - FeNa 5.9 - suggest post-renal obstruction/ FeUrea 36.9 suggesting intrinsic etiology.  - Bladder scan 198  - Renal US w/o hydro  - Nephrology was previously consulted and recs appreciated

## 2024-01-07 LAB
ALBUMIN SERPL ELPH-MCNC: 3.9 G/DL — SIGNIFICANT CHANGE UP (ref 3.3–5)
ALBUMIN SERPL ELPH-MCNC: 3.9 G/DL — SIGNIFICANT CHANGE UP (ref 3.3–5)
ALP SERPL-CCNC: 89 U/L — SIGNIFICANT CHANGE UP (ref 40–120)
ALP SERPL-CCNC: 89 U/L — SIGNIFICANT CHANGE UP (ref 40–120)
ALT FLD-CCNC: 37 U/L — HIGH (ref 4–33)
ALT FLD-CCNC: 37 U/L — HIGH (ref 4–33)
ANION GAP SERPL CALC-SCNC: 14 MMOL/L — SIGNIFICANT CHANGE UP (ref 7–14)
ANION GAP SERPL CALC-SCNC: 14 MMOL/L — SIGNIFICANT CHANGE UP (ref 7–14)
AST SERPL-CCNC: 50 U/L — HIGH (ref 4–32)
AST SERPL-CCNC: 50 U/L — HIGH (ref 4–32)
BASOPHILS # BLD AUTO: 0.02 K/UL — SIGNIFICANT CHANGE UP (ref 0–0.2)
BASOPHILS # BLD AUTO: 0.02 K/UL — SIGNIFICANT CHANGE UP (ref 0–0.2)
BASOPHILS NFR BLD AUTO: 0.3 % — SIGNIFICANT CHANGE UP (ref 0–2)
BASOPHILS NFR BLD AUTO: 0.3 % — SIGNIFICANT CHANGE UP (ref 0–2)
BILIRUB SERPL-MCNC: 0.5 MG/DL — SIGNIFICANT CHANGE UP (ref 0.2–1.2)
BILIRUB SERPL-MCNC: 0.5 MG/DL — SIGNIFICANT CHANGE UP (ref 0.2–1.2)
BUN SERPL-MCNC: 18 MG/DL — SIGNIFICANT CHANGE UP (ref 7–23)
BUN SERPL-MCNC: 18 MG/DL — SIGNIFICANT CHANGE UP (ref 7–23)
CALCIUM SERPL-MCNC: 9.7 MG/DL — SIGNIFICANT CHANGE UP (ref 8.4–10.5)
CALCIUM SERPL-MCNC: 9.7 MG/DL — SIGNIFICANT CHANGE UP (ref 8.4–10.5)
CHLORIDE SERPL-SCNC: 96 MMOL/L — LOW (ref 98–107)
CHLORIDE SERPL-SCNC: 96 MMOL/L — LOW (ref 98–107)
CO2 SERPL-SCNC: 24 MMOL/L — SIGNIFICANT CHANGE UP (ref 22–31)
CO2 SERPL-SCNC: 24 MMOL/L — SIGNIFICANT CHANGE UP (ref 22–31)
CREAT SERPL-MCNC: 0.78 MG/DL — SIGNIFICANT CHANGE UP (ref 0.5–1.3)
CREAT SERPL-MCNC: 0.78 MG/DL — SIGNIFICANT CHANGE UP (ref 0.5–1.3)
EGFR: 76 ML/MIN/1.73M2 — SIGNIFICANT CHANGE UP
EGFR: 76 ML/MIN/1.73M2 — SIGNIFICANT CHANGE UP
EOSINOPHIL # BLD AUTO: 0.26 K/UL — SIGNIFICANT CHANGE UP (ref 0–0.5)
EOSINOPHIL # BLD AUTO: 0.26 K/UL — SIGNIFICANT CHANGE UP (ref 0–0.5)
EOSINOPHIL NFR BLD AUTO: 4.3 % — SIGNIFICANT CHANGE UP (ref 0–6)
EOSINOPHIL NFR BLD AUTO: 4.3 % — SIGNIFICANT CHANGE UP (ref 0–6)
GLUCOSE SERPL-MCNC: 79 MG/DL — SIGNIFICANT CHANGE UP (ref 70–99)
GLUCOSE SERPL-MCNC: 79 MG/DL — SIGNIFICANT CHANGE UP (ref 70–99)
HCT VFR BLD CALC: 34.6 % — SIGNIFICANT CHANGE UP (ref 34.5–45)
HCT VFR BLD CALC: 34.6 % — SIGNIFICANT CHANGE UP (ref 34.5–45)
HGB BLD-MCNC: 11.7 G/DL — SIGNIFICANT CHANGE UP (ref 11.5–15.5)
HGB BLD-MCNC: 11.7 G/DL — SIGNIFICANT CHANGE UP (ref 11.5–15.5)
IANC: 2.27 K/UL — SIGNIFICANT CHANGE UP (ref 1.8–7.4)
IANC: 2.27 K/UL — SIGNIFICANT CHANGE UP (ref 1.8–7.4)
IMM GRANULOCYTES NFR BLD AUTO: 0.7 % — SIGNIFICANT CHANGE UP (ref 0–0.9)
IMM GRANULOCYTES NFR BLD AUTO: 0.7 % — SIGNIFICANT CHANGE UP (ref 0–0.9)
LYMPHOCYTES # BLD AUTO: 2.88 K/UL — SIGNIFICANT CHANGE UP (ref 1–3.3)
LYMPHOCYTES # BLD AUTO: 2.88 K/UL — SIGNIFICANT CHANGE UP (ref 1–3.3)
LYMPHOCYTES # BLD AUTO: 47.8 % — HIGH (ref 13–44)
LYMPHOCYTES # BLD AUTO: 47.8 % — HIGH (ref 13–44)
MAGNESIUM SERPL-MCNC: 1.9 MG/DL — SIGNIFICANT CHANGE UP (ref 1.6–2.6)
MAGNESIUM SERPL-MCNC: 1.9 MG/DL — SIGNIFICANT CHANGE UP (ref 1.6–2.6)
MCHC RBC-ENTMCNC: 29.5 PG — SIGNIFICANT CHANGE UP (ref 27–34)
MCHC RBC-ENTMCNC: 29.5 PG — SIGNIFICANT CHANGE UP (ref 27–34)
MCHC RBC-ENTMCNC: 33.8 GM/DL — SIGNIFICANT CHANGE UP (ref 32–36)
MCHC RBC-ENTMCNC: 33.8 GM/DL — SIGNIFICANT CHANGE UP (ref 32–36)
MCV RBC AUTO: 87.2 FL — SIGNIFICANT CHANGE UP (ref 80–100)
MCV RBC AUTO: 87.2 FL — SIGNIFICANT CHANGE UP (ref 80–100)
MONOCYTES # BLD AUTO: 0.56 K/UL — SIGNIFICANT CHANGE UP (ref 0–0.9)
MONOCYTES # BLD AUTO: 0.56 K/UL — SIGNIFICANT CHANGE UP (ref 0–0.9)
MONOCYTES NFR BLD AUTO: 9.3 % — SIGNIFICANT CHANGE UP (ref 2–14)
MONOCYTES NFR BLD AUTO: 9.3 % — SIGNIFICANT CHANGE UP (ref 2–14)
NEUTROPHILS # BLD AUTO: 2.27 K/UL — SIGNIFICANT CHANGE UP (ref 1.8–7.4)
NEUTROPHILS # BLD AUTO: 2.27 K/UL — SIGNIFICANT CHANGE UP (ref 1.8–7.4)
NEUTROPHILS NFR BLD AUTO: 37.6 % — LOW (ref 43–77)
NEUTROPHILS NFR BLD AUTO: 37.6 % — LOW (ref 43–77)
NRBC # BLD: 0 /100 WBCS — SIGNIFICANT CHANGE UP (ref 0–0)
NRBC # BLD: 0 /100 WBCS — SIGNIFICANT CHANGE UP (ref 0–0)
NRBC # FLD: 0 K/UL — SIGNIFICANT CHANGE UP (ref 0–0)
NRBC # FLD: 0 K/UL — SIGNIFICANT CHANGE UP (ref 0–0)
PHOSPHATE SERPL-MCNC: 2.9 MG/DL — SIGNIFICANT CHANGE UP (ref 2.5–4.5)
PHOSPHATE SERPL-MCNC: 2.9 MG/DL — SIGNIFICANT CHANGE UP (ref 2.5–4.5)
PLATELET # BLD AUTO: 489 K/UL — HIGH (ref 150–400)
PLATELET # BLD AUTO: 489 K/UL — HIGH (ref 150–400)
POTASSIUM SERPL-MCNC: 3.2 MMOL/L — LOW (ref 3.5–5.3)
POTASSIUM SERPL-MCNC: 3.2 MMOL/L — LOW (ref 3.5–5.3)
POTASSIUM SERPL-SCNC: 3.2 MMOL/L — LOW (ref 3.5–5.3)
POTASSIUM SERPL-SCNC: 3.2 MMOL/L — LOW (ref 3.5–5.3)
PROT SERPL-MCNC: 7.8 G/DL — SIGNIFICANT CHANGE UP (ref 6–8.3)
PROT SERPL-MCNC: 7.8 G/DL — SIGNIFICANT CHANGE UP (ref 6–8.3)
RBC # BLD: 3.97 M/UL — SIGNIFICANT CHANGE UP (ref 3.8–5.2)
RBC # BLD: 3.97 M/UL — SIGNIFICANT CHANGE UP (ref 3.8–5.2)
RBC # FLD: 12.4 % — SIGNIFICANT CHANGE UP (ref 10.3–14.5)
RBC # FLD: 12.4 % — SIGNIFICANT CHANGE UP (ref 10.3–14.5)
SODIUM SERPL-SCNC: 134 MMOL/L — LOW (ref 135–145)
SODIUM SERPL-SCNC: 134 MMOL/L — LOW (ref 135–145)
WBC # BLD: 6.03 K/UL — SIGNIFICANT CHANGE UP (ref 3.8–10.5)
WBC # BLD: 6.03 K/UL — SIGNIFICANT CHANGE UP (ref 3.8–10.5)
WBC # FLD AUTO: 6.03 K/UL — SIGNIFICANT CHANGE UP (ref 3.8–10.5)
WBC # FLD AUTO: 6.03 K/UL — SIGNIFICANT CHANGE UP (ref 3.8–10.5)

## 2024-01-07 PROCEDURE — 99232 SBSQ HOSP IP/OBS MODERATE 35: CPT | Mod: GC

## 2024-01-07 RX ORDER — MAGNESIUM SULFATE 500 MG/ML
2 VIAL (ML) INJECTION ONCE
Refills: 0 | Status: COMPLETED | OUTPATIENT
Start: 2024-01-07 | End: 2024-01-07

## 2024-01-07 RX ORDER — POTASSIUM CHLORIDE 20 MEQ
40 PACKET (EA) ORAL ONCE
Refills: 0 | Status: COMPLETED | OUTPATIENT
Start: 2024-01-07 | End: 2024-01-07

## 2024-01-07 RX ORDER — POTASSIUM CHLORIDE 20 MEQ
40 PACKET (EA) ORAL EVERY 4 HOURS
Refills: 0 | Status: COMPLETED | OUTPATIENT
Start: 2024-01-07 | End: 2024-01-07

## 2024-01-07 RX ADMIN — Medication 40 MILLIEQUIVALENT(S): at 16:00

## 2024-01-07 RX ADMIN — SODIUM CHLORIDE 1 GRAM(S): 9 INJECTION INTRAMUSCULAR; INTRAVENOUS; SUBCUTANEOUS at 13:04

## 2024-01-07 RX ADMIN — AMLODIPINE BESYLATE 10 MILLIGRAM(S): 2.5 TABLET ORAL at 05:54

## 2024-01-07 RX ADMIN — SODIUM CHLORIDE 1 GRAM(S): 9 INJECTION INTRAMUSCULAR; INTRAVENOUS; SUBCUTANEOUS at 21:46

## 2024-01-07 RX ADMIN — HEPARIN SODIUM 5000 UNIT(S): 5000 INJECTION INTRAVENOUS; SUBCUTANEOUS at 05:54

## 2024-01-07 RX ADMIN — Medication 40 MILLIEQUIVALENT(S): at 13:03

## 2024-01-07 RX ADMIN — SODIUM CHLORIDE 1 GRAM(S): 9 INJECTION INTRAMUSCULAR; INTRAVENOUS; SUBCUTANEOUS at 05:53

## 2024-01-07 RX ADMIN — Medication 3 MILLIGRAM(S): at 21:47

## 2024-01-07 RX ADMIN — Medication 25 GRAM(S): at 13:02

## 2024-01-07 RX ADMIN — HEPARIN SODIUM 5000 UNIT(S): 5000 INJECTION INTRAVENOUS; SUBCUTANEOUS at 21:47

## 2024-01-07 RX ADMIN — HEPARIN SODIUM 5000 UNIT(S): 5000 INJECTION INTRAVENOUS; SUBCUTANEOUS at 13:05

## 2024-01-07 RX ADMIN — Medication 40 MILLIEQUIVALENT(S): at 21:17

## 2024-01-07 RX ADMIN — Medication 110 MILLIGRAM(S): at 06:55

## 2024-01-07 NOTE — PROGRESS NOTE ADULT - ATTENDING COMMENTS
Patient seen and examined by myself , case discussed  with resident ,agree with the above finding and plan      82 year old female with history of chronic HA, vertigo, hyponatremia requiring hospitalization previously, now coming in with a couple days of generalized weakness, confusion, difficulty ambulating and facial rash. Afebrile, saturating well on RA. Exam similar to yesterday - awake in bed, oriented to person and place, not month or year. Answering simple questions/following simple commands.    On acyclovir for vzv encephalitis, sodium improved, however on 1/3 with new GENNY, will need to redose acyclovir. Reconsulted nephro, Cr improved with fluids. Awaiting MRI/MRA of head for encephalitis w/u    Hypokalemia : will supplement , monitor BMP ,   Mild transaminitis: may be secondary to  viral infection, will CTM   Discussed with family at bedside , patient is generally AOx2, ambulates by herself to the bathroom.  Pt at baseline mental status PT recommends KONG, Pt and family  do not want rehab, will continue to follow   Continue the rest of the work up and management as stated above.

## 2024-01-07 NOTE — PROGRESS NOTE ADULT - PROBLEM SELECTOR PLAN 3
RESOLVED Na 135 (1/6/24)  Na 123 on admission. 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 Patient s/p 2L NS in ED. 12/31 Na 134.  -Nephrology consulted, appreciate recs  -Salt tabs 1mg TID 12/30  -Will give NS x2 hrs with acyclovir at 75cc/hr  -Nephro consulted, improving with recs from above  -resolved, ctm

## 2024-01-07 NOTE — PROGRESS NOTE ADULT - PROBLEM SELECTOR PLAN 1
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 infectious control discussed, no need for isolation  -Otalgia, headache, and neck pain --> ordered po 975mg Tylenol; MRI head pending  -consider ENT consult given bilateral ear pain and head pain unless attributing to VZV infection/encephalitis

## 2024-01-07 NOTE — PROGRESS NOTE ADULT - PROBLEM SELECTOR PLAN 2
Resolved; Cr currently wnl  Cr from 0.7 -> 1.48  - FeNa 5.9 - suggest post-renal obstruction/ FeUrea 36.9 suggesting intrinsic etiology.  - Bladder scan 198  - Renal US w/o hydro  - Nephrology was previously consulted and recs appreciated Resolved; Cr currently wnl  Cr from 0.7 -> 1.48  - FeNa 5.9 - suggest post-renal obstruction/ FeUrea 36.9 suggesting intrinsic etiology.  - Bladder scan 198  - Renal US w/o hydro

## 2024-01-07 NOTE — PROGRESS NOTE ADULT - SUBJECTIVE AND OBJECTIVE BOX
Patient is a 83y old  Female who presents with a chief complaint of facial rash (06 Jan 2024 06:28)      INTERVAL HPI/OVERNIGHT EVENTS:    FAMILY HISTORY:  No pertinent family history in first degree relatives        Allergy Status Unknown      MEDS:  acetaminophen     Tablet .. 650 milliGRAM(s) Oral every 6 hours PRN  acyclovir IVPB 500 milliGRAM(s) IV Intermittent every 24 hours  amLODIPine   Tablet 10 milliGRAM(s) Oral daily  heparin   Injectable 5000 Unit(s) SubCutaneous every 8 hours  influenza  Vaccine (HIGH DOSE) 0.7 milliLiter(s) IntraMuscular once  lactated ringers. 1000 milliLiter(s) IV Continuous <Continuous>  melatonin 3 milliGRAM(s) Oral at bedtime  sodium chloride 1 Gram(s) Oral three times a day  sodium chloride 0.9%. 1000 milliLiter(s) IV Continuous <Continuous>  sodium chloride 0.9%. 1000 milliLiter(s) IV Continuous <Continuous>      T(C): 36.5 (01-07-24 @ 04:51), Max: 36.9 (01-06-24 @ 21:25)  HR: 77 (01-07-24 @ 04:51) (67 - 77)  BP: 143/84 (01-07-24 @ 04:51) (142/80 - 149/71)  RR: 18 (01-07-24 @ 04:51) (18 - 18)  SpO2: 100% (01-07-24 @ 04:51) (100% - 100%)  Wt(kg): --    PHYSICAL EXAM:  GENERAL: NAD, lying in bed, confused  HEAD:  Atraumatic, normocephalic  EYES: Eye discharge resolved. EOMI, conjunctiva and sclera clear  HEART: Regular rate and rhythm, no murmurs, rubs, or gallops  LUNGS: Unlabored respirations. Clear to auscultation bilaterally, no crackles, wheezing, or rhonchi  ABDOMEN: Soft, nontender, nondistended, +BS  EXTREMITIES: 2+ peripheral pulses bilaterally. No clubbing, cyanosis, or edema  NERVOUS SYSTEM: A&Ox1-2 (knows name, hospital setting)  SKIN: Improved rash by right jawline and chin, erythema resolved and rash is burnt out, scabbing, w/o oozing    Consultant(s) Notes Reviewed:  [x ] YES  [ ] NO  Care Discussed with Consultants/Other Providers [ x] YES  [ ] NO    LABS:                        11.4   7.45  )-----------( 442      ( 06 Jan 2024 05:30 )             33.8     135  |  96<L>  |  23  ----------------------------<  95  3.3<L>   |  23  |  0.93    Ca    9.0      06 Jan 2024 05:30  Phos  3.8     01-06  Mg     2.20     01-06    TPro  7.4  /  Alb  3.6  /  TBili  0.5  /  DBili  x   /  AST  18  /  ALT  17  /  AlkPhos  89  01-06    RADIOLOGY & ADDITIONAL TESTS:    US Kidney and Bladder (01.03.24 @ 11:33)  Sonographically normal kidneys.  No hydronephrosis.    CT Head No Cont (01.03.24 @ 08:38)  No acute hemorrhage or mass effect.    CT Head No Cont (12.27.23 @ 09:53)  No acute intracranial hemorrhage or acute territorial infarct.  If   symptoms persist, follow-up MRI exam recommended.    Xray Chest 1 View- PORTABLE-Urgent (12.26.23 @ 08:48)  No focal opacities.  Trace bilateral pleural effusions.   Patient is a 83y old  Female who presents with a chief complaint of facial rash (06 Jan 2024 06:28)    INTERVAL HPI/OVERNIGHT EVENTS: No acute overnight events. Patient S&E at bedside. Pt feels better, denies any pain. Wants to go home.    FAMILY HISTORY:  No pertinent family history in first degree relatives  Allergy Status Unknown      MEDS:  acetaminophen     Tablet .. 650 milliGRAM(s) Oral every 6 hours PRN  acyclovir IVPB 500 milliGRAM(s) IV Intermittent every 24 hours  amLODIPine   Tablet 10 milliGRAM(s) Oral daily  heparin   Injectable 5000 Unit(s) SubCutaneous every 8 hours  influenza  Vaccine (HIGH DOSE) 0.7 milliLiter(s) IntraMuscular once  lactated ringers. 1000 milliLiter(s) IV Continuous <Continuous>  melatonin 3 milliGRAM(s) Oral at bedtime  sodium chloride 1 Gram(s) Oral three times a day  sodium chloride 0.9%. 1000 milliLiter(s) IV Continuous <Continuous>  sodium chloride 0.9%. 1000 milliLiter(s) IV Continuous <Continuous>      T(C): 36.5 (01-07-24 @ 04:51), Max: 36.9 (01-06-24 @ 21:25)  HR: 77 (01-07-24 @ 04:51) (67 - 77)  BP: 143/84 (01-07-24 @ 04:51) (142/80 - 149/71)  RR: 18 (01-07-24 @ 04:51) (18 - 18)  SpO2: 100% (01-07-24 @ 04:51) (100% - 100%)  Wt(kg): --    PHYSICAL EXAM:  GENERAL: NAD, lying in bed, confused  HEAD:  Atraumatic, normocephalic  EYES: Eye discharge resolved. EOMI, conjunctiva and sclera clear  HEART: Regular rate and rhythm, no murmurs, rubs, or gallops  LUNGS: Unlabored respirations. Clear to auscultation bilaterally, no crackles, wheezing, or rhonchi  ABDOMEN: Soft, nontender, nondistended, +BS  EXTREMITIES: 2+ peripheral pulses bilaterally. No clubbing, cyanosis, or edema  NERVOUS SYSTEM: A&Ox1-2 (knows name, hospital setting)  SKIN: Improved rash by right jawline and chin, erythema resolved and rash is burnt out, scabbing, w/o oozing    Consultant(s) Notes Reviewed:  [x ] YES  [ ] NO  Care Discussed with Consultants/Other Providers [ x] YES  [ ] NO    LABS:                        11.4   7.45  )-----------( 442      ( 06 Jan 2024 05:30 )             33.8     135  |  96<L>  |  23  ----------------------------<  95  3.3<L>   |  23  |  0.93    Ca    9.0      06 Jan 2024 05:30  Phos  3.8     01-06  Mg     2.20     01-06    TPro  7.4  /  Alb  3.6  /  TBili  0.5  /  DBili  x   /  AST  18  /  ALT  17  /  AlkPhos  89  01-06    RADIOLOGY & ADDITIONAL TESTS:    US Kidney and Bladder (01.03.24 @ 11:33)  Sonographically normal kidneys.  No hydronephrosis.    CT Head No Cont (01.03.24 @ 08:38)  No acute hemorrhage or mass effect.    CT Head No Cont (12.27.23 @ 09:53)  No acute intracranial hemorrhage or acute territorial infarct.  If   symptoms persist, follow-up MRI exam recommended.    Xray Chest 1 View- PORTABLE-Urgent (12.26.23 @ 08:48)  No focal opacities.  Trace bilateral pleural effusions.

## 2024-01-08 LAB
ALBUMIN SERPL ELPH-MCNC: 4 G/DL — SIGNIFICANT CHANGE UP (ref 3.3–5)
ALBUMIN SERPL ELPH-MCNC: 4 G/DL — SIGNIFICANT CHANGE UP (ref 3.3–5)
ALP SERPL-CCNC: 100 U/L — SIGNIFICANT CHANGE UP (ref 40–120)
ALP SERPL-CCNC: 100 U/L — SIGNIFICANT CHANGE UP (ref 40–120)
ALT FLD-CCNC: 51 U/L — HIGH (ref 4–33)
ALT FLD-CCNC: 51 U/L — HIGH (ref 4–33)
ANION GAP SERPL CALC-SCNC: 15 MMOL/L — HIGH (ref 7–14)
ANION GAP SERPL CALC-SCNC: 15 MMOL/L — HIGH (ref 7–14)
AST SERPL-CCNC: 48 U/L — HIGH (ref 4–32)
AST SERPL-CCNC: 48 U/L — HIGH (ref 4–32)
BASOPHILS # BLD AUTO: 0.03 K/UL — SIGNIFICANT CHANGE UP (ref 0–0.2)
BASOPHILS # BLD AUTO: 0.03 K/UL — SIGNIFICANT CHANGE UP (ref 0–0.2)
BASOPHILS NFR BLD AUTO: 0.4 % — SIGNIFICANT CHANGE UP (ref 0–2)
BASOPHILS NFR BLD AUTO: 0.4 % — SIGNIFICANT CHANGE UP (ref 0–2)
BILIRUB SERPL-MCNC: 0.6 MG/DL — SIGNIFICANT CHANGE UP (ref 0.2–1.2)
BILIRUB SERPL-MCNC: 0.6 MG/DL — SIGNIFICANT CHANGE UP (ref 0.2–1.2)
BUN SERPL-MCNC: 26 MG/DL — HIGH (ref 7–23)
BUN SERPL-MCNC: 26 MG/DL — HIGH (ref 7–23)
CALCIUM SERPL-MCNC: 10 MG/DL — SIGNIFICANT CHANGE UP (ref 8.4–10.5)
CALCIUM SERPL-MCNC: 10 MG/DL — SIGNIFICANT CHANGE UP (ref 8.4–10.5)
CHLORIDE SERPL-SCNC: 98 MMOL/L — SIGNIFICANT CHANGE UP (ref 98–107)
CHLORIDE SERPL-SCNC: 98 MMOL/L — SIGNIFICANT CHANGE UP (ref 98–107)
CO2 SERPL-SCNC: 22 MMOL/L — SIGNIFICANT CHANGE UP (ref 22–31)
CO2 SERPL-SCNC: 22 MMOL/L — SIGNIFICANT CHANGE UP (ref 22–31)
CREAT SERPL-MCNC: 1.18 MG/DL — SIGNIFICANT CHANGE UP (ref 0.5–1.3)
CREAT SERPL-MCNC: 1.18 MG/DL — SIGNIFICANT CHANGE UP (ref 0.5–1.3)
EGFR: 46 ML/MIN/1.73M2 — LOW
EGFR: 46 ML/MIN/1.73M2 — LOW
EOSINOPHIL # BLD AUTO: 0.25 K/UL — SIGNIFICANT CHANGE UP (ref 0–0.5)
EOSINOPHIL # BLD AUTO: 0.25 K/UL — SIGNIFICANT CHANGE UP (ref 0–0.5)
EOSINOPHIL NFR BLD AUTO: 3 % — SIGNIFICANT CHANGE UP (ref 0–6)
EOSINOPHIL NFR BLD AUTO: 3 % — SIGNIFICANT CHANGE UP (ref 0–6)
GLUCOSE SERPL-MCNC: 95 MG/DL — SIGNIFICANT CHANGE UP (ref 70–99)
GLUCOSE SERPL-MCNC: 95 MG/DL — SIGNIFICANT CHANGE UP (ref 70–99)
HCT VFR BLD CALC: 38.2 % — SIGNIFICANT CHANGE UP (ref 34.5–45)
HCT VFR BLD CALC: 38.2 % — SIGNIFICANT CHANGE UP (ref 34.5–45)
HGB BLD-MCNC: 13 G/DL — SIGNIFICANT CHANGE UP (ref 11.5–15.5)
HGB BLD-MCNC: 13 G/DL — SIGNIFICANT CHANGE UP (ref 11.5–15.5)
IANC: 4.28 K/UL — SIGNIFICANT CHANGE UP (ref 1.8–7.4)
IANC: 4.28 K/UL — SIGNIFICANT CHANGE UP (ref 1.8–7.4)
IMM GRANULOCYTES NFR BLD AUTO: 0.5 % — SIGNIFICANT CHANGE UP (ref 0–0.9)
IMM GRANULOCYTES NFR BLD AUTO: 0.5 % — SIGNIFICANT CHANGE UP (ref 0–0.9)
LYMPHOCYTES # BLD AUTO: 2.88 K/UL — SIGNIFICANT CHANGE UP (ref 1–3.3)
LYMPHOCYTES # BLD AUTO: 2.88 K/UL — SIGNIFICANT CHANGE UP (ref 1–3.3)
LYMPHOCYTES # BLD AUTO: 34.8 % — SIGNIFICANT CHANGE UP (ref 13–44)
LYMPHOCYTES # BLD AUTO: 34.8 % — SIGNIFICANT CHANGE UP (ref 13–44)
MAGNESIUM SERPL-MCNC: 2.5 MG/DL — SIGNIFICANT CHANGE UP (ref 1.6–2.6)
MAGNESIUM SERPL-MCNC: 2.5 MG/DL — SIGNIFICANT CHANGE UP (ref 1.6–2.6)
MCHC RBC-ENTMCNC: 30 PG — SIGNIFICANT CHANGE UP (ref 27–34)
MCHC RBC-ENTMCNC: 30 PG — SIGNIFICANT CHANGE UP (ref 27–34)
MCHC RBC-ENTMCNC: 34 GM/DL — SIGNIFICANT CHANGE UP (ref 32–36)
MCHC RBC-ENTMCNC: 34 GM/DL — SIGNIFICANT CHANGE UP (ref 32–36)
MCV RBC AUTO: 88.2 FL — SIGNIFICANT CHANGE UP (ref 80–100)
MCV RBC AUTO: 88.2 FL — SIGNIFICANT CHANGE UP (ref 80–100)
MONOCYTES # BLD AUTO: 0.8 K/UL — SIGNIFICANT CHANGE UP (ref 0–0.9)
MONOCYTES # BLD AUTO: 0.8 K/UL — SIGNIFICANT CHANGE UP (ref 0–0.9)
MONOCYTES NFR BLD AUTO: 9.7 % — SIGNIFICANT CHANGE UP (ref 2–14)
MONOCYTES NFR BLD AUTO: 9.7 % — SIGNIFICANT CHANGE UP (ref 2–14)
NEUTROPHILS # BLD AUTO: 4.28 K/UL — SIGNIFICANT CHANGE UP (ref 1.8–7.4)
NEUTROPHILS # BLD AUTO: 4.28 K/UL — SIGNIFICANT CHANGE UP (ref 1.8–7.4)
NEUTROPHILS NFR BLD AUTO: 51.6 % — SIGNIFICANT CHANGE UP (ref 43–77)
NEUTROPHILS NFR BLD AUTO: 51.6 % — SIGNIFICANT CHANGE UP (ref 43–77)
NRBC # BLD: 0 /100 WBCS — SIGNIFICANT CHANGE UP (ref 0–0)
NRBC # BLD: 0 /100 WBCS — SIGNIFICANT CHANGE UP (ref 0–0)
NRBC # FLD: 0 K/UL — SIGNIFICANT CHANGE UP (ref 0–0)
NRBC # FLD: 0 K/UL — SIGNIFICANT CHANGE UP (ref 0–0)
PHOSPHATE SERPL-MCNC: 3.9 MG/DL — SIGNIFICANT CHANGE UP (ref 2.5–4.5)
PHOSPHATE SERPL-MCNC: 3.9 MG/DL — SIGNIFICANT CHANGE UP (ref 2.5–4.5)
PLATELET # BLD AUTO: 484 K/UL — HIGH (ref 150–400)
PLATELET # BLD AUTO: 484 K/UL — HIGH (ref 150–400)
POTASSIUM SERPL-MCNC: 4.3 MMOL/L — SIGNIFICANT CHANGE UP (ref 3.5–5.3)
POTASSIUM SERPL-MCNC: 4.3 MMOL/L — SIGNIFICANT CHANGE UP (ref 3.5–5.3)
POTASSIUM SERPL-SCNC: 4.3 MMOL/L — SIGNIFICANT CHANGE UP (ref 3.5–5.3)
POTASSIUM SERPL-SCNC: 4.3 MMOL/L — SIGNIFICANT CHANGE UP (ref 3.5–5.3)
PROT SERPL-MCNC: 8.2 G/DL — SIGNIFICANT CHANGE UP (ref 6–8.3)
PROT SERPL-MCNC: 8.2 G/DL — SIGNIFICANT CHANGE UP (ref 6–8.3)
RBC # BLD: 4.33 M/UL — SIGNIFICANT CHANGE UP (ref 3.8–5.2)
RBC # BLD: 4.33 M/UL — SIGNIFICANT CHANGE UP (ref 3.8–5.2)
RBC # FLD: 12.6 % — SIGNIFICANT CHANGE UP (ref 10.3–14.5)
RBC # FLD: 12.6 % — SIGNIFICANT CHANGE UP (ref 10.3–14.5)
SODIUM SERPL-SCNC: 135 MMOL/L — SIGNIFICANT CHANGE UP (ref 135–145)
SODIUM SERPL-SCNC: 135 MMOL/L — SIGNIFICANT CHANGE UP (ref 135–145)
WBC # BLD: 8.28 K/UL — SIGNIFICANT CHANGE UP (ref 3.8–10.5)
WBC # BLD: 8.28 K/UL — SIGNIFICANT CHANGE UP (ref 3.8–10.5)
WBC # FLD AUTO: 8.28 K/UL — SIGNIFICANT CHANGE UP (ref 3.8–10.5)
WBC # FLD AUTO: 8.28 K/UL — SIGNIFICANT CHANGE UP (ref 3.8–10.5)

## 2024-01-08 PROCEDURE — 70544 MR ANGIOGRAPHY HEAD W/O DYE: CPT | Mod: 26,59

## 2024-01-08 PROCEDURE — 70553 MRI BRAIN STEM W/O & W/DYE: CPT | Mod: 26

## 2024-01-08 PROCEDURE — 99232 SBSQ HOSP IP/OBS MODERATE 35: CPT | Mod: GC

## 2024-01-08 RX ORDER — SODIUM CHLORIDE 9 MG/ML
1000 INJECTION INTRAMUSCULAR; INTRAVENOUS; SUBCUTANEOUS
Refills: 0 | Status: DISCONTINUED | OUTPATIENT
Start: 2024-01-08 | End: 2024-01-08

## 2024-01-08 RX ADMIN — SODIUM CHLORIDE 1 GRAM(S): 9 INJECTION INTRAMUSCULAR; INTRAVENOUS; SUBCUTANEOUS at 14:43

## 2024-01-08 RX ADMIN — SODIUM CHLORIDE 1 GRAM(S): 9 INJECTION INTRAMUSCULAR; INTRAVENOUS; SUBCUTANEOUS at 05:58

## 2024-01-08 RX ADMIN — SODIUM CHLORIDE 75 MILLILITER(S): 9 INJECTION INTRAMUSCULAR; INTRAVENOUS; SUBCUTANEOUS at 12:15

## 2024-01-08 RX ADMIN — SODIUM CHLORIDE 1 GRAM(S): 9 INJECTION INTRAMUSCULAR; INTRAVENOUS; SUBCUTANEOUS at 22:07

## 2024-01-08 RX ADMIN — HEPARIN SODIUM 5000 UNIT(S): 5000 INJECTION INTRAVENOUS; SUBCUTANEOUS at 05:57

## 2024-01-08 RX ADMIN — AMLODIPINE BESYLATE 10 MILLIGRAM(S): 2.5 TABLET ORAL at 05:58

## 2024-01-08 RX ADMIN — Medication 3 MILLIGRAM(S): at 22:07

## 2024-01-08 RX ADMIN — HEPARIN SODIUM 5000 UNIT(S): 5000 INJECTION INTRAVENOUS; SUBCUTANEOUS at 22:08

## 2024-01-08 RX ADMIN — Medication 110 MILLIGRAM(S): at 12:15

## 2024-01-08 RX ADMIN — HEPARIN SODIUM 5000 UNIT(S): 5000 INJECTION INTRAVENOUS; SUBCUTANEOUS at 14:43

## 2024-01-08 NOTE — PROGRESS NOTE ADULT - ATTENDING COMMENTS
Patient seen and examined by myself , case discussed  with resident ,agree with the above finding and plan      82 year old female with history of chronic HA, vertigo, hyponatremia requiring hospitalization previously, now coming in with a couple days of generalized weakness, confusion, difficulty ambulating and facial rash. Afebrile, saturating well on RA.     awake in bed, oriented to person and place, not month or year. Answering simple questions/following simple commands.    On acyclovir for vzv encephalitis, sodium improved, however on 1/3 with new GENNY, resolved. Reconsulted nephro, Cr improved with fluids.   MRI/MRA of head for encephalitis- no acute changes noted  Hypokalemia : will supplement , monitor BMP ,   Mild transaminitis: may be secondary to  viral infection, will CTM   Discussed with family at bedside , patient is generally AOx2, ambulates by herself to the bathroom.  Pt at baseline mental status PT recommends KONG, Pt and family  do not want rehab, will continue to follow   Continue the rest of the work up and management as stated above.

## 2024-01-08 NOTE — PROGRESS NOTE ADULT - SUBJECTIVE AND OBJECTIVE BOX
Patient is a 83y old  Female who presents with a chief complaint of facial rash (07 Jan 2024 07:15)    INTERVAL HPI/OVERNIGHT EVENTS: No acute complaints this AM. wants to go home with home pt/ot.    FAMILY HISTORY:  No pertinent family history in first degree relatives    Allergy Status Unknown    MEDS:  acetaminophen     Tablet .. 650 milliGRAM(s) Oral every 6 hours PRN  acyclovir IVPB 500 milliGRAM(s) IV Intermittent every 24 hours  amLODIPine   Tablet 10 milliGRAM(s) Oral daily  heparin   Injectable 5000 Unit(s) SubCutaneous every 8 hours  influenza  Vaccine (HIGH DOSE) 0.7 milliLiter(s) IntraMuscular once  lactated ringers. 1000 milliLiter(s) IV Continuous <Continuous>  melatonin 3 milliGRAM(s) Oral at bedtime  sodium chloride 1 Gram(s) Oral three times a day  sodium chloride 0.9%. 1000 milliLiter(s) IV Continuous <Continuous>  sodium chloride 0.9%. 1000 milliLiter(s) IV Continuous <Continuous>      T(C): 36.4 (01-08-24 @ 05:24), Max: 36.8 (01-07-24 @ 21:21)  HR: 63 (01-08-24 @ 05:24) (63 - 80)  BP: 152/62 (01-08-24 @ 05:24) (130/60 - 152/62)  RR: 17 (01-08-24 @ 05:24) (17 - 17)  SpO2: 96% (01-08-24 @ 05:24) (96% - 100%)  Wt(kg): --    PHYSICAL EXAM:  GENERAL: NAD, lying in bed, confused  HEAD:  Atraumatic, normocephalic  EYES: Eye discharge resolved. EOMI, conjunctiva and sclera clear  HEART: Regular rate and rhythm, no murmurs, rubs, or gallops  LUNGS: Unlabored respirations. Clear to auscultation bilaterally, no crackles, wheezing, or rhonchi  ABDOMEN: Soft, nontender, nondistended, +BS  EXTREMITIES: 2+ peripheral pulses bilaterally. No clubbing, cyanosis, or edema  NERVOUS SYSTEM: A&Ox1-2 (knows name, hospital setting)  SKIN: Improved rash by right jawline and chin, erythema resolved and rash is burnt out, scabbing, w/o oozing    Consultant(s) Notes Reviewed:  [x ] YES  [ ] NO  Care Discussed with Consultants/Other Providers [ x] YES  [ ] NO    LABS:                        11.7   6.03  )-----------( 489      ( 07 Jan 2024 05:00 )             34.6     134<L>  |  96<L>  |  18  ----------------------------<  79  3.2<L>   |  24  |  0.78    Ca    9.7      07 Jan 2024 05:00  Phos  2.9     01-07  Mg     1.90     01-07    TPro  7.8  /  Alb  3.9  /  TBili  0.5  /  DBili  x   /  AST  50<H>  /  ALT  37<H>  /  AlkPhos  89  01-07      RADIOLOGY & ADDITIONAL TESTS:    Imaging Personally Reviewed:  [ ] YES  [ ] NO

## 2024-01-08 NOTE — PROGRESS NOTE ADULT - PROBLEM SELECTOR PLAN 4
Reported to be A&Ox3 at baseline. 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  -MRI and MRA brain as above  -Acyclovir as above  -Treatment of hyponatremia as above  -Pt had unwitnessed fall 1/3, vitals stable, mentation at baseline, CTH negative  -MRI head pending

## 2024-01-08 NOTE — PROGRESS NOTE ADULT - PROBLEM SELECTOR PLAN 3
Na 135 (1/6/24)  Na 123 on admission. 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 Patient s/p 2L NS in ED. 12/31 Na 134.  -Nephrology consulted, appreciate recs  -Salt tabs 1mg TID 12/30  -Will give NS x2 hrs with acyclovir at 75cc/hr  -Nephro consulted, improving with recs from above  -resolved, ctm

## 2024-01-09 ENCOUNTER — TRANSCRIPTION ENCOUNTER (OUTPATIENT)
Age: 84
End: 2024-01-09

## 2024-01-09 VITALS
TEMPERATURE: 98 F | OXYGEN SATURATION: 92 % | DIASTOLIC BLOOD PRESSURE: 62 MMHG | RESPIRATION RATE: 18 BRPM | HEART RATE: 67 BPM | SYSTOLIC BLOOD PRESSURE: 133 MMHG

## 2024-01-09 LAB
ALBUMIN SERPL ELPH-MCNC: 4 G/DL — SIGNIFICANT CHANGE UP (ref 3.3–5)
ALBUMIN SERPL ELPH-MCNC: 4 G/DL — SIGNIFICANT CHANGE UP (ref 3.3–5)
ALP SERPL-CCNC: 95 U/L — SIGNIFICANT CHANGE UP (ref 40–120)
ALP SERPL-CCNC: 95 U/L — SIGNIFICANT CHANGE UP (ref 40–120)
ALT FLD-CCNC: 53 U/L — HIGH (ref 4–33)
ALT FLD-CCNC: 53 U/L — HIGH (ref 4–33)
ANION GAP SERPL CALC-SCNC: 14 MMOL/L — SIGNIFICANT CHANGE UP (ref 7–14)
ANION GAP SERPL CALC-SCNC: 14 MMOL/L — SIGNIFICANT CHANGE UP (ref 7–14)
AST SERPL-CCNC: 43 U/L — HIGH (ref 4–32)
AST SERPL-CCNC: 43 U/L — HIGH (ref 4–32)
BASOPHILS # BLD AUTO: 0.06 K/UL — SIGNIFICANT CHANGE UP (ref 0–0.2)
BASOPHILS # BLD AUTO: 0.06 K/UL — SIGNIFICANT CHANGE UP (ref 0–0.2)
BASOPHILS NFR BLD AUTO: 0.9 % — SIGNIFICANT CHANGE UP (ref 0–2)
BASOPHILS NFR BLD AUTO: 0.9 % — SIGNIFICANT CHANGE UP (ref 0–2)
BILIRUB SERPL-MCNC: 0.5 MG/DL — SIGNIFICANT CHANGE UP (ref 0.2–1.2)
BILIRUB SERPL-MCNC: 0.5 MG/DL — SIGNIFICANT CHANGE UP (ref 0.2–1.2)
BUN SERPL-MCNC: 28 MG/DL — HIGH (ref 7–23)
BUN SERPL-MCNC: 28 MG/DL — HIGH (ref 7–23)
CALCIUM SERPL-MCNC: 9.7 MG/DL — SIGNIFICANT CHANGE UP (ref 8.4–10.5)
CALCIUM SERPL-MCNC: 9.7 MG/DL — SIGNIFICANT CHANGE UP (ref 8.4–10.5)
CHLORIDE SERPL-SCNC: 98 MMOL/L — SIGNIFICANT CHANGE UP (ref 98–107)
CHLORIDE SERPL-SCNC: 98 MMOL/L — SIGNIFICANT CHANGE UP (ref 98–107)
CO2 SERPL-SCNC: 23 MMOL/L — SIGNIFICANT CHANGE UP (ref 22–31)
CO2 SERPL-SCNC: 23 MMOL/L — SIGNIFICANT CHANGE UP (ref 22–31)
CREAT SERPL-MCNC: 1.09 MG/DL — SIGNIFICANT CHANGE UP (ref 0.5–1.3)
CREAT SERPL-MCNC: 1.09 MG/DL — SIGNIFICANT CHANGE UP (ref 0.5–1.3)
EGFR: 51 ML/MIN/1.73M2 — LOW
EGFR: 51 ML/MIN/1.73M2 — LOW
EOSINOPHIL # BLD AUTO: 0.29 K/UL — SIGNIFICANT CHANGE UP (ref 0–0.5)
EOSINOPHIL # BLD AUTO: 0.29 K/UL — SIGNIFICANT CHANGE UP (ref 0–0.5)
EOSINOPHIL NFR BLD AUTO: 4.2 % — SIGNIFICANT CHANGE UP (ref 0–6)
EOSINOPHIL NFR BLD AUTO: 4.2 % — SIGNIFICANT CHANGE UP (ref 0–6)
GLUCOSE SERPL-MCNC: 97 MG/DL — SIGNIFICANT CHANGE UP (ref 70–99)
GLUCOSE SERPL-MCNC: 97 MG/DL — SIGNIFICANT CHANGE UP (ref 70–99)
HCT VFR BLD CALC: 35.4 % — SIGNIFICANT CHANGE UP (ref 34.5–45)
HCT VFR BLD CALC: 35.4 % — SIGNIFICANT CHANGE UP (ref 34.5–45)
HGB BLD-MCNC: 12 G/DL — SIGNIFICANT CHANGE UP (ref 11.5–15.5)
HGB BLD-MCNC: 12 G/DL — SIGNIFICANT CHANGE UP (ref 11.5–15.5)
IANC: 3.02 K/UL — SIGNIFICANT CHANGE UP (ref 1.8–7.4)
IANC: 3.02 K/UL — SIGNIFICANT CHANGE UP (ref 1.8–7.4)
IMM GRANULOCYTES NFR BLD AUTO: 0.4 % — SIGNIFICANT CHANGE UP (ref 0–0.9)
IMM GRANULOCYTES NFR BLD AUTO: 0.4 % — SIGNIFICANT CHANGE UP (ref 0–0.9)
LYMPHOCYTES # BLD AUTO: 3.02 K/UL — SIGNIFICANT CHANGE UP (ref 1–3.3)
LYMPHOCYTES # BLD AUTO: 3.02 K/UL — SIGNIFICANT CHANGE UP (ref 1–3.3)
LYMPHOCYTES # BLD AUTO: 43.3 % — SIGNIFICANT CHANGE UP (ref 13–44)
LYMPHOCYTES # BLD AUTO: 43.3 % — SIGNIFICANT CHANGE UP (ref 13–44)
MAGNESIUM SERPL-MCNC: 2.1 MG/DL — SIGNIFICANT CHANGE UP (ref 1.6–2.6)
MAGNESIUM SERPL-MCNC: 2.1 MG/DL — SIGNIFICANT CHANGE UP (ref 1.6–2.6)
MCHC RBC-ENTMCNC: 29.6 PG — SIGNIFICANT CHANGE UP (ref 27–34)
MCHC RBC-ENTMCNC: 29.6 PG — SIGNIFICANT CHANGE UP (ref 27–34)
MCHC RBC-ENTMCNC: 33.9 GM/DL — SIGNIFICANT CHANGE UP (ref 32–36)
MCHC RBC-ENTMCNC: 33.9 GM/DL — SIGNIFICANT CHANGE UP (ref 32–36)
MCV RBC AUTO: 87.4 FL — SIGNIFICANT CHANGE UP (ref 80–100)
MCV RBC AUTO: 87.4 FL — SIGNIFICANT CHANGE UP (ref 80–100)
MONOCYTES # BLD AUTO: 0.55 K/UL — SIGNIFICANT CHANGE UP (ref 0–0.9)
MONOCYTES # BLD AUTO: 0.55 K/UL — SIGNIFICANT CHANGE UP (ref 0–0.9)
MONOCYTES NFR BLD AUTO: 7.9 % — SIGNIFICANT CHANGE UP (ref 2–14)
MONOCYTES NFR BLD AUTO: 7.9 % — SIGNIFICANT CHANGE UP (ref 2–14)
NEUTROPHILS # BLD AUTO: 3.02 K/UL — SIGNIFICANT CHANGE UP (ref 1.8–7.4)
NEUTROPHILS # BLD AUTO: 3.02 K/UL — SIGNIFICANT CHANGE UP (ref 1.8–7.4)
NEUTROPHILS NFR BLD AUTO: 43.3 % — SIGNIFICANT CHANGE UP (ref 43–77)
NEUTROPHILS NFR BLD AUTO: 43.3 % — SIGNIFICANT CHANGE UP (ref 43–77)
NRBC # BLD: 0 /100 WBCS — SIGNIFICANT CHANGE UP (ref 0–0)
NRBC # BLD: 0 /100 WBCS — SIGNIFICANT CHANGE UP (ref 0–0)
NRBC # FLD: 0 K/UL — SIGNIFICANT CHANGE UP (ref 0–0)
NRBC # FLD: 0 K/UL — SIGNIFICANT CHANGE UP (ref 0–0)
PHOSPHATE SERPL-MCNC: 3.9 MG/DL — SIGNIFICANT CHANGE UP (ref 2.5–4.5)
PHOSPHATE SERPL-MCNC: 3.9 MG/DL — SIGNIFICANT CHANGE UP (ref 2.5–4.5)
PLATELET # BLD AUTO: 470 K/UL — HIGH (ref 150–400)
PLATELET # BLD AUTO: 470 K/UL — HIGH (ref 150–400)
POTASSIUM SERPL-MCNC: 3.7 MMOL/L — SIGNIFICANT CHANGE UP (ref 3.5–5.3)
POTASSIUM SERPL-MCNC: 3.7 MMOL/L — SIGNIFICANT CHANGE UP (ref 3.5–5.3)
POTASSIUM SERPL-SCNC: 3.7 MMOL/L — SIGNIFICANT CHANGE UP (ref 3.5–5.3)
POTASSIUM SERPL-SCNC: 3.7 MMOL/L — SIGNIFICANT CHANGE UP (ref 3.5–5.3)
PROT SERPL-MCNC: 7.8 G/DL — SIGNIFICANT CHANGE UP (ref 6–8.3)
PROT SERPL-MCNC: 7.8 G/DL — SIGNIFICANT CHANGE UP (ref 6–8.3)
RBC # BLD: 4.05 M/UL — SIGNIFICANT CHANGE UP (ref 3.8–5.2)
RBC # BLD: 4.05 M/UL — SIGNIFICANT CHANGE UP (ref 3.8–5.2)
RBC # FLD: 12.7 % — SIGNIFICANT CHANGE UP (ref 10.3–14.5)
RBC # FLD: 12.7 % — SIGNIFICANT CHANGE UP (ref 10.3–14.5)
SODIUM SERPL-SCNC: 135 MMOL/L — SIGNIFICANT CHANGE UP (ref 135–145)
SODIUM SERPL-SCNC: 135 MMOL/L — SIGNIFICANT CHANGE UP (ref 135–145)
WBC # BLD: 6.97 K/UL — SIGNIFICANT CHANGE UP (ref 3.8–10.5)
WBC # BLD: 6.97 K/UL — SIGNIFICANT CHANGE UP (ref 3.8–10.5)
WBC # FLD AUTO: 6.97 K/UL — SIGNIFICANT CHANGE UP (ref 3.8–10.5)
WBC # FLD AUTO: 6.97 K/UL — SIGNIFICANT CHANGE UP (ref 3.8–10.5)

## 2024-01-09 PROCEDURE — 99239 HOSP IP/OBS DSCHRG MGMT >30: CPT

## 2024-01-09 RX ORDER — SODIUM CHLORIDE 9 MG/ML
1 INJECTION INTRAMUSCULAR; INTRAVENOUS; SUBCUTANEOUS
Qty: 0 | Refills: 0 | DISCHARGE
Start: 2024-01-09

## 2024-01-09 RX ORDER — ACETAMINOPHEN 500 MG
2 TABLET ORAL
Qty: 0 | Refills: 0 | DISCHARGE
Start: 2024-01-09

## 2024-01-09 RX ORDER — AMLODIPINE BESYLATE 2.5 MG/1
1 TABLET ORAL
Qty: 0 | Refills: 0 | DISCHARGE
Start: 2024-01-09

## 2024-01-09 RX ORDER — AMLODIPINE BESYLATE 2.5 MG/1
1 TABLET ORAL
Qty: 14 | Refills: 0
Start: 2024-01-09 | End: 2024-01-22

## 2024-01-09 RX ORDER — ACETAMINOPHEN 500 MG
2 TABLET ORAL
Qty: 0 | Refills: 0 | DISCHARGE

## 2024-01-09 RX ADMIN — HEPARIN SODIUM 5000 UNIT(S): 5000 INJECTION INTRAVENOUS; SUBCUTANEOUS at 13:06

## 2024-01-09 RX ADMIN — AMLODIPINE BESYLATE 10 MILLIGRAM(S): 2.5 TABLET ORAL at 05:50

## 2024-01-09 RX ADMIN — SODIUM CHLORIDE 1 GRAM(S): 9 INJECTION INTRAMUSCULAR; INTRAVENOUS; SUBCUTANEOUS at 13:05

## 2024-01-09 RX ADMIN — HEPARIN SODIUM 5000 UNIT(S): 5000 INJECTION INTRAVENOUS; SUBCUTANEOUS at 05:50

## 2024-01-09 RX ADMIN — SODIUM CHLORIDE 1 GRAM(S): 9 INJECTION INTRAMUSCULAR; INTRAVENOUS; SUBCUTANEOUS at 05:50

## 2024-01-09 NOTE — PROGRESS NOTE ADULT - ATTENDING COMMENTS
Patient seen and examined by myself , case discussed  with resident ,agree with the above finding and plan      82 year old female with history of chronic HA, vertigo, hyponatremia requiring hospitalization previously, now coming in with a couple days of generalized weakness, confusion, difficulty ambulating and facial rash. Afebrile, saturating well on RA.     awake in bed, oriented to person and place, not month or year. Answering simple questions/following simple commands.    Completed acyclovir for vzv encephalitis, sodium improved, however on 1/3 with new GENNY, resolved. Reconsulted nephro, Cr improved with fluids.   MRI/MRA of head for encephalitis- no acute changes noted  Hypokalemia : will supplement , monitor BMP ,   Mild transaminitis: may be secondary to  viral infection, will CTM   Pt at baseline mental status PT recommends KONG, Pt and family to decide on disposition   Continue the rest of the work up and management as stated above.

## 2024-01-09 NOTE — PROGRESS NOTE ADULT - PROBLEM SELECTOR PROBLEM 1
Facial rash
GENNY (acute kidney injury)
Facial rash
Hyponatremia
Facial rash
Hyponatremia
Facial rash
Facial rash
Hyponatremia
Facial rash

## 2024-01-09 NOTE — PROGRESS NOTE ADULT - NUTRITIONAL ASSESSMENT
This patient has been assessed with a concern for Malnutrition and has been determined to have a diagnosis/diagnoses of Moderate protein-calorie malnutrition.    This patient is being managed with:   Diet Regular-  No Beef  No Pork  Entered: Dec 30 2023  2:14PM  
This patient has been assessed with a concern for Malnutrition and has been determined to have a diagnosis/diagnoses of Moderate protein-calorie malnutrition.    This patient is being managed with:   Diet Regular-  No Beef  Entered: Dec 27 2023  6:39PM    This patient has been assessed with a concern for Malnutrition and has been determined to have a diagnosis/diagnoses of Moderate protein-calorie malnutrition.    This patient is being managed with:   Diet Regular-  No Beef  Entered: Dec 27 2023  6:39PM  
This patient has been assessed with a concern for Malnutrition and has been determined to have a diagnosis/diagnoses of Moderate protein-calorie malnutrition.    This patient is being managed with:   Diet Regular-  No Beef  No Pork  Entered: Dec 30 2023  2:14PM  

## 2024-01-09 NOTE — CHART NOTE - NSCHARTNOTESELECT_GEN_ALL_CORE
Nephrology/Event Note
Nutrition Services
Nephrology/Event Note
Pre-Procedure Interventional Note/Event Note

## 2024-01-09 NOTE — PROGRESS NOTE ADULT - SUBJECTIVE AND OBJECTIVE BOX
Patient is a 83y old  Female who presents with a chief complaint of facial rash (08 Jan 2024 07:19)    INTERVAL HPI/OVERNIGHT EVENTS: No acute overnight events. Completed IV acyclovir x14 days yesterday. pending family talk for facility placement given persistent weakness.     FAMILY HISTORY:  No pertinent family history in first degree relatives    Allergy Status Unknown    MEDS:  acetaminophen     Tablet .. 650 milliGRAM(s) Oral every 6 hours PRN  amLODIPine   Tablet 10 milliGRAM(s) Oral daily  heparin   Injectable 5000 Unit(s) SubCutaneous every 8 hours  influenza  Vaccine (HIGH DOSE) 0.7 milliLiter(s) IntraMuscular once  melatonin 3 milliGRAM(s) Oral at bedtime  sodium chloride 1 Gram(s) Oral three times a day    T(C): 36.7 (01-09-24 @ 05:25), Max: 36.9 (01-08-24 @ 21:54)  HR: 72 (01-09-24 @ 05:25) (69 - 83)  BP: 156/86 (01-09-24 @ 05:25) (121/75 - 157/84)  RR: 18 (01-09-24 @ 05:25) (17 - 18)  SpO2: 99% (01-09-24 @ 05:25) (99% - 100%)    PHYSICAL EXAM:  GENERAL: NAD, lying in bed  HEAD:  Atraumatic, normocephalic  EYES: Eye discharge resolved. EOMI, conjunctiva and sclera clear  HEART: Regular rate and rhythm, no murmurs, rubs, or gallops  LUNGS: Unlabored respirations. CTAB, no crackles, wheezing, or rhonchi  ABDOMEN: Soft, nontender, nondistended, +BS  EXTREMITIES: 2+ peripheral pulses bilaterally. No clubbing, cyanosis, or edema  NERVOUS SYSTEM: A&Ox2 (knows name, hospital setting)  SKIN: Improved rash by right jawline and chin, erythema resolved and rash is scabbed    Consultant(s) Notes Reviewed:  [x ] YES  [ ] NO  Care Discussed with Consultants/Other Providers [ x] YES  [ ] NO    LABS:                        13.0   8.28  )-----------( 484      ( 08 Jan 2024 07:10 )             38.2     135  |  98  |  26<H>  ----------------------------<  95  4.3   |  22  |  1.18    Ca    10.0      08 Jan 2024 07:10  Phos  3.9     01-08  Mg     2.50     01-08    TPro  8.2  /  Alb  4.0  /  TBili  0.6  /  DBili  x   /  AST  48<H>  /  ALT  51<H>  /  AlkPhos  100  01-08    RADIOLOGY & ADDITIONAL TESTS:    MR Head w/wo IV Cont (01.08.24 @ 11:39)  1.  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 Ischemic white matter disease and atrophy upper range   typical for age. No evidence of acute intracranial injury.    2.  ANTERIOR CIRCULATION:    Intracranial atherosclerosis cavernous and   clinoid segments of the internal carotid arteries, mild-to-moderate, and   left middle cerebral artery, moderate    3. POSTERIOR CIRCULATION:  Intracranial atherosclerosis posterior   cerebral arteries, at least mild-to-moderate.    US Kidney and Bladder (01.03.24 @ 11:33)  Sonographically normal kidneys.  No hydronephrosis.    CT Head No Cont (01.03.24 @ 08:38)  No acute hemorrhage or mass effect.    CT Head No Cont (12.27.23 @ 09:53)  No acute intracranial hemorrhage or acute territorial infarct.  If   symptoms persist, follow-up MRI exam recommended.    Xray Chest 1 View- PORTABLE-Urgent (12.26.23 @ 08:48)  No focal opacities.  Trace bilateral pleural effusions.

## 2024-01-09 NOTE — PROGRESS NOTE ADULT - PROVIDER SPECIALTY LIST ADULT
Infectious Disease
Infectious Disease
Internal Medicine
Internal Medicine
Nephrology
Internal Medicine
Internal Medicine
Nephrology
Infectious Disease
Nephrology
Internal Medicine
Nephrology
Internal Medicine

## 2024-01-09 NOTE — PROGRESS NOTE ADULT - PROBLEM SELECTOR PLAN 2
Cr from 0.7 -> 1.48  - FeNa 5.9 - suggest post-renal obstruction/ FeUrea 36.9 suggesting intrinsic etiology.  - Bladder scan 198  - Renal US w/o hydro  - s/p 1L NS, Cr improving  - Resolved

## 2024-01-09 NOTE — DISCHARGE NOTE NURSING/CASE MANAGEMENT/SOCIAL WORK - NSDCPEFALRISK_GEN_ALL_CORE
For information on Fall & Injury Prevention, visit: [email or web address] OR  [email or web address] OR  https://www.cdc.gov/steadi/patient.html For information on Fall & Injury Prevention, visit: [email or web address] OR  [email or web address] OR  https://www.cdc.gov/steadi/patient.html

## 2024-01-09 NOTE — DISCHARGE NOTE NURSING/CASE MANAGEMENT/SOCIAL WORK - NSDCFUADDAPPT_GEN_ALL_CORE_FT
- Please follow up with neurologist for the chronic atherosclerotic changes seen on MRI.  - Please follow up with primary doctor to review hospital course and review hypertensive medication.

## 2024-01-09 NOTE — PROGRESS NOTE ADULT - PROBLEM SELECTOR PLAN 1
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 +  -MRI and MRA of the brain (c/f possible VZV vasculopathy)  -s/p unasyn (12/27- 12/31)  -s/p acyclovir [id number] (12/26-1/8) w/ maintenance fluids  -1/2: rash is fully closed and scabbed over, isolation removed  -Otalgia, headache, and neck pain --> ordered po 975mg Tylenol; resolved  -MRI head with atherosclerotic changes, old infarcts 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

## 2024-01-09 NOTE — DISCHARGE NOTE NURSING/CASE MANAGEMENT/SOCIAL WORK - NSTRANSFERDENTURES_GEN_A_NUR
Informed Mom that Similac version of Enfamil Gentlease is Similac Total Comfort.  [facility] prescription signed by Dr. Chavez.  Notified Mom that prescription will be at [facility]  to  for tomorrow (8 AM to 11 AM).  Mom stated understanding.   full/upper/lower

## 2024-01-09 NOTE — CHART NOTE - NSCHARTNOTEFT_GEN_A_CORE
Source: Patient [ ]    Family [ ]     other [x ] chart review    Patient seen for moderate malnutrition f/u. 82 year old female with no significant PMH presenting with weakness and rash c/f VZV encephalitis, pending KONG per chart.    Patient seen during breakfast and doing well w/ tray. Intakes are % per RN flow sheet. No GI distress reported. Noted w/ +1 generalized and +2 L/R arm edema w/ no pressure injuries per RN flow sheet.    Diet : Diet, Regular:   No Beef  No Pork (12-30-23 @ 14:14)    Current Weight: no new weight to assess  54.7 kg (1/3)  51.7 kg (12/26)    Pertinent Medications: MEDICATIONS  (STANDING):  amLODIPine   Tablet 10 milliGRAM(s) Oral daily  heparin   Injectable 5000 Unit(s) SubCutaneous every 8 hours  influenza  Vaccine (HIGH DOSE) 0.7 milliLiter(s) IntraMuscular once  melatonin 3 milliGRAM(s) Oral at bedtime  sodium chloride 1 Gram(s) Oral three times a day    MEDICATIONS  (PRN):  acetaminophen     Tablet .. 650 milliGRAM(s) Oral every 6 hours PRN Temp greater or equal to 38C (100.4F), Mild Pain (1 - 3)    Pertinent Labs:  01-09 Na135 mmol/L Glu 97 mg/dL K+ 3.7 mmol/L Cr  1.09 mg/dL BUN 28 mg/dL<H> 01-09 Phos 3.9 mg/dL 01-09 Alb 4.0 g/dL    Estimated Needs: [x ] no change since previous assessment    Previous Nutrition Diagnosis: Moderate malnutrition    Nutrition Diagnosis is [x ] ongoing  [ ] resolved [ ] not applicable     Education:    [  ] Given today    Type of education provided:    [  ] Given on previous assessment by RD    [  ] Not applicable 2/2 cognitive deficit    [  ] Pt refusal of education offered    [  ] Not applicable 2/2 current prognosis    [ x ] Not warranted at present    Recommend  - continue diet as ordered  - nutrition department will provide Orgain BID (440 kcal, 32 g pro)  - obtain weekly weight and monitor PO intake    Monitoring and Evaluation:     [x ] PO intake [x ] Tolerance to diet prescription [ x] weights [x ] follow up per protocol    Neptali Callejas, [id number] or TEAMS Source: Patient [ ]    Family [ ]     other [x ] chart review    Patient seen for moderate malnutrition f/u. 82 year old female with no significant PMH presenting with weakness and rash c/f VZV encephalitis, pending KNOG per chart.    Patient seen during breakfast and doing well w/ tray. Intakes are % per RN flow sheet. No GI distress reported. Noted w/ +1 generalized and +2 L/R arm edema w/ no pressure injuries per RN flow sheet.    Diet : Diet, Regular:   No Beef  No Pork (12-30-23 @ 14:14)    Current Weight: no new weight to assess  54.7 kg (1/3)  51.7 kg (12/26)    Pertinent Medications: MEDICATIONS  (STANDING):  amLODIPine   Tablet 10 milliGRAM(s) Oral daily  heparin   Injectable 5000 Unit(s) SubCutaneous every 8 hours  influenza  Vaccine (HIGH DOSE) 0.7 milliLiter(s) IntraMuscular once  melatonin 3 milliGRAM(s) Oral at bedtime  sodium chloride 1 Gram(s) Oral three times a day    MEDICATIONS  (PRN):  acetaminophen     Tablet .. 650 milliGRAM(s) Oral every 6 hours PRN Temp greater or equal to 38C (100.4F), Mild Pain (1 - 3)    Pertinent Labs:  01-09 Na135 mmol/L Glu 97 mg/dL K+ 3.7 mmol/L Cr  1.09 mg/dL BUN 28 mg/dL<H> 01-09 Phos 3.9 mg/dL 01-09 Alb 4.0 g/dL    Estimated Needs: [x ] no change since previous assessment    Previous Nutrition Diagnosis: Moderate malnutrition    Nutrition Diagnosis is [x ] ongoing  [ ] resolved [ ] not applicable     Education:    [  ] Given today    Type of education provided:    [  ] Given on previous assessment by RD    [  ] Not applicable 2/2 cognitive deficit    [  ] Pt refusal of education offered    [  ] Not applicable 2/2 current prognosis    [ x ] Not warranted at present    Recommend  - continue diet as ordered  - nutrition department will provide Orgain BID (440 kcal, 32 g pro)  - obtain weekly weight and monitor PO intake    Monitoring and Evaluation:     [x ] PO intake [x ] Tolerance to diet prescription [ x] weights [x ] follow up per protocol    Neptali Callejas, [id number] or TEAMS

## 2024-01-09 NOTE — PROGRESS NOTE ADULT - PROBLEM SELECTOR PLAN 4
Reported to be A&Ox3 at baseline. 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  -MRI and MRA brain as above  -Acyclovir as above  -Treatment of hyponatremia as above  -Pt had unwitnessed fall 1/3, vitals stable, mentation at baseline, CTH negative  -MRI head w/o acute changes

## 2024-01-09 NOTE — PROGRESS NOTE ADULT - PROBLEM SELECTOR PLAN 3
Na 135 (1/6/24)  Na 123 on admission. 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 Patient s/p 2L NS in ED. 12/31 Na 134.  -Nephrology consulted, appreciate recs  -Salt tabs 1mg TID 12/30  -resolved, ctm

## 2024-01-09 NOTE — PROGRESS NOTE ADULT - REASON FOR ADMISSION
facial rash

## 2024-01-09 NOTE — PROGRESS NOTE ADULT - ATTENDING SUPERVISION STATEMENT
Fellow
Resident

## 2024-01-09 NOTE — PROGRESS NOTE ADULT - PROBLEM SELECTOR PLAN 7
Diet: Regular (no beef)  DVT ppx: heparin SQ  Dispo: pending clinical improvement and KONG
Diet: Regular (no beef)  DVT ppx: lovenox 40  Dispo: pending clinical improvement and KONG

## 2024-01-09 NOTE — DISCHARGE NOTE NURSING/CASE MANAGEMENT/SOCIAL WORK - PATIENT PORTAL LINK FT
You can access the FollowMyHealth Patient Portal offered by [facility] by registering at the following website: [email or web address]. By joining [facility]’s FollowMyHealth portal, you will also be able to view your health information using other applications (apps) compatible with our system. You can access the FollowMyHealth Patient Portal offered by [facility] by registering at the following website: [email or web address]. By joining [facility]’s FollowMyHealth portal, you will also be able to view your health information using other applications (apps) compatible with our system.

## 2024-01-10 RX ORDER — SODIUM CHLORIDE 9 MG/ML
1 INJECTION INTRAMUSCULAR; INTRAVENOUS; SUBCUTANEOUS
Qty: 90 | Refills: 0
Start: 2024-01-10 | End: 2024-02-08

## 2024-01-10 RX ORDER — SODIUM CHLORIDE 9 MG/ML
1 INJECTION INTRAMUSCULAR; INTRAVENOUS; SUBCUTANEOUS
Qty: 90 | Refills: 0 | DISCHARGE
Start: 2024-01-10 | End: 2024-02-08

## 2024-01-12 ENCOUNTER — APPOINTMENT (OUTPATIENT)
Dept: CARE COORDINATION | Facility: HOME HEALTH | Age: 84
End: 2024-01-12
Payer: MEDICARE

## 2024-01-12 DIAGNOSIS — I10 ESSENTIAL (PRIMARY) HYPERTENSION: ICD-10-CM

## 2024-01-12 DIAGNOSIS — R53.81 OTHER MALAISE: ICD-10-CM

## 2024-01-12 DIAGNOSIS — R51.9 HEADACHE, UNSPECIFIED: ICD-10-CM

## 2024-01-12 DIAGNOSIS — E87.1 HYPO-OSMOLALITY AND HYPONATREMIA: ICD-10-CM

## 2024-01-12 PROCEDURE — 99349 HOME/RES VST EST MOD MDM 40: CPT

## 2024-01-12 RX ORDER — ACETAMINOPHEN 325 MG/1
325 TABLET ORAL EVERY 6 HOURS
Qty: 80 | Refills: 0 | Status: ACTIVE | COMMUNITY
Start: 2024-01-12

## 2024-01-12 RX ORDER — NORMAL SALT TABLETS 1 G/G
1 TABLET ORAL
Refills: 0 | Status: ACTIVE | COMMUNITY
Start: 2024-01-12

## 2024-01-12 NOTE — COUNSELING
[Fall prevention counseling provided] : Fall prevention counseling provided [Use recommended devices] : Use recommended devices [FreeTextEntry1] : PT and ask for help [None] : None

## 2024-01-12 NOTE — PLAN
[FreeTextEntry1] : 1. continue all medications as prescribed 2. f/u with visiting providers 3. Maintain DASH diet 4.  maintain fall precaution .

## 2024-01-12 NOTE — PHYSICAL EXAM
[No Acute Distress] : no acute distress [Normal Sclera/Conjunctiva] : normal sclera/conjunctiva [Normal Outer Ear/Nose] : the outer ears and nose were normal in appearance [No Respiratory Distress] : no respiratory distress  [No Accessory Muscle Use] : no accessory muscle use [No Rash] : no rash [No Skin Lesions] : no skin lesions [Normal] : normal gait, coordination grossly intact, no focal deficits and deep tendon reflexes were 2+ and symmetric [Memory Grossly Normal] : memory grossly normal [Normal Affect] : the affect was normal [Alert and Oriented x3] : oriented to person, place, and time [Normal Mood] : the mood was normal

## 2024-01-12 NOTE — HISTORY OF PRESENT ILLNESS
[Family Member] : family member [FreeTextEntry1] : F/u post hospitalization at [facility] from 12/26/2023 - 1/9/2024 for AMS  [de-identified] : Brief Hospital Course: 82F with PMH hyponatremia, chronic headache, vertigo, s/p ILR 10/21, cataracts presenting with weakness and rash. Patient is A&Ox2 and unable to provide detailed history. Daughter not presently at bedside and could not be reached on the phone, collateral to be obtained later. Per ED note, daughter states that patient has been more tired and confused for x1-2 days and does not answer questions that she usually answers. At baseline, patient is reported to be A&Ox3 and ambulatory with a walker. Patient has had a pimple on her chin that vomiting, diarrhea, urinary complaints, recent travel, sick contacts.   This pt is enrolled in the [employer] [facility] Discharge Bridge program in collaboration with [facility] services follow up program for continuity of care s/p recent hospitalization until pt is seen by a PCP.   Televisit made with pt, her dgtr and PRIYANK Patterson. Pt is alert and oriented x 3. States she is still weak, was dx with being deconditioned. Pt c/o she has had a headache on and off ever since she was a child, work up was negative for any malignancy or CNS changes. Pt was dx with NSTEMI and facial Herpes. She now has dried crusted areas to rt side of mouth. Pt was also dx with hyponatremia. - last lab 1/9 Na 135. Pt to continue Sodium tabs until her visiting provider sees her and labs are done.    Pt has [facility] house calls provider 1/27

## 2024-01-24 ENCOUNTER — NON-APPOINTMENT (OUTPATIENT)
Age: 84
End: 2024-01-24

## 2024-01-24 DIAGNOSIS — H26.9 UNSPECIFIED CATARACT: ICD-10-CM

## 2024-01-24 DIAGNOSIS — R60.0 LOCALIZED EDEMA: ICD-10-CM

## 2024-01-24 RX ORDER — FUROSEMIDE 20 MG/1
20 TABLET ORAL DAILY
Qty: 3 | Refills: 0 | Status: ACTIVE | COMMUNITY
Start: 2024-01-24 | End: 1900-01-01

## 2024-01-24 RX ORDER — AMLODIPINE BESYLATE 10 MG/1
10 TABLET ORAL
Qty: 30 | Refills: 1 | Status: ACTIVE | COMMUNITY
Start: 2024-01-12 | End: 1900-01-01

## 2024-05-14 ENCOUNTER — INPATIENT (INPATIENT)
Facility: HOSPITAL | Age: 84
LOS: 2 days | Discharge: NURSING, REHAB OR LONG-TERM CARE | End: 2024-05-17
Attending: STUDENT IN AN ORGANIZED HEALTH CARE EDUCATION/TRAINING PROGRAM | Admitting: STUDENT IN AN ORGANIZED HEALTH CARE EDUCATION/TRAINING PROGRAM
Payer: MEDICARE

## 2024-05-14 VITALS
HEART RATE: 78 BPM | DIASTOLIC BLOOD PRESSURE: 98 MMHG | SYSTOLIC BLOOD PRESSURE: 181 MMHG | TEMPERATURE: 98 F | OXYGEN SATURATION: 97 % | RESPIRATION RATE: 18 BRPM

## 2024-05-14 DIAGNOSIS — E87.1 HYPO-OSMOLALITY AND HYPONATREMIA: ICD-10-CM

## 2024-05-14 DIAGNOSIS — J98.4 OTHER DISORDERS OF LUNG: ICD-10-CM

## 2024-05-14 DIAGNOSIS — Z29.9 ENCOUNTER FOR PROPHYLACTIC MEASURES, UNSPECIFIED: ICD-10-CM

## 2024-05-14 DIAGNOSIS — Z98.890 OTHER SPECIFIED POSTPROCEDURAL STATES: Chronic | ICD-10-CM

## 2024-05-14 DIAGNOSIS — E78.5 HYPERLIPIDEMIA, UNSPECIFIED: ICD-10-CM

## 2024-05-14 DIAGNOSIS — G93.41 METABOLIC ENCEPHALOPATHY: ICD-10-CM

## 2024-05-14 DIAGNOSIS — I10 ESSENTIAL (PRIMARY) HYPERTENSION: ICD-10-CM
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
## 2024-05-14 DIAGNOSIS — Z87.81 PERSONAL HISTORY OF (HEALED) TRAUMATIC FRACTURE: Chronic | ICD-10-CM

## 2024-05-14 LAB
ADD ON TEST-SPECIMEN IN LAB: SIGNIFICANT CHANGE UP
ALBUMIN SERPL ELPH-MCNC: 3.4 G/DL — SIGNIFICANT CHANGE UP (ref 3.3–5)
ALP SERPL-CCNC: 124 U/L — HIGH (ref 40–120)
ALT FLD-CCNC: 14 U/L — SIGNIFICANT CHANGE UP (ref 4–33)
ANION GAP SERPL CALC-SCNC: 14 MMOL/L — SIGNIFICANT CHANGE UP (ref 7–14)
APPEARANCE UR: CLEAR — SIGNIFICANT CHANGE UP
AST SERPL-CCNC: 17 U/L — SIGNIFICANT CHANGE UP (ref 4–32)
BACTERIA # UR AUTO: NEGATIVE /HPF — SIGNIFICANT CHANGE UP
BASE EXCESS BLDV CALC-SCNC: -0.7 MMOL/L — SIGNIFICANT CHANGE UP (ref -2–3)
BASOPHILS # BLD AUTO: 0.03 K/UL — SIGNIFICANT CHANGE UP (ref 0–0.2)
BASOPHILS NFR BLD AUTO: 0.3 % — SIGNIFICANT CHANGE UP (ref 0–2)
BILIRUB SERPL-MCNC: 0.5 MG/DL — SIGNIFICANT CHANGE UP (ref 0.2–1.2)
BILIRUB UR-MCNC: NEGATIVE — SIGNIFICANT CHANGE UP
BLOOD GAS VENOUS COMPREHENSIVE RESULT: SIGNIFICANT CHANGE UP
BUN SERPL-MCNC: 13 MG/DL — SIGNIFICANT CHANGE UP (ref 7–23)
BUN SERPL-MCNC: 13 MG/DL — SIGNIFICANT CHANGE UP (ref 7–23)
BUN SERPL-MCNC: 15 MG/DL — SIGNIFICANT CHANGE UP (ref 7–23)
CALCIUM SERPL-MCNC: 8.9 MG/DL — SIGNIFICANT CHANGE UP (ref 8.4–10.5)
CALCIUM SERPL-MCNC: 9.3 MG/DL — SIGNIFICANT CHANGE UP (ref 8.4–10.5)
CALCIUM SERPL-MCNC: 9.5 MG/DL — SIGNIFICANT CHANGE UP (ref 8.4–10.5)
CAST: 0 /LPF — SIGNIFICANT CHANGE UP (ref 0–4)
CHLORIDE BLDV-SCNC: 90 MMOL/L — LOW (ref 96–108)
CHLORIDE SERPL-SCNC: 87 MMOL/L — LOW (ref 98–107)
CHLORIDE SERPL-SCNC: 88 MMOL/L — LOW (ref 98–107)
CHLORIDE SERPL-SCNC: 91 MMOL/L — LOW (ref 98–107)
CO2 BLDV-SCNC: 25.4 MMOL/L — SIGNIFICANT CHANGE UP (ref 22–26)
CO2 SERPL-SCNC: 19 MMOL/L — LOW (ref 22–31)
CO2 SERPL-SCNC: 20 MMOL/L — LOW (ref 22–31)
CO2 SERPL-SCNC: 21 MMOL/L — LOW (ref 22–31)
COLOR SPEC: YELLOW — SIGNIFICANT CHANGE UP
CREAT ?TM UR-MCNC: 63 MG/DL — SIGNIFICANT CHANGE UP
CREAT SERPL-MCNC: 0.52 MG/DL — SIGNIFICANT CHANGE UP (ref 0.5–1.3)
CREAT SERPL-MCNC: 0.54 MG/DL — SIGNIFICANT CHANGE UP (ref 0.5–1.3)
CREAT SERPL-MCNC: 0.61 MG/DL — SIGNIFICANT CHANGE UP (ref 0.5–1.3)
DIFF PNL FLD: NEGATIVE — SIGNIFICANT CHANGE UP
EGFR: 89 ML/MIN/1.73M2 — SIGNIFICANT CHANGE UP
EGFR: 92 ML/MIN/1.73M2 — SIGNIFICANT CHANGE UP
EGFR: 93 ML/MIN/1.73M2 — SIGNIFICANT CHANGE UP
EOSINOPHIL # BLD AUTO: 0.57 K/UL — HIGH (ref 0–0.5)
EOSINOPHIL NFR BLD AUTO: 4.9 % — SIGNIFICANT CHANGE UP (ref 0–6)
GAS PNL BLDV: 122 MMOL/L — LOW (ref 136–145)
GAS PNL BLDV: SIGNIFICANT CHANGE UP
GLUCOSE BLDV-MCNC: 133 MG/DL — HIGH (ref 70–99)
GLUCOSE SERPL-MCNC: 105 MG/DL — HIGH (ref 70–99)
GLUCOSE SERPL-MCNC: 132 MG/DL — HIGH (ref 70–99)
GLUCOSE SERPL-MCNC: 208 MG/DL — HIGH (ref 70–99)
GLUCOSE UR QL: NEGATIVE MG/DL — SIGNIFICANT CHANGE UP
HCO3 BLDV-SCNC: 24 MMOL/L — SIGNIFICANT CHANGE UP (ref 22–29)
HCT VFR BLD CALC: 30.5 % — LOW (ref 34.5–45)
HCT VFR BLDA CALC: 32 % — LOW (ref 34.5–46.5)
HGB BLD CALC-MCNC: 10.7 G/DL — LOW (ref 11.7–16.1)
HGB BLD-MCNC: 10.6 G/DL — LOW (ref 11.5–15.5)
IANC: 8.81 K/UL — HIGH (ref 1.8–7.4)
IMM GRANULOCYTES NFR BLD AUTO: 0.6 % — SIGNIFICANT CHANGE UP (ref 0–0.9)
KETONES UR-MCNC: NEGATIVE MG/DL — SIGNIFICANT CHANGE UP
LACTATE BLDV-MCNC: 0.9 MMOL/L — SIGNIFICANT CHANGE UP (ref 0.5–2)
LEGIONELLA AG UR QL: NEGATIVE — SIGNIFICANT CHANGE UP
LEUKOCYTE ESTERASE UR-ACNC: ABNORMAL
LIDOCAIN IGE QN: 24 U/L — SIGNIFICANT CHANGE UP (ref 7–60)
LYMPHOCYTES # BLD AUTO: 1.27 K/UL — SIGNIFICANT CHANGE UP (ref 1–3.3)
LYMPHOCYTES # BLD AUTO: 10.9 % — LOW (ref 13–44)
MAGNESIUM SERPL-MCNC: 1.9 MG/DL — SIGNIFICANT CHANGE UP (ref 1.6–2.6)
MAGNESIUM SERPL-MCNC: 2 MG/DL — SIGNIFICANT CHANGE UP (ref 1.6–2.6)
MAGNESIUM SERPL-MCNC: 2 MG/DL — SIGNIFICANT CHANGE UP (ref 1.6–2.6)
MCHC RBC-ENTMCNC: 27 PG — SIGNIFICANT CHANGE UP (ref 27–34)
MCHC RBC-ENTMCNC: 34.8 GM/DL — SIGNIFICANT CHANGE UP (ref 32–36)
MCV RBC AUTO: 77.8 FL — LOW (ref 80–100)
MONOCYTES # BLD AUTO: 0.86 K/UL — SIGNIFICANT CHANGE UP (ref 0–0.9)
MONOCYTES NFR BLD AUTO: 7.4 % — SIGNIFICANT CHANGE UP (ref 2–14)
NEUTROPHILS # BLD AUTO: 8.81 K/UL — HIGH (ref 1.8–7.4)
NEUTROPHILS NFR BLD AUTO: 75.9 % — SIGNIFICANT CHANGE UP (ref 43–77)
NITRITE UR-MCNC: NEGATIVE — SIGNIFICANT CHANGE UP
NRBC # BLD: 0 /100 WBCS — SIGNIFICANT CHANGE UP (ref 0–0)
NRBC # FLD: 0 K/UL — SIGNIFICANT CHANGE UP (ref 0–0)
OSMOLALITY UR: 507 MOSM/KG — SIGNIFICANT CHANGE UP (ref 50–1200)
PCO2 BLDV: 40 MMHG — SIGNIFICANT CHANGE UP (ref 39–52)
PH BLDV: 7.39 — SIGNIFICANT CHANGE UP (ref 7.32–7.43)
PH UR: 7 — SIGNIFICANT CHANGE UP (ref 5–8)
PHOSPHATE SERPL-MCNC: 2.9 MG/DL — SIGNIFICANT CHANGE UP (ref 2.5–4.5)
PHOSPHATE SERPL-MCNC: 3.5 MG/DL — SIGNIFICANT CHANGE UP (ref 2.5–4.5)
PLATELET # BLD AUTO: 457 K/UL — HIGH (ref 150–400)
PO2 BLDV: 29 MMHG — SIGNIFICANT CHANGE UP (ref 25–45)
POTASSIUM BLDV-SCNC: 4.1 MMOL/L — SIGNIFICANT CHANGE UP (ref 3.5–5.1)
POTASSIUM SERPL-MCNC: 3.9 MMOL/L — SIGNIFICANT CHANGE UP (ref 3.5–5.3)
POTASSIUM SERPL-MCNC: 4 MMOL/L — SIGNIFICANT CHANGE UP (ref 3.5–5.3)
POTASSIUM SERPL-MCNC: 4.1 MMOL/L — SIGNIFICANT CHANGE UP (ref 3.5–5.3)
POTASSIUM SERPL-SCNC: 3.9 MMOL/L — SIGNIFICANT CHANGE UP (ref 3.5–5.3)
POTASSIUM SERPL-SCNC: 4 MMOL/L — SIGNIFICANT CHANGE UP (ref 3.5–5.3)
POTASSIUM SERPL-SCNC: 4.1 MMOL/L — SIGNIFICANT CHANGE UP (ref 3.5–5.3)
POTASSIUM UR-SCNC: 46.5 MMOL/L — SIGNIFICANT CHANGE UP
PROT ?TM UR-MCNC: 30 MG/DL — SIGNIFICANT CHANGE UP
PROT SERPL-MCNC: 7.5 G/DL — SIGNIFICANT CHANGE UP (ref 6–8.3)
PROT UR-MCNC: 30 MG/DL
PROT/CREAT UR-RTO: 0.5 RATIO — HIGH (ref 0–0.2)
RBC # BLD: 3.92 M/UL — SIGNIFICANT CHANGE UP (ref 3.8–5.2)
RBC # FLD: 13.1 % — SIGNIFICANT CHANGE UP (ref 10.3–14.5)
RBC CASTS # UR COMP ASSIST: 6 /HPF — HIGH (ref 0–4)
REVIEW: SIGNIFICANT CHANGE UP
SAO2 % BLDV: 54.5 % — LOW (ref 67–88)
SODIUM SERPL-SCNC: 122 MMOL/L — LOW (ref 135–145)
SODIUM SERPL-SCNC: 122 MMOL/L — LOW (ref 135–145)
SODIUM SERPL-SCNC: 124 MMOL/L — LOW (ref 135–145)
SODIUM UR-SCNC: 77 MMOL/L — SIGNIFICANT CHANGE UP
SP GR SPEC: 1.02 — SIGNIFICANT CHANGE UP (ref 1–1.03)
SQUAMOUS # UR AUTO: 4 /HPF — SIGNIFICANT CHANGE UP (ref 0–5)
UROBILINOGEN FLD QL: 1 MG/DL — SIGNIFICANT CHANGE UP (ref 0.2–1)
UUN UR-MCNC: 661.1 MG/DL — SIGNIFICANT CHANGE UP
WBC # BLD: 11.61 K/UL — HIGH (ref 3.8–10.5)
WBC # FLD AUTO: 11.61 K/UL — HIGH (ref 3.8–10.5)
WBC UR QL: 4 /HPF — SIGNIFICANT CHANGE UP (ref 0–5)

## 2024-05-14 PROCEDURE — 72125 CT NECK SPINE W/O DYE: CPT | Mod: 26,MC

## 2024-05-14 PROCEDURE — 70450 CT HEAD/BRAIN W/O DYE: CPT | Mod: 26,MC

## 2024-05-14 PROCEDURE — 99223 1ST HOSP IP/OBS HIGH 75: CPT | Mod: GC,AI

## 2024-05-14 PROCEDURE — 70486 CT MAXILLOFACIAL W/O DYE: CPT | Mod: 26,MC

## 2024-05-14 PROCEDURE — 99285 EMERGENCY DEPT VISIT HI MDM: CPT | Mod: GC

## 2024-05-14 PROCEDURE — 99223 1ST HOSP IP/OBS HIGH 75: CPT | Mod: GC

## 2024-05-14 PROCEDURE — 99284 EMERGENCY DEPT VISIT MOD MDM: CPT | Mod: GC

## 2024-05-14 RX ORDER — SODIUM CHLORIDE 9 MG/ML
1 INJECTION INTRAMUSCULAR; INTRAVENOUS; SUBCUTANEOUS EVERY 8 HOURS
Refills: 0 | Status: DISCONTINUED | OUTPATIENT
Start: 2024-05-14 | End: 2024-05-14

## 2024-05-14 RX ORDER — ALENDRONATE SODIUM 70 MG/1
1 TABLET ORAL
Refills: 0 | DISCHARGE

## 2024-05-14 RX ORDER — ENOXAPARIN SODIUM 100 MG/ML
40 INJECTION SUBCUTANEOUS EVERY 24 HOURS
Refills: 0 | Status: DISCONTINUED | OUTPATIENT
Start: 2024-05-14 | End: 2024-05-17

## 2024-05-14 RX ORDER — ASPIRIN/CALCIUM CARB/MAGNESIUM 324 MG
81 TABLET ORAL DAILY
Refills: 0 | Status: DISCONTINUED | OUTPATIENT
Start: 2024-05-14 | End: 2024-05-17

## 2024-05-14 RX ORDER — SODIUM CHLORIDE 5 G/100ML
500 INJECTION, SOLUTION INTRAVENOUS
Refills: 0 | Status: DISCONTINUED | OUTPATIENT
Start: 2024-05-14 | End: 2024-05-15

## 2024-05-14 RX ORDER — ATORVASTATIN CALCIUM 80 MG/1
80 TABLET, FILM COATED ORAL AT BEDTIME
Refills: 0 | Status: DISCONTINUED | OUTPATIENT
Start: 2024-05-14 | End: 2024-05-17

## 2024-05-14 RX ORDER — AMLODIPINE BESYLATE 2.5 MG/1
10 TABLET ORAL DAILY
Refills: 0 | Status: DISCONTINUED | OUTPATIENT
Start: 2024-05-14 | End: 2024-05-17

## 2024-05-14 RX ORDER — ASPIRIN/CALCIUM CARB/MAGNESIUM 324 MG
1 TABLET ORAL
Refills: 0 | DISCHARGE

## 2024-05-14 RX ORDER — ATORVASTATIN CALCIUM 80 MG/1
1 TABLET, FILM COATED ORAL
Refills: 0 | DISCHARGE

## 2024-05-14 RX ADMIN — SODIUM CHLORIDE 30 MILLILITER(S): 5 INJECTION, SOLUTION INTRAVENOUS at 11:20

## 2024-05-14 RX ADMIN — ENOXAPARIN SODIUM 40 MILLIGRAM(S): 100 INJECTION SUBCUTANEOUS at 18:59

## 2024-05-14 RX ADMIN — Medication 81 MILLIGRAM(S): at 18:59

## 2024-05-14 RX ADMIN — ATORVASTATIN CALCIUM 80 MILLIGRAM(S): 80 TABLET, FILM COATED ORAL at 21:38

## 2024-05-14 NOTE — PHYSICAL THERAPY INITIAL EVALUATION ADULT - PERTINENT HX OF CURRENT PROBLEM, REHAB EVAL
Pt. admitted for weakness, fall. Per radiology reports, CT head: No acute intracranial hemorrhage, mass effect, or acute osseous fracture. Left frontal scalp hematoma. CT cervical spine: No acute cervical spine fracture or evidence of traumatic malalignment. Multilevel cervical spondylosis. Chronic compression fracture of T2. CT maxillofacial: No acute maxillofacial bone or mandibular fracture.

## 2024-05-14 NOTE — ED ADULT TRIAGE NOTE - CHIEF COMPLAINT QUOTE
Pt brought to ED by daughters for ams X 2 days. Per daughters increasing weakness, unsteady gait, decreased PO intake and not answering at times. A&Ox3 currently, endorsing a headache. Fall last week with + head strike, ecchymosis to left eye, neg LOC.  Hx: HTN, HLD, Hyponatremia

## 2024-05-14 NOTE — CONSULT NOTE ADULT - ATTENDING COMMENTS
pt is an 84 yo female with hx htn, CKD, htn, cataract, presents  with weakness. Pt noted to have na 122, hx hyponatremia  with siadh, pt arousable, alert,  pt alert andoriented x2    bp 110/64 rr 18 heent left facial hematoma,    lungs clear b/l heart s1s2 abdomen nontender  ext no edema  neuro nonfocal    wbc 11 hgb 10 hct 30  bicarb 21 cr 0.61      A/p  84 yo female with hyponatremia na 122,   -etiology siadh vs hypovolemia  -checkurine lytes, creatinine, osmolarity  -monitor urine output  -renal evaluation  -check echo to evaluate lv function  -check ua, blood culture  -check cortisol , tsh  -will hold off hypertonic saline, repeat bmp  -pt does not require icu level care
acute on chromic hyponatremia   s/p fall.  check urine ans serum osm. 3% as stated above   lethargic but responsive according to RN   Assessment and plan as outlined above. Monitor labs and urine output.   monitor Na closely

## 2024-05-14 NOTE — ED ADULT NURSE NOTE - NSFALLCONCLUSION_ED_ALL_ED
Last visit:7/31/18      Next Visit Date:  Future Appointments  Date Time Provider [place]   1/30/2019 9:00 AM Jose Alejandro Hernandez MD Adult pc [facility]   2/13/2019 1:30 PM Maxwell Moser MD [facility]  [address] Maintenance   Topic Date Due    Flu vaccine (1) 09/01/2018    Hepatitis C screen  02/12/2018 (Originally 1946)    DTaP/Tdap/Td vaccine (1 - Tdap) 02/13/2019 (Originally 2/22/1965)    Shingles Vaccine (1 of 2 - 2 Dose Series) 06/19/2019 (Originally 2/22/1997)    Potassium monitoring  04/27/2019    A1C test (Diabetic or Prediabetic)  05/09/2019    Creatinine monitoring  06/22/2019    Lipid screen  08/08/2022    Colon cancer screen colonoscopy  08/21/2023    Pneumococcal low/med risk  Completed       Hemoglobin A1C (%)   Date Value   05/09/2018 6.1 (H)   08/08/2017 6.0   08/05/2016 5.6             ( goal A1C is < 7)   No results found for: LABMICR  LDL Cholesterol (mg/dL)   Date Value   08/08/2017 79   08/05/2016 112       (goal LDL is <100)   AST (U/L)   Date Value   08/08/2017 30     ALT (U/L)   Date Value   08/08/2017 27     BUN (mg/dL)   Date Value   04/27/2018 15     BP Readings from Last 3 Encounters:   07/31/18 124/83   06/15/18 120/70   05/09/18 138/86          (goal 120/80)    All Future Testing planned in [facility]  Lab Frequency Next Occurrence   PSA, Diagnostic Once 02/09/2019               Patient Active Problem List:     Hypertension     GERD (gastroesophageal reflux disease)     Dyslipidemia     ED (erectile dysfunction)     Chronic renal disease     Pulmonary nodule     Personal history of prostate cancer     Stress incontinence, male     RAIMUNDO (stress urinary incontinence), male     Pre-diabetes Fall Risk

## 2024-05-14 NOTE — ED ADULT NURSE NOTE - EXTENSIONS OF SELF_ADULT
Prep Survey      Responses   [facility] patient discharged from? [place]   Is LACE score < 7 ? Yes   Emergency Room discharge w/ pulse ox? No   Eligibility [facility]   Date of Admission 01/13/22   Date of Discharge 01/14/22   Discharge Disposition Home or Self Care   Discharge diagnosis s/p emergent laparoscopic left salpingectomy   Does the patient have one of the following disease processes/diagnoses(primary or secondary)? General Surgery   Does the patient have Home health ordered? No   Is there a DME ordered? No   Prep survey completed? Yes          Pina Philip RN         None

## 2024-05-14 NOTE — H&P ADULT - PROBLEM SELECTOR PLAN 5
DVT prophy: lovenox  Diet: DVT prophy: lovenox  Diet: bedside swallow test Pt with apical lung scarring seen on CT c-spine. CT chest in 2022 with concern for lung fibrosis.  - CXR given crackles heard on exam and to evaluate Pt on amlodipine 10mg at home  - c/w amlodipine

## 2024-05-14 NOTE — H&P ADULT - PROBLEM SELECTOR PLAN 6
DVT prophy: lovenox 40 qd  Diet: soft and bite sized c/w home atorvastatin 80mg   c/w home aspirin 81mg

## 2024-05-14 NOTE — ED ADULT NURSE NOTE - NSFALLRISKINTERV_ED_ALL_ED

## 2024-05-14 NOTE — PHYSICAL THERAPY INITIAL EVALUATION ADULT - ADDITIONAL COMMENTS
Pt. reports she lives in a house with daughter. Pt. ambulates with a rollator and requires assistance with ADLs.     Pt. was left in bed post PT Evaluation, no apparent distress, all lines intact, HR 81 bpm. Kathleen YOST made aware of pt. status.

## 2024-05-14 NOTE — ED ADULT TRIAGE NOTE - GLASGOW COMA SCALE: SCORE, MLM
Add 62943 Cpt? (Important Note: In 2017 The Use Of 34410 Is Being Tracked By Cms To Determine Future Global Period Reimbursement For Global Periods): yes
Detail Level: Detailed
15

## 2024-05-14 NOTE — ED ADULT NURSE NOTE - OBJECTIVE STATEMENT
LOLI RN. Patient received in room 5. A&O3 with periods of forgetfulness. Ambulatory with walker. Brought into ED by daughter for AMS. [facility] has been more forgetful, having decreased PO intake and unsteady gait. Patient received with ecchymosis to right eye after having fell last week. Daughter states patient takes baby aspirin. Respirations even and unlabored. Denies SOB, chest pain, N/V/D, fevers. 20g IV placed right ac. labs drawn and sent. Report given to primary RN. Call bell within reach.

## 2024-05-14 NOTE — PHYSICAL THERAPY INITIAL EVALUATION ADULT - GENERAL OBSERVATIONS, REHAB EVAL
Consult received, chart reviewed. Patient received in bed, no apparent distress, +tele. Pt. agreeable to participate in PT.

## 2024-05-14 NOTE — H&P ADULT - ATTENDING COMMENTS
82 yo female with pmhx of Chronic Hyponatremia, CKD, HTN, HLD, Chronic headache, BPPV, ILR, cataracts presents with complaints of weakness for 2d with preceding event of fall at home, unclear if LOC, has left face hematoma. patient is mostly somnolent, an01, doesn't participate, follows very minimal commands. per family, functioning deconditioning and less participatory. VSS, afebrile. on exam with no acute distress but mostly somnolent, able to follow minimal commands but mostly non-verbal. generalized weakness. ANO1. CT head with left frontal hematoma, no other acute findings. labs leukocytosis, anemia.  mod-severe hyponatremia. ua wo pyuria.      #Acute on chronic hyponatremia- Na 122  #generalized weakness   - urine studies suggestive of possible SIADH of unclear etiology  - renal cs, recommends hypertonic saline   -monitor BMP q6hr  -currently npo until dysphagia screening   -fluid restrict to 1L/d   -fu TSH, am cortisol    #Fall, unclear if syncope  EKG with RBBB, PAC, no other findings.   previously with ILR likely removed, not found on exam. will obtain cxr to confirm  -PT eval  -obtain TTE   -orthostatic deferred dt current mental status  -monitor vitals     rest as above  diet: npo for now until slp eval  dispo: pending

## 2024-05-14 NOTE — H&P ADULT - TIME BILLING
Time-based billing (NON-critical care).     [ 75 ] minutes spent on total encounter; more than 50% of the visit was spent counseling and / or coordinating care by the attending physician.  The necessity of the time spent during the encounter on this date of service was due to:     documentation in [place], reviewing chart and coordinating care with patient/ACP and interdisciplinary staff (such as , social workers, etc) as well as reviewing vitals, laboratory data, radiology, medication list, consultants' recommendations and prior records. Interventions were performed as documented above.

## 2024-05-14 NOTE — CONSULT NOTE ADULT - SUBJECTIVE AND OBJECTIVE BOX
[facility] DIVISION OF KIDNEY DISEASES AND HYPERTENSION -- [phone]  -- INITIAL CONSULT NOTE  --------------------------------------------------------------------------------  HPI: 83F admitted s/p fall, found to have acute on chronic hyponatremia, so which Nephrology was consulted.    Patient was seen and examined at bedside. She was mildly lethargic but AAOx2. Per daughter, patient not taking salt tabs most recently because of nausea it was causing, unclear for how long she was not taking them. Per family, patient has had decreased PO intake for the past two days and has been responding less to family, and has been more unsteady on her feet. About a week ago, pt did have a fall and hit her face on a table, but had no LOC. 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 She improved with HTS and salt tabs. Baseline Na around 130. She is not on any thiazides or SSRIs.    In ED, serum Na 122 with Uosm 507 and Shilpa 77. Patient was placed on 800mL fluid restriction. CT head/C-spine/maxillofacial showed L frontal hematoma , no acute intracranial findings, chronic T2 compression fracture, and biapical parenchymal scarring and GGO of lung apices.         PAST HISTORY  --------------------------------------------------------------------------------  PAST MEDICAL & SURGICAL HISTORY:  Bilateral cataracts  Syncope  Hypertension  Hyponatremia  Vertigo  H/O elbow surgery  H/O fracture of leg  H/O surgical procedure  ILR 10/2021      FAMILY HISTORY:    PAST SOCIAL HISTORY:    ALLERGIES & MEDICATIONS  --------------------------------------------------------------------------------  Allergies    Allergy Status Unknown      Standing Inpatient Medications  amLODIPine   Tablet 10 milliGRAM(s) Oral daily  aspirin  chewable 81 milliGRAM(s) Oral daily  atorvastatin 80 milliGRAM(s) Oral at bedtime  enoxaparin Injectable 40 milliGRAM(s) SubCutaneous every 24 hours  sodium chloride 3%. 500 milliLiter(s) IV Continuous <Continuous>    PRN Inpatient Medications      REVIEW OF SYSTEMS  --------------------------------------------------------------------------------  Unable to obtain as pt mildly lethargic    VITALS/PHYSICAL EXAM  --------------------------------------------------------------------------------  T(C): 36.7 (05-14-24 @ 09:30), Max: 36.8 (05-14-24 @ 01:16)  HR: 77 (05-14-24 @ 09:30) (77 - 90)  BP: 130/71 (05-14-24 @ 09:30) (110/94 - 181/98)  RR: 19 (05-14-24 @ 09:30) (16 - 19)  SpO2: 95% (05-14-24 @ 09:30) (95% - 100%)  Wt(kg): --        Physical Exam:                       Gen: NAD, periorbital ecchymosis noted                       Pulm: CTA B/L                       CV: +S1S2                       Abd: +BS, soft, nondistended/nontender                       Extremities: +bilateral LE edema                       Neuro: non-focal, AOx2    LABS/STUDIES  --------------------------------------------------------------------------------              10.6   11.61 >-----------<  457      [05-14-24 @ 01:59]              30.5     122  |  88  |  13  ----------------------------<  105      [05-14-24 @ 06:16]  3.9   |  20  |  0.52        Ca     9.3     [05-14-24 @ 06:16]      Mg     2.00     [05-14-24 @ 06:16]      Phos  3.5     [05-14-24 @ 06:16]    TPro  7.5  /  Alb  3.4  /  TBili  0.5  /  DBili  x   /  AST  17  /  ALT  14  /  AlkPhos  124  [05-14-24 @ 01:59]        Uric acid 3.5      [05-14-24 @ 01:59]    Creatinine Trend:  SCr 0.52 [05-14 @ 06:16]  SCr 0.61 [05-14 @ 01:59]    Urine Creatinine 63      [05-14-24 @ 04:09]  Urine Protein 30      [05-14-24 @ 04:09]  Urine Sodium 77      [05-14-24 @ 04:09]  Urine Urea Nitrogen 661.1      [05-14-24 @ 04:09]  Urine Potassium 46.5      [05-14-24 @ 04:09]  Urine Osmolality 507      [05-14-24 @ 04:09]
CHIEF COMPLAINT:    HPI: 84YO f with PMH hyponatremia, CKD, hypertension chronic headache, vertigo, s/p ILR 10/21, cataracts presenting with weakness. She has had episodes of hyponatremia attributed to SIADH in the past and was most recently admitted in 2024. She is not fully participatory in interview but is AO2 and endorses a headache but no other symptoms.     MICU consulted for hyponatremia.     PAST MEDICAL & SURGICAL HISTORY:  Bilateral cataracts      Syncope      H/O elbow surgery      H/O fracture of leg      H/O surgical procedure  ILR 10/2021          FAMILY HISTORY:  No pertinent family history in first degree relatives         Allergies    Allergy Status Unknown    Intolerances        HOME MEDICATIONS:    REVIEW OF SYSTEMS:    Eyes: no vision changes   CV: no CP   Resp: no SOB  Neurological: +HA   [ ] All other systems negative  [ ] Unable to fully assess ROS because ___altered mental status _____    OBJECTIVE:  ICU Vital Signs Last 24 Hrs  T(C): 36.6 (14 May 2024 04:10), Max: 36.8 (14 May 2024 01:16)  T(F): 97.8 (14 May 2024 04:10), Max: 98.3 (14 May 2024 01:16)  HR: 90 (14 May 2024 04:10) (78 - 90)  BP: 110/94 (14 May 2024 04:10) (110/94 - 181/98)  BP(mean): 101 (14 May 2024 04:10) (101 - 101)  ABP: --  ABP(mean): --  RR: 18 (14 May 2024 04:10) (18 - 18)  SpO2: 100% (14 May 2024 04:10) (97% - 100%)    O2 Parameters below as of 14 May 2024 04:10  Patient On (Oxygen Delivery Method): room air              CAPILLARY BLOOD GLUCOSE      POCT Blood Glucose.: 147 mg/dL (14 May 2024 01:19)      PHYSICAL EXAM:  General: drowsey, intermittently writhing in bed   HEENT: PERRLA, EOMI  Respiratory: LCTA  Cardiovascular: rrr, no mrg   Abdomen: nontender nondistended  Extremities: wwp  Neurological: AO2      HOSPITAL MEDICATIONS:  MEDICATIONS  (STANDING):    MEDICATIONS  (PRN):      LABS:                        10.6   11.61 )-----------( 457      ( 14 May 2024 01:59 )             30.5         122<L>  |  87<L>  |  15  ----------------------------<  132<H>  4.1   |  21<L>  |  0.61    Ca    9.5      14 May 2024 01:59  Mg     2.00         TPro  7.5  /  Alb  3.4  /  TBili  0.5  /  DBili  x   /  AST  17  /  ALT  14  /  AlkPhos  124<H>        Urinalysis Basic - ( 14 May 2024 04:09 )    Color: Yellow / Appearance: Clear / S.016 / pH: x  Gluc: x / Ketone: Negative mg/dL  / Bili: Negative / Urobili: 1.0 mg/dL   Blood: x / Protein: 30 mg/dL / Nitrite: Negative   Leuk Esterase: Trace / RBC: 6 /HPF / WBC 4 /HPF   Sq Epi: x / Non Sq Epi: 4 /HPF / Bacteria: Negative /HPF        Venous Blood Gas:   @ 01:59  7.39/40/29/24/54.5  VBG Lactate: 0.9      MICROBIOLOGY:     RADIOLOGY:  [ ] Reviewed and interpreted by me    EKG:
Asc Procedure Text (B): After obtaining clear surgical margins the patient was sent to an ASC for surgical repair.  The patient understands they will receive post-surgical care and follow-up from the ASC physician.

## 2024-05-14 NOTE — H&P ADULT - PROBLEM SELECTOR PLAN 1
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 On exam somnolent, following commands but not opening eyes, Aox1 at the very least. Unclear if difference from baseline. Likely 2/2 hyponatremia. No concern for infection , afebrile. CT head with no SDH.  - treatment of hypo NA as below Patient with some lethargy, confusion for past 2 days per ED note. On exam somnolent, following commands but not opening eyes, Aox1 at the very least. Per daughter baseline of patient she can walk and talk.  Likely 2/2 hyponatremia. No concern for infection , afebrile, but was having some cough. CT head with no SDH.  - treatment of hypo NA as below  - CTM fever curve  -  RVP

## 2024-05-14 NOTE — H&P ADULT - ASSESSMENT
84 yo F with chronic hyponatremia, CKD, HTN, HLD, chronic headache, vertigo, ILR in 10/21, cataracts presenting with weakness x2d found to be hyponatremic.  84 yo F with chronic hyponatremia, CKD, HTN, HLD, chronic headache, vertigo, ILR in 10/21, cataracts presenting with weakness x2d as wll as fall 6 days ago with L frontal hematoma found to be hyponatremic.  82 yo F with chronic hyponatremia, CKD, HTN, HLD, chronic headache, vertigo, ILR in 10/21, cataracts presenting with weakness x2d as well as fall 6 days ago with L frontal hematoma found to be hyponatremic.  84 yo F with chronic hyponatremia, CKD, HTN, HLD, chronic headache, vertigo, ILR in 10/21, cataracts presenting with acute metabolic encephalopathy as well as fall (unclear if synocpal) 6 days ago with L frontal hematoma found to be hyponatremic.

## 2024-05-14 NOTE — H&P ADULT - NSICDXPASTMEDICALHX_GEN_ALL_CORE_FT
PAST MEDICAL HISTORY:  Bilateral cataracts     Hypertension     Hyponatremia     Syncope     Vertigo

## 2024-05-14 NOTE — CONSULT NOTE ADULT - PROBLEM SELECTOR RECOMMENDATION 9
Elevated urine osm indicative of SIADH, possibly due to nausea or pain from fall. However, hyponatremia may have preceded the fall as Na <130 increases risk for falls especially in the elderly.   - obtain serum osm to confirm true hyponatremia.   - obtain TSH and AM cortisol tomorrow AM.   - check orthostatic vitals.   - Lethargy may be due to hyponatremia, so recommended 3% HTS 30cc/h x 4h and to repeat renal panel afterwards. Goal correction rate should be 6-8meq/24h. Pt does have LE edema but appears to be breathing comfortably on room air, but if she does show signs of volume overload, can give Lasix 20mg IVP. If repeat sodium has not improved adequately, would repeat HTS with Lasix 20mg IVP  - Hold salt tabs for now if they are causing her nausea. Would control nausea while inpatient and r/o any other etiology for her nausea  - Continue fluid restriction and liberalize salt in diet here and on discharge. Limit any infusions constituted in hypotonic solutions/    Recommendations are preliminary until attending attestation.    Gonzales Esparza, PGY-4  Nephrology Fellow  MS TEAMS preferred  (After 5pm or on weekends, please contact the fellow on call).

## 2024-05-14 NOTE — H&P ADULT - NSHPLABSRESULTS_GEN_ALL_CORE
LABS:                         10.6   11.61 )-----------( 457      ( 14 May 2024 01:59 )             30.5     05-14    122<L>  |  88<L>  |  13  ----------------------------<  105<H>  3.9   |  20<L>  |  0.52    Ca    9.3      14 May 2024 06:16  Phos  3.5     05-14  Mg     2.00     05-14    TPro  7.5  /  Alb  3.4  /  TBili  0.5  /  DBili  x   /  AST  17  /  ALT  14  /  AlkPhos  124<H>  05-14      Urinalysis Basic - ( 14 May 2024 06:16 )    Color: x / Appearance: x / SG: x / pH: x  Gluc: 105 mg/dL / Ketone: x  / Bili: x / Urobili: x   Blood: x / Protein: x / Nitrite: x   Leuk Esterase: x / RBC: x / WBC x   Sq Epi: x / Non Sq Epi: x / Bacteria: x            RADIOLOGY, EKG & ADDITIONAL TESTS: Reviewed.     CT head/maxillofacial/c-spine:  IMPRESSION:    CT HEAD:  No acute intracranial hemorrhage, mass effect, or acute osseous fracture.   Left frontal scalp hematoma.    Chronic ischemic changes, unchanged.    CT MAXILLOFACIAL:  No acute maxillofacial bone or mandibular fracture.    CT CERVICAL SPINE:  No acute cervical spine fracture or evidence of traumatic malalignment.   Multilevel cervical spondylosis. Chronic compression fracture of T2.    Mild biapical parenchymal scarring and groundglass opacities at the lung   apices (right greater than left).

## 2024-05-14 NOTE — ED PROVIDER NOTE - ATTENDING CONTRIBUTION TO CARE
83-year-old woman, history of hyponatremia, hypertension, hyperlipidemia, on aspirin, status post hospital admission 5 months ago for what was hyponatremia as well as VZV encephalitis status post IV acyclovir, now presents with 2 days of progressive AMS, poor appetite, unsteadiness on her feet, confusion.  All of this is typical of her episodes of hyponatremia.  Patient had a trip 5 to 6 days ago at home striking her left orbit without LOC, visual changes.    VS: afebrile, others notable for HTN, otherwise reassuring   Gen: nontoxic but confused, in NAD  Head: NC/AT  face: R orbital ecchymosis  Eyes: PERRL, EOMI, no chemosis, no hyphema  ENT: dry mucous membranes   Neck: trachea midline, FROM (painless) - neg Lennydzinski   Resp:  No distress  abd: +ttp supraumbilical, neg morales's   Ext: no deformities  Neuro:  A&Ox3 but only responding appropriately to simple questions, no gross motor/sensory deficit or facial droop.   Skin:  Warm and dry as visualized    Initial ED MDM: AMS in elderly, no fever or other sepsis criteria. DDx includes, but is not limited to, the following: AEIOUTIPS, chiefly UTI, electrolytes, ICH.   plan for CT head/face/neck, CT abd/pelvis given ttp, CBC, lipase, VBG, CMP, Ulytes, straight cath UA/UCx, admit 83-year-old woman, history of hyponatremia, hypertension, hyperlipidemia, on aspirin, status post hospital admission 5 months ago for what was hyponatremia as well as VZV encephalitis status post IV acyclovir, now presents with 2 days of progressive AMS, poor appetite, unsteadiness on her feet, confusion.  All of this is typical of her episodes of hyponatremia.  Patient had a trip 5 to 6 days ago at home striking her left orbit without LOC, visual changes.    VS: afebrile, others notable for HTN, otherwise reassuring   Gen: nontoxic but confused, in NAD  Head: NC/AT  face: R orbital ecchymosis  Eyes: PERRL, EOMI, no chemosis, no hyphema  ENT: dry mucous membranes   Neck: trachea midline, FROM (painless) - neg Lennydclaynski   Resp:  No distress  abd: +ttp supraumbilical, neg morales's   Ext: no deformities  Neuro:  A&Ox3 but only responding appropriately to simple questions (and only most of the time, not all), no gross motor/sensory deficits or facial droop.   Skin:  Warm and dry as visualized    Initial ED MDM: AMS in elderly, no fever or other sepsis criteria. DDx includes, but is not limited to, the following: AEIOUTIPS, chiefly UTI, PNA, electrolytes, ICH based on clinical context after initial Emergency Department assessment.   Plan for CT head/face/neck, CT abd/pelvis given ttp, CBC, lipase, VBG, CMP, Ulytes, straight cath UA/UCx, admit
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
## 2024-05-14 NOTE — ED PROVIDER NOTE - CLINICAL SUMMARY MEDICAL DECISION MAKING FREE TEXT BOX
83F w/ hx hyponatremia, chronic headache, vertigo, s/p ILR 10/21, cataracts presenting with AMS. Pt has had 2d decreased po, not responding as much to her family, unsteady with walking. Pt lost balance and hit L face on shelf 6d ago, no LOC. No AC use. No fever, cp, sob, n/v. Pt able to follow some commands, seems drowsy. moving all extremities. +L periorbital ecchymosis. c/f hyponatremia vs. intracranial bleed from trauma. Will get labs, UA, urine studies, CT.

## 2024-05-14 NOTE — H&P ADULT - PROBLEM SELECTOR PLAN 2
Patient with fall 6 days ago where she hit her head on table. No LOC. L frontal hematoma, but no fracture or ICH on CT head. Unclear if no LOC.   - TTE Patient with fall 6 days ago where she hit her head on table. L frontal hematoma, but no fracture or ICH on CT head. Unclear if no LOC given unable to asses during interview given AMS. TTE in 2022 with no valvular disease  - repeat TTE  - EKG with RBBB, sinus  - PT consult Na 122 on admission, with urine studies suggestive of SIADH  Patient with previous episodes of SIADH which resolved with short round of HTS and salt tabs  Patient's SIADH in past caused by herpetic rash, this time possibly 2/2 pain from fall.   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  Baseline Na appears to be ~ 130  - fluid restriction  - 1g salt tabs TID  - consult nephrology re: using HTS  - BMP q6h  - AM cortisol  - TSH

## 2024-05-14 NOTE — CONSULT NOTE ADULT - ASSESSMENT
Pt is an 82yo woman with PMH hyponatremia, CKD, hypertension chronic headache, vertigo, s/p ILR 10/21, cataracts presenting with weakness found to be hyponatremic.  MICU consulted for hyponatremia.     #Hyponatremia  #altered mental status  - Pt with waxing and waning mental status in the past, baseline appears to be AO2 per chart review  - Has had episodes of hypoNa and AMS in the past including last admission in Jan  - In Jan, nephrology consulted and gave brief course hypertonic saline and initiated salt tabs with improvement of hypoNa (low Na was attributed to SIADH 2/2 ?pain from herpetic rash)  - HypoNa seemingly chronic, baseline ~130 so would not aim to correct quickly  -  consider consulting nephrology   - could likely benefit from re-initiating salt tabs  - FeNa 0.6%  - no need for MICU at this time, please feel free to re-consult as necessary.     **Note not finalized until signed by attending physician. 
83F with acute on chronic hyponatremia.

## 2024-05-14 NOTE — H&P ADULT - HISTORY OF PRESENT ILLNESS
82 yo F with chronic hyponatremia, CKD, HTN, HLD, chronic headache, vertigo, ILR in 10/21, cataracts presenting with weakness x2d.     Patient has had decreased PO intake for the past two days and has been responding less to family as well as more unsteady on her feet per family. 6 days ago she fell and hit the left side of her face on a table. There was no LOC. 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 She improved with HTS and salt tabs. Baseline Na around 130.     In ED, patient urine electrolytes were drawn with Uosm 507 and Shilpa 77, patient was palced on 800mL fluid restriction. CT head/C-spine/maxillofacial showed L frontal hematoma , no acute intracranial findings, chronic T2 compression fractuer, and biapical parenchymal scarring and GGO of lung apices.  84 yo F with chronic hyponatremia, CKD, HTN, HLD, chronic headache, vertigo, ILR in 10/21, cataracts presenting with weakness x2d.     Patient not participatory with interview, somnolent, but sarah to follow command and state her name. Called patient' daughter phone # listed in chart, but could not reach. Per ED note, patient has had decreased PO intake for the past two days and has been responding less to family as well as more unsteady on her feet per family. 6 days ago she fell and hit the left side of her face on a table. There was no LOC. 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 She improved with HTS and salt tabs. Baseline Na around 130.     In ED, patient urine electrolytes were drawn with Uosm 507 and Shilpa 77, patient was placed on 800mL fluid restriction. CT head/C-spine/maxillofacial showed L frontal hematoma , no acute intracranial findings, chronic T2 compression fracture, and biapical parenchymal scarring and GGO of lung apices.  84 yo F with chronic hyponatremia, CKD, HTN, HLD, chronic headache, vertigo, ILR in 10/21, cataracts presenting with weakness x2d.     Patient not participatory with interview, somnolent, but able to follow command and state her name. Per daughter, patient not taking salt tabs most recently because of nausea it was causing , unclear for how long she was not taking them. Daughter thinks may have been mechanical fall, but the fall was unwitnessed, patient did not endorse LOC.  Per ED note, patient has had decreased PO intake for the past two days and has been responding less to family as well as more unsteady on her feet per family. 6 days ago she fell and hit the left side of her face on a table. There was no LOC. 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 She improved with HTS and salt tabs. Baseline Na around 130.     In ED, patient urine electrolytes were drawn with Uosm 507 and Shilpa 77, patient was placed on 800mL fluid restriction. CT head/C-spine/maxillofacial showed L frontal hematoma , no acute intracranial findings, chronic T2 compression fracture, and biapical parenchymal scarring and GGO of lung apices.

## 2024-05-14 NOTE — ED PROVIDER NOTE - PROGRESS NOTE DETAILS
Zvi Dubin, MD note: pt comfortable. Spoke to THERESA Carroll, would be best to wait for Urine electrolytes p/t any IVF. Pt made NPO. Nolan Desouza- nitin consulted, pt stable for floor. spoke to hospitalist for admission. Zvi Dubin, MD note: pt appears comfortable w/ good VS. Spoke to THERESA Carroll, & we both agree that it would be best to wait for Urine electrolytes p/t any IVF. Pt has been in similar clinical scenario in the past w/ the low Na, so wouldn't want to correct it too quickly w/ hypertonic saline. Nolan Desouza- micu consulted, pt stable for floor. No hypertonix saline. spoke to hospitalist for admission.

## 2024-05-14 NOTE — H&P ADULT - PROBLEM SELECTOR PLAN 4
Pt with apical lung scarring seen on CT c-spine. CT chest in 2022 with concern for lung fibrosis. Pt on amlodipine 10 at home per chart review.   - can continue amlodipine 10mg once home meds confirmed Pt with apical lung scarring seen on CT c-spine. CT chest in 2022 with concern for lung fibrosis.  - CXR given crackles heard on exam

## 2024-05-14 NOTE — ED PROVIDER NOTE - PHYSICAL EXAMINATION
General appearance: drowsy, conversant, afebrile    Eyes: L periorbital ecchymosis. anicteric sclerae, JILL, EOMI   HENT: Atraumatic; oropharynx clear, MMM and no ulcerations, no pharyngeal erythema or exudate   Neck: Trachea midline; Full range of motion, supple   Pulm: CTA bl, normal respiratory effort and no intercostal retractions, normal work of breathing   CV: RRR, No murmurs, rubs, or gallops.    Abdomen: Soft, non-tender, non-distended; no guarding or rebound   Extremities: No peripheral edema or extremity lymphadenopathy.    Skin: Dry, normal temperature, turgor and texture; no rash, ulcers or subcutaneous nodules

## 2024-05-14 NOTE — H&P ADULT - PROBLEM SELECTOR PLAN 3
Pt on amlodipine 10 at home Pt on amlodipine 10 at home per chart review.   - can continue amlodipine 10mg Patient with fall 6 days ago where she hit her head on table. L frontal hematoma, but no fracture or ICH on CT head. Unclear if no LOC given unable to asses during interview given AMS. TTE in 2022 with no valvular disease. per chart review patient has ILR -> can confirm when CXR is obtained.   - repeat TTE  - EKG with RBBB, sinus  - PT consult

## 2024-05-14 NOTE — H&P ADULT - NSHPPHYSICALEXAM_GEN_ALL_CORE
T(C): 36.6 (05-14-24 @ 04:10), Max: 36.8 (05-14-24 @ 01:16)  T(F): 97.8 (05-14-24 @ 04:10), Max: 98.3 (05-14-24 @ 01:16)  HR: 90 (05-14-24 @ 04:10) (78 - 90)  BP: 110/94 (05-14-24 @ 04:10) (110/94 - 181/98)  ABP: --  ABP(mean): --  RR: 18 (05-14-24 @ 04:10) (18 - 18)  SpO2: 100% (05-14-24 @ 04:10) (97% - 100%)  GENERAL: NAD, lying in bed comfortably  HEAD:  Atraumatic, normocephalic  EYES: EOMI, PERRLA, conjunctiva and sclera clear  NECK: Supple, trachea midline, no JVD  HEART: Regular rate and rhythm, no murmurs, rubs, or gallops  LUNGS: Unlabored respirations.  Clear to auscultation bilaterally, no crackles, wheezing, or rhonchi  ABDOMEN: Soft, nontender, nondistended, +BS  EXTREMITIES: 2+ peripheral pulses bilaterally. No clubbing, cyanosis, or edema  NERVOUS SYSTEM:  A&Ox2  SKIN: No rashes or lesions T(C): 36.6 (05-14-24 @ 04:10), Max: 36.8 (05-14-24 @ 01:16)  T(F): 97.8 (05-14-24 @ 04:10), Max: 98.3 (05-14-24 @ 01:16)  HR: 90 (05-14-24 @ 04:10) (78 - 90)  BP: 110/94 (05-14-24 @ 04:10) (110/94 - 181/98)  ABP: --  ABP(mean): --  RR: 18 (05-14-24 @ 04:10) (18 - 18)  SpO2: 100% (05-14-24 @ 04:10) (97% - 100%)  GENERAL: NAD, lying in bed comfortably  HEAD:  L frontal hematoma, with L periorbital ecchymosis  EYES: patient not opening, but when opened L eye with some conjunctival hemorrhage laterally  NECK: Supple, trachea midline, no JVD  HEART: Regular rate and rhythm, no murmurs, rubs, or gallops  LUNGS: Unlabored respirations.  Some end inspiratory crackles in mid to upper lung fields  ABDOMEN: Soft, nontender, nondistended, +BS  EXTREMITIES: 2+ peripheral pulses bilaterally. No clubbing, cyanosis, or edema  NERVOUS SYSTEM:  A&Ox1 , unable to assess further orientation, able to follow commands, moves all extremities spontaneously  SKIN: No rashes or lesions

## 2024-05-14 NOTE — ED PROVIDER NOTE - OBJECTIVE STATEMENT
83F w/ hx hyponatremia, chronic headache, vertigo, s/p ILR 10/21, cataracts presenting with AMS. Pt has had 2d decreased po, not responding as much to her family, unsteady with walking. Pt lost balance and hit L face on shelf 6d ago, no LOC. No AC use. No fever, cp, sob, n/v. Pt able to follow some commands, seems drowsy.

## 2024-05-15 ENCOUNTER — RESULT REVIEW (OUTPATIENT)
Age: 84
End: 2024-05-15

## 2024-05-15 LAB
A1C WITH ESTIMATED AVERAGE GLUCOSE RESULT: 5.5 % — SIGNIFICANT CHANGE UP (ref 4–5.6)
ADD ON TEST-SPECIMEN IN LAB: SIGNIFICANT CHANGE UP
ALBUMIN SERPL ELPH-MCNC: 3.3 G/DL — SIGNIFICANT CHANGE UP (ref 3.3–5)
ALP SERPL-CCNC: 103 U/L — SIGNIFICANT CHANGE UP (ref 40–120)
ALT FLD-CCNC: 13 U/L — SIGNIFICANT CHANGE UP (ref 4–33)
ANION GAP SERPL CALC-SCNC: 13 MMOL/L — SIGNIFICANT CHANGE UP (ref 7–14)
ANION GAP SERPL CALC-SCNC: 13 MMOL/L — SIGNIFICANT CHANGE UP (ref 7–14)
ANION GAP SERPL CALC-SCNC: 14 MMOL/L — SIGNIFICANT CHANGE UP (ref 7–14)
APTT BLD: 32.4 SEC — SIGNIFICANT CHANGE UP (ref 24.5–35.6)
AST SERPL-CCNC: 16 U/L — SIGNIFICANT CHANGE UP (ref 4–32)
BASOPHILS # BLD AUTO: 0.04 K/UL — SIGNIFICANT CHANGE UP (ref 0–0.2)
BASOPHILS NFR BLD AUTO: 0.4 % — SIGNIFICANT CHANGE UP (ref 0–2)
BILIRUB SERPL-MCNC: 0.4 MG/DL — SIGNIFICANT CHANGE UP (ref 0.2–1.2)
BUN SERPL-MCNC: 21 MG/DL — SIGNIFICANT CHANGE UP (ref 7–23)
BUN SERPL-MCNC: 22 MG/DL — SIGNIFICANT CHANGE UP (ref 7–23)
BUN SERPL-MCNC: 23 MG/DL — SIGNIFICANT CHANGE UP (ref 7–23)
CALCIUM SERPL-MCNC: 8.8 MG/DL — SIGNIFICANT CHANGE UP (ref 8.4–10.5)
CALCIUM SERPL-MCNC: 9 MG/DL — SIGNIFICANT CHANGE UP (ref 8.4–10.5)
CALCIUM SERPL-MCNC: 9 MG/DL — SIGNIFICANT CHANGE UP (ref 8.4–10.5)
CHLORIDE SERPL-SCNC: 93 MMOL/L — LOW (ref 98–107)
CHLORIDE SERPL-SCNC: 93 MMOL/L — LOW (ref 98–107)
CHLORIDE SERPL-SCNC: 94 MMOL/L — LOW (ref 98–107)
CHOLEST SERPL-MCNC: 172 MG/DL — SIGNIFICANT CHANGE UP
CO2 SERPL-SCNC: 19 MMOL/L — LOW (ref 22–31)
CO2 SERPL-SCNC: 21 MMOL/L — LOW (ref 22–31)
CO2 SERPL-SCNC: 21 MMOL/L — LOW (ref 22–31)
CORTIS AM PEAK SERPL-MCNC: 12.1 UG/DL — SIGNIFICANT CHANGE UP (ref 6–18.4)
CREAT SERPL-MCNC: 0.73 MG/DL — SIGNIFICANT CHANGE UP (ref 0.5–1.3)
CREAT SERPL-MCNC: 0.82 MG/DL — SIGNIFICANT CHANGE UP (ref 0.5–1.3)
CREAT SERPL-MCNC: 0.87 MG/DL — SIGNIFICANT CHANGE UP (ref 0.5–1.3)
CULTURE RESULTS: SIGNIFICANT CHANGE UP
EGFR: 66 ML/MIN/1.73M2 — SIGNIFICANT CHANGE UP
EGFR: 71 ML/MIN/1.73M2 — SIGNIFICANT CHANGE UP
EGFR: 82 ML/MIN/1.73M2 — SIGNIFICANT CHANGE UP
EOSINOPHIL # BLD AUTO: 1.35 K/UL — HIGH (ref 0–0.5)
EOSINOPHIL NFR BLD AUTO: 14.2 % — HIGH (ref 0–6)
ESTIMATED AVERAGE GLUCOSE: 111 — SIGNIFICANT CHANGE UP
FLUAV AG NPH QL: SIGNIFICANT CHANGE UP
FLUBV AG NPH QL: SIGNIFICANT CHANGE UP
GLUCOSE SERPL-MCNC: 83 MG/DL — SIGNIFICANT CHANGE UP (ref 70–99)
GLUCOSE SERPL-MCNC: 90 MG/DL — SIGNIFICANT CHANGE UP (ref 70–99)
GLUCOSE SERPL-MCNC: 90 MG/DL — SIGNIFICANT CHANGE UP (ref 70–99)
HCT VFR BLD CALC: 28.4 % — LOW (ref 34.5–45)
HDLC SERPL-MCNC: 44 MG/DL — LOW
HGB BLD-MCNC: 9.8 G/DL — LOW (ref 11.5–15.5)
IANC: 5.24 K/UL — SIGNIFICANT CHANGE UP (ref 1.8–7.4)
IMM GRANULOCYTES NFR BLD AUTO: 0.7 % — SIGNIFICANT CHANGE UP (ref 0–0.9)
INR BLD: 1.18 RATIO — SIGNIFICANT CHANGE UP (ref 0.85–1.18)
LIPID PNL WITH DIRECT LDL SERPL: 115 MG/DL — HIGH
LYMPHOCYTES # BLD AUTO: 1.95 K/UL — SIGNIFICANT CHANGE UP (ref 1–3.3)
LYMPHOCYTES # BLD AUTO: 20.5 % — SIGNIFICANT CHANGE UP (ref 13–44)
MAGNESIUM SERPL-MCNC: 2.1 MG/DL — SIGNIFICANT CHANGE UP (ref 1.6–2.6)
MAGNESIUM SERPL-MCNC: 2.1 MG/DL — SIGNIFICANT CHANGE UP (ref 1.6–2.6)
MAGNESIUM SERPL-MCNC: 2.2 MG/DL — SIGNIFICANT CHANGE UP (ref 1.6–2.6)
MCHC RBC-ENTMCNC: 26.8 PG — LOW (ref 27–34)
MCHC RBC-ENTMCNC: 34.5 GM/DL — SIGNIFICANT CHANGE UP (ref 32–36)
MCV RBC AUTO: 77.8 FL — LOW (ref 80–100)
MONOCYTES # BLD AUTO: 0.88 K/UL — SIGNIFICANT CHANGE UP (ref 0–0.9)
MONOCYTES NFR BLD AUTO: 9.2 % — SIGNIFICANT CHANGE UP (ref 2–14)
NEUTROPHILS # BLD AUTO: 5.24 K/UL — SIGNIFICANT CHANGE UP (ref 1.8–7.4)
NEUTROPHILS NFR BLD AUTO: 55 % — SIGNIFICANT CHANGE UP (ref 43–77)
NON HDL CHOLESTEROL: 128 MG/DL — SIGNIFICANT CHANGE UP
NRBC # BLD: 0 /100 WBCS — SIGNIFICANT CHANGE UP (ref 0–0)
NRBC # FLD: 0 K/UL — SIGNIFICANT CHANGE UP (ref 0–0)
PHOSPHATE SERPL-MCNC: 3.4 MG/DL — SIGNIFICANT CHANGE UP (ref 2.5–4.5)
PLATELET # BLD AUTO: 518 K/UL — HIGH (ref 150–400)
POTASSIUM SERPL-MCNC: 3.8 MMOL/L — SIGNIFICANT CHANGE UP (ref 3.5–5.3)
POTASSIUM SERPL-MCNC: 3.9 MMOL/L — SIGNIFICANT CHANGE UP (ref 3.5–5.3)
POTASSIUM SERPL-MCNC: 3.9 MMOL/L — SIGNIFICANT CHANGE UP (ref 3.5–5.3)
POTASSIUM SERPL-SCNC: 3.8 MMOL/L — SIGNIFICANT CHANGE UP (ref 3.5–5.3)
POTASSIUM SERPL-SCNC: 3.9 MMOL/L — SIGNIFICANT CHANGE UP (ref 3.5–5.3)
POTASSIUM SERPL-SCNC: 3.9 MMOL/L — SIGNIFICANT CHANGE UP (ref 3.5–5.3)
PROT SERPL-MCNC: 7.2 G/DL — SIGNIFICANT CHANGE UP (ref 6–8.3)
PROTHROM AB SERPL-ACNC: 13.2 SEC — HIGH (ref 9.5–13)
RBC # BLD: 3.65 M/UL — LOW (ref 3.8–5.2)
RBC # FLD: 13.1 % — SIGNIFICANT CHANGE UP (ref 10.3–14.5)
RSV RNA NPH QL NAA+NON-PROBE: SIGNIFICANT CHANGE UP
SARS-COV-2 RNA SPEC QL NAA+PROBE: SIGNIFICANT CHANGE UP
SODIUM SERPL-SCNC: 126 MMOL/L — LOW (ref 135–145)
SODIUM SERPL-SCNC: 127 MMOL/L — LOW (ref 135–145)
SODIUM SERPL-SCNC: 128 MMOL/L — LOW (ref 135–145)
SPECIMEN SOURCE: SIGNIFICANT CHANGE UP
TRIGL SERPL-MCNC: 66 MG/DL — SIGNIFICANT CHANGE UP
TSH SERPL-MCNC: 1.26 UIU/ML — SIGNIFICANT CHANGE UP (ref 0.27–4.2)
WBC # BLD: 9.53 K/UL — SIGNIFICANT CHANGE UP (ref 3.8–10.5)
WBC # FLD AUTO: 9.53 K/UL — SIGNIFICANT CHANGE UP (ref 3.8–10.5)

## 2024-05-15 PROCEDURE — 93306 TTE W/DOPPLER COMPLETE: CPT | Mod: 26

## 2024-05-15 PROCEDURE — 99232 SBSQ HOSP IP/OBS MODERATE 35: CPT

## 2024-05-15 PROCEDURE — 99233 SBSQ HOSP IP/OBS HIGH 50: CPT | Mod: GC

## 2024-05-15 PROCEDURE — 71045 X-RAY EXAM CHEST 1 VIEW: CPT | Mod: 26

## 2024-05-15 RX ORDER — SODIUM CHLORIDE 9 MG/ML
2 INJECTION INTRAMUSCULAR; INTRAVENOUS; SUBCUTANEOUS EVERY 8 HOURS
Refills: 0 | Status: DISCONTINUED | OUTPATIENT
Start: 2024-05-15 | End: 2024-05-17

## 2024-05-15 RX ADMIN — Medication 81 MILLIGRAM(S): at 11:44

## 2024-05-15 RX ADMIN — SODIUM CHLORIDE 2 GRAM(S): 9 INJECTION INTRAMUSCULAR; INTRAVENOUS; SUBCUTANEOUS at 23:41

## 2024-05-15 RX ADMIN — SODIUM CHLORIDE 2 GRAM(S): 9 INJECTION INTRAMUSCULAR; INTRAVENOUS; SUBCUTANEOUS at 11:44

## 2024-05-15 RX ADMIN — ATORVASTATIN CALCIUM 80 MILLIGRAM(S): 80 TABLET, FILM COATED ORAL at 23:41

## 2024-05-15 RX ADMIN — AMLODIPINE BESYLATE 10 MILLIGRAM(S): 2.5 TABLET ORAL at 07:01

## 2024-05-15 RX ADMIN — ENOXAPARIN SODIUM 40 MILLIGRAM(S): 100 INJECTION SUBCUTANEOUS at 18:09

## 2024-05-15 NOTE — SWALLOW BEDSIDE ASSESSMENT ADULT - PHARYNGEAL PHASE
Unable to assess. Within functional limits Immediate cough observed post oral-intake self-administered cup sips thin liquids./Delayed pharyngeal swallow/Cough post oral intake

## 2024-05-15 NOTE — SWALLOW BEDSIDE ASSESSMENT ADULT - CONSISTENCIES ADMINISTERED
3 ounces via teaspoon and cup sips (patient declined additional PO trials of mildly-thick liquids despite verbal education and encouragement provided)./mildly thick pureed 3 ounces via teaspoon and cup sips (patient declined additional PO trials thin liquids/water (i.e., stated, "no more") despite verbal education and encouragement provided)./thin liquid 2 teaspoons./minced & moist 1 bite of 1/4 cracker./soft & bite-sized

## 2024-05-15 NOTE — SWALLOW BEDSIDE ASSESSMENT ADULT - ASR SWALLOW DENTITION
Patient with upper dentures; Edentulous on bottom - reported bottom dentures "broke" and she does not use them.

## 2024-05-15 NOTE — SWALLOW BEDSIDE ASSESSMENT ADULT - MODE OF PRESENTATION
cup/spoon/self fed/fed by clinician 3 teaspoons/spoon/fed by clinician spoon fed by clinician cup/spoon

## 2024-05-15 NOTE — SWALLOW BEDSIDE ASSESSMENT ADULT - ORAL PREPARATORY PHASE
Within functional limits Prolonged mastication with observed vertical/munching chew pattern. Patient presented with bite of soft and bite-sized consistency; Patient not observed to attempt to masticate or orally manipulate bolus (suspect likely due to absent lower dentition); Bolus presentation, therefore, manually extracted from oral cavity by SLP with no residue remaining./Decreased mastication ability

## 2024-05-15 NOTE — SWALLOW BEDSIDE ASSESSMENT ADULT - COMMENTS
No recent chest imaging.     Patient received awake and alert in bed in no acute distress. Oriented to self and place, reoriented to time and situation. Agreeable to repositioning to upright in bed for PO trials. Patient able to make wants/needs known and follow simple verbal directives given visual cues and repetition of prompts. Denied current difficulties swallowing. Patient noted with baseline cough prior to administration of PO trials. Progress Note-Internal Medicine 5/15: "82 yo F with chronic hyponatremia, CKD, HTN, HLD, chronic headache, vertigo, ILR in 10/21, cataracts presenting with acute metabolic encephalopathy as well as fall (unclear if synocpal) 6 days ago with L frontal hematoma found to be hyponatremic;  Problem/Plan - 1: Problem: Acute metabolic encephalopathy. Plan: Improved, alert and responsive. Patient with some lethargy, confusion for past 2 days per ED note. On exam somnolent, following commands but not opening eyes, Aox1 at the very least. Per daughter baseline of patient she can walk and talk.  Likely 2/2 hyponatremia. No concern for infection , afebrile, but was having some cough. CT head with no SDH;  Problem/Plan - 3: Problem: Fall. Plan: Patient with fall 6 days ago where she hit her head on table. L frontal hematoma, but no fracture or ICH on CT head. Unclear if no LOC given unable to asses during interview given AMS. TTE in 2022 with no valvular disease. per chart review patient has ILR -> can confirm when CXR is obtained."    CT 5/14: "IMPRESSION: CT HEAD: No acute intracranial hemorrhage, mass effect, or acute osseous fracture.  Left frontal scalp hematoma. Chronic ischemic changes, unchanged. CT MAXILLOFACIAL: No acute maxillofacial bone or mandibular fracture. CT CERVICAL SPINE: No acute cervical spine fracture or evidence of traumatic malalignment. Multilevel cervical spondylosis. Chronic compression fracture of T2. Mild biapical parenchymal scarring and groundglass opacities at the lung apices (right greater than left)."    No recent chest imaging.     Patient received awake and alert in bed in no acute distress. Oriented to self and place, reoriented to time and situation. Agreeable to repositioning to upright in bed for PO trials. Patient able to make wants/needs known and follow simple verbal directives given visual cues and repetition of prompts. Denied current difficulties swallowing. Patient noted with baseline cough prior to administration of PO trials.

## 2024-05-15 NOTE — CHART NOTE - NSCHARTNOTEFT_GEN_A_CORE
Called to interrogate ILR   Model # [employer] [id number] Confirm Rx  Implanted on 9/28/2021 by Dr. Rosas   Unable to interrogate ILR due to battery is end of life, confirmed with [employer] rep.   On remote session, ILR reached to RRT as of 11/15/2023 and no AT/AF noted

## 2024-05-15 NOTE — SWALLOW BEDSIDE ASSESSMENT ADULT - SWALLOW EVAL: PROGNOSIS
(continued) 4. Functional pharyngeal stage for mildly-thick liquids, puree, minced and moist, and soft and bite-sized as characterized by suspected timely initiation of pharyngeal swallow trigger and present hyolaryngeal excursion upon digital palpation. No overt s/sx aspiration/penetration with PO trials mildly-thick liquids, puree, minced and moist, and soft and bite-sized consistencies.

## 2024-05-15 NOTE — PROGRESS NOTE ADULT - PROBLEM SELECTOR PLAN 1
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 However, hyponatremia may have preceded the fall as Na <130 increases risk for falls especially in the elderly. No serum osm drawn. AM cortisol wnl. Patient received 3% HTS 30cc/hr x 4h on 5/14 due to serum Na 122 with lethargy. In 24h, serum Na adequately increased to 127. Pt now more alert. Would start trial of NaCl tabs 2g TID. If pt cannot tolerate due to nausea, may consider Urena, but this is often not covered by insuirance and difficult to discharge pts with. Continue fluid restriction and liberalize salt in diet here and on discharge. Limit any infusions constituted in hypotonic solutions.    Recommendations are preliminary until attending attestation.    Gonzales Esparza, PGY-4  Nephrology Fellow  MS TEAMS preferred  (After 5pm or on weekends, please contact the fellow on call).

## 2024-05-15 NOTE — SWALLOW BEDSIDE ASSESSMENT ADULT - ASR SWALLOW RECOMMEND DIAG
2. Recommend consideration of Cinesopahgram secondary to determine if baseline cough is dysphagia-related. Upon chart review, no recent chest imaging./VFSS/MBS

## 2024-05-15 NOTE — PROGRESS NOTE ADULT - TIME BILLING
Time-based billing (NON-critical care).     [ 55 ] minutes spent on total encounter; more than 50% of the visit was spent counseling and / or coordinating care by the attending physician.  The necessity of the time spent during the encounter on this date of service was due to:     documentation in [place], reviewing chart and coordinating care with patient/ACP and interdisciplinary staff (such as , social workers, etc) as well as reviewing vitals, laboratory data, radiology, medication list, consultants' recommendations and prior records. Interventions were performed as documented above.

## 2024-05-15 NOTE — PROGRESS NOTE ADULT - PROBLEM SELECTOR PLAN 3
Patient with fall 6 days ago where she hit her head on table. L frontal hematoma, but no fracture or ICH on CT head. Unclear if no LOC given unable to asses during interview given AMS. TTE in 2022 with no valvular disease. per chart review patient has ILR -> can confirm when CXR is obtained.   - repeat TTE  - EKG with RBBB, sinus  - PT consult rec KONG Patient with fall 6 days ago where she hit her head on table. L frontal hematoma, but no fracture or ICH on CT head. Unclear if no LOC given unable to asses during interview given AMS. TTE in 2022 with no valvular disease.  - repeat TTE  - EP to conduct loop interrogation  - EKG with RBBB, sinus  - PT consult rec KONG

## 2024-05-15 NOTE — PROGRESS NOTE ADULT - PROBLEM SELECTOR PLAN 4
Pt with apical lung scarring seen on CT c-spine. CT chest in 2022 with concern for lung fibrosis.  - CXR given crackles heard on exam Pt with apical lung scarring seen on CT c-spine. CT chest in 2022 with concern for lung fibrosis.  - CXR reporting possible upper lobe pneumonia however no white count, o2 requirement, but minimal cough, pattern may possibly also seen with fibrosis

## 2024-05-15 NOTE — SWALLOW BEDSIDE ASSESSMENT ADULT - SWALLOW EVAL: DIAGNOSIS
1. Moderate oral dysphagia for soft and bite-sized and minced and moist as characterized by adequate bolus acceptance/containment, minimal to no bolus manipulation/mastication for presentation of soft and bite-sized (suspect likely due to absence of lower dentition), slow mastication with vertical/munching chew pattern for minced and moist, adequate anterior-posterior transfer, and adequate oral clearance. 2. Functional oral stage for puree, thin liquids, and mildly-thick liquids as characterized by adequate bolus acceptance/containment, adequate anterior-posterior transfer, and adequate oral clearance. 3. Mild-moderate pharyngeal stage for thin liquids as characterized by suspected delay in initiation of pharyngeal swallow trigger with present hyolaryngeal excursion upon digital palpation. Overt s/sx aspiration/penetration (immediate cough) observed post oral-intake self-administered cup sips of thin liquids (continued in prognosis).

## 2024-05-15 NOTE — SWALLOW BEDSIDE ASSESSMENT ADULT - ADDITIONAL RECOMMENDATIONS
This service to follow for diet tolerance/advancement as schedule permits. Medical team advised to reconsult this service if there is change in patient's medical status/observed tolerance of PO diet.

## 2024-05-15 NOTE — PROGRESS NOTE ADULT - PROBLEM SELECTOR PLAN 1
Improved, alert and responsive.  Patient with some lethargy, confusion for past 2 days per ED note. On exam somnolent, following commands but not opening eyes, Aox1 at the very least. Per daughter baseline of patient she can walk and talk.  Likely 2/2 hyponatremia. No concern for infection , afebrile, but was having some cough. CT head with no SDH.  - treatment of hypo NA as below  - CTM fever curve  -  RVP

## 2024-05-15 NOTE — PROGRESS NOTE ADULT - PROBLEM SELECTOR PLAN 7
DVT prophy: lovenox 40 qd  Diet: NPO for now, dysphagia screen DVT prophy: lovenox 40 qd  Diet: minced and moist, mildly thick liquids per SLP, MBS recommended DVT prophy: lovenox 40 qd  Diet: minced and moist, mildly thick liquids per SLP

## 2024-05-15 NOTE — PROGRESS NOTE ADULT - SUBJECTIVE AND OBJECTIVE BOX
PROGRESS NOTE:   Authored by Dmitry Stark MD  Internal Medicine      Patient is a 83y old  Female who presents with a chief complaint of weakness, fall (15 May 2024 09:18)      SUBJECTIVE / OVERNIGHT EVENTS: NAEO, patient seen and examined at bedside. More alert today. Stating no abdominal pain or chest pain, some pain on forehead. No SOB.     MEDICATIONS  (STANDING):  amLODIPine   Tablet 10 milliGRAM(s) Oral daily  aspirin  chewable 81 milliGRAM(s) Oral daily  atorvastatin 80 milliGRAM(s) Oral at bedtime  enoxaparin Injectable 40 milliGRAM(s) SubCutaneous every 24 hours  sodium chloride 3%. 500 milliLiter(s) (30 mL/Hr) IV Continuous <Continuous>    MEDICATIONS  (PRN):      CAPILLARY BLOOD GLUCOSE        I&O's Summary      PHYSICAL EXAM:  Vital Signs Last 24 Hrs  T(C): 36.9 (15 May 2024 06:55), Max: 37 (14 May 2024 15:56)  T(F): 98.4 (15 May 2024 06:55), Max: 98.6 (14 May 2024 15:56)  HR: 83 (15 May 2024 06:55) (74 - 83)  BP: 126/65 (15 May 2024 06:55) (126/65 - 149/100)  BP(mean): --  RR: 18 (15 May 2024 06:55) (18 - 19)  SpO2: 99% (15 May 2024 06:55) (99% - 100%)    Parameters below as of 15 May 2024 06:55  Patient On (Oxygen Delivery Method): room air      CONSTITUTIONAL: Well-groomed, in no apparent distress  EYES: No conjunctival or scleral injection, non-icteric;   HEAD: forehead hematoma, L periorbital ecchymoses  ENMT: No external nasal lesions; MMM  RESPIRATORY: Breathing comfortably; lungs CTA without wheeze/rhonchi/rales  CARDIOVASCULAR: +S1S2, RRR, no M/G/R; 1+ pitting edema BLE  GASTROINTESTINAL: No palpable masses or tenderness, +BS throughout, no rebound/guarding  SKIN: No rashes or ulcers noted  NEUROLOGIC: CN II-XII intact; sensation intact in LEs b/l to light touch  PSYCHIATRIC: A+O x 2    LABS:                        9.8    9.53  )-----------( 518      ( 15 May 2024 04:43 )             28.4     05-15    127<L>  |  93<L>  |  21  ----------------------------<  83  3.9   |  21<L>  |  0.87    Ca    9.0      15 May 2024 04:43  Phos  3.4     05-15  Mg     2.10     05-15    TPro  7.2  /  Alb  3.3  /  TBili  0.4  /  DBili  x   /  AST  16  /  ALT  13  /  AlkPhos  103  05-15    PT/INR - ( 15 May 2024 04:43 )   PT: 13.2 sec;   INR: 1.18 ratio         PTT - ( 15 May 2024 04:43 )  PTT:32.4 sec      Urinalysis Basic - ( 15 May 2024 04:43 )    Color: x / Appearance: x / SG: x / pH: x  Gluc: 83 mg/dL / Ketone: x  / Bili: x / Urobili: x   Blood: x / Protein: x / Nitrite: x   Leuk Esterase: x / RBC: x / WBC x   Sq Epi: x / Non Sq Epi: x / Bacteria: x          RADIOLOGY & ADDITIONAL TESTS:      COORDINATION OF CARE:  Care Discussed with Consultants/Other Providers [Y/N]:

## 2024-05-15 NOTE — SWALLOW BEDSIDE ASSESSMENT ADULT - ASR SWALLOW REFERRAL
Prophylactic measure
Recommend RD consult to ensure maintenance of adequate nutrition./Registered Dietitian
Prophylactic measure

## 2024-05-15 NOTE — PROGRESS NOTE ADULT - SUBJECTIVE AND OBJECTIVE BOX
[facility] Division of Kidney Diseases & Hypertension  FOLLOW UP NOTE  [phone]--------------------------------------------------------------------------------    Reason for consult: Hyponatremia      24 hour events/subjective: Patient is much more alert today, has no acute complaints.       PAST HISTORY  --------------------------------------------------------------------------------  No significant changes to PMH, PSH, FHx, SHx, unless otherwise noted    ALLERGIES & MEDICATIONS  --------------------------------------------------------------------------------  Allergies    Allergy Status Unknown        Standing Inpatient Medications  amLODIPine   Tablet 10 milliGRAM(s) Oral daily  aspirin  chewable 81 milliGRAM(s) Oral daily  atorvastatin 80 milliGRAM(s) Oral at bedtime  enoxaparin Injectable 40 milliGRAM(s) SubCutaneous every 24 hours  sodium chloride 3%. 500 milliLiter(s) IV Continuous <Continuous>    PRN Inpatient Medications      REVIEW OF SYSTEMS  --------------------------------------------------------------------------------  Constitutional: No fevers, no chills  Neuro: no HA, dizziness  HEENT: No sore throat   Respiratory: No dyspnea, cough  Cardiovascular: No chest pain  Gastrointestinal: No abdominal pain, diarrhea, nausea, vomiting  Genitourinary: No dysuria, hematuria, urgency    All other systems were reviewed and are negative, except as noted.    VITALS/PHYSICAL EXAM  --------------------------------------------------------------------------------  T(C): 36.9 (05-15-24 @ 06:55), Max: 37 (05-14-24 @ 15:56)  HR: 83 (05-15-24 @ 06:55) (74 - 83)  BP: 126/65 (05-15-24 @ 06:55) (126/65 - 149/100)  RR: 18 (05-15-24 @ 06:55) (18 - 19)  SpO2: 99% (05-15-24 @ 06:55) (95% - 100%)  Wt(kg): --        Physical Exam:                       Gen: NAD, periorbital ecchymosis noted                       Pulm: CTA B/L                       CV: +S1S2                       Abd: +BS, soft, nondistended/nontender                       Extremities: trace LE edema                       Neuro: non-focal, AOx2    LABS/STUDIES  --------------------------------------------------------------------------------              9.8    9.53  >-----------<  518      [05-15-24 @ 04:43]              28.4     127  |  93  |  21  ----------------------------<  83      [05-15-24 @ 04:43]  3.9   |  21  |  0.87        Ca     9.0     [05-15-24 @ 04:43]      Mg     2.10     [05-15-24 @ 04:43]      Phos  3.4     [05-15-24 @ 04:43]    TPro  7.2  /  Alb  3.3  /  TBili  0.4  /  DBili  x   /  AST  16  /  ALT  13  /  AlkPhos  103  [05-15-24 @ 04:43]    PT/INR: PT 13.2 , INR 1.18       [05-15-24 @ 04:43]  PTT: 32.4       [05-15-24 @ 04:43]    Uric acid 3.5      [05-14-24 @ 01:59]    Creatinine Trend:  SCr 0.87 [05-15 @ 04:43]  SCr 0.54 [05-14 @ 19:04]  SCr 0.52 [05-14 @ 06:16]  SCr 0.61 [05-14 @ 01:59]    Urinalysis - [05-15-24 @ 04:43]      Color  / Appearance  / SG  / pH       Gluc 83 / Ketone   / Bili  / Urobili        Blood  / Protein  / Leuk Est  / Nitrite       RBC  / WBC  / Hyaline  / Gran  / Sq Epi  / Non Sq Epi  / Bacteria     Urine Creatinine 63      [05-14-24 @ 04:09]  Urine Protein 30      [05-14-24 @ 04:09]  Urine Sodium 77      [05-14-24 @ 04:09]  Urine Urea Nitrogen 661.1      [05-14-24 @ 04:09]  Urine Potassium 46.5      [05-14-24 @ 04:09]  Urine Osmolality 507      [05-14-24 @ 04:09]    Lipid: chol 172, TG 66, HDL 44, LDL --      [05-15-24 @ 04:43]       [facility] Division of Kidney Diseases & Hypertension  FOLLOW UP NOTE  [phone]--------------------------------------------------------------------------------    Reason for consult: Hyponatremia    24 hour events/subjective: Patient is much more alert today, has no acute complaints.     PAST HISTORY  --------------------------------------------------------------------------------  No significant changes to PMH, PSH, FHx, SHx, unless otherwise noted    ALLERGIES & MEDICATIONS  --------------------------------------------------------------------------------  Allergies    Allergy Status Unknown    Standing Inpatient Medications  amLODIPine   Tablet 10 milliGRAM(s) Oral daily  aspirin  chewable 81 milliGRAM(s) Oral daily  atorvastatin 80 milliGRAM(s) Oral at bedtime  enoxaparin Injectable 40 milliGRAM(s) SubCutaneous every 24 hours  sodium chloride 3%. 500 milliLiter(s) IV Continuous <Continuous>    PRN Inpatient Medications    REVIEW OF SYSTEMS  --------------------------------------------------------------------------------  Constitutional: No fevers, no chills  Neuro: no HA, dizziness  HEENT: No sore throat   Respiratory: No dyspnea, cough  Cardiovascular: No chest pain  Gastrointestinal: No abdominal pain, diarrhea, nausea, vomiting  Genitourinary: No dysuria, hematuria, urgency    All other systems were reviewed and are negative, except as noted.    VITALS/PHYSICAL EXAM  --------------------------------------------------------------------------------  T(C): 36.9 (05-15-24 @ 06:55), Max: 37 (05-14-24 @ 15:56)  HR: 83 (05-15-24 @ 06:55) (74 - 83)  BP: 126/65 (05-15-24 @ 06:55) (126/65 - 149/100)  RR: 18 (05-15-24 @ 06:55) (18 - 19)  SpO2: 99% (05-15-24 @ 06:55) (95% - 100%)  Wt(kg): --        Physical Exam:                       Gen: NAD, periorbital ecchymosis noted                       Pulm: CTA B/L                       CV: +S1S2                       Abd: +BS, soft, nondistended/nontender                       Extremities: trace LE edema                       Neuro: non-focal, AOx2    LABS/STUDIES  --------------------------------------------------------------------------------              9.8    9.53  >-----------<  518      [05-15-24 @ 04:43]              28.4     127  |  93  |  21  ----------------------------<  83      [05-15-24 @ 04:43]  3.9   |  21  |  0.87        Ca     9.0     [05-15-24 @ 04:43]      Mg     2.10     [05-15-24 @ 04:43]      Phos  3.4     [05-15-24 @ 04:43]    TPro  7.2  /  Alb  3.3  /  TBili  0.4  /  DBili  x   /  AST  16  /  ALT  13  /  AlkPhos  103  [05-15-24 @ 04:43]    PT/INR: PT 13.2 , INR 1.18       [05-15-24 @ 04:43]  PTT: 32.4       [05-15-24 @ 04:43]    Uric acid 3.5      [05-14-24 @ 01:59]    Creatinine Trend:  SCr 0.87 [05-15 @ 04:43]  SCr 0.54 [05-14 @ 19:04]  SCr 0.52 [05-14 @ 06:16]  SCr 0.61 [05-14 @ 01:59]    Urinalysis - [05-15-24 @ 04:43]      Color  / Appearance  / SG  / pH       Gluc 83 / Ketone   / Bili  / Urobili        Blood  / Protein  / Leuk Est  / Nitrite       RBC  / WBC  / Hyaline  / Gran  / Sq Epi  / Non Sq Epi  / Bacteria     Urine Creatinine 63      [05-14-24 @ 04:09]  Urine Protein 30      [05-14-24 @ 04:09]  Urine Sodium 77      [05-14-24 @ 04:09]  Urine Urea Nitrogen 661.1      [05-14-24 @ 04:09]  Urine Potassium 46.5      [05-14-24 @ 04:09]  Urine Osmolality 507      [05-14-24 @ 04:09]    Lipid: chol 172, TG 66, HDL 44, LDL --      [05-15-24 @ 04:43]

## 2024-05-16 LAB
ALBUMIN SERPL ELPH-MCNC: 3.1 G/DL — LOW (ref 3.3–5)
ALP SERPL-CCNC: 105 U/L — SIGNIFICANT CHANGE UP (ref 40–120)
ALT FLD-CCNC: 13 U/L — SIGNIFICANT CHANGE UP (ref 4–33)
ANION GAP SERPL CALC-SCNC: 14 MMOL/L — SIGNIFICANT CHANGE UP (ref 7–14)
AST SERPL-CCNC: 22 U/L — SIGNIFICANT CHANGE UP (ref 4–32)
BASOPHILS # BLD AUTO: 0.05 K/UL — SIGNIFICANT CHANGE UP (ref 0–0.2)
BASOPHILS NFR BLD AUTO: 0.5 % — SIGNIFICANT CHANGE UP (ref 0–2)
BILIRUB SERPL-MCNC: 0.6 MG/DL — SIGNIFICANT CHANGE UP (ref 0.2–1.2)
BLD GP AB SCN SERPL QL: NEGATIVE — SIGNIFICANT CHANGE UP
BUN SERPL-MCNC: 18 MG/DL — SIGNIFICANT CHANGE UP (ref 7–23)
CALCIUM SERPL-MCNC: 8.8 MG/DL — SIGNIFICANT CHANGE UP (ref 8.4–10.5)
CHLORIDE SERPL-SCNC: 95 MMOL/L — LOW (ref 98–107)
CO2 SERPL-SCNC: 20 MMOL/L — LOW (ref 22–31)
CREAT SERPL-MCNC: 0.65 MG/DL — SIGNIFICANT CHANGE UP (ref 0.5–1.3)
EGFR: 88 ML/MIN/1.73M2 — SIGNIFICANT CHANGE UP
EOSINOPHIL # BLD AUTO: 1.46 K/UL — HIGH (ref 0–0.5)
EOSINOPHIL NFR BLD AUTO: 15.2 % — HIGH (ref 0–6)
GLUCOSE SERPL-MCNC: 90 MG/DL — SIGNIFICANT CHANGE UP (ref 70–99)
HCT VFR BLD CALC: 29 % — LOW (ref 34.5–45)
HGB BLD-MCNC: 9.7 G/DL — LOW (ref 11.5–15.5)
IANC: 4.92 K/UL — SIGNIFICANT CHANGE UP (ref 1.8–7.4)
IMM GRANULOCYTES NFR BLD AUTO: 0.9 % — SIGNIFICANT CHANGE UP (ref 0–0.9)
LYMPHOCYTES # BLD AUTO: 2.21 K/UL — SIGNIFICANT CHANGE UP (ref 1–3.3)
LYMPHOCYTES # BLD AUTO: 23.1 % — SIGNIFICANT CHANGE UP (ref 13–44)
MAGNESIUM SERPL-MCNC: 2.2 MG/DL — SIGNIFICANT CHANGE UP (ref 1.6–2.6)
MCHC RBC-ENTMCNC: 26.4 PG — LOW (ref 27–34)
MCHC RBC-ENTMCNC: 33.4 GM/DL — SIGNIFICANT CHANGE UP (ref 32–36)
MCV RBC AUTO: 78.8 FL — LOW (ref 80–100)
MONOCYTES # BLD AUTO: 0.85 K/UL — SIGNIFICANT CHANGE UP (ref 0–0.9)
MONOCYTES NFR BLD AUTO: 8.9 % — SIGNIFICANT CHANGE UP (ref 2–14)
NEUTROPHILS # BLD AUTO: 4.92 K/UL — SIGNIFICANT CHANGE UP (ref 1.8–7.4)
NEUTROPHILS NFR BLD AUTO: 51.4 % — SIGNIFICANT CHANGE UP (ref 43–77)
NRBC # BLD: 0 /100 WBCS — SIGNIFICANT CHANGE UP (ref 0–0)
NRBC # FLD: 0 K/UL — SIGNIFICANT CHANGE UP (ref 0–0)
PHOSPHATE SERPL-MCNC: 3.4 MG/DL — SIGNIFICANT CHANGE UP (ref 2.5–4.5)
PLATELET # BLD AUTO: 567 K/UL — HIGH (ref 150–400)
POTASSIUM SERPL-MCNC: 3.9 MMOL/L — SIGNIFICANT CHANGE UP (ref 3.5–5.3)
POTASSIUM SERPL-SCNC: 3.9 MMOL/L — SIGNIFICANT CHANGE UP (ref 3.5–5.3)
PROT SERPL-MCNC: 7 G/DL — SIGNIFICANT CHANGE UP (ref 6–8.3)
RBC # BLD: 3.68 M/UL — LOW (ref 3.8–5.2)
RBC # FLD: 13 % — SIGNIFICANT CHANGE UP (ref 10.3–14.5)
RH IG SCN BLD-IMP: POSITIVE — SIGNIFICANT CHANGE UP
SODIUM SERPL-SCNC: 129 MMOL/L — LOW (ref 135–145)
WBC # BLD: 9.58 K/UL — SIGNIFICANT CHANGE UP (ref 3.8–10.5)
WBC # FLD AUTO: 9.58 K/UL — SIGNIFICANT CHANGE UP (ref 3.8–10.5)

## 2024-05-16 PROCEDURE — 99232 SBSQ HOSP IP/OBS MODERATE 35: CPT | Mod: GC

## 2024-05-16 PROCEDURE — 99232 SBSQ HOSP IP/OBS MODERATE 35: CPT

## 2024-05-16 RX ADMIN — SODIUM CHLORIDE 2 GRAM(S): 9 INJECTION INTRAMUSCULAR; INTRAVENOUS; SUBCUTANEOUS at 06:50

## 2024-05-16 RX ADMIN — AMLODIPINE BESYLATE 10 MILLIGRAM(S): 2.5 TABLET ORAL at 06:51

## 2024-05-16 RX ADMIN — Medication 81 MILLIGRAM(S): at 12:50

## 2024-05-16 RX ADMIN — ATORVASTATIN CALCIUM 80 MILLIGRAM(S): 80 TABLET, FILM COATED ORAL at 21:52

## 2024-05-16 RX ADMIN — ENOXAPARIN SODIUM 40 MILLIGRAM(S): 100 INJECTION SUBCUTANEOUS at 18:05

## 2024-05-16 RX ADMIN — SODIUM CHLORIDE 2 GRAM(S): 9 INJECTION INTRAMUSCULAR; INTRAVENOUS; SUBCUTANEOUS at 21:52

## 2024-05-16 NOTE — PATIENT PROFILE ADULT - FALL HARM RISK - HARM RISK INTERVENTIONS
Assistance with ambulation/Assistance OOB with selected safe patient handling equipment/Communicate Risk of Fall with Harm to all staff/Discuss with provider need for PT consult/Monitor gait and stability/Reinforce activity limits and safety measures with patient and family/Tailored Fall Risk Interventions/Visual Cue: Yellow wristband and red socks/Bed in lowest position, wheels locked, appropriate side rails in place/Call bell, personal items and telephone in reach/Instruct patient to call for assistance before getting out of bed or chair/Non-slip footwear when patient is out of bed/Golden to call system/Physically safe environment - no spills, clutter or unnecessary equipment/Purposeful Proactive Rounding/Room/bathroom lighting operational, light cord in reach

## 2024-05-16 NOTE — PROGRESS NOTE ADULT - SUBJECTIVE AND OBJECTIVE BOX
PROGRESS NOTE:     Patient is a 83y old  Female who presents with a chief complaint of weakness, fall (16 May 2024 09:23)      INTERVAL HISTORY: NAEO. VSS. ROS negative.    MEDICATIONS  (STANDING):  amLODIPine   Tablet 10 milliGRAM(s) Oral daily  aspirin  chewable 81 milliGRAM(s) Oral daily  atorvastatin 80 milliGRAM(s) Oral at bedtime  enoxaparin Injectable 40 milliGRAM(s) SubCutaneous every 24 hours  sodium chloride 2 Gram(s) Oral every 8 hours    MEDICATIONS  (PRN):      CAPILLARY BLOOD GLUCOSE        I&O's Summary      PHYSICAL EXAM:  Vital Signs Last 24 Hrs  T(C): 37.1 (16 May 2024 06:41), Max: 37.3 (15 May 2024 13:01)  T(F): 98.7 (16 May 2024 06:41), Max: 99.2 (15 May 2024 13:01)  HR: 66 (16 May 2024 06:41) (66 - 71)  BP: 143/39 (16 May 2024 06:41) (128/71 - 143/39)  BP(mean): --  RR: 16 (16 May 2024 06:41) (16 - 19)  SpO2: 95% (16 May 2024 06:41) (95% - 97%)    Parameters below as of 16 May 2024 06:41  Patient On (Oxygen Delivery Method): room air        CONSTITUTIONAL: NAD, well-developed  HEENT:   RESPIRATORY: Normal respiratory effort; lungs are clear to auscultation bilaterally  CARDIOVASCULAR: Regular rate and rhythm, normal S1 and S2, no murmur/rub/gallop; No lower extremity edema; Peripheral pulses are 2+ bilaterally  ABDOMEN: Nontender to palpation, normoactive bowel sounds, no rebound/guarding; No hepatosplenomegaly  MUSCULOSKELETAL: no clubbing or cyanosis of digits; no joint swelling or tenderness to palpation  PSYCH: A+O to person, place, and time; affect appropriate    LABS:                        9.7    9.58  )-----------( 567      ( 16 May 2024 06:20 )             29.0     05-16    129<L>  |  95<L>  |  18  ----------------------------<  90  3.9   |  20<L>  |  0.65    Ca    8.8      16 May 2024 06:20  Phos  3.4     05-16  Mg     2.20     05-16    TPro  7.0  /  Alb  3.1<L>  /  TBili  0.6  /  DBili  x   /  AST  22  /  ALT  13  /  AlkPhos  105  05-16    PT/INR - ( 15 May 2024 04:43 )   PT: 13.2 sec;   INR: 1.18 ratio         PTT - ( 15 May 2024 04:43 )  PTT:32.4 sec      Urinalysis Basic - ( 16 May 2024 06:20 )    Color: x / Appearance: x / SG: x / pH: x  Gluc: 90 mg/dL / Ketone: x  / Bili: x / Urobili: x   Blood: x / Protein: x / Nitrite: x   Leuk Esterase: x / RBC: x / WBC x   Sq Epi: x / Non Sq Epi: x / Bacteria: x        Culture - Urine (collected 14 May 2024 04:09)  Source: Clean Catch Clean Catch (Midstream)  Final Report (15 May 2024 11:28):    <10,000 CFU/mL Normal Urogenital Rosa M        RADIOLOGY:        ******************************  Authored By: Mary Campa MD PGY2  Internal Medicine  MS Teams  ******************************

## 2024-05-16 NOTE — PATIENT PROFILE ADULT - LEGAL HELP
CHW Note:    Type of contact:  phone call    Reason for contact:  asthma    Action taken:  nun    Patient next steps:  n/a    CHW next steps: follow up in 5 days.  
no

## 2024-05-16 NOTE — PROGRESS NOTE ADULT - SUBJECTIVE AND OBJECTIVE BOX
[facility] Division of Kidney Diseases & Hypertension  FOLLOW UP NOTE  [phone]--------------------------------------------------------------------------------    Reason for consult: Hyponatremia      24 hour events/subjective: Patient was seen eating breakfast, appetite appears good, no complaints of nausea.      PAST HISTORY  --------------------------------------------------------------------------------  No significant changes to PMH, PSH, FHx, SHx, unless otherwise noted    ALLERGIES & MEDICATIONS  --------------------------------------------------------------------------------  Allergies    Allergy Status Unknown      Standing Inpatient Medications  amLODIPine   Tablet 10 milliGRAM(s) Oral daily  aspirin  chewable 81 milliGRAM(s) Oral daily  atorvastatin 80 milliGRAM(s) Oral at bedtime  enoxaparin Injectable 40 milliGRAM(s) SubCutaneous every 24 hours  sodium chloride 2 Gram(s) Oral every 8 hours      REVIEW OF SYSTEMS  --------------------------------------------------------------------------------  Constitutional: No fevers, no chills  Respiratory: No dyspnea, cough  Cardiovascular: No chest pain  Gastrointestinal: No abdominal pain, diarrhea, nausea, vomiting  Genitourinary: No dysuria, hematuria, urgency    VITALS/PHYSICAL EXAM  --------------------------------------------------------------------------------  T(C): 37.1 (05-16-24 @ 06:41), Max: 37.3 (05-15-24 @ 13:01)  HR: 66 (05-16-24 @ 06:41) (66 - 71)  BP: 143/39 (05-16-24 @ 06:41) (128/71 - 143/39)  RR: 16 (05-16-24 @ 06:41) (16 - 19)  SpO2: 95% (05-16-24 @ 06:41) (95% - 97%)  Wt(kg): --        Physical Exam:                       Gen: NAD, periorbital ecchymosis noted                       Pulm: CTA B/L                       CV: +S1S2                       Abd: +BS, soft, nondistended/nontender                       Extremities: trace LE edema                       Neuro: non-focal, AOx2  LABS/STUDIES  --------------------------------------------------------------------------------              9.7    9.58  >-----------<  567      [05-16-24 @ 06:20]              29.0     129  |  95  |  18  ----------------------------<  90      [05-16-24 @ 06:20]  3.9   |  20  |  0.65        Ca     8.8     [05-16-24 @ 06:20]      Mg     2.20     [05-16-24 @ 06:20]      Phos  3.4     [05-16-24 @ 06:20]    TPro  7.0  /  Alb  3.1  /  TBili  0.6  /  DBili  x   /  AST  22  /  ALT  13  /  AlkPhos  105  [05-16-24 @ 06:20]    PT/INR: PT 13.2 , INR 1.18       [05-15-24 @ 04:43]  PTT: 32.4       [05-15-24 @ 04:43]      Creatinine Trend:  SCr 0.65 [05-16 @ 06:20]  SCr 0.73 [05-15 @ 16:38]  SCr 0.82 [05-15 @ 09:40]  SCr 0.87 [05-15 @ 04:43]  SCr 0.54 [05-14 @ 19:04]    Urine Creatinine 63      [05-14-24 @ 04:09]  Urine Protein 30      [05-14-24 @ 04:09]  Urine Sodium 77      [05-14-24 @ 04:09]  Urine Urea Nitrogen 661.1      [05-14-24 @ 04:09]  Urine Potassium 46.5      [05-14-24 @ 04:09]  Urine Osmolality 507      [05-14-24 @ 04:09]    TSH 1.26      [05-15-24 @ 09:40]  Lipid: chol 172, TG 66, HDL 44, LDL --      [05-15-24 @ 04:43]       [facility] Division of Kidney Diseases & Hypertension  FOLLOW UP NOTE  [phone]--------------------------------------------------------------------------------    Reason for consult: Hyponatremia    24 hour events/subjective: Patient was seen eating breakfast, appetite appears good, no complaints of nausea.    PAST HISTORY  --------------------------------------------------------------------------------  No significant changes to PMH, PSH, FHx, SHx, unless otherwise noted    ALLERGIES & MEDICATIONS  --------------------------------------------------------------------------------  Allergies    Allergy Status Unknown      Standing Inpatient Medications  amLODIPine   Tablet 10 milliGRAM(s) Oral daily  aspirin  chewable 81 milliGRAM(s) Oral daily  atorvastatin 80 milliGRAM(s) Oral at bedtime  enoxaparin Injectable 40 milliGRAM(s) SubCutaneous every 24 hours  sodium chloride 2 Gram(s) Oral every 8 hours    REVIEW OF SYSTEMS  --------------------------------------------------------------------------------  Constitutional: No fevers, no chills  Respiratory: No dyspnea, cough  Cardiovascular: No chest pain  Gastrointestinal: No abdominal pain, diarrhea, nausea, vomiting  Genitourinary: No dysuria, hematuria, urgency    VITALS/PHYSICAL EXAM  --------------------------------------------------------------------------------  T(C): 37.1 (05-16-24 @ 06:41), Max: 37.3 (05-15-24 @ 13:01)  HR: 66 (05-16-24 @ 06:41) (66 - 71)  BP: 143/39 (05-16-24 @ 06:41) (128/71 - 143/39)  RR: 16 (05-16-24 @ 06:41) (16 - 19)  SpO2: 95% (05-16-24 @ 06:41) (95% - 97%)  Wt(kg): --    Physical Exam:                       Gen: NAD, periorbital ecchymosis noted                       Pulm: CTA B/L                       CV: +S1S2                       Abd: +BS, soft, nondistended/nontender                       Extremities: trace LE edema                       Neuro: non-focal, AOx2  LABS/STUDIES  --------------------------------------------------------------------------------              9.7    9.58  >-----------<  567      [05-16-24 @ 06:20]              29.0     129  |  95  |  18  ----------------------------<  90      [05-16-24 @ 06:20]  3.9   |  20  |  0.65        Ca     8.8     [05-16-24 @ 06:20]      Mg     2.20     [05-16-24 @ 06:20]      Phos  3.4     [05-16-24 @ 06:20]    TPro  7.0  /  Alb  3.1  /  TBili  0.6  /  DBili  x   /  AST  22  /  ALT  13  /  AlkPhos  105  [05-16-24 @ 06:20]    PT/INR: PT 13.2 , INR 1.18       [05-15-24 @ 04:43]  PTT: 32.4       [05-15-24 @ 04:43]      Creatinine Trend:  SCr 0.65 [05-16 @ 06:20]  SCr 0.73 [05-15 @ 16:38]  SCr 0.82 [05-15 @ 09:40]  SCr 0.87 [05-15 @ 04:43]  SCr 0.54 [05-14 @ 19:04]    Urine Creatinine 63      [05-14-24 @ 04:09]  Urine Protein 30      [05-14-24 @ 04:09]  Urine Sodium 77      [05-14-24 @ 04:09]  Urine Urea Nitrogen 661.1      [05-14-24 @ 04:09]  Urine Potassium 46.5      [05-14-24 @ 04:09]  Urine Osmolality 507      [05-14-24 @ 04:09]    TSH 1.26      [05-15-24 @ 09:40]  Lipid: chol 172, TG 66, HDL 44, LDL --      [05-15-24 @ 04:43]

## 2024-05-16 NOTE — PROGRESS NOTE ADULT - PROBLEM SELECTOR PLAN 3
Patient with fall 6 days ago where she hit her head on table. L frontal hematoma, but no fracture or ICH on CT head. Unclear if no LOC given unable to asses during interview given AMS. TTE in 2022 with no valvular disease.  - repeat TTE  - EP to conduct loop interrogation  - EKG with RBBB, sinus  - PT consult rec KONG

## 2024-05-16 NOTE — PROGRESS NOTE ADULT - PROBLEM SELECTOR PLAN 1
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 However, hyponatremia may have preceded the fall as Na <130 increases risk for falls especially in the elderly. No serum osm drawn. AM cortisol wnl. Patient received 3% HTS 30cc/hr x 4h on 5/14 due to serum Na 122 with lethargy. In 24h, serum Na adequately increased to 127. Started NaCl tabs 2g TID on 5/15 and serum Na increased to 129 on 5/16. Pt now more alert. Continue fluid restriction and liberalize salt in diet here and on discharge. Limit any infusions constituted in hypotonic solutions. Would discharge pt on salt tabs when medically ready per primary team. Remainder of care per primary team.    Recommendations are preliminary until attending attestation.    Gonzales Esparza, PGY-4  Nephrology Fellow  MS TEAMS preferred  (After 5pm or on weekends, please contact the fellow on call).

## 2024-05-16 NOTE — PROGRESS NOTE ADULT - PROBLEM SELECTOR PLAN 4
Pt with apical lung scarring seen on CT c-spine. CT chest in 2022 with concern for lung fibrosis.  - CXR reporting possible upper lobe pneumonia however no white count, o2 requirement, but minimal cough, pattern may possibly also seen with fibrosis

## 2024-05-17 ENCOUNTER — TRANSCRIPTION ENCOUNTER (OUTPATIENT)
Age: 84
End: 2024-05-17

## 2024-05-17 VITALS
OXYGEN SATURATION: 97 % | SYSTOLIC BLOOD PRESSURE: 169 MMHG | TEMPERATURE: 99 F | HEART RATE: 77 BPM | DIASTOLIC BLOOD PRESSURE: 69 MMHG | RESPIRATION RATE: 18 BRPM

## 2024-05-17 LAB
ALBUMIN SERPL ELPH-MCNC: 3.2 G/DL — LOW (ref 3.3–5)
ALP SERPL-CCNC: 106 U/L — SIGNIFICANT CHANGE UP (ref 40–120)
ALT FLD-CCNC: 12 U/L — SIGNIFICANT CHANGE UP (ref 4–33)
ANION GAP SERPL CALC-SCNC: 13 MMOL/L — SIGNIFICANT CHANGE UP (ref 7–14)
AST SERPL-CCNC: 18 U/L — SIGNIFICANT CHANGE UP (ref 4–32)
BASOPHILS # BLD AUTO: 0.03 K/UL — SIGNIFICANT CHANGE UP (ref 0–0.2)
BASOPHILS NFR BLD AUTO: 0.3 % — SIGNIFICANT CHANGE UP (ref 0–2)
BILIRUB SERPL-MCNC: 0.4 MG/DL — SIGNIFICANT CHANGE UP (ref 0.2–1.2)
BUN SERPL-MCNC: 15 MG/DL — SIGNIFICANT CHANGE UP (ref 7–23)
CALCIUM SERPL-MCNC: 8.6 MG/DL — SIGNIFICANT CHANGE UP (ref 8.4–10.5)
CHLORIDE SERPL-SCNC: 98 MMOL/L — SIGNIFICANT CHANGE UP (ref 98–107)
CO2 SERPL-SCNC: 20 MMOL/L — LOW (ref 22–31)
CREAT SERPL-MCNC: 0.58 MG/DL — SIGNIFICANT CHANGE UP (ref 0.5–1.3)
EGFR: 90 ML/MIN/1.73M2 — SIGNIFICANT CHANGE UP
EOSINOPHIL # BLD AUTO: 1.51 K/UL — HIGH (ref 0–0.5)
EOSINOPHIL NFR BLD AUTO: 16 % — HIGH (ref 0–6)
GLUCOSE SERPL-MCNC: 106 MG/DL — HIGH (ref 70–99)
HCT VFR BLD CALC: 28.9 % — LOW (ref 34.5–45)
HGB BLD-MCNC: 9.6 G/DL — LOW (ref 11.5–15.5)
IANC: 4.96 K/UL — SIGNIFICANT CHANGE UP (ref 1.8–7.4)
IMM GRANULOCYTES NFR BLD AUTO: 1 % — HIGH (ref 0–0.9)
LYMPHOCYTES # BLD AUTO: 2 K/UL — SIGNIFICANT CHANGE UP (ref 1–3.3)
LYMPHOCYTES # BLD AUTO: 21.2 % — SIGNIFICANT CHANGE UP (ref 13–44)
MAGNESIUM SERPL-MCNC: 2.1 MG/DL — SIGNIFICANT CHANGE UP (ref 1.6–2.6)
MCHC RBC-ENTMCNC: 26.3 PG — LOW (ref 27–34)
MCHC RBC-ENTMCNC: 33.2 GM/DL — SIGNIFICANT CHANGE UP (ref 32–36)
MCV RBC AUTO: 79.2 FL — LOW (ref 80–100)
MONOCYTES # BLD AUTO: 0.85 K/UL — SIGNIFICANT CHANGE UP (ref 0–0.9)
MONOCYTES NFR BLD AUTO: 9 % — SIGNIFICANT CHANGE UP (ref 2–14)
NEUTROPHILS # BLD AUTO: 4.96 K/UL — SIGNIFICANT CHANGE UP (ref 1.8–7.4)
NEUTROPHILS NFR BLD AUTO: 52.5 % — SIGNIFICANT CHANGE UP (ref 43–77)
NRBC # BLD: 0 /100 WBCS — SIGNIFICANT CHANGE UP (ref 0–0)
NRBC # FLD: 0 K/UL — SIGNIFICANT CHANGE UP (ref 0–0)
PHOSPHATE SERPL-MCNC: 2.9 MG/DL — SIGNIFICANT CHANGE UP (ref 2.5–4.5)
PLATELET # BLD AUTO: 507 K/UL — HIGH (ref 150–400)
POTASSIUM SERPL-MCNC: 3.5 MMOL/L — SIGNIFICANT CHANGE UP (ref 3.5–5.3)
POTASSIUM SERPL-SCNC: 3.5 MMOL/L — SIGNIFICANT CHANGE UP (ref 3.5–5.3)
PROT SERPL-MCNC: 7 G/DL — SIGNIFICANT CHANGE UP (ref 6–8.3)
RBC # BLD: 3.65 M/UL — LOW (ref 3.8–5.2)
RBC # FLD: 12.9 % — SIGNIFICANT CHANGE UP (ref 10.3–14.5)
SODIUM SERPL-SCNC: 131 MMOL/L — LOW (ref 135–145)
WBC # BLD: 9.44 K/UL — SIGNIFICANT CHANGE UP (ref 3.8–10.5)
WBC # FLD AUTO: 9.44 K/UL — SIGNIFICANT CHANGE UP (ref 3.8–10.5)

## 2024-05-17 PROCEDURE — 99239 HOSP IP/OBS DSCHRG MGMT >30: CPT

## 2024-05-17 PROCEDURE — 99232 SBSQ HOSP IP/OBS MODERATE 35: CPT

## 2024-05-17 RX ORDER — SODIUM CHLORIDE 9 MG/ML
2 INJECTION INTRAMUSCULAR; INTRAVENOUS; SUBCUTANEOUS
Qty: 0 | Refills: 0 | DISCHARGE
Start: 2024-05-17

## 2024-05-17 RX ORDER — ACETAMINOPHEN 500 MG
650 TABLET ORAL EVERY 6 HOURS
Refills: 0 | Status: DISCONTINUED | OUTPATIENT
Start: 2024-05-17 | End: 2024-05-17

## 2024-05-17 RX ORDER — AMLODIPINE BESYLATE 2.5 MG/1
1 TABLET ORAL
Qty: 0 | Refills: 0 | DISCHARGE
Start: 2024-05-17

## 2024-05-17 RX ADMIN — Medication 81 MILLIGRAM(S): at 11:34

## 2024-05-17 RX ADMIN — SODIUM CHLORIDE 2 GRAM(S): 9 INJECTION INTRAMUSCULAR; INTRAVENOUS; SUBCUTANEOUS at 06:29

## 2024-05-17 RX ADMIN — Medication 650 MILLIGRAM(S): at 15:05

## 2024-05-17 RX ADMIN — AMLODIPINE BESYLATE 10 MILLIGRAM(S): 2.5 TABLET ORAL at 06:29

## 2024-05-17 RX ADMIN — Medication 650 MILLIGRAM(S): at 14:51

## 2024-05-17 RX ADMIN — Medication 100 MILLIGRAM(S): at 14:49

## 2024-05-17 RX ADMIN — SODIUM CHLORIDE 2 GRAM(S): 9 INJECTION INTRAMUSCULAR; INTRAVENOUS; SUBCUTANEOUS at 13:22

## 2024-05-17 NOTE — PROGRESS NOTE ADULT - PROBLEM SELECTOR PLAN 4
Pt with apical lung scarring seen on CT c-spine. CT chest in 2022 with concern for lung fibrosis.  - CXR reporting possible upper lobe pneumonia however no white count, o2 requirement, but minimal cough, pattern may possibly also seen with fibrosis  - patient with some cough, symptomatic tx with guaifenesin and tessalon perle

## 2024-05-17 NOTE — DISCHARGE NOTE PROVIDER - NSDCFUADDAPPT_GEN_ALL_CORE_FT
APPTS ARE READY TO BE MADE: [x] YES    Best Family or Patient Contact (if needed):    Additional Information about above appointments (if needed):    1: primary care in 2-4 weeks  2:   3:     Other comments or requests:

## 2024-05-17 NOTE — PROGRESS NOTE ADULT - ASSESSMENT
83F with acute on chronic hyponatremia.
83F with acute on chronic hyponatremia.
82 yo F with chronic hyponatremia, CKD, HTN, HLD, chronic headache, vertigo, ILR in 10/21, cataracts presenting with acute metabolic encephalopathy as well as fall (unclear if synocpal) 6 days ago with L frontal hematoma found to be hyponatremic, improving. 
83F with acute on chronic hyponatremia.
82 yo F with chronic hyponatremia, CKD, HTN, HLD, chronic headache, vertigo, ILR in 10/21, cataracts presenting with acute metabolic encephalopathy as well as fall (unclear if synocpal) 6 days ago with L frontal hematoma found to be hyponatremic. 
84 yo F with chronic hyponatremia, CKD, HTN, HLD, chronic headache, vertigo, ILR in 10/21, cataracts presenting with acute metabolic encephalopathy as well as fall (unclear if synocpal) 6 days ago with L frontal hematoma found to be hyponatremic.

## 2024-05-17 NOTE — DIETITIAN INITIAL EVALUATION ADULT - OTHER INFO
Patient lunch tray observed with minimal intake (few spoonful of minced & moist entree foods). Observed coughing. Pt c/o thirst wanting water. communicated to RN. Pt believes her last BM was yesterday.  Patient lunch tray observed with minimal intake (few spoonful of minced & moist entree foods). Observed coughing. Pt c/o thirst wanting water. communicated to RN. Pt believes her last BM was yesterday. SLP abdullahi noted 5/15: recs for minced/moist, mildly thick liquids

## 2024-05-17 NOTE — PROGRESS NOTE ADULT - PROBLEM SELECTOR PLAN 1
Improved, alert and responsive.  Patient with some lethargy, confusion for past 2 days per ED note. On exam somnolent, following commands but not opening eyes, Aox1 at the very least. Per daughter baseline of patient she can walk and talk.  Likely 2/2 hyponatremia. No concern for infection , afebrile, but was having some cough. CT head with no SDH.  - treatment of hypo NA as below  - CTM fever curve

## 2024-05-17 NOTE — DISCHARGE NOTE PROVIDER - HOSPITAL COURSE
HPI:  84 yo F with chronic hyponatremia, CKD, HTN, HLD, chronic headache, vertigo, ILR in 10/21, cataracts presenting with weakness x2d.     Patient not participatory with interview, somnolent, but able to follow command and state her name. Per daughter, patient not taking salt tabs most recently because of nausea it was causing , unclear for how long she was not taking them. Daughter thinks may have been mechanical fall, but the fall was unwitnessed, patient did not endorse LOC.  Per ED note, patient has had decreased PO intake for the past two days and has been responding less to family as well as more unsteady on her feet per family. 6 days ago she fell and hit the left side of her face on a table. There was no LOC. 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 She improved with HTS and salt tabs. Baseline Na around 130.     In ED, patient urine electrolytes were drawn with Uosm 507 and Shilpa 77, patient was placed on 800mL fluid restriction. CT head/C-spine/maxillofacial showed L frontal hematoma , no acute intracranial findings, chronic T2 compression fracture, and biapical parenchymal scarring and GGO of lung apices.  (14 May 2024 07:22)    Hospital Course:  Patient was given HTS once per nephro which improved Na, then started on salt tabs 2g TID, which patient tolerated, with sodium improving appropriately to 131 on day of discharge, which is around patient's baseline. patient to continue 2g salt tabs TID on dc. With improvement of sodium, patient's mental status improved and patient was alert and at baseline Aox2. Given patient had an unwitnessed fall where it was unclear if there was any LOC, a syncope workup was done with TTE, which showed only mild aortic stenosis, on tele patient was sinus. Patient had ILR interrogated by EP, was found to have lost battery by 11/2023, but had no events. On day of discharge, patient was HDS and afebrile and at baseline Aox2.     Important Medication Changes and Reason:  - salt tabs increased to 2g TID    Active or Pending Issues Requiring Follow-up:  - f/u ILR removal        Advanced Directives:   [x] Full code  [ ] DNR  [ ] Hospice    Discharge Diagnoses:

## 2024-05-17 NOTE — PROGRESS NOTE ADULT - PROBLEM SELECTOR PLAN 2
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  Baseline Na appears to be ~ 130  - f/u nephro recs  - fluid restriction  - 2g salt tabs TID  - BMP q6h  - AM cortisol 12.1  - TSH
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  Baseline Na appears to be ~ 130  - f/u nephro recs  - fluid restriction  - 2g salt tabs TID  - BMP q6h  - AM cortisol 12.1  - TSH
Improving.  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  Baseline Na appears to be ~ 130  - f/u nephro recs  - fluid restriction  - 2g salt tabs TID  - BMP q6h  - AM cortisol 12.1, drawn before 8 AM so likely patient with normal AM cortisol. no other sx of adrenal insufficiency  - TSH wnl

## 2024-05-17 NOTE — DISCHARGE NOTE NURSING/CASE MANAGEMENT/SOCIAL WORK - NSDCPETBCESMAN_GEN_ALL_CORE
Colonoscopy / Sigmoidoscopy Discharge Instructions  Ramone Restrepo M.D. You may have a regular diet unless instructed otherwise and if you do not have any abdominal (belly) pains, nausea, vomiting or fever. No driving, operating machinery or making important decisions for the next 24 hours. It is normal to feel distended or full in the abdomen; passing gas will help relieve the fullness. The fullness is from the air put inside your bowel (colon) during the procedure. It is normal to pass small amounts of blood after the colonoscopy. Findings today included:  Normal Colon    Medications:   No changes to home medication therapy. Next colonoscopy recommended in 10 years for screening purposes unless otherwise indicated before. Schedule a follow up visit as needed. Below are abnormal symptoms which will need medical attention; please call the office at any time if these occur. Large amount of bleeding through the rectum   Severe abdominal pain or pain in the belly (it is normal to have occasional mild discomfort). Fever of 101F or above, chills or excessive sweating. Persistent nausea or vomiting. If you are a smoker, it is important for your health to stop smoking. Please be aware that second hand smoke is also harmful.

## 2024-05-17 NOTE — PROGRESS NOTE ADULT - PROBLEM SELECTOR PLAN 1
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 However, hyponatremia may have preceded the fall as Na <130 increases risk for falls especially in the elderly. Could also be due to lung process. Patient has chronic apical scarring No serum osm drawn. AM cortisol wnl. Patient received 3% HTS 30cc/hr x 4h on 5/14 due to serum Na 122 with lethargy. In 24h, serum Na adequately increased to 127. Started NaCl tabs 2g TID on 5/15 and serum Na increased to 129 on 5/16. Pt now more alert. Continue fluid restriction and liberalize salt in diet here and on discharge. Limit any infusions constituted in hypotonic solutions. Would discharge pt on salt tabs when medically ready per primary team. Remainder of care per primary team.    Recommendations are preliminary until attending attestation.    Gonzales Esparza, PGY-4  Nephrology Fellow  MS TEAMS preferred  (After 5pm or on weekends, please contact the fellow on call). 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 However, hyponatremia may have preceded the fall as Na <130 increases risk for falls especially in the elderly. Could also be due to lung process. Patient may have chronic apical scarring and lung fibrosis per previous CT, but CXR on 5/15 shows new RUL opacity suggestive of possible PNA especially iso cough, although pt afebrile. Does not have leukocytosis but is developing eosinophilia and some immature granulocytes, consider possible seasonal allergies with cough. Also developing thrombocytosis though unclear etiology. ABx and/or further eval per primary team. AM cortisol and TSH wnl. Would check serum osm. Serum Na has improved to 131 on 5/17, tolerating NaCl tabs 2g TID. Continue fluid restriction and liberalize salt in diet here and on discharge. Limit any infusions constituted in hypotonic solutions. Would discharge pt on salt tabs when medically ready per primary team. Remainder of care per primary team.    Recommendations are preliminary until attending attestation.    Gonzales Esparza, PGY-4  Nephrology Fellow  MS TEAMS preferred  (After 5pm or on weekends, please contact the fellow on call). Elevated urine osm of 507 indicative of SIADH, possibly due to nausea or pain from fall, intrinsic lung pathology. However, hyponatremia may have preceded the fall as Na <130 increases risk for falls especially in the elderly.  AM cortisol and TSH wnl.  Serum Na has improved to 131 on 5/17, tolerating NaCl tabs 2g TID. Continue fluid restriction and liberalize salt in diet here and on discharge. Limit any infusions constituted in hypotonic solutions. Would discharge pt on salt tabs when medically ready per primary team. Remainder of care per primary team.    Recommendations are preliminary until attending attestation.    Gonzales Esparza, PGY-4  Nephrology Fellow  MS TEAMS preferred  (After 5pm or on weekends, please contact the fellow on call).

## 2024-05-17 NOTE — DIETITIAN INITIAL EVALUATION ADULT - ADD RECOMMEND
1. Consider low sodium modifier when hyponatremia improved. Diet texture/consistency per SLP recs Minced and Moist Mildly thick allow for swallow between intakes; maintain upright posture during/after eating for 30 mins; oral hygiene; position upright (90 degrees))  1. Recommend Ensure Compact 4oz (220 kcal, 9g pro) daily to supplement intake   2. Recommend daily multivitamins  3. Monitor I/Os, hydration status, labs/electrolytes

## 2024-05-17 NOTE — PROGRESS NOTE ADULT - PROBLEM SELECTOR PLAN 3
Patient with fall 6 days ago where she hit her head on table. L frontal hematoma, but no fracture or ICH on CT head. Unclear if no LOC given unable to asses during interview given AMS. TTE in 2022 with no valvular disease.  - repeat TTE  - EP to conduct loop interrogation --> ran out of battery in 11/23, but no events prior  - EKG with RBBB, sinus  - PT consult rec KONG Hemigard Intro: Due to skin fragility and wound tension, it was decided to use HEMIGARD adhesive retention suture devices to permit a linear closure. The skin was cleaned and dried for a 6cm distance away from the wound. Excessive hair, if present, was removed to allow for adhesion.

## 2024-05-17 NOTE — DISCHARGE NOTE NURSING/CASE MANAGEMENT/SOCIAL WORK - NSDCCRNAME_GEN_ALL_CORE_FT
Here with mother because of cough and congestion for about 3 days. Some post-tussive emesis. Fever to 102.6. Eating and drinking well. Unimmunized.  Brother sick with cough illness. Brother tested negative for Covid Saturday.  Neeta hasn't been tested. Mom says he hasn't been near his brother. Denies ear pulling,  eye drainage,  swollen glands, cough, abdominal pain, diarrhea, skin rash, joint pain, joint swelling, and muscle aches.    Physical Exam  Gen:  Awake, alert, and in no acute distress.  HEENT:  Conjunctivae are clear.  Tympanic membranes are clear bilaterally with normal landmarks.  Positive nasal congestion.  Oropharynx is benign with moist mucous membranes.  There is no tonsillar hypertrophy or exudate.  Neck:  Supple without adenopathy or thyromegaly.  Chest:  Clear to auscultation bilaterally with no wheezes or retractions present. Coarse breath sounds and transmitted upper airway noises.  Heart:  Regular rate and rhythm.  No murmur.   Extremities:  Good distal perfusion.  Symmetric movement.  Brisk capillary refill.  Skin:  Warm with no rashes or lesions.    Declined Covid testing    Assessment and plan: bronchiolitis. He is unimmunized and mom does not want testing done.  Signs and symptoms warranting further evaluation were discussed.  If he has had no significant improvement in his signs and symptoms they should call our clinic for advice and possible reevaluation.  Otherwise, we will see them back as questions and concerns arise.   [facility] ([address] [place] [address])

## 2024-05-17 NOTE — PROGRESS NOTE ADULT - PROBLEM SELECTOR PLAN 5
Pt on amlodipine 10mg at home  - c/w amlodipine

## 2024-05-17 NOTE — PROGRESS NOTE ADULT - SUBJECTIVE AND OBJECTIVE BOX
[facility] Division of Kidney Diseases & Hypertension  FOLLOW UP NOTE  [phone]--------------------------------------------------------------------------------    Reason for consult: Hyponatremia      24 hour events/subjective: Patient c/o cough but no SOB, no fever. Tolerating salt tabs, good appetite, no nausea.      PAST HISTORY  --------------------------------------------------------------------------------  No significant changes to PMH, PSH, FHx, SHx, unless otherwise noted    ALLERGIES & MEDICATIONS  --------------------------------------------------------------------------------  Allergies    Allergy Status Unknown      Standing Inpatient Medications  amLODIPine   Tablet 10 milliGRAM(s) Oral daily  aspirin  chewable 81 milliGRAM(s) Oral daily  atorvastatin 80 milliGRAM(s) Oral at bedtime  benzonatate 100 milliGRAM(s) Oral once  enoxaparin Injectable 40 milliGRAM(s) SubCutaneous every 24 hours  sodium chloride 2 Gram(s) Oral every 8 hours    PRN Inpatient Medications  guaiFENesin Oral Liquid (Sugar-Free) 100 milliGRAM(s) Oral every 6 hours PRN      REVIEW OF SYSTEMS  --------------------------------------------------------------------------------  Constitutional: No fevers, no chills  Respiratory: +cough  Cardiovascular: No chest pain, no edema  Gastrointestinal: No abdominal pain, diarrhea, nausea, or vomiting  Genitourinary: No dysuria, hematuria, or urgency    VITALS/PHYSICAL EXAM  --------------------------------------------------------------------------------  T(C): 37.2 (05-16-24 @ 20:22), Max: 37.2 (05-16-24 @ 20:22)  HR: 72 (05-16-24 @ 20:22) (70 - 72)  BP: 151/68 (05-16-24 @ 20:22) (130/54 - 151/68)  RR: 18 (05-16-24 @ 20:22) (16 - 18)  SpO2: 99% (05-16-24 @ 20:22) (96% - 99%)  Wt(kg): --  Height (cm): 152.4 (05-16-24 @ 17:35)  Weight (kg): 60.1 (05-16-24 @ 17:35)  BMI (kg/m2): 25.9 (05-16-24 @ 17:35)  BSA (m2): 1.57 (05-16-24 @ 17:35)      Physical Exam:                       Gen: NAD                       Pulm: scattered rhonchi                       CV: +S1S2                       Abd: +BS, soft, nondistended/nontender                       Extremities: no bilateral LE edema                       Neuro: non-focal    LABS/STUDIES  --------------------------------------------------------------------------------              9.6    9.44  >-----------<  507      [05-17-24 @ 06:10]              28.9     131  |  98  |  15  ----------------------------<  106      [05-17-24 @ 06:10]  3.5   |  20  |  0.58        Ca     8.6     [05-17-24 @ 06:10]      Mg     2.10     [05-17-24 @ 06:10]      Phos  2.9     [05-17-24 @ 06:10]    TPro  7.0  /  Alb  3.2  /  TBili  0.4  /  DBili  x   /  AST  18  /  ALT  12  /  AlkPhos  106  [05-17-24 @ 06:10]    Creatinine Trend:  SCr 0.58 [05-17 @ 06:10]  SCr 0.65 [05-16 @ 06:20]  SCr 0.73 [05-15 @ 16:38]  SCr 0.82 [05-15 @ 09:40]  SCr 0.87 [05-15 @ 04:43]    Urine Creatinine 63      [05-14-24 @ 04:09]  Urine Protein 30      [05-14-24 @ 04:09]  Urine Sodium 77      [05-14-24 @ 04:09]  Urine Urea Nitrogen 661.1      [05-14-24 @ 04:09]  Urine Potassium 46.5      [05-14-24 @ 04:09]  Urine Osmolality 507      [05-14-24 @ 04:09]    TSH 1.26      [05-15-24 @ 09:40]  Lipid: chol 172, TG 66, HDL 44, LDL --      [05-15-24 @ 04:43] [facility] Division of Kidney Diseases & Hypertension  FOLLOW UP NOTE  [phone]--------------------------------------------------------------------------------    Reason for consult: Hyponatremia    24 hour events/subjective: Patient c/o cough but no SOB, no fever. Tolerating salt tabs, good appetite, no nausea.    PAST HISTORY  --------------------------------------------------------------------------------  No significant changes to PMH, PSH, FHx, SHx, unless otherwise noted    ALLERGIES & MEDICATIONS  --------------------------------------------------------------------------------  Allergies    Allergy Status Unknown    Standing Inpatient Medications  amLODIPine   Tablet 10 milliGRAM(s) Oral daily  aspirin  chewable 81 milliGRAM(s) Oral daily  atorvastatin 80 milliGRAM(s) Oral at bedtime  benzonatate 100 milliGRAM(s) Oral once  enoxaparin Injectable 40 milliGRAM(s) SubCutaneous every 24 hours  sodium chloride 2 Gram(s) Oral every 8 hours    PRN Inpatient Medications  guaiFENesin Oral Liquid (Sugar-Free) 100 milliGRAM(s) Oral every 6 hours PRN      REVIEW OF SYSTEMS  --------------------------------------------------------------------------------  Constitutional: No fevers, no chills  Respiratory: +cough  Cardiovascular: No chest pain, no edema  Gastrointestinal: No abdominal pain, diarrhea, nausea, or vomiting  Genitourinary: No dysuria, hematuria, or urgency    VITALS/PHYSICAL EXAM  --------------------------------------------------------------------------------  T(C): 37.2 (05-16-24 @ 20:22), Max: 37.2 (05-16-24 @ 20:22)  HR: 72 (05-16-24 @ 20:22) (70 - 72)  BP: 151/68 (05-16-24 @ 20:22) (130/54 - 151/68)  RR: 18 (05-16-24 @ 20:22) (16 - 18)  SpO2: 99% (05-16-24 @ 20:22) (96% - 99%)  Wt(kg): --  Height (cm): 152.4 (05-16-24 @ 17:35)  Weight (kg): 60.1 (05-16-24 @ 17:35)  BMI (kg/m2): 25.9 (05-16-24 @ 17:35)  BSA (m2): 1.57 (05-16-24 @ 17:35)      Physical Exam:                       Gen: NAD                       Pulm: scattered rhonchi                       CV: +S1S2                       Abd: +BS, soft, nondistended/nontender                       Extremities: no bilateral LE edema                       Neuro: non-focal    LABS/STUDIES  --------------------------------------------------------------------------------              9.6    9.44  >-----------<  507      [05-17-24 @ 06:10]              28.9     131  |  98  |  15  ----------------------------<  106      [05-17-24 @ 06:10]  3.5   |  20  |  0.58        Ca     8.6     [05-17-24 @ 06:10]      Mg     2.10     [05-17-24 @ 06:10]      Phos  2.9     [05-17-24 @ 06:10]    TPro  7.0  /  Alb  3.2  /  TBili  0.4  /  DBili  x   /  AST  18  /  ALT  12  /  AlkPhos  106  [05-17-24 @ 06:10]    Creatinine Trend:  SCr 0.58 [05-17 @ 06:10]  SCr 0.65 [05-16 @ 06:20]  SCr 0.73 [05-15 @ 16:38]  SCr 0.82 [05-15 @ 09:40]  SCr 0.87 [05-15 @ 04:43]    Urine Creatinine 63      [05-14-24 @ 04:09]  Urine Protein 30      [05-14-24 @ 04:09]  Urine Sodium 77      [05-14-24 @ 04:09]  Urine Urea Nitrogen 661.1      [05-14-24 @ 04:09]  Urine Potassium 46.5      [05-14-24 @ 04:09]  Urine Osmolality 507      [05-14-24 @ 04:09]    TSH 1.26      [05-15-24 @ 09:40]  Lipid: chol 172, TG 66, HDL 44, LDL --      [05-15-24 @ 04:43]

## 2024-05-17 NOTE — DISCHARGE NOTE PROVIDER - NSDCCPCAREPLAN_GEN_ALL_CORE_FT
PRINCIPAL DISCHARGE DIAGNOSIS  Diagnosis: Hyponatremia  Assessment and Plan of Treatment: You came in with altered mental status, lethargy , likely due to low sodium, which was at 122. This is likely because you were not taking your salt tabs for a few days as well as the pain from the fall that you had prior to comign in. Nephrology was consulted and we gave you hypertonic saline initially and then started you back on the salt tabs. Please continue the salt tabs at home. Please note that now we recommend that you take 2 grams of salt tabs every 8 hours, rather than just 1 gram as before.  If you have worsening confusion, lethargy, please seek urgent medical care.      SECONDARY DISCHARGE DIAGNOSES  Diagnosis: Fall  Assessment and Plan of Treatment: You fell a few days prior to your admission, you have a bruise on your left eye and a colleciton of blood in your scalp from the fall, which should self resolve. Imaging was done and no bleeding in your brain or any fractures were noted.  Since you had a fall that was not witnessed at the time of the fall we are unclear if you lost consciousness during the fall and so conducted some tests to check your heart (echocardiogram, which was mostly normal, only with mild aortic valve stenosis (tightnening), but not likely the cause of your fall) and checked the loop recorder which you have implanted from 2021. The loop recorder is out of battery, please follow up with your primary care provider to see if it needs to be removed.    Diagnosis: Apical lung scarring  Assessment and Plan of Treatment: You had some fibrosis seen on your right lung from the CT cervical spine that was done, this was seen again on an xray of your chest that you had on admission, Please follow up with your primary care doctor to see if any additional workup is necessary.

## 2024-05-17 NOTE — DIETITIAN INITIAL EVALUATION ADULT - EDUCATION DIETARY MODIFICATIONS
Communicated current diet order and fluid restriction. Low sodium modifications as appropriate./(2) meets goals/outcomes/verbalization

## 2024-05-17 NOTE — DISCHARGE NOTE PROVIDER - CARE PROVIDER_API CALL
EZIO JUNE  [address]  [place] [address]  Phone: [phone]  Fax: [phone]  Established Patient  Follow Up Time:

## 2024-05-17 NOTE — DISCHARGE NOTE PROVIDER - NSDCMRMEDTOKEN_GEN_ALL_CORE_FT
alendronate 70 mg oral tablet: 1 tab(s) orally once a week  amLODIPine 10 mg oral tablet: 1 tab(s) orally once a day  aspirin 81 mg oral capsule: 1 cap(s) orally once a day  atorvastatin 80 mg oral tablet: 1 tab(s) orally once a day  Sodium Chloride 1 g oral tablet: 2 tab(s) orally every 8 hours

## 2024-05-17 NOTE — DISCHARGE NOTE PROVIDER - NSDCCPTREATMENT_GEN_ALL_CORE_FT
PRINCIPAL PROCEDURE  Procedure: Head CT  Findings and Treatment: IMPRESSION:  CT HEAD:  No acute intracranial hemorrhage, mass effect, or acute osseous fracture.   Left frontal scalp hematoma.  Chronic ischemic changes, unchanged.  CT MAXILLOFACIAL:  No acute maxillofacial bone or mandibular fracture.  CT CERVICAL SPINE:  No acute cervical spine fracture or evidence of traumatic malalignment.   Multilevel cervical spondylosis. Chronic compression fracture of T2.  Mild biapical parenchymal scarring and groundglass opacities at the lung   apices (right greater than left).        SECONDARY PROCEDURE  Procedure: Transthoracic echocardiography (TTE)  Findings and Treatment: CONCLUSIONS:      1. Left ventricular systolic function is normal with an ejection fraction of 54 % by Stauffer's method of disks.   2. Normal right ventricular cavity size, with normal wall thickness, and normal systolic function.   3. The left atrium is normal in size.   4. The right atrium is normal in size.   5. The aortic valve appears trileaflet with reduced systolic excursion. There is calcification of the aortic valve leaflets. There is mild aortic stenosis.   6. No pericardial effusion seen.   7. The inferior vena cava is normal in size (normal <2.1cm) with normal inspiratory collapse (normal >50%) consistent withnormal right atrial pressure (~3, range 0-5mmHg).   8. Estimated pulmonary artery systolic pressure is 31 mmHg.      Procedure: XR chest, 1 view  Findings and Treatment:

## 2024-05-17 NOTE — PROGRESS NOTE ADULT - SUBJECTIVE AND OBJECTIVE BOX
PROGRESS NOTE:   Authored by Dmitry Stark MD  Internal Medicine      Patient is a 83y old  Female who presents with a chief complaint of weakness, fall (16 May 2024 10:02)      SUBJECTIVE / OVERNIGHT EVENTS: NAEO, patient seen and examined at bedside. States has some cough. otherwise no acute complaints.    MEDICATIONS  (STANDING):  amLODIPine   Tablet 10 milliGRAM(s) Oral daily  aspirin  chewable 81 milliGRAM(s) Oral daily  atorvastatin 80 milliGRAM(s) Oral at bedtime  benzonatate 100 milliGRAM(s) Oral once  enoxaparin Injectable 40 milliGRAM(s) SubCutaneous every 24 hours  sodium chloride 2 Gram(s) Oral every 8 hours    MEDICATIONS  (PRN):  guaiFENesin Oral Liquid (Sugar-Free) 100 milliGRAM(s) Oral every 6 hours PRN Cough      CAPILLARY BLOOD GLUCOSE        I&O's Summary      PHYSICAL EXAM:  Vital Signs Last 24 Hrs  T(C): 37.2 (16 May 2024 20:22), Max: 37.2 (16 May 2024 20:22)  T(F): 98.9 (16 May 2024 20:22), Max: 98.9 (16 May 2024 20:22)  HR: 72 (16 May 2024 20:22) (70 - 72)  BP: 151/68 (16 May 2024 20:22) (130/54 - 151/68)  BP(mean): --  RR: 18 (16 May 2024 20:22) (16 - 18)  SpO2: 99% (16 May 2024 20:22) (96% - 99%)    Parameters below as of 16 May 2024 20:22  Patient On (Oxygen Delivery Method): room air      CONSTITUTIONAL: Well-groomed, in no apparent distress  EYES: No conjunctival or scleral injection, non-icteric;   HEAD: periorbital ecchymoses on L , L frontal hematoma  RESPIRATORY: anterior R lung field with inspiratory crackles  CARDIOVASCULAR: +S1S2, RRR, no M/G/R; no lower extremity edema  GASTROINTESTINAL: No palpable masses or tenderness, +BS throughout, no rebound/guarding  SKIN: No rashes or ulcers noted  NEUROLOGIC: CN II-XII intact; sensation intact in LEs b/l to light touch  PSYCHIATRIC: A+O x 1-2    LABS:                        9.6    9.44  )-----------( 507      ( 17 May 2024 06:10 )             28.9     05-17    131<L>  |  98  |  15  ----------------------------<  106<H>  3.5   |  20<L>  |  0.58    Ca    8.6      17 May 2024 06:10  Phos  2.9     05-17  Mg     2.10     05-17    TPro  7.0  /  Alb  3.2<L>  /  TBili  0.4  /  DBili  x   /  AST  18  /  ALT  12  /  AlkPhos  106  05-17          Urinalysis Basic - ( 17 May 2024 06:10 )    Color: x / Appearance: x / SG: x / pH: x  Gluc: 106 mg/dL / Ketone: x  / Bili: x / Urobili: x   Blood: x / Protein: x / Nitrite: x   Leuk Esterase: x / RBC: x / WBC x   Sq Epi: x / Non Sq Epi: x / Bacteria: x          RADIOLOGY & ADDITIONAL TESTS:  Results Reviewed:   Imaging Personally Reviewed:  Electrocardiogram Personally Reviewed:    COORDINATION OF CARE:  Care Discussed with Consultants/Other Providers [Y/N]:

## 2024-05-17 NOTE — DIETITIAN INITIAL EVALUATION ADULT - ORAL INTAKE PTA/DIET HISTORY
84 yo F with chronic hyponatremia, CKD, HTN, HLD, chronic headache, vertigo, ILR in 10/21, cataracts presenting with acute metabolic encephalopathy as well as fall (unclear if synocpal) 6 days ago with L frontal hematoma found to be hyponatremic per chart.     Pt unable to report significant hx, spoke to daughter via phone call. Reported patient was eating well, would consume a meal or snack every 2-3 hours at home. Stated pt has top and bottom dentures (only wearing top at current admission, will bring bottom dentures from home soon per daughter). Denied observed difficulty with regular texture foods or liquids as per daughter, reported patient eats the same foods as family without any problems. Denied concerns for N/V/D or abdominal pain. Daughter denied any food allergies. As per patient she does not eat beef or pork.  82 yo F with chronic hyponatremia, CKD, HTN, HLD, chronic headache, vertigo, ILR in 10/21, cataracts presenting with acute metabolic encephalopathy as well as fall (unclear if synocpal) 6 days ago with L frontal hematoma found to be hyponatremic per chart.     Pt unable to report significant hx, spoke to daughter via phone call. Reported patient was eating well, would consume a meal or snack every 2-3 hours at home. Stated pt has top and bottom dentures (only wearing top at current admission, will bring bottom dentures from home soon per daughter). Denied observed difficulty with regular texture foods or liquids as per daughter, reported patient eats the same foods as family without any problems. Denied concerns for N/V/D or abdominal pain. Daughter denied any food allergies. As per patient she does not eat beef or pork. Prev RD note wts : 1/3/24 120.34 lbs/54.7 kg (weight change of +12%/10% over 4 months may be attributable to fluid retention. Pt with edema)

## 2024-05-17 NOTE — PROGRESS NOTE ADULT - PROBLEM SELECTOR PLAN 6
c/w home atorvastatin 80mg   c/w home aspirin 81mg

## 2024-05-17 NOTE — DIETITIAN INITIAL EVALUATION ADULT - PERTINENT MEDS FT
MEDICATIONS  (STANDING):  amLODIPine   Tablet 10 milliGRAM(s) Oral daily  aspirin  chewable 81 milliGRAM(s) Oral daily  atorvastatin 80 milliGRAM(s) Oral at bedtime  benzonatate 100 milliGRAM(s) Oral once  enoxaparin Injectable 40 milliGRAM(s) SubCutaneous every 24 hours  sodium chloride 2 Gram(s) Oral every 8 hours    MEDICATIONS  (PRN):  guaiFENesin Oral Liquid (Sugar-Free) 100 milliGRAM(s) Oral every 6 hours PRN Cough

## 2024-05-17 NOTE — DISCHARGE NOTE NURSING/CASE MANAGEMENT/SOCIAL WORK - PATIENT PORTAL LINK FT
You can access the FollowMyHealth Patient Portal offered by [facility] by registering at the following website: [email or web address]. By joining [facility]’s FollowMyHealth portal, you will also be able to view your health information using other applications (apps) compatible with our system.

## 2024-05-17 NOTE — DIETITIAN INITIAL EVALUATION ADULT - PERTINENT LABORATORY DATA
05-17    131<L>  |  98  |  15  ----------------------------<  106<H>  3.5   |  20<L>  |  0.58    Ca    8.6      17 May 2024 06:10  Phos  2.9     05-17  Mg     2.10     05-17    TPro  7.0  /  Alb  3.2<L>  /  TBili  0.4  /  DBili  x   /  AST  18  /  ALT  12  /  AlkPhos  106  05-17  A1C with Estimated Average Glucose Result: 5.5 % (05-15-24 @ 04:43)

## 2024-05-17 NOTE — PROGRESS NOTE ADULT - ATTENDING COMMENTS
seen and evaluated.  salt tabs as above.  encourage protein intake restrict water.
Patient seen and evaluted this morning.  robust appetite. eating well.  trying her best to restrict fluid.  recommendations as above.
SIADH with etiologies as above.  sodium now improved.  recommend continue salt tabs and fluid restriction on discharge.  nephrology will sign off for now.
82 yo female with pmhx of Chronic Hyponatremia, CKD, HTN, HLD, Chronic headache, BPPV, ILR, cataracts presents with complaints of weakness for 2d with preceding event of fall at home, unclear if LOC, has left face hematoma. admitted for AMS likely dt metabolic encephalopathy in s.o severe hyponatremia now s/p hypertonic saline with improvement. also worked up for syncope.    VSS. ANO2, alert. reports mild dry cough but denies phelgm, no fever/chills or sob.     #AMS - likely metabolic encephalopathy in s.o hyponatremia. CT head with left frontal hematoma, no other acute findings. improved.   #Acute on chronic hyponatremia- Na 122, urine studies suggestive of SIADH  #generalized weakness   - now s/p Hypertonic saline, Na 122--> 124--127--126-->129  - started on salt tab 2g tid (monitor for any symptoms of nausea), fluid restrict to 1L/day  - renal cs  - Cont monitor BMP qam  - TSH wnl, cortisol performed too early but low suspicion of adrenal insufficiency as no other findings   - no ss of sepsis, ua negative, cxr with opacities but no respiratory symptoms.     #Fall, unclear if syncope  EKG with RBBB, PAC, no other findings.   previously with ILR- per EP interrogation, no event previously   -PT eval -[facility]  -TTE wo any abn, mild AS.  -orthostatic unstable to perform   -monitor vitals     rest as above  diet: Mince and Moist per SLP, fluid restrict   dispo: PT eval-[facility]. family wants to take patient home instead.
82 yo female with pmhx of Chronic Hyponatremia, CKD, HTN, HLD, Chronic headache, BPPV, ILR, cataracts presents with complaints of weakness for 2d with preceding event of fall at home, unclear if LOC, has left face hematoma. admitted for AMS likely dt metabolic encephalopathy in s.o severe hyponatremia now s/p hypertonic saline with improvement. also worked up for syncope.    VSS. alert and awake, oriented X2. still has some dry cough, denies fever/chills. appetite fair.     #AMS - likely metabolic encephalopathy in s.o hyponatremia. CT head with left frontal hematoma, no other acute findings. improved.   #Acute on chronic hyponatremia- Na 122, urine studies suggestive of SIADH  #generalized weakness   - now s/p Hypertonic saline, Na 122--> 124--127--126-->129->131  - cont on salt tab 2g tid and fluid restrict to 1L/day  - renal cs  - Cont monitor BMP qam  - TSH wnl, cortisol performed too early but low suspicion of adrenal insufficiency as no other findings   - no ss of sepsis, ua negative, cxr with opacities but no respiratory symptoms except dry cough     #Fall, unclear if syncope  EKG with RBBB, PAC, no other findings.   previously with ILR- per EP interrogation, no event previously   -PT eval -[facility]  -TTE wo any abn, mild AS.  -orthostatic unable to perform   -monitor vitals     rest as above  diet: Mince and Moist per SLP, fluid restrict   Stable for discharge to [facility] today.
82 yo female with pmhx of Chronic Hyponatremia, CKD, HTN, HLD, Chronic headache, BPPV, ILR, cataracts presents with complaints of weakness for 2d with preceding event of fall at home, unclear if LOC, has left face hematoma. admitted for AMS likely dt metabolic encephalopathy in s.o severe hyponatremia now s/p hypertonic saline with improvement. also worked up for syncope.    VSS. more awake today, AN02. NAD.   labs with improving hyponatremia within appropriate range.     #AMS - likely metabolic encephalopathy in s.o hyponatremia. CT head with left frontal hematoma, no other acute findings.  #Acute on chronic hyponatremia- Na 122, urine studies suggestive of SIADH  #generalized weakness   - now s/p Hypertonic saline, Na 122--> 124--127--126  - started on salt tab 2g tid (monitor for any symptoms of nausea), fluid restrict to 1L/day  - renal cs  - Cont monitor BMP q8hr  - TSH wnl, cortisol performed too early but low suspicion of adrenal insufficiency as no other findings   - no ss of sepsis, ua negative, cxr with opacities but no respiratory symptoms.     #Fall, unclear if syncope  EKG with RBBB, PAC, no other findings.   previously with ILR- per EP interrogation, no event previously   -PT eval -[facility]  -obtain TTE - pending report   -orthostatic deferred dt current mental status  -monitor vitals     rest as above  diet: Mince and Moist per SLP, fluid restrict   dispo: PT eval-[facility].

## 2024-05-17 NOTE — PROGRESS NOTE ADULT - PROBLEM SELECTOR PROBLEM 1
Hyponatremia
Acute metabolic encephalopathy

## 2024-07-27 NOTE — PROGRESS NOTE ADULT - PROBLEM SELECTOR PROBLEM 4
Patient having occasional episodes of confusion, forgets where he is and what brought him into hospital. Patient re-oriented but quickly forgets what he was told. Otherwise, patient in no apparent distress. SLIM informed.    Paige 7/27/2024 12:32 PM   
Prophylactic measure
Prophylactic measure
UTI (urinary tract infection)
UTI (urinary tract infection)

## 2024-08-15 NOTE — OCCUPATIONAL THERAPY INITIAL EVALUATION ADULT - ORIENTATION, REHAB EVAL
Referring Provider: Shay Thornton MD    Reason for follow-up: ?  Atrial fibrillation     Patient Care Team:  Provider, No Known as PCP - General      SUBJECTIVE  Patient was just discharged from the hospital and returned.  I have been consulted for bradycardia.     ROS  Review of all systems negative except as indicated.    Since I have last seen, the patient has been without any chest discomfort, shortness of breath, palpitations, dizziness or syncope.  Denies having any headache, abdominal pain, nausea, vomiting, diarrhea, constipation, loss of weight or loss of appetite.  Denies having any excessive bruising, hematuria or blood in the stool.        Personal History:    Past Medical History:   Diagnosis Date    Alcohol abuse 11/08/2022    Anemia of chronic disease 07/29/2024    Chronic GERD 08/09/2024    Chronic hepatitis C virus infection 11/08/2022    HAVP Negative 11/03/2022     HEPBSAG Negative 11/03/2022     HEPBCAB Negative 11/03/2022     HEPC10 Positive (A) 11/03/2022             Cirrhosis     Colitis 07/17/2024    Elevated LFTs 11/06/2019    Esophageal varices     Lesion of vocal fold 07/29/2024    Major depressive disorder, recurrent episode, moderate degree 02/17/2024    Neuropathy 08/09/2024    Pelvic congestion syndrome 07/28/2023    Portal vein thrombosis 07/11/2024    Sepsis without acute organ dysfunction 11/03/2022       History reviewed. No pertinent surgical history.    Family History   Family history unknown: Yes       Social History     Tobacco Use    Smoking status: Every Day     Types: Cigarettes    Smokeless tobacco: Current   Vaping Use    Vaping status: Every Day    Substances: Nicotine, THC, CBD, Flavoring    Devices: Disposable   Substance Use Topics    Alcohol use: Yes     Comment: Daily - 1-2 pints a day currently    Drug use: Yes     Types: Marijuana        Home meds:  Prior to Admission medications    Medication Sig Start Date End Date Taking? Authorizing Provider   folic acid  (FOLVITE) 1 MG tablet Take 1 tablet by mouth Daily.  Patient not taking: Reported on 8/9/2024    Varsha Costello MD   furosemide (LASIX) 40 MG tablet Take 1 tablet by mouth Daily.  Patient not taking: Reported on 8/9/2024    Varsha Costello MD   gabapentin (NEURONTIN) 300 MG capsule Take 1 capsule by mouth 3 (Three) Times a Day.  Patient not taking: Reported on 8/9/2024    Varsha Costello MD   HYDROcodone-acetaminophen (NORCO) 5-325 MG per tablet Take 1 tablet by mouth Every 6 (Six) Hours As Needed.  Patient not taking: Reported on 8/9/2024    Varsha Costello MD   hydrOXYzine (ATARAX) 25 MG tablet Take 1 tablet by mouth 3 (Three) Times a Day As Needed for Itching.  Patient not taking: Reported on 8/9/2024    Varsha Costello MD   lactulose (CHRONULAC) 10 GM/15ML solution solution (encephalopathy)  7/22/24   Varsha Costello MD   mirtazapine (REMERON) 30 MG tablet 1 tablet.  Patient not taking: Reported on 8/9/2024 7/30/24   Varsha Costello MD   nicotine polacrilex (NICORETTE) 2 MG gum Take 1 each by mouth Every 2 (Two) Hours As Needed.  Patient not taking: Reported on 8/9/2024 7/30/24 10/28/24  Varsha Costello MD   oxyCODONE (OXY-IR) 5 MG capsule Take 1 capsule by mouth Every 4 (Four) Hours As Needed for Moderate Pain.  Patient not taking: Reported on 8/9/2024    Varsha Costello MD   pantoprazole (PROTONIX) 40 MG EC tablet Take 1 tablet by mouth 2 (Two) Times a Day.  Patient not taking: Reported on 8/9/2024    Varsha Costello MD   propranolol (INDERAL) 10 MG tablet Take 1 tablet by mouth 3 (Three) Times a Day.  Patient not taking: Reported on 8/9/2024    Varsha Costello MD   spironolactone (ALDACTONE) 100 MG tablet Take 1 tablet by mouth Daily.  Patient not taking: Reported on 8/9/2024    Varsha Costello MD       Allergies:  Patient has no known allergies.    Scheduled Meds:pantoprazole, 40 mg, Oral, Q AM      Continuous Infusions:   PRN Meds:.   "[COMPLETED] Insert Peripheral IV **AND** sodium chloride      OBJECTIVE    Vital Signs  Vitals:    08/15/24 0031 08/15/24 0101 08/15/24 0123 08/15/24 0541   BP: 108/66 101/64  95/54   BP Location:    Right arm   Patient Position:    Lying   Pulse: 59 64 (!) 46 61   Resp:    13   Temp:    97.4 °F (36.3 °C)   TempSrc:    Oral   SpO2: 99% 96% 99% 99%   Weight:    81.6 kg (179 lb 14.3 oz)   Height:    165.1 cm (65\")       Flowsheet Rows      Flowsheet Row First Filed Value   Admission Height 170.2 cm (67\") Documented at 08/08/2024 2109   Admission Weight 93.4 kg (205 lb 14.6 oz) Documented at 08/08/2024 2109              Intake/Output Summary (Last 24 hours) at 8/15/2024 0628  Last data filed at 8/15/2024 0615  Gross per 24 hour   Intake 120 ml   Output --   Net 120 ml          Telemetry: Sinus rhythm/sinus bradycardia    Physical Exam:  The patient is alert, oriented and in no distress.  Obese  Vital signs as noted above.  Head and neck revealed no carotid bruits or jugular venous distention.  No thyromegaly or lymphadenopathy is present  Lungs clear.  No wheezing.  Breath sounds are normal bilaterally.  Heart normal first and second heart sounds.  No murmur. No precordial rub is present.  No gallop is present.  Abdomen soft and nontender.  No organomegaly is present.  Extremities with good peripheral pulses without any pedal edema.  Skin warm and dry.  Musculoskeletal system is grossly normal.  CNS grossly normal.       Results Review:  I have personally reviewed the results from the time of this admission to 8/15/2024 06:28 EDT and agree with these findings:  []  Laboratory  []  Microbiology  []  Radiology  []  EKG/Telemetry   []  Cardiology/Vascular   []  Pathology  []  Old records  []  Other:    Most notable findings include:    Lab Results (last 24 hours)       Procedure Component Value Units Date/Time    Comprehensive Metabolic Panel [[id number]]  (Abnormal) Collected: 08/15/24 0325    Specimen: Blood Updated: " 08/15/24 0352     Glucose 115 mg/dL      BUN 10 mg/dL      Creatinine 0.67 mg/dL      Sodium 138 mmol/L      Potassium 3.8 mmol/L      Chloride 106 mmol/L      CO2 24.5 mmol/L      Calcium 8.7 mg/dL      Total Protein 6.4 g/dL      Albumin 2.5 g/dL      ALT (SGPT) 48 U/L      AST (SGOT) 81 U/L      Alkaline Phosphatase 137 U/L      Total Bilirubin 3.3 mg/dL      Globulin 3.9 gm/dL      A/G Ratio 0.6 g/dL      BUN/Creatinine Ratio 14.9     Anion Gap 7.5 mmol/L      eGFR 110.0 mL/min/1.73     Narrative:      GFR Normal >60  Chronic Kidney Disease <60  Kidney Failure <15      CBC & Differential [[id number]]  (Abnormal) Collected: 08/15/24 0245    Specimen: Blood Updated: 08/15/24 0322    Narrative:      The following orders were created for panel order CBC & Differential.  Procedure                               Abnormality         Status                     ---------                               -----------         ------                     CBC Auto Differential[[id number]]        Abnormal            Final result               Scan Slide[[id number]]                                       Final result                 Please view results for these tests on the individual orders.    CBC Auto Differential [[id number]]  (Abnormal) Collected: 08/15/24 0245    Specimen: Blood Updated: 08/15/24 0322     WBC 3.80 10*3/mm3      RBC 2.75 10*6/mm3      Hemoglobin 9.5 g/dL      Hematocrit 30.7 %      .6 fL      MCH 34.5 pg      MCHC 30.9 g/dL      RDW 13.8 %      RDW-SD 56.7 fl      MPV 11.4 fL      Platelets 76 10*3/mm3     Scan Slide [[id number]] Collected: 08/15/24 0245    Specimen: Blood Updated: 08/15/24 0322     Scan Slide --     Comment: See Manual Differential Results       Manual Differential [[id number]]  (Abnormal) Collected: 08/15/24 0245    Specimen: Blood Updated: 08/15/24 0322     Neutrophil % 42.0 %      Lymphocyte % 41.0 %      Monocyte % 7.0 %      Eosinophil % 4.0 %      Basophil % 1.0 %      Bands %  2.0 %       Metamyelocyte % 1.0 %      Atypical Lymphocyte % 2.0 %      Neutrophils Absolute 1.67 10*3/mm3      Lymphocytes Absolute 1.63 10*3/mm3      Monocytes Absolute 0.27 10*3/mm3      Eosinophils Absolute 0.15 10*3/mm3      Basophils Absolute 0.04 10*3/mm3      Anisocytosis Slight/1+     Crenated RBC's Slight/1+     Macrocytes Slight/1+     Poikilocytes Slight/1+     WBC Morphology Normal     Platelet Estimate Decreased    COVID-19, FLU A/B, RSV PCR 1 HR TAT - Swab, Nasopharynx [[id number]]  (Normal) Collected: 08/14/24 2317    Specimen: Swab from Nasopharynx Updated: 08/14/24 2358     COVID19 Not Detected     Influenza A PCR Not Detected     Influenza B PCR Not Detected     RSV, PCR Not Detected    Narrative:      Fact sheet for providers: https://www.fda.gov/media/609650/download    Fact sheet for patients: https://www.fda.gov/media/495947/download    Test performed by PCR.    High Sensitivity Troponin T 2Hr [[id number]] Collected: 08/14/24 2233    Specimen: Blood Updated: 08/14/24 2301     HS Troponin T <6 ng/L      Troponin T Delta --     Comment: Unable to calculate.       Narrative:      High Sensitive Troponin T Reference Range:  <14.0 ng/L- Negative Female for AMI  <22.0 ng/L- Negative Male for AMI  >=14 - Abnormal Female indicating possible myocardial injury.  >=22 - Abnormal Male indicating possible myocardial injury.   Clinicians would have to utilize clinical acumen, EKG, Troponin, and serial changes to determine if it is an Acute Myocardial Infarction or myocardial injury due to an underlying chronic condition.         CBC & Differential [[id number]]  (Abnormal) Collected: 08/14/24 1925    Specimen: Blood from Arm, Left Updated: 08/14/24 2017    Narrative:      The following orders were created for panel order CBC & Differential.  Procedure                               Abnormality         Status                     ---------                               -----------         ------                     CBC  Auto Differential[[id number]]        Abnormal            Final result               Scan Slide[[id number]]                                       Final result                 Please view results for these tests on the individual orders.    Scan Slide [[id number]] Collected: 08/14/24 1925    Specimen: Blood from Arm, Left Updated: 08/14/24 2017     Scan Slide --     Comment: See Manual Differential Results       Manual Differential [[id number]]  (Abnormal) Collected: 08/14/24 1925    Specimen: Blood from Arm, Left Updated: 08/14/24 2017     Neutrophil % 44.0 %      Lymphocyte % 35.0 %      Monocyte % 15.0 %      Eosinophil % 2.0 %      Bands %  2.0 %      Atypical Lymphocyte % 2.0 %      Neutrophils Absolute 2.10 10*3/mm3      Lymphocytes Absolute 1.69 10*3/mm3      Monocytes Absolute 0.68 10*3/mm3      Eosinophils Absolute 0.09 10*3/mm3      Anisocytosis Slight/1+     Macrocytes Slight/1+     WBC Morphology Normal     Platelet Estimate Decreased    CBC Auto Differential [[id number]]  (Abnormal) Collected: 08/14/24 1925    Specimen: Blood from Arm, Left Updated: 08/14/24 2017     WBC 4.56 10*3/mm3      RBC 2.90 10*6/mm3      Hemoglobin 9.9 g/dL      Hematocrit 31.2 %      .6 fL      MCH 34.1 pg      MCHC 31.7 g/dL      RDW 13.8 %      RDW-SD 54.8 fl      MPV 10.8 fL      Platelets 86 10*3/mm3     Narrative:      The previously reported component NRBC is no longer being reported. Previous result was 0.0 /100 WBC (Reference Range: 0.0-0.2 /100 WBC) on 8/14/2024 at [address] [place], [place] [address]T.    Ammonia [[id number]]  (Abnormal) Collected: 08/14/24 1936    Specimen: Blood Updated: 08/14/24 2004     Ammonia 82 umol/L     Comprehensive Metabolic Panel [[id number]]  (Abnormal) Collected: 08/14/24 1925    Specimen: Blood from Arm, Left Updated: 08/14/24 2004     Glucose 126 mg/dL      BUN 11 mg/dL      Creatinine 0.69 mg/dL      Sodium 136 mmol/L      Potassium 4.0 mmol/L      Chloride 102 mmol/L      CO2 25.4 mmol/L      Calcium 9.1 mg/dL       Total Protein 6.9 g/dL      Albumin 2.6 g/dL      ALT (SGPT) 51 U/L      AST (SGOT) 89 U/L      Alkaline Phosphatase 146 U/L      Total Bilirubin 3.7 mg/dL      Globulin 4.3 gm/dL      A/G Ratio 0.6 g/dL      BUN/Creatinine Ratio 15.9     Anion Gap 8.6 mmol/L      eGFR 109.2 mL/min/1.73     Narrative:      GFR Normal >60  Chronic Kidney Disease <60  Kidney Failure <15      BNP [[id number]]  (Normal) Collected: 08/14/24 1925    Specimen: Blood from Arm, Left Updated: 08/14/24 2004     proBNP 122.0 pg/mL     Narrative:      This assay is used as an aid in the diagnosis of individuals suspected of having heart failure. It can be used as an aid in the diagnosis of acute decompensated heart failure (ADHF) in patients presenting with signs and symptoms of ADHF to the emergency department (ED). In addition, NT-proBNP of <300 pg/mL indicates ADHF is not likely.    Age Range Result Interpretation  NT-proBNP Concentration (pg/mL:      <50             Positive            >450                   Gray                 300-450                    Negative             <300    50-75           Positive            >900                  Gray                300-900                  Negative            <300      >75             Positive            >1800                  Gray                300-1800                  Negative            <300    High Sensitivity Troponin T [[id number]]  (Normal) Collected: 08/14/24 1925    Specimen: Blood from Arm, Left Updated: 08/14/24 2004     HS Troponin T <6 ng/L     Narrative:      High Sensitive Troponin T Reference Range:  <14.0 ng/L- Negative Female for AMI  <22.0 ng/L- Negative Male for AMI  >=14 - Abnormal Female indicating possible myocardial injury.  >=22 - Abnormal Male indicating possible myocardial injury.   Clinicians would have to utilize clinical acumen, EKG, Troponin, and serial changes to determine if it is an Acute Myocardial Infarction or myocardial injury due to an underlying chronic  "condition.         D-dimer, Quantitative [[id number]]  (Abnormal) Collected: 08/14/24 1925    Specimen: Blood from Arm, Left Updated: 08/14/24 1953     D-Dimer, Quantitative 1.42 mg/L (FEU)     Narrative:      According to the assay 's published package insert, a normal (<0.50 mg/L (FEU)) D-dimer result in conjunction with a non-high clinical probability assessment, excludes deep vein thrombosis (DVT) and pulmonary embolism (PE) with high sensitivity.    D-dimer values increase with age and this can make VTE exclusion of an older population difficult. To address this, the [employer], based on best available evidence and recent guidelines, recommends that clinicians use age-adjusted D-dimer thresholds in patients greater than 50 years of age with: a) a low probability of PE who do not meet all Pulmonary Embolism Rule Out Criteria, or b) in those with intermediate probability of PE.   The formula for an age-adjusted D-dimer cut-off is \"age/100\".  For example, a 60 year old patient would have an age-adjusted cut-off of 0.60 mg/L (FEU) and an 80 year old 0.80 mg/L (FEU).    Extra Tubes [[id number]] Collected: 08/14/24 1925    Specimen: Blood from Arm, Left Updated: 08/14/24 1946    Narrative:      The following orders were created for panel order Extra Tubes.  Procedure                               Abnormality         Status                     ---------                               -----------         ------                     [facility][[id number]]                                   Final result                 Please view results for these tests on the individual orders.    Gold [facility] - [facility] [[id number]] Collected: 08/14/24 1925    Specimen: Blood from Arm, Left Updated: 08/14/24 1946     Extra Tube Hold for add-ons.     Comment: Auto resulted.               Imaging Results (Last 24 Hours)       Procedure Component Value Units Date/Time    CT Angiogram Chest Pulmonary Embolism " [[id number]] Collected: 08/14/24 2148     Updated: 08/14/24 2159    Narrative:      CT ANGIOGRAM CHEST PULMONARY EMBOLISM    Date of Exam: 8/14/2024 9:08 PM EDT    Indication: sob. Elevated D-dimer    Comparison: CT abdomen and pelvis with contrast 8/9/2024    Technique: Axial CT images were obtained of the chest after the uneventful intravenous administration of iodinated contrast utilizing pulmonary embolism protocol.  Sagittal and coronal reconstructions were performed.  Automated exposure control and   iterative reconstruction methods were used.    Findings:  Visualized soft tissues of the lower neck are without acute abnormality. The heart is mildly enlarged. Aortic valve calcifications. Negative for aortic aneurysm. Coronary artery calcifications are present. No pericardial effusion. No pleural effusion.   Pulmonary arteries are well-opacified with contrast. Negative for pulmonary embolus. No mediastinal or hilar adenopathy. No axillary adenopathy.    Trachea and mainstem bronchi are patent. Emphysema. No pneumothorax. Minimal dependent basilar atelectasis. No findings of pneumonia. No suspicious pulmonary nodule. Calcified left lower lobe granuloma.    Upper abdomen demonstrates small perihepatic ascites. Micronodular cirrhotic liver morphology. Mild splenomegaly unchanged. Normal adrenal glands. Large amount of stool in the colon at the hepatic flexure partially imaged. Upper abdominal aortic branch   vasculature patent. No aggressive osseous lesion.      Impression:      Impression:  1. Negative for pulmonary embolus.  2. No acute intrathoracic process.  3. Suspected cirrhotic liver with small upper abdominal ascites and splenomegaly.  4. Emphysema, coronary artery calcifications, and additional chronic findings above.        Electronically Signed: Sage Sharma MD    8/14/2024 9:57 PM EDT    Workstation ID: [id number]    XR Chest 1 View [[id number]] Collected: 08/14/24 2008     Updated: 08/14/24 2011     Narrative:      XR CHEST 1 VW    Date of Exam: 8/14/2024 7:45 PM EDT    Indication: sob    Comparison: CT abdomen pelvis 8/9/2024    Findings:  Elevated right diaphragm. Cardiomegaly. Left lower lobe airspace opacity atelectasis versus pneumonia similar to previous study. No definite pleural effusion. Pulmonary vessels are distinct.      Impression:      Impression:  No change from CT abdomen pelvis appearance of chest. Left lower lobe airspace opacity atelectasis versus pneumonia.      Electronically Signed: Karen Salgado MD    8/14/2024 8:09 PM EDT    Workstation ID: [id number]            LAB RESULTS (LAST 7 DAYS)    CBC  Results from last 7 days   Lab Units 08/15/24  0245 08/14/24 1925 08/11/24  0617 08/10/24  0605 08/09/24  0546 08/08/24  2329   WBC 10*3/mm3 3.80 4.56 6.75 5.17 3.21* 4.43   RBC 10*6/mm3 2.75* 2.90* 2.78* 2.56* 2.35* 2.54*   HEMOGLOBIN g/dL 9.5* 9.9* 9.7* 9.0* 8.2* 9.2*   HEMATOCRIT % 30.7* 31.2* 30.3* 27.9* 26.4* 28.3*   MCV fL 111.6* 107.6* 109.0* 109.0* 112.3* 111.4*   PLATELETS 10*3/mm3 76* 86* 89* 71* 63* 74*       BMP  Results from last 7 days   Lab Units 08/15/24  0325 08/14/24 1925 08/12/24  0325 08/11/24  0617 08/10/24  2317 08/10/24  0605 08/09/24  1355 08/09/24  0546 08/08/24  2329   SODIUM mmol/L 138 136 134* 136  --  137  --  137 137   POTASSIUM mmol/L 3.8 4.0 3.5 3.9 4.0 3.5  --  3.9 4.2   CHLORIDE mmol/L 106 102 99 103  --  107  --  109* 106   CO2 mmol/L 24.5 25.4 27.1 23.5  --  23.0  --  20.7* 21.4*   BUN mg/dL 10 11 8 10  --  12  --  12 9   CREATININE mg/dL 0.67 0.69 0.76 0.72  --  0.71  --  0.90 0.86   GLUCOSE mg/dL 115* 126* 104* 86  --  103*  --  97 97   MAGNESIUM mg/dL  --   --   --   --   --   --  1.6  --   --        CMP   Results from last 7 days   Lab Units 08/15/24  0325 08/14/24  1936 08/14/24  1925 08/12/24  0325 08/11/24  0617 08/10/24  2317 08/10/24  0605 08/09/24  1355 08/09/24  0546 08/08/24  2329   SODIUM mmol/L 138  --  136 134* 136  --  137  --  137 137   POTASSIUM  mmol/L 3.8  --  4.0 3.5 3.9 4.0 3.5  --  3.9 4.2   CHLORIDE mmol/L 106  --  102 99 103  --  107  --  109* 106   CO2 mmol/L 24.5  --  25.4 27.1 23.5  --  23.0  --  20.7* 21.4*   BUN mg/dL 10  --  11 8 10  --  12  --  12 9   CREATININE mg/dL 0.67  --  0.69 0.76 0.72  --  0.71  --  0.90 0.86   GLUCOSE mg/dL 115*  --  126* 104* 86  --  103*  --  97 97   ALBUMIN g/dL 2.5*  --  2.6* 2.6* 2.5*  --  2.4* 2.6*  --  2.8*   BILIRUBIN mg/dL 3.3*  --  3.7* 3.7* 3.8*  --  3.1* 3.8*  --  3.9*   ALK PHOS U/L 137*  --  146* 149* 158*  --  150* 131*  --  166*   AST (SGOT) U/L 81*  --  89* 104* 107*  --  102* 103*  --  121*   ALT (SGPT) U/L 48*  --  51* 54* 54*  --  51* 53*  --  61*   LIPASE U/L  --   --   --   --   --   --   --   --   --  57   AMMONIA umol/L  --  82*  --   --   --   --   --   --  125*  --        BNP        TROPONIN  Results from last 7 days   Lab Units 08/14/24  2233   HSTROP T ng/L <6       CoAg  Results from last 7 days   Lab Units 08/11/24  0617 08/09/24  1041   INR  1.49* 1.50*       Creatinine Clearance  Estimated Creatinine Clearance: 111.8 mL/min (by C-G formula based on SCr of 0.67 mg/dL).    ABG        Radiology  CT Angiogram Chest Pulmonary Embolism    Result Date: 8/14/2024  Impression: 1. Negative for pulmonary embolus. 2. No acute intrathoracic process. 3. Suspected cirrhotic liver with small upper abdominal ascites and splenomegaly. 4. Emphysema, coronary artery calcifications, and additional chronic findings above. Electronically Signed: Sage Sharma MD  8/14/2024 9:57 PM EDT  Workstation ID: [id number]    XR Chest 1 View    Result Date: 8/14/2024  Impression: No change from CT abdomen pelvis appearance of chest. Left lower lobe airspace opacity atelectasis versus pneumonia. Electronically Signed: Karen Salgado MD  8/14/2024 8:09 PM EDT  Workstation ID: [id number]       EKG  I personally viewed and interpreted the patient's EKG/Telemetry data:  ECG 12 Lead Chest Pain   Preliminary Result   HEART RATE=44  bpm    RR Sorkjcup=8779  ms   AZ Hsgstnnr=873  ms   P Horizontal Axis=29  deg   P Front Axis=14  deg   QRSD Ucraswyl=666  ms   QT Vandjznj=967  ms   VWcL=824  ms   QRS Axis=38  deg   T Wave Axis=37  deg   - BORDERLINE ECG -   Bradycardia with irregular rate   Date and Time of Study:2024-08-14 19:12:50      Telemetry Scan   Final Result            Echocardiogram:    Results for orders placed during the hospital encounter of 08/08/24    Adult Transthoracic Echo Complete W/ Cont if Necessary Per Protocol    Interpretation Summary    Left ventricular systolic function is normal. Calculated left ventricular 3D EF = 61% Left ventricular ejection fraction appears to be 61 - 65%.    Left ventricular diastolic function is consistent with (grade II w/high LAP) pseudonormalization.    Mild to moderate aortic valve stenosis is present. Mean/peak gradient of 17/31 mmHg.  Aortic valve area 1.5 cm²    Estimated right ventricular systolic pressure from tricuspid regurgitation is normal (<35 mmHg).        Stress Test:         Cardiac Catheterization:  No results found for this or any previous visit.         Other:         ASSESSMENT & PLAN:    Principal Problem:    Bradycardia  Active Problems:    Alcoholic cirrhosis of liver with ascites    Alcohol abuse    Chronic GERD    Major depressive disorder, recurrent episode, moderate degree    Tobacco abuse    Thrombocytopenia    Macrocytic anemia    Hyperammonemia    Arrhythmia/bradycardia  Questionable paroxysmal A-fib with slow ventricular rate  She remained in sinus rhythm throughout the previous hospital admission  Bradycardia is secondary to underlying liver disease.  Echocardiogram with preserved LV function, grade 2 diastolic dysfunction  TSH is normal  Unable to tolerate beta-blocker due to bradycardia  Good chronotropic response as assessed at bedside.  Heart rate increases to 70s and 80s with minimal movement.  Unable to tolerate anticoagulation due to esophageal varices, anemia,  thrombocytopenia and high risk of bleeding.  14 days Holter monitor at the time of discharge.      Alcoholic cirrhosis of liver with ascites and Esophageal varices / Alcohol abuse  Anasarca   History of large volume paracentesis in the past  Ceftriaxone for SBP  Albumin and Lasix  On lactulose and rifaximin for hyperammonemia      Hypotension  Likely secondary to liver disease and possible sepsis  Currently on midodrine with normal blood pressure  Albumin replacement  Will closely monitor blood pressure    Thrombocytopenia / Macrocytic anemia  Likely secondary to chronic liver disease  Platelets 76 and hemoglobin 9.5 today  INR is 1.49  Monitor CBC  Monitor for signs of bleeding     Chronic hepatitis C virus infection  Unsure if patient has received treatment in the past     Tobacco abuse  Smoking cessation counseling provided to the patient      Jayson Corbin MD  08/15/24  06:28 EDT                 oriented to person, place, time and situation

## 2024-08-27 NOTE — PHYSICAL THERAPY INITIAL EVALUATION ADULT - PLANNED THERAPY INTERVENTIONS, PT EVAL
balance training/bed mobility training/gait training/strengthening/transfer training X Size Of Lesion In Cm: 0

## 2024-11-21 NOTE — H&P ADULT - TIME BILLING
Last seen in office visit: 9/10/24  Next scheduled office visit: 1/27/25  Last UDS results: compliant  Any discrepancies on Sabino (such as refills from another provider): none  
Review of laboratory data, radiology results, consultant recommendations, EMR documentation, discussion with patient/residents/advanced care providers and interdisciplinary staff.

## 2024-12-19 NOTE — ED ADULT TRIAGE NOTE - NS ED TRIAGE AVPU SCALE
Yes...
Alert-The patient is alert, awake and responds to voice. The patient is oriented to time, place, and person. The triage nurse is able to obtain subjective information.